# Patient Record
Sex: FEMALE | NOT HISPANIC OR LATINO | Employment: FULL TIME | URBAN - METROPOLITAN AREA
[De-identification: names, ages, dates, MRNs, and addresses within clinical notes are randomized per-mention and may not be internally consistent; named-entity substitution may affect disease eponyms.]

---

## 2021-08-06 ENCOUNTER — THERAPY VISIT (OUTPATIENT)
Dept: PHYSICAL THERAPY | Facility: CLINIC | Age: 46
End: 2021-08-06
Payer: COMMERCIAL

## 2021-08-06 DIAGNOSIS — S39.012A STRAIN OF LUMBAR REGION, INITIAL ENCOUNTER: ICD-10-CM

## 2021-08-06 DIAGNOSIS — M25.561 CHRONIC PAIN OF RIGHT KNEE: ICD-10-CM

## 2021-08-06 DIAGNOSIS — G89.29 CHRONIC PAIN OF RIGHT KNEE: ICD-10-CM

## 2021-08-06 PROCEDURE — 97161 PT EVAL LOW COMPLEX 20 MIN: CPT | Mod: GP | Performed by: PHYSICAL THERAPIST

## 2021-08-06 PROCEDURE — 97110 THERAPEUTIC EXERCISES: CPT | Mod: GP | Performed by: PHYSICAL THERAPIST

## 2021-08-06 ASSESSMENT — ACTIVITIES OF DAILY LIVING (ADL)
GO UP STAIRS: ACTIVITY IS FAIRLY DIFFICULT
STAND: ACTIVITY IS FAIRLY DIFFICULT
GIVING WAY, BUCKLING OR SHIFTING OF KNEE: THE SYMPTOM PREVENTS ME FROM ALL DAILY ACTIVITIES
SWELLING: THE SYMPTOM PREVENTS ME FROM ALL DAILY ACTIVITIES
HOW_WOULD_YOU_RATE_THE_OVERALL_FUNCTION_OF_YOUR_KNEE_DURING_YOUR_USUAL_DAILY_ACTIVITIES?: ABNORMAL
KNEEL ON THE FRONT OF YOUR KNEE: NOT ANSWERED
GO DOWN STAIRS: NOT ANSWERED
SQUAT: ACTIVITY IS FAIRLY DIFFICULT
PAIN: THE SYMPTOM PREVENTS ME FROM ALL DAILY ACTIVITIES
SIT WITH YOUR KNEE BENT: ACTIVITY IS VERY DIFFICULT
AS_A_RESULT_OF_YOUR_KNEE_INJURY,_HOW_WOULD_YOU_RATE_YOUR_CURRENT_LEVEL_OF_DAILY_ACTIVITY?: ABNORMAL
WALK: ACTIVITY IS FAIRLY DIFFICULT
LIMPING: I DO NOT HAVE THE SYMPTOM
KNEE_ACTIVITY_OF_DAILY_LIVING_SUM: 19
RISE FROM A CHAIR: NOT ANSWERED
HOW_WOULD_YOU_RATE_THE_CURRENT_FUNCTION_OF_YOUR_KNEE_DURING_YOUR_USUAL_DAILY_ACTIVITIES_ON_A_SCALE_FROM_0_TO_100_WITH_100_BEING_YOUR_LEVEL_OF_KNEE_FUNCTION_PRIOR_TO_YOUR_INJURY_AND_0_BEING_THE_INABILITY_TO_PERFORM_ANY_OF_YOUR_USUAL_DAILY_ACTIVITIES?: 0
STIFFNESS: NOT ANSWERED
WEAKNESS: I DO NOT HAVE THE SYMPTOM

## 2021-08-06 NOTE — PROGRESS NOTES
Physical Therapy Initial Evaluation  Subjective:  The history is provided by the patient. No  was used.   Therapist Generated HPI Evaluation  Problem details: Pt reports several years of knee pain without known cause. Does yoga regularly and a few weeks ago (July 2021) was bending forward and noticed soreness and stiffness in her back. Her  had to help her stand back up. The onset of the back pain has aggravated her R knee pain. MD referred to PT. .         Type of problem:  Right knee.    This is a chronic condition.  Condition occurred with:  Insidious onset.  Where condition occurred: during recreation/sport.  Patient reports pain:  Anterior.  Pain is described as aching and is intermittent.  Pain is the same all the time.  Since onset symptoms are unchanged.  Associated symptoms:  Edema and tingling (R foot tingling since back pain onset a couple weeks ago). Symptoms are exacerbated by walking, descending stairs and ascending stairs  and relieved by rest.  Special tests included:  X-ray (neg).    Restrictions due to condition include:  Working in normal job without restrictions.  Barriers include:  None as reported by patient.    Patient Health History           General health as reported by patient is good.  Pertinent medical history includes: none.   Red flags:  None as reported by patient.  Medical allergies: none.   Surgeries include:  None.    Current medications:  Pain medication.    Current occupation is IT.   Primary job tasks include:  Computer work and prolonged sitting.                                    Objective:  Standing Alignment:        Lumbar:  Lordosis decr                                                             Hip Evaluation    Hip Strength:        Abduction:  Left: 4+/5     Pain:Right: 3+/5    Pain:                           Knee Evaluation:  ROM:  Strength:  Normal  AROM    Hyperextension:  Left:  12    Right: 4    Flexion: Left: 135    Right:  134              Palpation:  Not Assessed      Edema:  Normal              Sami Lumbar Evaluation    Posture:  Sitting: poor            Movement Loss:  Flexion (Flex): nil  Extension (EXT): nil  Side Glide R (SG R): nil  Side Glide L (SG L): nil and pain                                               ROS    Assessment/Plan:    Patient is a 45 year old female with right side knee complaints.    Patient has the following significant findings with corresponding treatment plan.                Diagnosis 1:  Chronic R patellofemoral pain, recent exacerbation following low back strain  Pain -  manual therapy, self management, education, directional preference exercise and home program  Decreased ROM/flexibility - manual therapy, therapeutic exercise, therapeutic activity and home program  Decreased joint mobility - manual therapy, therapeutic exercise, therapeutic activity and home program  Decreased strength - therapeutic exercise, therapeutic activities and home program  Inflammation - self management/home program  Decreased function - therapeutic activities and home program  Impaired posture - neuro re-education, therapeutic activities and home program      Cumulative Therapy Evaluation is: Low complexity.    Previous and current functional limitations:  (See Goal Flow Sheet for this information)    Short term and Long term goals: (See Goal Flow Sheet for this information)     Communication ability:  Patient appears to be able to clearly communicate and understand verbal and written communication and follow directions correctly.  Treatment Explanation - The following has been discussed with the patient:   RX ordered/plan of care  Anticipated outcomes  Possible risks and side effects  This patient would benefit from PT intervention to resume normal activities.   Rehab potential is excellent.    Frequency:  1 X week, once daily  Duration:  for 6 weeks  Discharge Plan:  Achieve all LTG.  Independent in home treatment  program.  Reach maximal therapeutic benefit.    Please refer to the daily flowsheet for treatment today, total treatment time and time spent performing 1:1 timed codes.

## 2021-08-06 NOTE — LETTER
[facility]  [address]  [place] [address]  [phone]    2021    Re: Sharon Fong   : 1975  MRN: [id number]   REFERRING PHYSICIAN:   Lashon AIKEN [facility]    Date of Initial Evaluation:  2021  Visits:  Rxs Used: 1  Reason for Referral:     Chronic pain of right knee  Strain of lumbar region, initial encounter    EVALUATION SUMMARY    Physical Therapy Initial Evaluation  Subjective:  The history is provided by the patient. No  was used.   Therapist Generated HPI Evaluation  Problem details: Pt reports several years of knee pain without known cause. Does yoga regularly and a few weeks ago (2021) was bending forward and noticed soreness and stiffness in her back. Her  had to help her stand back up. The onset of the back pain has aggravated her R knee pain. MD referred to PT. .         Type of problem:  Right knee.  This is a chronic condition.  Condition occurred with:  Insidious onset.  Where condition occurred: during recreation/sport.  Patient reports pain:  Anterior.  Pain is described as aching and is intermittent.  Pain is the same all the time.  Since onset symptoms are unchanged.  Associated symptoms:  Edema and tingling (R foot tingling since back pain onset a couple weeks ago). Symptoms are exacerbated by walking, descending stairs and ascending stairs  and relieved by rest.  Special tests included:  X-ray (neg).  Restrictions due to condition include:  Working in normal job without restrictions.  Barriers include:  None as reported by patient.    Patient Health History  General health as reported by patient is good.  Pertinent medical history includes: none.   Red flags:  None as reported by patient.  Medical allergies: none.   Surgeries include:  None.    Current medications:  Pain medication.    Current occupation is IT.    Primary job tasks include:  Computer work and prolonged sitting.                Sharon Fong   : 1975- page 2      Objective:  Standing Alignment:    Lumbar:  Lordosis decr       Hip Evaluation  Hip Strength:    Abduction:  Left: 4+/5     Pain:Right: 3+/5    Pain:       Knee Evaluation:  ROM:  Strength:  Normal  AROM  Hyperextension:  Left:  12    Right: 4  Flexion: Left: 135    Right: 134  Palpation:  Not Assessed  Edema:  Normal      Sami Lumbar Evaluation  Posture:  Sitting: poor  Movement Loss:  Flexion (Flex): nil  Extension (EXT): nil  Side Glide R (SG R): nil  Side Glide L (SG L): nil and pain    Assessment/Plan:    Patient is a 45 year old female with right side knee complaints.    Patient has the following significant findings with corresponding treatment plan.                Diagnosis 1:  Chronic R patellofemoral pain, recent exacerbation following low back strain  Pain -  manual therapy, self management, education, directional preference exercise and home program  Decreased ROM/flexibility - manual therapy, therapeutic exercise, therapeutic activity and home program  Decreased joint mobility - manual therapy, therapeutic exercise, therapeutic activity and home program  Decreased strength - therapeutic exercise, therapeutic activities and home program  Inflammation - self management/home program  Decreased function - therapeutic activities and home program  Impaired posture - neuro re-education, therapeutic activities and home program  Cumulative Therapy Evaluation is: Low complexity.  Previous and current functional limitations:  (See Goal Flow Sheet for this information)    Short term and Long term goals: (See Goal Flow Sheet for this information)   Communication ability:  Patient appears to be able to clearly communicate and understand verbal and written communication and follow directions correctly.  Treatment Explanation - The following has been discussed with the patient:   RX  ordered/plan of care  Anticipated outcomes  Possible risks and side effects  This patient would benefit from PT intervention to resume normal activities.   Rehab potential is excellent.    Sharon Fong   : 1975- page 3    Frequency:  1 X week, once daily  Duration:  for 6 weeks  Discharge Plan:  Achieve all LTG.  Independent in home treatment program.  Reach maximal therapeutic benefit.    Thank you for your referral.    INQUIRIES  Therapist: Desean Espinal, PT, DPT, Cert. MDT   [address]  [facility] [address]  Phone: [phone]  Fax: [phone]

## 2021-08-12 NOTE — PROGRESS NOTES
Subjective:  HPI  Physical Exam                    Objective:  System    Physical Exam      Sami Lumbar Evaluation      Movement Loss:  Flexion (Flex): nil  Extension (EXT): nil and min  Side Glide R (SG R): nil  Side Glide L (SG L): nil and pain                                               ROS    Assessment/Plan:    SUBJECTIVE  Subjective changes as noted by pt:  No change in back symptoms, still hurts when sitting for long periods. Knee doesn't feel too back. Cannot reach 30 reps on strengthening yet.        Current pain level: 3/10     Changes in function:  Yes (See Goal flowsheet attached for changes in current functional level)     Adverse reaction to treatment or activity:  None    OBJECTIVE  Changes in objective findings:  Yes, See physical exam section and/or daily flowsheet for response to repeated movements.           ASSESSMENT  Suparna continues to require intervention to meet STG and LTG's: PT  No change of symptoms has been noted.  Response to therapy has shown lack of progress in  pain level  Progress made towards STG/LTG?  Yes (See Goal flowsheet attached for updates on achievement of STG and LTG)    PLAN  Current treatment program is being advanced to more complex exercises.    PTA/ATC plan:  N/A    Please refer to the daily flowsheet for treatment today, total treatment time and time spent performing 1:1 timed codes.

## 2021-08-13 ENCOUNTER — THERAPY VISIT (OUTPATIENT)
Dept: PHYSICAL THERAPY | Facility: CLINIC | Age: 46
End: 2021-08-13
Payer: COMMERCIAL

## 2021-08-13 DIAGNOSIS — G89.29 CHRONIC PAIN OF RIGHT KNEE: ICD-10-CM

## 2021-08-13 DIAGNOSIS — M25.561 CHRONIC PAIN OF RIGHT KNEE: ICD-10-CM

## 2021-08-13 DIAGNOSIS — S39.012A STRAIN OF LUMBAR REGION, INITIAL ENCOUNTER: ICD-10-CM

## 2021-08-13 PROCEDURE — 97112 NEUROMUSCULAR REEDUCATION: CPT | Mod: GP | Performed by: PHYSICAL THERAPIST

## 2021-08-13 PROCEDURE — 97110 THERAPEUTIC EXERCISES: CPT | Mod: GP | Performed by: PHYSICAL THERAPIST

## 2021-08-20 ENCOUNTER — THERAPY VISIT (OUTPATIENT)
Dept: PHYSICAL THERAPY | Facility: CLINIC | Age: 46
End: 2021-08-20
Payer: COMMERCIAL

## 2021-08-20 DIAGNOSIS — G89.29 CHRONIC PAIN OF RIGHT KNEE: ICD-10-CM

## 2021-08-20 DIAGNOSIS — M25.561 CHRONIC PAIN OF RIGHT KNEE: ICD-10-CM

## 2021-08-20 DIAGNOSIS — S39.012A STRAIN OF LUMBAR REGION, INITIAL ENCOUNTER: ICD-10-CM

## 2021-08-20 PROCEDURE — 97110 THERAPEUTIC EXERCISES: CPT | Mod: GP | Performed by: PHYSICAL THERAPIST

## 2021-08-20 PROCEDURE — 97112 NEUROMUSCULAR REEDUCATION: CPT | Mod: GP | Performed by: PHYSICAL THERAPIST

## 2021-08-27 ENCOUNTER — THERAPY VISIT (OUTPATIENT)
Dept: PHYSICAL THERAPY | Facility: CLINIC | Age: 46
End: 2021-08-27
Payer: COMMERCIAL

## 2021-08-27 DIAGNOSIS — G89.29 CHRONIC PAIN OF RIGHT KNEE: ICD-10-CM

## 2021-08-27 DIAGNOSIS — S39.012A STRAIN OF LUMBAR REGION, INITIAL ENCOUNTER: ICD-10-CM

## 2021-08-27 DIAGNOSIS — M25.561 CHRONIC PAIN OF RIGHT KNEE: ICD-10-CM

## 2021-08-27 PROCEDURE — 97110 THERAPEUTIC EXERCISES: CPT | Mod: GP | Performed by: PHYSICAL THERAPIST

## 2021-08-27 PROCEDURE — 97112 NEUROMUSCULAR REEDUCATION: CPT | Mod: GP | Performed by: PHYSICAL THERAPIST

## 2021-09-03 ENCOUNTER — THERAPY VISIT (OUTPATIENT)
Dept: PHYSICAL THERAPY | Facility: CLINIC | Age: 46
End: 2021-09-03
Payer: COMMERCIAL

## 2021-09-03 DIAGNOSIS — G89.29 CHRONIC PAIN OF RIGHT KNEE: ICD-10-CM

## 2021-09-03 DIAGNOSIS — S39.012A STRAIN OF LUMBAR REGION, INITIAL ENCOUNTER: ICD-10-CM

## 2021-09-03 DIAGNOSIS — M25.561 CHRONIC PAIN OF RIGHT KNEE: ICD-10-CM

## 2021-09-03 PROCEDURE — 97112 NEUROMUSCULAR REEDUCATION: CPT | Mod: GP | Performed by: PHYSICAL THERAPIST

## 2021-09-03 PROCEDURE — 97110 THERAPEUTIC EXERCISES: CPT | Mod: GP | Performed by: PHYSICAL THERAPIST

## 2021-09-03 NOTE — PROGRESS NOTES
Subjective:  HPI  Physical Exam                    Objective:  System    Physical Exam    General     ROS    Assessment/Plan:    SUBJECTIVE  Subjective changes as noted by pt:  The back bothers more than the knee at this point. The knee feels about 50% improved overall. Can still see swelling in her knee. Maybe performing the pressups 2x/day, feels better in the morning. Standing for long periods and walking bothers the knee. Sitting worsens the back.        Current pain level: 2/10     Changes in function:  Yes (See Goal flowsheet attached for changes in current functional level)     Adverse reaction to treatment or activity:  None    OBJECTIVE  Changes in objective findings:  Yes, See physical exam section and/or daily flowsheet for response to repeated movements.           ASSESSMENT  Suparna continues to require intervention to meet STG and LTG's: PT  No change of symptoms has been noted.  Response to therapy has shown lack of progress in  pain level  Progress made towards STG/LTG?  Yes (See Goal flowsheet attached for updates on achievement of STG and LTG)    PLAN  Continue current treatment plan until patient demonstrates readiness to progress to higher level exercises.    PTA/ATC plan:  N/A    Please refer to the daily flowsheet for treatment today, total treatment time and time spent performing 1:1 timed codes.

## 2021-10-17 ENCOUNTER — HEALTH MAINTENANCE LETTER (OUTPATIENT)
Age: 46
End: 2021-10-17

## 2021-10-20 PROBLEM — M25.561 RIGHT KNEE PAIN: Status: RESOLVED | Noted: 2021-08-06 | Resolved: 2021-10-20

## 2021-10-20 PROBLEM — S39.012A LOW BACK STRAIN: Status: RESOLVED | Noted: 2021-08-06 | Resolved: 2021-10-20

## 2021-10-20 NOTE — PROGRESS NOTES
Discharge Note    Progress reporting period is from initial evaluation date (please see noted date below) to Sep 3, 2021.  No linked episodes      Sharon failed to follow up and current status is unknown.  Please see information below for last relevant information on current status.  Patient seen for 5 visits.    SUBJECTIVE  Subjective changes noted by patient:     .  Current pain level is  .     Previous pain level was  4/10.   Changes in function:  Yes (See Goal flowsheet attached for changes in current functional level)  Adverse reaction to treatment or activity: None    OBJECTIVE  Changes noted in objective findings:       ASSESSMENT/PLAN  Diagnosis: chronic R patellofemoral pain, exacerbated after recent low back strain   Updated problem list and treatment plan:   Pain - HEP  Decreased ROM/flexibility - HEP  Decreased function - HEP  Decreased strength - HEP  STG/LTGs have been met or progress has been made towards goals:  Yes, please see goal flowsheet for most current information  Assessment of Progress: current status is unknown.    Last current status:     Self Management Plans:  HEP  I have re-evaluated this patient and find that the nature, scope, duration and intensity of the therapy is appropriate for the medical condition of the patient.  Sharon continues to require the following intervention to meet STG and LTG's:  HEP.    Recommendations:  Discharge with current home program.  Patient to follow up with MD as needed.    Please refer to the daily flowsheet for treatment today, total treatment time and time spent performing 1:1 timed codes.

## 2022-08-09 ENCOUNTER — LAB REQUISITION (OUTPATIENT)
Dept: LAB | Facility: CLINIC | Age: 47
End: 2022-08-09
Payer: COMMERCIAL

## 2022-08-09 DIAGNOSIS — Z01.411 ENCOUNTER FOR GYNECOLOGICAL EXAMINATION (GENERAL) (ROUTINE) WITH ABNORMAL FINDINGS: ICD-10-CM

## 2022-08-09 LAB
CHOLEST SERPL-MCNC: 194 MG/DL
ERYTHROCYTE [DISTWIDTH] IN BLOOD BY AUTOMATED COUNT: 14.2 % (ref 10–15)
HBA1C MFR BLD: 5.8 %
HCT VFR BLD AUTO: 37.3 % (ref 35–47)
HDLC SERPL-MCNC: 37 MG/DL
HGB BLD-MCNC: 11.6 G/DL (ref 11.7–15.7)
LDLC SERPL CALC-MCNC: 122 MG/DL
MCH RBC QN AUTO: 27 PG (ref 26.5–33)
MCHC RBC AUTO-ENTMCNC: 31.1 G/DL (ref 31.5–36.5)
MCV RBC AUTO: 87 FL (ref 78–100)
NONHDLC SERPL-MCNC: 157 MG/DL
PLATELET # BLD AUTO: 230 10E3/UL (ref 150–450)
RBC # BLD AUTO: 4.3 10E6/UL (ref 3.8–5.2)
TRIGL SERPL-MCNC: 176 MG/DL
TSH SERPL DL<=0.005 MIU/L-ACNC: 3.09 UIU/ML (ref 0.3–4.2)
WBC # BLD AUTO: 8.8 10E3/UL (ref 4–11)

## 2022-08-09 PROCEDURE — 83036 HEMOGLOBIN GLYCOSYLATED A1C: CPT | Mod: ORL | Performed by: OBSTETRICS & GYNECOLOGY

## 2022-08-09 PROCEDURE — 80061 LIPID PANEL: CPT | Mod: ORL | Performed by: OBSTETRICS & GYNECOLOGY

## 2022-08-09 PROCEDURE — G0145 SCR C/V CYTO,THINLAYER,RESCR: HCPCS | Mod: ORL | Performed by: OBSTETRICS & GYNECOLOGY

## 2022-08-09 PROCEDURE — 85027 COMPLETE CBC AUTOMATED: CPT | Mod: ORL | Performed by: OBSTETRICS & GYNECOLOGY

## 2022-08-09 PROCEDURE — 84443 ASSAY THYROID STIM HORMONE: CPT | Mod: ORL | Performed by: OBSTETRICS & GYNECOLOGY

## 2022-08-09 PROCEDURE — 87624 HPV HI-RISK TYP POOLED RSLT: CPT | Mod: ORL | Performed by: OBSTETRICS & GYNECOLOGY

## 2022-08-13 LAB
BKR LAB AP GYN ADEQUACY: NORMAL
BKR LAB AP GYN INTERPRETATION: NORMAL
BKR LAB AP HPV REFLEX: NORMAL
BKR LAB AP LMP: NORMAL
BKR LAB AP PREVIOUS ABNL DX: NORMAL
BKR LAB AP PREVIOUS ABNORMAL: NORMAL
PATH REPORT.COMMENTS IMP SPEC: NORMAL
PATH REPORT.COMMENTS IMP SPEC: NORMAL
PATH REPORT.RELEVANT HX SPEC: NORMAL

## 2022-08-16 LAB
HUMAN PAPILLOMA VIRUS 16 DNA: NEGATIVE
HUMAN PAPILLOMA VIRUS 18 DNA: NEGATIVE
HUMAN PAPILLOMA VIRUS FINAL DIAGNOSIS: NORMAL
HUMAN PAPILLOMA VIRUS OTHER HR: NEGATIVE

## 2022-10-03 ENCOUNTER — HEALTH MAINTENANCE LETTER (OUTPATIENT)
Age: 47
End: 2022-10-03

## 2022-12-22 ENCOUNTER — LAB REQUISITION (OUTPATIENT)
Dept: LAB | Facility: CLINIC | Age: 47
End: 2022-12-22
Payer: COMMERCIAL

## 2022-12-22 DIAGNOSIS — R30.0 DYSURIA: ICD-10-CM

## 2022-12-22 PROCEDURE — 82565 ASSAY OF CREATININE: CPT | Mod: ORL | Performed by: OBSTETRICS & GYNECOLOGY

## 2022-12-22 PROCEDURE — 87086 URINE CULTURE/COLONY COUNT: CPT | Mod: ORL | Performed by: OBSTETRICS & GYNECOLOGY

## 2022-12-23 LAB
CREAT SERPL-MCNC: 0.63 MG/DL (ref 0.51–0.95)
GFR SERPL CREATININE-BSD FRML MDRD: >90 ML/MIN/1.73M2

## 2022-12-24 LAB — BACTERIA UR CULT: NO GROWTH

## 2023-02-11 ENCOUNTER — HEALTH MAINTENANCE LETTER (OUTPATIENT)
Age: 48
End: 2023-02-11

## 2024-03-09 ENCOUNTER — HEALTH MAINTENANCE LETTER (OUTPATIENT)
Age: 49
End: 2024-03-09

## 2024-03-28 ENCOUNTER — TRANSFERRED RECORDS (OUTPATIENT)
Dept: HEALTH INFORMATION MANAGEMENT | Facility: CLINIC | Age: 49
End: 2024-03-28
Payer: COMMERCIAL

## 2024-04-08 ENCOUNTER — TRANSCRIBE ORDERS (OUTPATIENT)
Dept: OTHER | Age: 49
End: 2024-04-08

## 2024-04-08 ENCOUNTER — PATIENT OUTREACH (OUTPATIENT)
Dept: ONCOLOGY | Facility: CLINIC | Age: 49
End: 2024-04-08
Payer: COMMERCIAL

## 2024-04-08 DIAGNOSIS — C50.919 BREAST CANCER (H): Primary | ICD-10-CM

## 2024-04-08 NOTE — TELEPHONE ENCOUNTER
"Action 2024 3:47 PM ABT   Action Taken Records from RN/Provider received and sent to HIM for upload. Pathology report from [facility] also received and sent to HIM for upload     RECORDS STATUS - BREAST    RECORDS REQUESTED FROM: [facility]   DATE REQUESTED:    NOTES DETAILS STATUS   OFFICE NOTE from referring provider SELF    OFFICE NOTE from medical oncologist [facility] 24: Dr. Marivel Lockett   OPERATIVE REPORT [facility] & [facility] 11/15/13: EXCISION AXILLARY BREAST TISSUE RIGHT     11: RIGHT PARTIAL MASTECTOMY    MEDICATION LIST -NM    LABS     PATHOLOGY REPORTS  (Tissue diagnosis, Stage, ER/AL percentage positive and intensity of staining, HER2 IHC, FISH, and all biopsies from breast and any distant metastasis)                 Req -[facility]  [employer] [facility]:  24: [id number]  11: [id number]  10/08/10: [id number]    [facility]:  11/15/19: [id number]  \"Negative for atypia and malignancy \"  11/15/13: [id number]  \"No atypia or malignancy\"   IMAGING (NEED IMAGES & REPORT)     MRI PACS [facility]:  11, 08/09/10: MR Breast   MAMMO PACS [facility]:  24-07/17/15   ULTRASOUND PACS [facility]:  24-05/06/10: US Breast  24: US Lymph Node     "

## 2024-04-08 NOTE — PROGRESS NOTES
"New Patient Oncology Nurse Navigator Note     Referring provider: Self     Referred to (specialty:) Medical Oncology     Requested provider (if applicable): Dr. Dame Bernal     Date Referral Received: April 8, 2024     Evaluation for:  C50.919 (ICD-10-CM) - Breast cancer (H)     Clinical History (per Nurse review of records provided):      2009  HX BREAST LUMPECTOMY Right 2009  benign   IS THIS AN ACCURATE DATE OR SHOULD IT BE 2010??    2010  10/8/2010  A.     Right breast, 4:00, 3 cm from nipple, cyst aspiration:  Two syringes totalling 7 cc of thick, cloudy brown and bloody fluid.    B.     Right breast, 7:00, 3 cm from nipple, cyst aspiration:  7 cc of cloudy thick brown fluid.   C.     Right breast, 4:00 at nipple, cyst aspiration:  3 cc of thick cloudy tan blood-streaked fluid.([id number])     10/8/2010  Right breast ultrasound-guided biopsy:  Five needle cores up to 1.5 cm. Inked black. IT-1.    MICROSCOPIC  Multiple biopsies reveal foamy histiocytes with an inflammatory reaction composed of neutrophils, lymphocytes, plasma cells and foreign body giant cells.  There is fatty tissue and fibrous tissue. The findings suggest an absess, however, clinical correlation is recommended.  There is a minute fragment of residual duct and lobular units within fibrous stroma without abnormality.    Conclusion   Breast, right 4:00 1 cm from nipple, biopsy  Features consistent with abscess.      2011  HX MASTECTOMY Right 02/2011   partial rt for infection. Is \"MASTECTOMY\" correct?    2/4/2011 - Specimen No: [id number]   A 145 gram lumpectomy specimen measuring 8.0 cm from superior to inferior, 11.5 cm from medial to lateral, and 4.0 cm from anterior to posterior.     Breast, right, excision  Multifocal abscess formation with noncaseating granulomatous inflammation negative for acid fast and fungal organisms. Proliferative fibrocytic change with focal atypical ductal hyperplasia and fibroadenoma.     2019   11/15/2019 - Case: " [id number]   A) SKIN AND SOFT TISSUE, LEFT AXILLA, EXCISION:   1. Benign breast tissue, consistent with ectopic axillary breast tisue   2. Negative for atypia and malignancy     2024  Patient presented with a palpable lump in the 1 o'clock position of the right breast.   Diagnostic mammogram on 3/21/24 showed clustered fine indeterminate microcalcifications in the 12 o'clock position of the right breast, posterior depth. A right breast stereotactic biopsy is recommended for further evaluation. No definitive mammographic abnormality identified to correspond with the palpable area of fullness in the 1 o'clock position   US:   -There is a solid hypoechoic irregularly marginated mass in the 1 o'clock position, 10 cm from the nipple, measuring up to 3.7 x 2.3 x 1.5 cm.   -Additional solid hypoechoic lesion located at the 2 o'clock position of the right breast, 3 cm from the nipple, measuring 1.0 x 0.5 cm.   -Additional hypoechoic solid irregularly marginated lesion at the 2 o'clock position of the right breast, approximately 12 cm from the nipple, located 2.2 cm from the dominant lesion at the 1 o'clock position of the right breast. 0.7 cm  -There are 2 adjacent enlarged right axillary lymph nodes with the largest node measuring 1.4 cm, with a cortical thickness of up to 6 mm.   Imaging needed    3/28/24:          Pathology review needed    Met with Dr. Marivel Lockett on 4/4/24.     4/8 8:45 - Telephoned and spoke with patient. Explained my role and purpose for the call. Writer received referral, reviewed for appropriate plan, and call warm transferred to New Patient Scheduling for completion.    Records Location: Care Everywhere, Media, and See Bookmarked material     Records Needed:   All breast imaging for past 5 years (no imaging on PACS as of 4/8 0854)  Pathology review (as applicable)

## 2024-04-09 ENCOUNTER — ONCOLOGY VISIT (OUTPATIENT)
Dept: ONCOLOGY | Facility: CLINIC | Age: 49
End: 2024-04-09
Attending: INTERNAL MEDICINE
Payer: COMMERCIAL

## 2024-04-09 ENCOUNTER — ALLIED HEALTH/NURSE VISIT (OUTPATIENT)
Dept: ONCOLOGY | Facility: CLINIC | Age: 49
End: 2024-04-09
Payer: COMMERCIAL

## 2024-04-09 ENCOUNTER — PRE VISIT (OUTPATIENT)
Dept: ONCOLOGY | Facility: CLINIC | Age: 49
End: 2024-04-09
Payer: COMMERCIAL

## 2024-04-09 VITALS
SYSTOLIC BLOOD PRESSURE: 134 MMHG | TEMPERATURE: 97.9 F | BODY MASS INDEX: 25.83 KG/M2 | DIASTOLIC BLOOD PRESSURE: 93 MMHG | HEIGHT: 65 IN | HEART RATE: 76 BPM | RESPIRATION RATE: 16 BRPM | WEIGHT: 155 LBS | OXYGEN SATURATION: 99 %

## 2024-04-09 DIAGNOSIS — R56.9 SEIZURE (H): ICD-10-CM

## 2024-04-09 DIAGNOSIS — Z17.0 MALIGNANT NEOPLASM OF UPPER-INNER QUADRANT OF RIGHT BREAST IN FEMALE, ESTROGEN RECEPTOR POSITIVE (H): ICD-10-CM

## 2024-04-09 DIAGNOSIS — C50.211 MALIGNANT NEOPLASM OF UPPER-INNER QUADRANT OF RIGHT BREAST IN FEMALE, ESTROGEN RECEPTOR POSITIVE (H): Primary | Chronic | ICD-10-CM

## 2024-04-09 DIAGNOSIS — E55.9 VITAMIN D DEFICIENCY: ICD-10-CM

## 2024-04-09 DIAGNOSIS — M54.2 NECK PAIN: ICD-10-CM

## 2024-04-09 DIAGNOSIS — Z00.6 EXAMINATION OF PARTICIPANT OR CONTROL IN CLINICAL RESEARCH: ICD-10-CM

## 2024-04-09 DIAGNOSIS — C50.211 MALIGNANT NEOPLASM OF UPPER-INNER QUADRANT OF RIGHT BREAST IN FEMALE, ESTROGEN RECEPTOR POSITIVE (H): ICD-10-CM

## 2024-04-09 DIAGNOSIS — Z17.0 MALIGNANT NEOPLASM OF UPPER-INNER QUADRANT OF RIGHT BREAST IN FEMALE, ESTROGEN RECEPTOR POSITIVE (H): Primary | Chronic | ICD-10-CM

## 2024-04-09 PROCEDURE — 99205 OFFICE O/P NEW HI 60 MIN: CPT | Performed by: INTERNAL MEDICINE

## 2024-04-09 PROCEDURE — 99213 OFFICE O/P EST LOW 20 MIN: CPT | Performed by: INTERNAL MEDICINE

## 2024-04-09 PROCEDURE — 99417 PROLNG OP E/M EACH 15 MIN: CPT | Performed by: INTERNAL MEDICINE

## 2024-04-09 RX ORDER — AMITRIPTYLINE HYDROCHLORIDE 10 MG/1
20 TABLET ORAL AT BEDTIME
COMMUNITY
Start: 2023-07-24 | End: 2024-07-30

## 2024-04-09 RX ORDER — OMEGA-3 FATTY ACIDS/FISH OIL 300-1000MG
CAPSULE ORAL
COMMUNITY

## 2024-04-09 ASSESSMENT — PAIN SCALES - GENERAL: PAINLEVEL: MODERATE PAIN (4)

## 2024-04-09 NOTE — PROGRESS NOTES
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 She presents today for initial medical oncology consultation.      Her full oncologic history is as follows:   Oncologic History  Patient Active Problem List    Diagnosis Date Noted    Malignant neoplasm of upper-inner quadrant of right female breast (H) 2024     Priority: High     Right breast cancer  Has had longstanding history of multiple biopsies, infection, and partial mastectomy for a right breast multifocal abscess dating back to at least .     patient presented with palpable lump at 1:00 in the right breast    3/21/2024 diagnostic mammo showed clustered fine indeterminate calcs at 12:00.  No mammographic abnormality identified to correspond to the palpable area of fullness at 1:00.  On US, at 1:00, 10 cm FN there was a 3.7 x 2.3 x 1.5 cm solid hypoechoic irregularly marginated mass.  Additional hypoechoic lesion located at 2:00, 3 cm FN measuring 1.0 x 0.5 cm.  Another hypoechoic lesion at 2:00, 12 cm FN measuring 0.7 cm.  At least 2 enlarged right axillary lymph nodes -largest measuring up to 0.6 cm.    3/28/2024 ultrasound-guided biopsy of the followin:00 lesion at least DCIS  1:00 lesion grade 2 IDC.  No LVI.  ER (3+, 80 to 90%), [place] (3+, 90 to 100%), HER2 2+ and FISH negative   Right axillary LN: metastatic carcinoma      Inflammatory arthritis 2015     Priority: Medium    Seizure (H) 11/15/2013     Priority: Medium    Vitamin D deficiency 2013     Priority: Medium          Interval History  Sharon is overall doing well.  She presents today accompanied by her .  She is understandably in the midst of still processing her recent diagnosis. She endorses persistent cervical neck pain that is worse on the left side over the  "last several weeks.  At baseline, she has migraines, but no change in quality of headaches recently.      No new visual changes, cough, SOB, chest pain, n/v, constipation/diarrhea, abdominal pain, focal weakness or numbness.       Past Medical History  Past Medical History:   Diagnosis Date    Breast cancer (H)     Hypermobility arthralgia     Migraine     Seizure disorder (H) 2014    Once post surgery         Past Surgical History  Past Surgical History:   Procedure Laterality Date    AS KNEE SCOPE, DIAGNOSTIC      COSMETIC SURGERY Bilateral     removal of axillary excessive tissue    HYSTERECTOMY  2022    fibroids    IR LYMPH NODE BIOPSY  2024    LUMPECTOMY BREAST Right 10/08/2010    for abscess         Family History  History reviewed. No pertinent family history.       Social History  Social History     Tobacco Use    Smoking status: Never     Passive exposure: Never    Smokeless tobacco: Never   Substance Use Topics    Alcohol use: Never    Drug use: Never      Social History     Social History Narrative    Menarche 14. No periods since hysterectomy. No postmenopausal symptoms      first at age 26    Youngest child at diagnosis - 16    Working full time as           Current Medications:  Current Outpatient Medications   Medication Sig Dispense Refill    amitriptyline (ELAVIL) 10 MG tablet Take 20 mg by mouth at bedtime      aspirin-acetaminophen-caffeine (EXCEDRIN MIGRAINE) 250-250-65 MG tablet       ibuprofen (ADVIL/MOTRIN) 200 MG capsule            ECOG Performance Status: 0    Physical Examination:  BP (!) 134/93   Pulse 76   Temp 97.9  F (36.6  C) (Oral)   Resp 16   Ht 1.64 m (5' 4.57\")   Wt 70.3 kg (155 lb)   SpO2 99%   BMI 26.14 kg/m    General:  Well appearing, well-nourished adult female in NAD.  HEENT:  Normocephalic.  Sclera anicteric.  MMM.  No lesions of the oropharynx.  Breast: 2.5 x 3.0 mass at 1:00 7cm FN. Other small palpable densities, difficult to " "appreciate the borders   Lymph:  No cervical, supraclavicular LAD. Small palpable axillary LN bilaterally   Chest:  CTA bilaterally.  No wheezes or crackles.  CV:  RRR.  No murmurs or gallops  Abd:  Soft/NT/ND.  BS normoactive.  No hepatosplenomegaly.  Ext:  No pitting edema of the bilateral lower extremities.  Pulses 2+ and symmetric.  Musculo:  Strength 5/5 throughout.  Neuro:  Cranial nerves grossly intact.  Psych:  Mood and affect appear normal.    Laboratory Data:  Lab Results   Component Value Date    WBC 8.8 08/09/2022    HGB 11.6 (L) 08/09/2022    HCT 37.3 08/09/2022    MCV 87 08/09/2022     08/09/2022     No results found for: \"NA\", \"HCO3\", \"CREATININE\", \"BUN\", \"ANIONGAP\", \"GLUCOSE\"  No results found for: \"ALT\", \"AST\", \"GGT\", \"ALKPHOS\", \"BILITOT\"  No results found for: \"INR\", \"PT\"      Radiology data:  I have personally reviewed the images of / or the following radiology data: As detailed in the oncology timeline      Pathology and other data:  I have personally reviewed the following data: As detailed in the oncology timeline      Assessment and Recommendations  Sharon Fong  is a 48 year old premenopausal woman with a new diagnosis of palp detected [id number] HR+/HER2 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     I had a lengthy discussion with the patient in which we reviewed her breast cancer diagnosis and workup history to date. I reviewed all of the relevant and available clinic notes,imaging, and pathology reports. We discussed the use of locoregional therapies to reduce risk of locoregional disease and the use of systemic therapies to primarily reduce risk of systemic disease. Informed her that locoregional breast cancer is curable and that the goal would be to reduced her risk of developing systemic disease, which can potentially be incurable.  In light of her persistent neck/back pain, will obtain systemic imaging to confirm there is no evidence of distant metastatic disease.    Since she is " premenopausal, given the multifocal nature of her disease and lymph node involvement, I would favor treating her with systemic chemotherapy.I discussed the advantages of offering chemotherapy in the neoadjuvant setting in order to assess response to treatment, and to increase the chances of breast conserving surgery if that is desired. We discussed the chemotherapy regimens that would be recommended as standard of care (T/ddAC), and discussed how these regimens would be different in the context of the ISPY 2.2  clinical trial.  In order to be eligible for the standard arm of the I spy trial, she will need to have a MammaPrint high risk score.  If she does not, briefly discussed that endocrine optimization arm is a potential option.    If her disease is MammaPrint high risk, discussed that in block A (which is currently not open -but hopefully will be at the time of her treatment initiation), the options are Rilvegostomig (a bispecific, humanised IgG1 targeting PD-1 and TIGIT, receptors) and Traztuzumab deruxtecan.  In general, discussed that block B and Block C options are standard chemotherapy.  We discussed the use of tumor molecular subtypes to determine treatment assignments in the I spy trial.  Discussed all of the additional MRIs, biopsy, laboratory testing, PRO surveys, etc. that will be required on the trial but not on standard therapy.  She understands treatment on the trial will likely require a lot more time investment.  Despite all this, she is interested in screening for the trial.  Therefore, initial laboratory evaluations, imaging, and appropriate screening consultations have been ordered.  I will see her back in 2 to 3 weeks to initiate systemic therapy.    In light of her breast cancer diagnosis, I recommended germline genetic testing.  This will be done as part of the I spy trial, but if she decides to be treated with standard therapy, can consider ordering this as it this may at times guide our  treatment decisions and be helpful information for her family members to have.     She was also interested in seeing Dr. Collier for surgical consultation.  Since she will likely be getting neoadjuvant therapy, we will place a routine consult for her to have an initial consultation with them.      120 minutes spent on the date of the encounter doing chart review, review of outside records, review of test results, interpretation of tests, patient visit, documentation, and discussion with other provider(s)       Dame Dolores MD   of Medicine  Division of Hematology, Oncology and Transplantation  [facility]

## 2024-04-09 NOTE — LETTER
2024         RE: Sharon Fong  [address] [address]        Dear Colleague,    Thank you for referring your patient, Sharon Fong, to the [facility]. Please see a copy of my visit note below.    Medical Oncology Note      Sharon Fong    Age: 48 year old        CC: Breast Cancer      HPI: Sharon Fong is a 48 year old premenopausal woman with recent diagnosis of palp detected [id number] HR+/HER2 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 She presents today for initial medical oncology consultation.      Her full oncologic history is as follows:   Oncologic History  Patient Active Problem List    Diagnosis Date Noted    Malignant neoplasm of upper-inner quadrant of right female breast (H) 2024     Priority: High     Right breast cancer  Has had longstanding history of multiple biopsies, infection, and partial mastectomy for a right breast multifocal abscess dating back to at least .     patient presented with palpable lump at 1:00 in the right breast    3/21/2024 diagnostic mammo showed clustered fine indeterminate calcs at 12:00.  No mammographic abnormality identified to correspond to the palpable area of fullness at 1:00.  On US, at 1:00, 10 cm FN there was a 3.7 x 2.3 x 1.5 cm solid hypoechoic irregularly marginated mass.  Additional hypoechoic lesion located at 2:00, 3 cm FN measuring 1.0 x 0.5 cm.  Another hypoechoic lesion at 2:00, 12 cm FN measuring 0.7 cm.  At least 2 enlarged right axillary lymph nodes -largest measuring up to 0.6 cm.    3/28/2024 ultrasound-guided biopsy of the followin:00 lesion at least DCIS  1:00 lesion grade 2 IDC.  No LVI.  ER (3+, 80 to 90%), [place] (3+, 90 to 100%), HER2 2+ and FISH negative   Right axillary LN: metastatic carcinoma      Inflammatory arthritis 2015     Priority: Medium    Seizure (H) 11/15/2013     Priority: Medium    Vitamin D deficiency 2013     Priority: Medium     "      Interval History  Sharon is overall doing well.  She presents today accompanied by her .  She is understandably in the midst of still processing her recent diagnosis. She endorses persistent cervical neck pain that is worse on the left side over the last several weeks.  At baseline, she has migraines, but no change in quality of headaches recently.      No new visual changes, cough, SOB, chest pain, n/v, constipation/diarrhea, abdominal pain, focal weakness or numbness.       Past Medical History  Past Medical History:   Diagnosis Date    Breast cancer (H)     Hypermobility arthralgia     Migraine     Seizure disorder (H) 2014    Once post surgery         Past Surgical History  Past Surgical History:   Procedure Laterality Date    AS KNEE SCOPE, DIAGNOSTIC      COSMETIC SURGERY Bilateral     removal of axillary excessive tissue    HYSTERECTOMY  2022    fibroids    IR LYMPH NODE BIOPSY  2024    LUMPECTOMY BREAST Right 10/08/2010    for abscess         Family History  History reviewed. No pertinent family history.       Social History  Social History     Tobacco Use    Smoking status: Never     Passive exposure: Never    Smokeless tobacco: Never   Substance Use Topics    Alcohol use: Never    Drug use: Never      Social History     Social History Narrative    Menarche 14. No periods since hysterectomy. No postmenopausal symptoms      first at age 26    Youngest child at diagnosis - 16    Working full time as           Current Medications:  Current Outpatient Medications   Medication Sig Dispense Refill    amitriptyline (ELAVIL) 10 MG tablet Take 20 mg by mouth at bedtime      aspirin-acetaminophen-caffeine (EXCEDRIN MIGRAINE) 250-250-65 MG tablet       ibuprofen (ADVIL/MOTRIN) 200 MG capsule            ECOG Performance Status: 0    Physical Examination:  BP (!) 134/93   Pulse 76   Temp 97.9  F (36.6  C) (Oral)   Resp 16   Ht 1.64 m (5' 4.57\")   Wt 70.3 kg (155 lb)   " "SpO2 99%   BMI 26.14 kg/m    General:  Well appearing, well-nourished adult female in NAD.  HEENT:  Normocephalic.  Sclera anicteric.  MMM.  No lesions of the oropharynx.  Breast: 2.5 x 3.0 mass at 1:00 7cm FN. Other small palpable densities, difficult to appreciate the borders   Lymph:  No cervical, supraclavicular LAD. Small palpable axillary LN bilaterally   Chest:  CTA bilaterally.  No wheezes or crackles.  CV:  RRR.  No murmurs or gallops  Abd:  Soft/NT/ND.  BS normoactive.  No hepatosplenomegaly.  Ext:  No pitting edema of the bilateral lower extremities.  Pulses 2+ and symmetric.  Musculo:  Strength 5/5 throughout.  Neuro:  Cranial nerves grossly intact.  Psych:  Mood and affect appear normal.    Laboratory Data:  Lab Results   Component Value Date    WBC 8.8 08/09/2022    HGB 11.6 (L) 08/09/2022    HCT 37.3 08/09/2022    MCV 87 08/09/2022     08/09/2022     No results found for: \"NA\", \"HCO3\", \"CREATININE\", \"BUN\", \"ANIONGAP\", \"GLUCOSE\"  No results found for: \"ALT\", \"AST\", \"GGT\", \"ALKPHOS\", \"BILITOT\"  No results found for: \"INR\", \"PT\"      Radiology data:  I have personally reviewed the images of / or the following radiology data: As detailed in the oncology timeline      Pathology and other data:  I have personally reviewed the following data: As detailed in the oncology timeline      Assessment and Recommendations  Sharon Fong  is a 48 year old premenopausal woman with a new diagnosis of palp detected [id number] HR+/HER2 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     I had a lengthy discussion with the patient in which we reviewed her breast cancer diagnosis and workup history to date. I reviewed all of the relevant and available clinic notes,imaging, and pathology reports. We discussed the use of locoregional therapies to reduce risk of locoregional disease and the use of systemic therapies to primarily reduce risk of systemic disease. Informed her that locoregional breast cancer is curable and that the " goal would be to reduced her risk of developing systemic disease, which can potentially be incurable.  In light of her persistent neck/back pain, will obtain systemic imaging to confirm there is no evidence of distant metastatic disease.    Since she is premenopausal, given the multifocal nature of her disease and lymph node involvement, I would favor treating her with systemic chemotherapy.I discussed the advantages of offering chemotherapy in the neoadjuvant setting in order to assess response to treatment, and to increase the chances of breast conserving surgery if that is desired. We discussed the chemotherapy regimens that would be recommended as standard of care (T/ddAC), and discussed how these regimens would be different in the context of the ISPY 2.2  clinical trial.  In order to be eligible for the standard arm of the I spy trial, she will need to have a MammaPrint high risk score.  If she does not, briefly discussed that endocrine optimization arm is a potential option.    If her disease is MammaPrint high risk, discussed that in block A (which is currently not open -but hopefully will be at the time of her treatment initiation), the options are Rilvegostomig (a bispecific, humanised IgG1 targeting PD-1 and TIGIT, receptors) and Traztuzumab deruxtecan.  In general, discussed that block B and Block C options are standard chemotherapy.  We discussed the use of tumor molecular subtypes to determine treatment assignments in the I spy trial.  Discussed all of the additional MRIs, biopsy, laboratory testing, PRO surveys, etc. that will be required on the trial but not on standard therapy.  She understands treatment on the trial will likely require a lot more time investment.  Despite all this, she is interested in screening for the trial.  Therefore, initial laboratory evaluations, imaging, and appropriate screening consultations have been ordered.  I will see her back in 2 to 3 weeks to initiate systemic  therapy.    In light of her breast cancer diagnosis, I recommended germline genetic testing.  This will be done as part of the I spy trial, but if she decides to be treated with standard therapy, can consider ordering this as it this may at times guide our treatment decisions and be helpful information for her family members to have.     She was also interested in seeing Dr. Collier for surgical consultation.  Since she will likely be getting neoadjuvant therapy, we will place a routine consult for her to have an initial consultation with them.      120 minutes spent on the date of the encounter doing chart review, review of outside records, review of test results, interpretation of tests, patient visit, documentation, and discussion with other provider(s)       Dame Dolores MD   of Medicine  Division of Hematology, Oncology and Transplantation  [facility]

## 2024-04-09 NOTE — NURSING NOTE
[id number]: Informed Consent Note     The consent form, including purpose, risks and benefits, was reviewed with Sharon Suarezantonyshelly, and all questions were answered before she signed the consent form. The patient understands that the study involves an active treatment phase as well as a post-treatment follow up phase.     Present during the discussion were Sharon and her , Fransisco. A copy of the signed form was provided to the patient. No procedures specific to this study were performed prior to the patient signing the consent form.    Consent Version Date: [id number]  Consent obtained by: Smiley Frank RN    Date: [id number]  HIPAA authorization signed?: yes  HIPAA authorization version date: [id number]    Smiley Frank RN    Form 503.03.01 (Version 2)     Effective date: 01AUG2018     Next Review Date: 01AUG2020

## 2024-04-11 ENCOUNTER — ANESTHESIA EVENT (OUTPATIENT)
Dept: SURGERY | Facility: AMBULATORY SURGERY CENTER | Age: 49
End: 2024-04-11
Payer: COMMERCIAL

## 2024-04-12 ENCOUNTER — HOSPITAL ENCOUNTER (OUTPATIENT)
Dept: CT IMAGING | Facility: CLINIC | Age: 49
Discharge: HOME | End: 2024-04-12
Attending: INTERNAL MEDICINE
Payer: COMMERCIAL

## 2024-04-12 ENCOUNTER — HOSPITAL ENCOUNTER (OUTPATIENT)
Dept: NUCLEAR MEDICINE | Facility: CLINIC | Age: 49
Setting detail: NUCLEAR MEDICINE
Discharge: HOME | End: 2024-04-12
Attending: INTERNAL MEDICINE
Payer: COMMERCIAL

## 2024-04-12 ENCOUNTER — HOSPITAL ENCOUNTER (OUTPATIENT)
Dept: CARDIOLOGY | Facility: CLINIC | Age: 49
Discharge: HOME | End: 2024-04-12
Attending: INTERNAL MEDICINE
Payer: COMMERCIAL

## 2024-04-12 DIAGNOSIS — M54.2 NECK PAIN: ICD-10-CM

## 2024-04-12 DIAGNOSIS — Z17.0 MALIGNANT NEOPLASM OF UPPER-INNER QUADRANT OF RIGHT BREAST IN FEMALE, ESTROGEN RECEPTOR POSITIVE (H): Chronic | ICD-10-CM

## 2024-04-12 DIAGNOSIS — C50.211 MALIGNANT NEOPLASM OF UPPER-INNER QUADRANT OF RIGHT FEMALE BREAST (H): Primary | Chronic | ICD-10-CM

## 2024-04-12 DIAGNOSIS — C50.211 MALIGNANT NEOPLASM OF UPPER-INNER QUADRANT OF RIGHT BREAST IN FEMALE, ESTROGEN RECEPTOR POSITIVE (H): Chronic | ICD-10-CM

## 2024-04-12 PROCEDURE — 93010 ELECTROCARDIOGRAM REPORT: CPT | Performed by: INTERNAL MEDICINE

## 2024-04-12 PROCEDURE — A9503 TC99M MEDRONATE: HCPCS | Performed by: INTERNAL MEDICINE

## 2024-04-12 PROCEDURE — 93005 ELECTROCARDIOGRAM TRACING: CPT

## 2024-04-12 PROCEDURE — 343N000001 HC RX 343: Performed by: INTERNAL MEDICINE

## 2024-04-12 PROCEDURE — 250N000009 HC RX 250: Performed by: INTERNAL MEDICINE

## 2024-04-12 PROCEDURE — 78306 BONE IMAGING WHOLE BODY: CPT | Mod: 26 | Performed by: RADIOLOGY

## 2024-04-12 PROCEDURE — 74177 CT ABD & PELVIS W/CONTRAST: CPT | Mod: 26 | Performed by: RADIOLOGY

## 2024-04-12 PROCEDURE — 71260 CT THORAX DX C+: CPT

## 2024-04-12 PROCEDURE — 250N000011 HC RX IP 250 OP 636: Performed by: INTERNAL MEDICINE

## 2024-04-12 PROCEDURE — 78306 BONE IMAGING WHOLE BODY: CPT

## 2024-04-12 PROCEDURE — 71260 CT THORAX DX C+: CPT | Mod: 26 | Performed by: RADIOLOGY

## 2024-04-12 RX ORDER — TC 99M MEDRONATE 20 MG/10ML
25 INJECTION, POWDER, LYOPHILIZED, FOR SOLUTION INTRAVENOUS ONCE
Status: COMPLETED | OUTPATIENT
Start: 2024-04-12 | End: 2024-04-12

## 2024-04-12 RX ORDER — IOPAMIDOL 755 MG/ML
95 INJECTION, SOLUTION INTRAVASCULAR ONCE
Status: COMPLETED | OUTPATIENT
Start: 2024-04-12 | End: 2024-04-12

## 2024-04-12 RX ADMIN — TC 99M MEDRONATE 26.3 MILLICURIE: 20 INJECTION, POWDER, LYOPHILIZED, FOR SOLUTION INTRAVENOUS at 10:36

## 2024-04-12 RX ADMIN — IOPAMIDOL 95 ML: 755 INJECTION, SOLUTION INTRAVENOUS at 11:19

## 2024-04-12 RX ADMIN — SODIUM CHLORIDE, PRESERVATIVE FREE 74 ML: 5 INJECTION INTRAVENOUS at 11:19

## 2024-04-15 ENCOUNTER — ANCILLARY PROCEDURE (OUTPATIENT)
Dept: MRI IMAGING | Facility: CLINIC | Age: 49
End: 2024-04-15
Attending: INTERNAL MEDICINE
Payer: COMMERCIAL

## 2024-04-15 DIAGNOSIS — Z17.0 MALIGNANT NEOPLASM OF UPPER-INNER QUADRANT OF RIGHT BREAST IN FEMALE, ESTROGEN RECEPTOR POSITIVE (H): Chronic | ICD-10-CM

## 2024-04-15 DIAGNOSIS — C50.211 MALIGNANT NEOPLASM OF UPPER-INNER QUADRANT OF RIGHT BREAST IN FEMALE, ESTROGEN RECEPTOR POSITIVE (H): Chronic | ICD-10-CM

## 2024-04-15 LAB
ATRIAL RATE - MUSE: 67 BPM
DIASTOLIC BLOOD PRESSURE - MUSE: NORMAL MMHG
INTERPRETATION ECG - MUSE: NORMAL
P AXIS - MUSE: 62 DEGREES
PR INTERVAL - MUSE: 148 MS
QRS DURATION - MUSE: 78 MS
QT - MUSE: 390 MS
QTC - MUSE: 412 MS
R AXIS - MUSE: 53 DEGREES
SYSTOLIC BLOOD PRESSURE - MUSE: NORMAL MMHG
T AXIS - MUSE: 46 DEGREES
VENTRICULAR RATE- MUSE: 67 BPM

## 2024-04-15 RX ORDER — GADOBUTROL 604.72 MG/ML
0.1 INJECTION INTRAVENOUS ONCE
Status: COMPLETED | OUTPATIENT
Start: 2024-04-15 | End: 2024-04-15

## 2024-04-15 RX ADMIN — GADOBUTROL 7.03 ML: 604.72 INJECTION INTRAVENOUS at 07:56

## 2024-04-16 ENCOUNTER — TRANSFERRED RECORDS (OUTPATIENT)
Dept: HEALTH INFORMATION MANAGEMENT | Facility: CLINIC | Age: 49
End: 2024-04-16

## 2024-04-16 ENCOUNTER — ANCILLARY PROCEDURE (OUTPATIENT)
Dept: MAMMOGRAPHY | Facility: CLINIC | Age: 49
End: 2024-04-16
Attending: INTERNAL MEDICINE
Payer: COMMERCIAL

## 2024-04-16 DIAGNOSIS — Z85.3 PERSONAL HISTORY OF MALIGNANT NEOPLASM OF BREAST: ICD-10-CM

## 2024-04-16 DIAGNOSIS — R92.8 ABNORMAL FINDING ON BREAST IMAGING: ICD-10-CM

## 2024-04-16 DIAGNOSIS — Z00.6 EXAMINATION OF PARTICIPANT OR CONTROL IN CLINICAL RESEARCH: ICD-10-CM

## 2024-04-16 PROCEDURE — 19083 BX BREAST 1ST LESION US IMAG: CPT | Mod: RT | Performed by: RADIOLOGY

## 2024-04-16 PROCEDURE — 76882 US LMTD JT/FCL EVL NVASC XTR: CPT | Mod: LT | Performed by: RADIOLOGY

## 2024-04-16 NOTE — ANESTHESIA PREPROCEDURE EVALUATION
"Anesthesia Pre-Procedure Evaluation    Patient: Sharon Fong   MRN: [id number] : 1975        Procedure : Procedure(s):  Insert port vascular access chest          Past Medical History:   Diagnosis Date    Breast cancer (H)     Hypermobility arthralgia     Migraine     Seizure disorder (H) 2014    Once post surgery      Past Surgical History:   Procedure Laterality Date    AS KNEE SCOPE, DIAGNOSTIC      COSMETIC SURGERY Bilateral     removal of axillary excessive tissue    HYSTERECTOMY  2022    fibroids    IR LYMPH NODE BIOPSY  2024    LUMPECTOMY BREAST Right 10/08/2010    for abscess      No Known Allergies   Social History     Tobacco Use    Smoking status: Never     Passive exposure: Never    Smokeless tobacco: Never   Substance Use Topics    Alcohol use: Never      Wt Readings from Last 1 Encounters:   24 70.3 kg (155 lb)           Physical Exam    Airway        Mallampati: II   TM distance: > 3 FB   Neck ROM: full   Mouth opening: > 3 cm    Respiratory Devices and Support         Dental       (+) Minor Abnormalities - some fillings, tiny chips      Cardiovascular   cardiovascular exam normal          Pulmonary   pulmonary exam normal                OUTSIDE LABS:  CBC:   Lab Results   Component Value Date    WBC 8.8 2022    HGB 11.6 (L) 2022    HCT 37.3 2022     2022     BMP:   Lab Results   Component Value Date    CR 0.63 2022     COAGS: No results found for: \"PTT\", \"INR\", \"FIBR\"  POC: No results found for: \"BGM\", \"HCG\", \"HCGS\"  HEPATIC: No results found for: \"ALBUMIN\", \"PROTTOTAL\", \"ALT\", \"AST\", \"GGT\", \"ALKPHOS\", \"BILITOTAL\", \"BILIDIRECT\", \"MEGHANN\"  OTHER:   Lab Results   Component Value Date    A1C 5.8 (H) 2022    TSH 3.09 2022       Anesthesia Plan    ASA Status:  3    NPO Status:  NPO Appropriate    Anesthesia Type: MAC.     - Reason for MAC: straight local not clinically adequate   Induction: Intravenous, Propofol.   Maintenance: " "TIVA.        Consents    Anesthesia Plan(s) and associated risks, benefits, and realistic alternatives discussed. Questions answered and patient/representative(s) expressed understanding.     - Discussed: Risks, Benefits and Alternatives for BOTH SEDATION and the PROCEDURE were discussed     - Discussed with:  Patient, Other (See Comment), Spouse      - Extended Intubation/Ventilatory Support Discussed: No.      - Patient is DNR/DNI Status: No     Use of blood products discussed: No .     Postoperative Care    Pain management: IV analgesics, Oral pain medications, Multi-modal analgesia.   PONV prophylaxis: Dexamethasone or Solumedrol, Ondansetron (or other 5HT-3), Background Propofol Infusion     Comments:               Renaldo Huerta MD    I have reviewed the pertinent notes and labs in the chart from the past 30 days and (re)examined the patient.  Any updates or changes from those notes are reflected in this note.              # Overweight: Estimated body mass index is 26.14 kg/m  as calculated from the following:    Height as of 4/9/24: 1.64 m (5' 4.57\").    Weight as of 4/9/24: 70.3 kg (155 lb).      "

## 2024-04-17 ENCOUNTER — LAB (OUTPATIENT)
Dept: LAB | Facility: CLINIC | Age: 49
End: 2024-04-17
Attending: INTERNAL MEDICINE
Payer: COMMERCIAL

## 2024-04-17 ENCOUNTER — ANESTHESIA (OUTPATIENT)
Dept: SURGERY | Facility: AMBULATORY SURGERY CENTER | Age: 49
End: 2024-04-17
Payer: COMMERCIAL

## 2024-04-17 ENCOUNTER — HOSPITAL ENCOUNTER (OUTPATIENT)
Facility: AMBULATORY SURGERY CENTER | Age: 49
Discharge: HOME | End: 2024-04-17
Attending: RADIOLOGY
Payer: COMMERCIAL

## 2024-04-17 ENCOUNTER — ANCILLARY PROCEDURE (OUTPATIENT)
Dept: RADIOLOGY | Facility: AMBULATORY SURGERY CENTER | Age: 49
End: 2024-04-17
Attending: INTERNAL MEDICINE
Payer: COMMERCIAL

## 2024-04-17 VITALS
HEART RATE: 61 BPM | HEIGHT: 65 IN | TEMPERATURE: 97.4 F | OXYGEN SATURATION: 99 % | RESPIRATION RATE: 16 BRPM | WEIGHT: 155 LBS | SYSTOLIC BLOOD PRESSURE: 128 MMHG | DIASTOLIC BLOOD PRESSURE: 80 MMHG | BODY MASS INDEX: 25.83 KG/M2

## 2024-04-17 DIAGNOSIS — Z00.6 EXAMINATION OF PARTICIPANT OR CONTROL IN CLINICAL RESEARCH: ICD-10-CM

## 2024-04-17 DIAGNOSIS — C50.211 MALIGNANT NEOPLASM OF UPPER-INNER QUADRANT OF RIGHT BREAST IN FEMALE, ESTROGEN RECEPTOR POSITIVE (H): Chronic | ICD-10-CM

## 2024-04-17 DIAGNOSIS — C50.211 MALIGNANT NEOPLASM OF UPPER-INNER QUADRANT OF RIGHT BREAST IN FEMALE, ESTROGEN RECEPTOR POSITIVE (H): ICD-10-CM

## 2024-04-17 DIAGNOSIS — Z17.0 MALIGNANT NEOPLASM OF UPPER-INNER QUADRANT OF RIGHT BREAST IN FEMALE, ESTROGEN RECEPTOR POSITIVE (H): ICD-10-CM

## 2024-04-17 DIAGNOSIS — Z17.0 MALIGNANT NEOPLASM OF UPPER-INNER QUADRANT OF RIGHT BREAST IN FEMALE, ESTROGEN RECEPTOR POSITIVE (H): Chronic | ICD-10-CM

## 2024-04-17 LAB
ALBUMIN SERPL BCG-MCNC: 4.4 G/DL (ref 3.5–5.2)
ALP SERPL-CCNC: 68 U/L (ref 40–150)
ALT SERPL W P-5'-P-CCNC: 11 U/L (ref 0–50)
ANION GAP SERPL CALCULATED.3IONS-SCNC: 12 MMOL/L (ref 7–15)
APTT PPP: 29 SECONDS (ref 22–38)
AST SERPL W P-5'-P-CCNC: 19 U/L (ref 0–45)
BASOPHILS # BLD AUTO: 0.1 10E3/UL (ref 0–0.2)
BASOPHILS NFR BLD AUTO: 1 %
BILIRUB SERPL-MCNC: 0.9 MG/DL
BUN SERPL-MCNC: 9.4 MG/DL (ref 6–20)
CALCIUM SERPL-MCNC: 9.4 MG/DL (ref 8.6–10)
CHLORIDE SERPL-SCNC: 104 MMOL/L (ref 98–107)
CORTIS SERPL-MCNC: 6.6 UG/DL
CREAT SERPL-MCNC: 0.62 MG/DL (ref 0.51–0.95)
DEPRECATED HCO3 PLAS-SCNC: 23 MMOL/L (ref 22–29)
EGFRCR SERPLBLD CKD-EPI 2021: >90 ML/MIN/1.73M2
EOSINOPHIL # BLD AUTO: 0.1 10E3/UL (ref 0–0.7)
EOSINOPHIL NFR BLD AUTO: 2 %
ERYTHROCYTE [DISTWIDTH] IN BLOOD BY AUTOMATED COUNT: 12.4 % (ref 10–15)
GLUCOSE SERPL-MCNC: 105 MG/DL (ref 70–99)
HBA1C MFR BLD: 5.9 %
HBV CORE AB SERPL QL IA: NONREACTIVE
HBV SURFACE AB SERPL IA-ACNC: <3.5 M[IU]/ML
HBV SURFACE AB SERPL IA-ACNC: NONREACTIVE M[IU]/ML
HBV SURFACE AG SERPL QL IA: NONREACTIVE
HCT VFR BLD AUTO: 39.1 % (ref 35–47)
HCV AB SERPL QL IA: NONREACTIVE
HGB BLD-MCNC: 13.2 G/DL (ref 11.7–15.7)
HIV 1+2 AB+HIV1 P24 AG SERPL QL IA: NONREACTIVE
IMM GRANULOCYTES # BLD: 0 10E3/UL
IMM GRANULOCYTES NFR BLD: 0 %
INR PPP: 1.02 (ref 0.85–1.15)
LYMPHOCYTES # BLD AUTO: 2.3 10E3/UL (ref 0.8–5.3)
LYMPHOCYTES NFR BLD AUTO: 32 %
MAGNESIUM SERPL-MCNC: 2.1 MG/DL (ref 1.7–2.3)
MCH RBC QN AUTO: 28.3 PG (ref 26.5–33)
MCHC RBC AUTO-ENTMCNC: 33.8 G/DL (ref 31.5–36.5)
MCV RBC AUTO: 84 FL (ref 78–100)
MONOCYTES # BLD AUTO: 0.4 10E3/UL (ref 0–1.3)
MONOCYTES NFR BLD AUTO: 6 %
NEUTROPHILS # BLD AUTO: 4.2 10E3/UL (ref 1.6–8.3)
NEUTROPHILS NFR BLD AUTO: 59 %
NRBC # BLD AUTO: 0 10E3/UL
NRBC BLD AUTO-RTO: 0 /100
PLATELET # BLD AUTO: 298 10E3/UL (ref 150–450)
POTASSIUM SERPL-SCNC: 4 MMOL/L (ref 3.4–5.3)
PROT SERPL-MCNC: 7.8 G/DL (ref 6.4–8.3)
RBC # BLD AUTO: 4.67 10E6/UL (ref 3.8–5.2)
SODIUM SERPL-SCNC: 139 MMOL/L (ref 135–145)
T4 FREE SERPL-MCNC: 0.97 NG/DL (ref 0.9–1.7)
TSH SERPL DL<=0.005 MIU/L-ACNC: 2.81 UIU/ML (ref 0.3–4.2)
WBC # BLD AUTO: 7.1 10E3/UL (ref 4–11)

## 2024-04-17 PROCEDURE — 87389 HIV-1 AG W/HIV-1&-2 AB AG IA: CPT

## 2024-04-17 PROCEDURE — 84443 ASSAY THYROID STIM HORMONE: CPT

## 2024-04-17 PROCEDURE — 86803 HEPATITIS C AB TEST: CPT

## 2024-04-17 PROCEDURE — 84439 ASSAY OF FREE THYROXINE: CPT

## 2024-04-17 PROCEDURE — 85730 THROMBOPLASTIN TIME PARTIAL: CPT

## 2024-04-17 PROCEDURE — 87340 HEPATITIS B SURFACE AG IA: CPT

## 2024-04-17 PROCEDURE — 85041 AUTOMATED RBC COUNT: CPT

## 2024-04-17 PROCEDURE — 76937 US GUIDE VASCULAR ACCESS: CPT | Mod: 26 | Performed by: RADIOLOGY

## 2024-04-17 PROCEDURE — 36561 INSERT TUNNELED CV CATH: CPT | Performed by: RADIOLOGY

## 2024-04-17 PROCEDURE — 77001 FLUOROGUIDE FOR VEIN DEVICE: CPT | Mod: 26 | Performed by: RADIOLOGY

## 2024-04-17 PROCEDURE — 36560 INSERT TUNNELED CV CATH: CPT | Performed by: NURSE ANESTHETIST, CERTIFIED REGISTERED

## 2024-04-17 PROCEDURE — 36415 COLL VENOUS BLD VENIPUNCTURE: CPT

## 2024-04-17 PROCEDURE — 82533 TOTAL CORTISOL: CPT

## 2024-04-17 PROCEDURE — 85610 PROTHROMBIN TIME: CPT

## 2024-04-17 PROCEDURE — 86706 HEP B SURFACE ANTIBODY: CPT

## 2024-04-17 PROCEDURE — 86704 HEP B CORE ANTIBODY TOTAL: CPT

## 2024-04-17 PROCEDURE — 83735 ASSAY OF MAGNESIUM: CPT

## 2024-04-17 PROCEDURE — 83036 HEMOGLOBIN GLYCOSYLATED A1C: CPT

## 2024-04-17 PROCEDURE — 36560 INSERT TUNNELED CV CATH: CPT | Performed by: STUDENT IN AN ORGANIZED HEALTH CARE EDUCATION/TRAINING PROGRAM

## 2024-04-17 PROCEDURE — 82374 ASSAY BLOOD CARBON DIOXIDE: CPT

## 2024-04-17 DEVICE — IMP SMART PORT LOW PROFILE 300 PSI 63CMX6FR H787CT60LPPDVI0
Type: IMPLANTABLE DEVICE | Site: CHEST | Status: NON-FUNCTIONAL
Removed: 2024-09-30

## 2024-04-17 RX ORDER — OXYCODONE HYDROCHLORIDE 5 MG/1
10 TABLET ORAL
Status: DISCONTINUED | OUTPATIENT
Start: 2024-04-17 | End: 2024-04-18 | Stop reason: HOSPADM

## 2024-04-17 RX ORDER — SODIUM CHLORIDE, SODIUM LACTATE, POTASSIUM CHLORIDE, CALCIUM CHLORIDE 600; 310; 30; 20 MG/100ML; MG/100ML; MG/100ML; MG/100ML
INJECTION, SOLUTION INTRAVENOUS CONTINUOUS
Status: DISCONTINUED | OUTPATIENT
Start: 2024-04-17 | End: 2024-04-18 | Stop reason: HOSPADM

## 2024-04-17 RX ORDER — HYDROMORPHONE HYDROCHLORIDE 1 MG/ML
0.2 INJECTION, SOLUTION INTRAMUSCULAR; INTRAVENOUS; SUBCUTANEOUS EVERY 5 MIN PRN
Status: DISCONTINUED | OUTPATIENT
Start: 2024-04-17 | End: 2024-04-18 | Stop reason: HOSPADM

## 2024-04-17 RX ORDER — NALOXONE HYDROCHLORIDE 0.4 MG/ML
0.1 INJECTION, SOLUTION INTRAMUSCULAR; INTRAVENOUS; SUBCUTANEOUS
Status: DISCONTINUED | OUTPATIENT
Start: 2024-04-17 | End: 2024-04-18 | Stop reason: HOSPADM

## 2024-04-17 RX ORDER — FENTANYL CITRATE 50 UG/ML
25 INJECTION, SOLUTION INTRAMUSCULAR; INTRAVENOUS EVERY 5 MIN PRN
Status: DISCONTINUED | OUTPATIENT
Start: 2024-04-17 | End: 2024-04-18 | Stop reason: HOSPADM

## 2024-04-17 RX ORDER — HEPARIN SODIUM (PORCINE) LOCK FLUSH IV SOLN 100 UNIT/ML 100 UNIT/ML
5-10 SOLUTION INTRAVENOUS
Status: DISCONTINUED | OUTPATIENT
Start: 2024-04-17 | End: 2024-04-18 | Stop reason: HOSPADM

## 2024-04-17 RX ORDER — LIDOCAINE 40 MG/G
CREAM TOPICAL
Status: DISCONTINUED | OUTPATIENT
Start: 2024-04-17 | End: 2024-04-18 | Stop reason: HOSPADM

## 2024-04-17 RX ORDER — ONDANSETRON 4 MG/1
4 TABLET, ORALLY DISINTEGRATING ORAL EVERY 30 MIN PRN
Status: DISCONTINUED | OUTPATIENT
Start: 2024-04-17 | End: 2024-04-18 | Stop reason: HOSPADM

## 2024-04-17 RX ORDER — HYDROMORPHONE HYDROCHLORIDE 1 MG/ML
0.4 INJECTION, SOLUTION INTRAMUSCULAR; INTRAVENOUS; SUBCUTANEOUS EVERY 5 MIN PRN
Status: DISCONTINUED | OUTPATIENT
Start: 2024-04-17 | End: 2024-04-18 | Stop reason: HOSPADM

## 2024-04-17 RX ORDER — ACETAMINOPHEN 325 MG/1
975 TABLET ORAL ONCE
Status: COMPLETED | OUTPATIENT
Start: 2024-04-17 | End: 2024-04-17

## 2024-04-17 RX ORDER — CEFAZOLIN SODIUM 2 G/50ML
2 SOLUTION INTRAVENOUS
Status: COMPLETED | OUTPATIENT
Start: 2024-04-17 | End: 2024-04-17

## 2024-04-17 RX ORDER — HEPARIN SODIUM (PORCINE) LOCK FLUSH IV SOLN 100 UNIT/ML 100 UNIT/ML
SOLUTION INTRAVENOUS PRN
Status: DISCONTINUED | OUTPATIENT
Start: 2024-04-17 | End: 2024-04-17 | Stop reason: HOSPADM

## 2024-04-17 RX ORDER — PROPOFOL 10 MG/ML
INJECTION, EMULSION INTRAVENOUS CONTINUOUS PRN
Status: DISCONTINUED | OUTPATIENT
Start: 2024-04-17 | End: 2024-04-17

## 2024-04-17 RX ORDER — FENTANYL CITRATE 50 UG/ML
50 INJECTION, SOLUTION INTRAMUSCULAR; INTRAVENOUS EVERY 5 MIN PRN
Status: DISCONTINUED | OUTPATIENT
Start: 2024-04-17 | End: 2024-04-18 | Stop reason: HOSPADM

## 2024-04-17 RX ORDER — LIDOCAINE HYDROCHLORIDE 20 MG/ML
INJECTION, SOLUTION INFILTRATION; PERINEURAL PRN
Status: DISCONTINUED | OUTPATIENT
Start: 2024-04-17 | End: 2024-04-17

## 2024-04-17 RX ORDER — OXYCODONE HYDROCHLORIDE 5 MG/1
5 TABLET ORAL
Status: DISCONTINUED | OUTPATIENT
Start: 2024-04-17 | End: 2024-04-18 | Stop reason: HOSPADM

## 2024-04-17 RX ORDER — ONDANSETRON 2 MG/ML
4 INJECTION INTRAMUSCULAR; INTRAVENOUS EVERY 30 MIN PRN
Status: DISCONTINUED | OUTPATIENT
Start: 2024-04-17 | End: 2024-04-18 | Stop reason: HOSPADM

## 2024-04-17 RX ORDER — HEPARIN SODIUM,PORCINE 10 UNIT/ML
5-10 VIAL (ML) INTRAVENOUS EVERY 24 HOURS
Status: DISCONTINUED | OUTPATIENT
Start: 2024-04-17 | End: 2024-04-18 | Stop reason: HOSPADM

## 2024-04-17 RX ORDER — PROPOFOL 10 MG/ML
INJECTION, EMULSION INTRAVENOUS PRN
Status: DISCONTINUED | OUTPATIENT
Start: 2024-04-17 | End: 2024-04-17

## 2024-04-17 RX ORDER — HEPARIN SODIUM,PORCINE 10 UNIT/ML
5-10 VIAL (ML) INTRAVENOUS
Status: DISCONTINUED | OUTPATIENT
Start: 2024-04-17 | End: 2024-04-18 | Stop reason: HOSPADM

## 2024-04-17 RX ADMIN — CEFAZOLIN SODIUM 2 G: 2 SOLUTION INTRAVENOUS at 09:25

## 2024-04-17 RX ADMIN — PROPOFOL 150 MCG/KG/MIN: 10 INJECTION, EMULSION INTRAVENOUS at 09:27

## 2024-04-17 RX ADMIN — PROPOFOL 40 MG: 10 INJECTION, EMULSION INTRAVENOUS at 09:30

## 2024-04-17 RX ADMIN — ACETAMINOPHEN 975 MG: 325 TABLET ORAL at 08:36

## 2024-04-17 RX ADMIN — LIDOCAINE HYDROCHLORIDE 70 MG: 20 INJECTION, SOLUTION INFILTRATION; PERINEURAL at 09:30

## 2024-04-17 RX ADMIN — SODIUM CHLORIDE, SODIUM LACTATE, POTASSIUM CHLORIDE, CALCIUM CHLORIDE: 600; 310; 30; 20 INJECTION, SOLUTION INTRAVENOUS at 08:35

## 2024-04-17 NOTE — DISCHARGE INSTRUCTIONS
A collaboration between [facility] and [facility]  Experts in minimally invasive, targeted treatments performed using imaging guidance    Venous Access Device,  Port Catheter or Tunneled or Non-Tunneled Central Line Placement    Today you had a procedure today to install a venous access device; either a tunneled central vein catheter or a subcutaneous port catheter.    After you go home:  Drink plenty of fluids.  Generally 6-8 (8 ounce) glasses a day is recommended.  Resume your regular diet unless otherwise ordered by a medical provider.  Keep any applied tape/gauze dressings clean and dry.  Change tape/gauze dressings if they get wet or soiled.  You may shower the following day after procedure, however cover and protect from moisture any tape/gauze dressings.  You may let water hit and run over dried skin glue, but do not scrub.  Pat the area dry after showering.  Port placement incisions are closed with absorbable suture, meaning they do not need to be removed at a later date, and a topical skin adhesive (skin glue).  This glue will wear off in 7-14 days.  Do not remove before this time.  If 14 days have passed and residual glue is present, you may gently remove it.  Do not apply gels, lotions, or ointments to the glue site for the first 10 days as this may cause the glue to prematurely soften and fail.  Do not perform strenuous activities or lift greater than 10 pounds for the next three days.  If there is bleeding or oozing from the procedure site, apply firm pressure to the area for 5-10 minutes.  If the bleeding continues seek medical advice at the numbers below.  Mild procedure site discomfort can be treated with an ice pack and over-the-counter pain relievers.        For 24 hours after any sedation used:  Relax and take it easy.  No strenuous activities.  Do not drive or operate machines at home or at work.  No alcohol consumption.  Do not make any  important or legal decisions.    Call our Interventional Radiology (IR) service if:  If you start bleeding from the procedure site.  If you do start to bleed from the site, lie down and hold some pressure on the site.  Our radiology provider can help you decide if you need to return to the hospital.  If you have new or worsening pain related to the procedure.  If you have concerning swelling at the procedure site.  If you develop persistent nausea or vomiting.  If you develop hives or a rash or any unexplained itching.  If you have a fever of greater than 100.5  F and chills in the first 5 days after procedure.  Any other concerns related to your procedure.      [facility]  Interventional Radiology (IR)  [address]  [address] [facility] [place] [address]    Contact Number:  [phone]  (IR Nurse Triage)  Monday - Friday 7am - 4pm    After hours for urgent concerns:  [phone]  After 4pm Monday - Friday, Weekends and Holidays.   Ask for Interventional Radiology on-call.  Someone is available 24 hours a day.  [facility] toll free number:  [phone]

## 2024-04-17 NOTE — ANESTHESIA CARE TRANSFER NOTE
Patient: Sharon Fong    Procedure: Procedure(s):  Insert port vascular access chest       Diagnosis: Malignant neoplasm of upper-inner quadrant of right female breast (H) [C50.211]  Diagnosis Additional Information: No value filed.    Anesthesia Type:   MAC     Note:    Oropharynx: oropharynx clear of all foreign objects and spontaneously breathing  Level of Consciousness: awake and drowsy  Oxygen Supplementation: room air    Independent Airway: airway patency satisfactory and stable  Dentition: dentition unchanged  Vital Signs Stable: post-procedure vital signs reviewed and stable  Report to RN Given: handoff report given  Patient transferred to: Phase II    Handoff Report: Identifed the Patient, Identified the Reponsible Provider, Reviewed the pertinent medical history, Discussed the surgical course, Reviewed Intra-OP anesthesia mangement and issues during anesthesia, Set expectations for post-procedure period and Allowed opportunity for questions and acknowledgement of understanding      Vitals:  Vitals Value Taken Time   /82 04/17/24 1100   Temp 36.3  C (97.4  F) 04/17/24 1024   Pulse 61 04/17/24 1100   Resp 16 04/17/24 1100   SpO2 99 % 04/17/24 1100       Electronically Signed By: TEETEE Saleh CRNA  April 17, 2024  11:09 AM

## 2024-04-17 NOTE — NURSING NOTE
Chief Complaint   Patient presents with    Labs Only     Labs collected via venipuncture by RN in lab     Labs and research kit collected from venipuncture by RN.     Marleny Talavera RN

## 2024-04-17 NOTE — BRIEF OP NOTE
[facility] [place]    Brief Operative Note    Pre-operative diagnosis: Malignant neoplasm of upper-inner quadrant of right female breast (H) [C50.211]  Post-operative diagnosis Same as pre-operative diagnosis    Procedure: Insert port vascular access chest, Left - Chest    Surgeon: Surgeons and Role:     * Chapin Loza MD - Primary  Anesthesia: MAC   Estimated Blood Loss: Minimal    Drains: None  Specimens: * No specimens in log *  Findings:   None.  Complications: None.  Implants:   Implant Name Type Inv. Item Serial No.  Lot No. LRB No. Used Action   IMP SMART PORT LOW PROFILE 300 PSI [id number] - [id number] Port IMP SMART PORT LOW PROFILE 300 PSI [id number]  [employer] [id number] Left 1 Implanted           6 [place] 22 cm single lumen AngioDynamics Plastic CT SmartPort placed via left internal jugular vein. Tip in the high right atrium. Heparin locked and ready for use.

## 2024-04-17 NOTE — ANESTHESIA POSTPROCEDURE EVALUATION
Patient: Sharon Fong    Procedure: Procedure(s):  Insert port vascular access chest       Anesthesia Type:  MAC    Note:  Disposition: Outpatient   Postop Pain Control: Uneventful            Sign Out: Well controlled pain   PONV: No   Neuro/Psych: Uneventful            Sign Out: Acceptable/Baseline neuro status   Airway/Respiratory: Uneventful            Sign Out: Acceptable/Baseline resp. status   CV/Hemodynamics: Uneventful            Sign Out: Acceptable CV status; No obvious hypovolemia; No obvious fluid overload   Other NRE:    DID A NON-ROUTINE EVENT OCCUR?            Last vitals:  Vitals Value Taken Time   /80 04/17/24 1120   Temp 36.3  C (97.4  F) 04/17/24 1024   Pulse 61 04/17/24 1100   Resp 16 04/17/24 1100   SpO2 99 % 04/17/24 1100       Electronically Signed By: Renaldo Huerta MD  April 17, 2024  2:17 PM

## 2024-04-18 ENCOUNTER — ANCILLARY PROCEDURE (OUTPATIENT)
Dept: MAMMOGRAPHY | Facility: CLINIC | Age: 49
End: 2024-04-18
Attending: INTERNAL MEDICINE
Payer: COMMERCIAL

## 2024-04-18 ENCOUNTER — ANCILLARY PROCEDURE (OUTPATIENT)
Dept: MRI IMAGING | Facility: CLINIC | Age: 49
End: 2024-04-18
Attending: INTERNAL MEDICINE
Payer: COMMERCIAL

## 2024-04-18 DIAGNOSIS — R92.8 ABNORMAL FINDING ON BREAST IMAGING: ICD-10-CM

## 2024-04-18 PROCEDURE — A9585 GADOBUTROL INJECTION: HCPCS | Performed by: STUDENT IN AN ORGANIZED HEALTH CARE EDUCATION/TRAINING PROGRAM

## 2024-04-18 PROCEDURE — 19085 BX BREAST 1ST LESION MR IMAG: CPT | Mod: LT | Performed by: STUDENT IN AN ORGANIZED HEALTH CARE EDUCATION/TRAINING PROGRAM

## 2024-04-18 PROCEDURE — 88305 TISSUE EXAM BY PATHOLOGIST: CPT | Mod: TC | Performed by: INTERNAL MEDICINE

## 2024-04-18 PROCEDURE — 88305 TISSUE EXAM BY PATHOLOGIST: CPT | Mod: 26 | Performed by: PATHOLOGY

## 2024-04-18 RX ORDER — GADOBUTROL 604.72 MG/ML
7.5 INJECTION INTRAVENOUS ONCE
Status: COMPLETED | OUTPATIENT
Start: 2024-04-18 | End: 2024-04-18

## 2024-04-18 RX ORDER — LIDOCAINE HYDROCHLORIDE AND EPINEPHRINE 10; 10 MG/ML; UG/ML
10 INJECTION, SOLUTION INFILTRATION; PERINEURAL ONCE
Status: COMPLETED | OUTPATIENT
Start: 2024-04-18 | End: 2024-04-18

## 2024-04-18 RX ADMIN — LIDOCAINE HYDROCHLORIDE AND EPINEPHRINE 10 ML: 10; 10 INJECTION, SOLUTION INFILTRATION; PERINEURAL at 08:22

## 2024-04-18 RX ADMIN — GADOBUTROL 7 ML: 604.72 INJECTION INTRAVENOUS at 08:21

## 2024-04-22 ENCOUNTER — HOSPITAL ENCOUNTER (OUTPATIENT)
Dept: CARDIOLOGY | Facility: CLINIC | Age: 49
Discharge: HOME | End: 2024-04-22
Attending: INTERNAL MEDICINE
Payer: COMMERCIAL

## 2024-04-22 ENCOUNTER — OFFICE VISIT (OUTPATIENT)
Dept: OPHTHALMOLOGY | Facility: CLINIC | Age: 49
End: 2024-04-22
Attending: OPHTHALMOLOGY
Payer: COMMERCIAL

## 2024-04-22 ENCOUNTER — TELEPHONE (OUTPATIENT)
Dept: MAMMOGRAPHY | Facility: CLINIC | Age: 49
End: 2024-04-22

## 2024-04-22 DIAGNOSIS — Z00.6 EXAMINATION OF PARTICIPANT OR CONTROL IN CLINICAL RESEARCH: ICD-10-CM

## 2024-04-22 DIAGNOSIS — Z17.0 MALIGNANT NEOPLASM OF UPPER-INNER QUADRANT OF RIGHT BREAST IN FEMALE, ESTROGEN RECEPTOR POSITIVE (H): Chronic | ICD-10-CM

## 2024-04-22 DIAGNOSIS — Z79.899 HIGH RISK MEDICATION USE: Primary | ICD-10-CM

## 2024-04-22 DIAGNOSIS — C50.211 MALIGNANT NEOPLASM OF UPPER-INNER QUADRANT OF RIGHT BREAST IN FEMALE, ESTROGEN RECEPTOR POSITIVE (H): Chronic | ICD-10-CM

## 2024-04-22 LAB
LVEF ECHO: NORMAL
PATH REPORT.COMMENTS IMP SPEC: NORMAL
PATH REPORT.FINAL DX SPEC: NORMAL
PATH REPORT.GROSS SPEC: NORMAL
PATH REPORT.MICROSCOPIC SPEC OTHER STN: NORMAL
PATH REPORT.RELEVANT HX SPEC: NORMAL
PHOTO IMAGE: NORMAL

## 2024-04-22 PROCEDURE — 99213 OFFICE O/P EST LOW 20 MIN: CPT | Mod: 25 | Performed by: OPHTHALMOLOGY

## 2024-04-22 PROCEDURE — 93306 TTE W/DOPPLER COMPLETE: CPT | Mod: 26 | Performed by: INTERNAL MEDICINE

## 2024-04-22 PROCEDURE — 93356 MYOCRD STRAIN IMG SPCKL TRCK: CPT | Performed by: INTERNAL MEDICINE

## 2024-04-22 PROCEDURE — 99203 OFFICE O/P NEW LOW 30 MIN: CPT | Performed by: OPHTHALMOLOGY

## 2024-04-22 PROCEDURE — 93306 TTE W/DOPPLER COMPLETE: CPT

## 2024-04-22 ASSESSMENT — VISUAL ACUITY
CORRECTION_TYPE: GLASSES
OD_CC: 20/20
METHOD_MR: DIAGNOSTIC MRX ONLY.
OS_CC: 20/20
METHOD: SNELLEN - LINEAR
OD_CC+: -3

## 2024-04-22 ASSESSMENT — REFRACTION_WEARINGRX
OD_AXIS: 169
OD_ADD: +1.50
SPECS_TYPE: PAL
OD_SPHERE: -1.00
OD_CYLINDER: +0.50
OS_AXIS: 011
OS_CYLINDER: +0.75
OS_ADD: +1.50
OS_SPHERE: -1.00

## 2024-04-22 ASSESSMENT — CUP TO DISC RATIO
OD_RATIO: 0.2
OS_RATIO: 0.2

## 2024-04-22 ASSESSMENT — CONF VISUAL FIELD
OD_SUPERIOR_TEMPORAL_RESTRICTION: 0
OD_INFERIOR_NASAL_RESTRICTION: 0
OD_INFERIOR_TEMPORAL_RESTRICTION: 0
OS_INFERIOR_TEMPORAL_RESTRICTION: 0
OS_INFERIOR_NASAL_RESTRICTION: 0
OD_SUPERIOR_NASAL_RESTRICTION: 0
METHOD: COUNTING FINGERS
OS_SUPERIOR_NASAL_RESTRICTION: 0
OS_SUPERIOR_TEMPORAL_RESTRICTION: 0
OS_NORMAL: 1
OD_NORMAL: 1

## 2024-04-22 ASSESSMENT — REFRACTION_MANIFEST
OD_SPHERE: -1.00
OS_SPHERE: -1.00
OD_AXIS: 170
OD_ADD: +1.75
OD_CYLINDER: +0.25
OS_CYLINDER: +0.75
OS_ADD: +1.75
OS_AXIS: 180

## 2024-04-22 ASSESSMENT — SLIT LAMP EXAM - LIDS
COMMENTS: NORMAL
COMMENTS: NORMAL

## 2024-04-22 ASSESSMENT — TONOMETRY
IOP_METHOD: ICARE
OS_IOP_MMHG: 11
OD_IOP_MMHG: 12

## 2024-04-22 ASSESSMENT — EXTERNAL EXAM - LEFT EYE: OS_EXAM: NORMAL

## 2024-04-22 ASSESSMENT — EXTERNAL EXAM - RIGHT EYE: OD_EXAM: NORMAL

## 2024-04-22 NOTE — PROGRESS NOTES
Chief complaint     Chief Complaints and History of Present Illnesses   Patient presents with    Research Study     ISpy       HPI    Sharon Fong 48 year old female     Chief Complaint(s) and History of Present Illness(es)       Research Study     Additional comments: ISpy             Comments    Pt states vision has been good. No eye pain today. No flashes or floaters. No redness or dryness.    AJAY Tavarez April 22, 2024 9:22 AM                           Past ocular history   Prior eye surgery/laser/Trauma: No  CTL wearer:No  Glasses : -  Family Hx of eye disease: No    PMH     Past Medical History:   Diagnosis Date    Breast cancer (H)     Hypermobility arthralgia     Migraine     Seizure disorder (H) 2014    Once post surgery       PSH     Past Surgical History:   Procedure Laterality Date    AS KNEE SCOPE, DIAGNOSTIC      COSMETIC SURGERY Bilateral     removal of axillary excessive tissue    HYSTERECTOMY  12/2022    fibroids    INSERT PORT VASCULAR ACCESS Left 4/17/2024    Procedure: Insert port vascular access chest;  Surgeon: Chapin Loza MD;  Location: [facility] OR    IR CHEST PORT PLACEMENT > 5 YRS OF AGE  4/17/2024    IR LYMPH NODE BIOPSY  03/28/2024    LUMPECTOMY BREAST Right 10/08/2010    for abscess       Meds     Current Outpatient Medications   Medication Sig Dispense Refill    amitriptyline (ELAVIL) 10 MG tablet Take 20 mg by mouth at bedtime      aspirin-acetaminophen-caffeine (EXCEDRIN MIGRAINE) 250-250-65 MG tablet       ibuprofen (ADVIL/MOTRIN) 200 MG capsule        No current facility-administered medications for this visit.       Labs   -    Imaging   -    Drops Currently Taking   -    Assessment/Plan   # high risk medication  ISPY trial    Normal ocular exam today  Instructions for dry eye given in case she developed new symptoms      Follow up:  Oph: yearly exam    Attending Physician Attestation:  Complete documentation of historical and exam elements from today's  encounter can be found in the full encounter summary report (not reduplicated in this progress note).  I personally obtained the chief complaint(s) and history of present illness.  I confirmed and edited as necessary the review of systems, past medical/surgical history, family history, social history, and examination findings as documented by others; and I examined the patient myself.  I personally reviewed the relevant tests, images, and reports as documented above.  I formulated and edited as necessary the assessment and plan and discussed the findings and management plan with the patient and family. - Ashlyn Quezada MD      Wound Care: Petrolatum

## 2024-04-22 NOTE — TELEPHONE ENCOUNTER
Spoke to Sharon about the benign finding from her left breast biopsy done last week.  We discussed the Radiologist's recommendation of continuing on with her surgical and oncology plan for her known right breast cancer.  Sharon verbalized understanding and all questions and concerns were answered at this time.

## 2024-04-22 NOTE — NURSING NOTE
Chief Complaints and History of Present Illnesses   Patient presents with    Research Study     Deonte     Chief Complaint(s) and History of Present Illness(es)       Research Study              Comments: ISwilfrido              Comments    Pt states vision has been good. No eye pain today. No flashes or floaters. No redness or dryness.    AJAY Tavarez April 22, 2024 9:22 AM

## 2024-04-25 NOTE — TELEPHONE ENCOUNTER
Action April 25, 2024 1:33 PM    Action Taken Slides from NM received and taken to 5th floor path lab for review.  3/28/24: [id number]

## 2024-04-26 ENCOUNTER — LAB REQUISITION (OUTPATIENT)
Dept: LAB | Facility: CLINIC | Age: 49
End: 2024-04-26
Payer: COMMERCIAL

## 2024-04-26 PROCEDURE — 88321 CONSLTJ&REPRT SLD PREP ELSWR: CPT | Performed by: STUDENT IN AN ORGANIZED HEALTH CARE EDUCATION/TRAINING PROGRAM

## 2024-04-29 DIAGNOSIS — C50.211 MALIGNANT NEOPLASM OF UPPER-INNER QUADRANT OF RIGHT BREAST IN FEMALE, ESTROGEN RECEPTOR POSITIVE (H): Primary | ICD-10-CM

## 2024-04-29 DIAGNOSIS — Z17.0 MALIGNANT NEOPLASM OF UPPER-INNER QUADRANT OF RIGHT BREAST IN FEMALE, ESTROGEN RECEPTOR POSITIVE (H): Primary | ICD-10-CM

## 2024-04-29 RX ORDER — LIDOCAINE/PRILOCAINE 2.5 %-2.5%
CREAM (GRAM) TOPICAL PRN
Qty: 30 G | Refills: 1 | Status: SHIPPED | OUTPATIENT
Start: 2024-04-29

## 2024-04-30 ENCOUNTER — ONCOLOGY VISIT (OUTPATIENT)
Dept: ONCOLOGY | Facility: CLINIC | Age: 49
End: 2024-04-30
Attending: INTERNAL MEDICINE
Payer: COMMERCIAL

## 2024-04-30 ENCOUNTER — PATIENT OUTREACH (OUTPATIENT)
Dept: ONCOLOGY | Facility: CLINIC | Age: 49
End: 2024-04-30

## 2024-04-30 ENCOUNTER — LAB (OUTPATIENT)
Dept: LAB | Facility: CLINIC | Age: 49
End: 2024-04-30
Attending: INTERNAL MEDICINE
Payer: COMMERCIAL

## 2024-04-30 ENCOUNTER — ALLIED HEALTH/NURSE VISIT (OUTPATIENT)
Dept: ONCOLOGY | Facility: CLINIC | Age: 49
End: 2024-04-30

## 2024-04-30 VITALS — OXYGEN SATURATION: 96 % | DIASTOLIC BLOOD PRESSURE: 77 MMHG | SYSTOLIC BLOOD PRESSURE: 115 MMHG | HEART RATE: 76 BPM

## 2024-04-30 VITALS
SYSTOLIC BLOOD PRESSURE: 126 MMHG | WEIGHT: 157 LBS | BODY MASS INDEX: 26.48 KG/M2 | OXYGEN SATURATION: 98 % | HEART RATE: 79 BPM | TEMPERATURE: 98.3 F | DIASTOLIC BLOOD PRESSURE: 84 MMHG | RESPIRATION RATE: 16 BRPM

## 2024-04-30 DIAGNOSIS — C50.211 MALIGNANT NEOPLASM OF UPPER-INNER QUADRANT OF RIGHT BREAST IN FEMALE, ESTROGEN RECEPTOR POSITIVE (H): Primary | ICD-10-CM

## 2024-04-30 DIAGNOSIS — Z17.0 MALIGNANT NEOPLASM OF UPPER-INNER QUADRANT OF RIGHT BREAST IN FEMALE, ESTROGEN RECEPTOR POSITIVE (H): Primary | ICD-10-CM

## 2024-04-30 DIAGNOSIS — Z00.6 EXAMINATION OF PARTICIPANT OR CONTROL IN CLINICAL RESEARCH: Primary | ICD-10-CM

## 2024-04-30 LAB
ALBUMIN SERPL BCG-MCNC: 4.3 G/DL (ref 3.5–5.2)
ALBUMIN SERPL BCG-MCNC: 4.3 G/DL (ref 3.5–5.2)
ALP SERPL-CCNC: 65 U/L (ref 40–150)
ALP SERPL-CCNC: 65 U/L (ref 40–150)
ALT SERPL W P-5'-P-CCNC: 7 U/L (ref 0–50)
ALT SERPL W P-5'-P-CCNC: 7 U/L (ref 0–50)
ANION GAP SERPL CALCULATED.3IONS-SCNC: 12 MMOL/L (ref 7–15)
AST SERPL W P-5'-P-CCNC: 17 U/L (ref 0–45)
AST SERPL W P-5'-P-CCNC: 17 U/L (ref 0–45)
BASOPHILS # BLD AUTO: 0.1 10E3/UL (ref 0–0.2)
BASOPHILS NFR BLD AUTO: 1 %
BILIRUB DIRECT SERPL-MCNC: <0.2 MG/DL (ref 0–0.3)
BILIRUB SERPL-MCNC: 0.9 MG/DL
BILIRUB SERPL-MCNC: 0.9 MG/DL
BUN SERPL-MCNC: 13.9 MG/DL (ref 6–20)
CALCIUM SERPL-MCNC: 9.5 MG/DL (ref 8.6–10)
CHLORIDE SERPL-SCNC: 106 MMOL/L (ref 98–107)
CREAT SERPL-MCNC: 0.58 MG/DL (ref 0.51–0.95)
DEPRECATED HCO3 PLAS-SCNC: 21 MMOL/L (ref 22–29)
EGFRCR SERPLBLD CKD-EPI 2021: >90 ML/MIN/1.73M2
EOSINOPHIL # BLD AUTO: 0.2 10E3/UL (ref 0–0.7)
EOSINOPHIL NFR BLD AUTO: 3 %
ERYTHROCYTE [DISTWIDTH] IN BLOOD BY AUTOMATED COUNT: 12.6 % (ref 10–15)
FSH SERPL IRP2-ACNC: 6.6 MIU/ML
GLUCOSE SERPL-MCNC: 130 MG/DL (ref 70–99)
HCT VFR BLD AUTO: 37 % (ref 35–47)
HGB BLD-MCNC: 12.3 G/DL (ref 11.7–15.7)
IMM GRANULOCYTES # BLD: 0 10E3/UL
IMM GRANULOCYTES NFR BLD: 0 %
LYMPHOCYTES # BLD AUTO: 2.5 10E3/UL (ref 0.8–5.3)
LYMPHOCYTES NFR BLD AUTO: 31 %
MCH RBC QN AUTO: 28.3 PG (ref 26.5–33)
MCHC RBC AUTO-ENTMCNC: 33.2 G/DL (ref 31.5–36.5)
MCV RBC AUTO: 85 FL (ref 78–100)
MONOCYTES # BLD AUTO: 0.6 10E3/UL (ref 0–1.3)
MONOCYTES NFR BLD AUTO: 7 %
NEUTROPHILS # BLD AUTO: 4.7 10E3/UL (ref 1.6–8.3)
NEUTROPHILS NFR BLD AUTO: 58 %
NRBC # BLD AUTO: 0 10E3/UL
NRBC BLD AUTO-RTO: 0 /100
PATH REPORT.COMMENTS IMP SPEC: ABNORMAL
PATH REPORT.COMMENTS IMP SPEC: YES
PATH REPORT.FINAL DX SPEC: ABNORMAL
PATH REPORT.GROSS SPEC: ABNORMAL
PATH REPORT.MICROSCOPIC SPEC OTHER STN: ABNORMAL
PATH REPORT.RELEVANT HX SPEC: ABNORMAL
PATH REPORT.RELEVANT HX SPEC: ABNORMAL
PATH REPORT.SITE OF ORIGIN SPEC: ABNORMAL
PLATELET # BLD AUTO: 316 10E3/UL (ref 150–450)
POTASSIUM SERPL-SCNC: 4.1 MMOL/L (ref 3.4–5.3)
PROT SERPL-MCNC: 7.6 G/DL (ref 6.4–8.3)
PROT SERPL-MCNC: 7.6 G/DL (ref 6.4–8.3)
RBC # BLD AUTO: 4.35 10E6/UL (ref 3.8–5.2)
SODIUM SERPL-SCNC: 139 MMOL/L (ref 135–145)
WBC # BLD AUTO: 8.2 10E3/UL (ref 4–11)

## 2024-04-30 PROCEDURE — 96375 TX/PRO/DX INJ NEW DRUG ADDON: CPT

## 2024-04-30 PROCEDURE — 99215 OFFICE O/P EST HI 40 MIN: CPT | Performed by: INTERNAL MEDICINE

## 2024-04-30 PROCEDURE — 250N000011 HC RX IP 250 OP 636: Performed by: INTERNAL MEDICINE

## 2024-04-30 PROCEDURE — 96367 TX/PROPH/DG ADDL SEQ IV INF: CPT

## 2024-04-30 PROCEDURE — 99213 OFFICE O/P EST LOW 20 MIN: CPT | Mod: 25,27 | Performed by: INTERNAL MEDICINE

## 2024-04-30 PROCEDURE — 83001 ASSAY OF GONADOTROPIN (FSH): CPT | Performed by: INTERNAL MEDICINE

## 2024-04-30 PROCEDURE — 85025 COMPLETE CBC W/AUTO DIFF WBC: CPT | Performed by: INTERNAL MEDICINE

## 2024-04-30 PROCEDURE — 258N000003 HC RX IP 258 OP 636: Performed by: INTERNAL MEDICINE

## 2024-04-30 PROCEDURE — 99213 OFFICE O/P EST LOW 20 MIN: CPT | Mod: 25,27

## 2024-04-30 PROCEDURE — 96413 CHEMO IV INFUSION 1 HR: CPT

## 2024-04-30 PROCEDURE — 250N000013 HC RX MED GY IP 250 OP 250 PS 637: Performed by: INTERNAL MEDICINE

## 2024-04-30 PROCEDURE — 80076 HEPATIC FUNCTION PANEL: CPT | Performed by: INTERNAL MEDICINE

## 2024-04-30 PROCEDURE — 96376 TX/PRO/DX INJ SAME DRUG ADON: CPT

## 2024-04-30 PROCEDURE — 36591 DRAW BLOOD OFF VENOUS DEVICE: CPT | Performed by: INTERNAL MEDICINE

## 2024-04-30 RX ORDER — HEPARIN SODIUM (PORCINE) LOCK FLUSH IV SOLN 100 UNIT/ML 100 UNIT/ML
5 SOLUTION INTRAVENOUS
Status: DISCONTINUED | OUTPATIENT
Start: 2024-04-30 | End: 2024-04-30 | Stop reason: HOSPADM

## 2024-04-30 RX ORDER — ONDANSETRON 8 MG/1
8 TABLET, FILM COATED ORAL EVERY 8 HOURS PRN
Qty: 30 TABLET | Refills: 3 | Status: SHIPPED | OUTPATIENT
Start: 2024-04-30

## 2024-04-30 RX ORDER — DIPHENHYDRAMINE HCL 25 MG
50 CAPSULE ORAL ONCE
Status: COMPLETED | OUTPATIENT
Start: 2024-04-30 | End: 2024-04-30

## 2024-04-30 RX ORDER — DIPHENHYDRAMINE HCL 25 MG
50 CAPSULE ORAL ONCE
Status: CANCELLED
Start: 2024-04-30

## 2024-04-30 RX ORDER — ALBUTEROL SULFATE 90 UG/1
1-2 AEROSOL, METERED RESPIRATORY (INHALATION)
Status: CANCELLED
Start: 2024-04-30

## 2024-04-30 RX ORDER — DIPHENHYDRAMINE HYDROCHLORIDE 50 MG/ML
50 INJECTION INTRAMUSCULAR; INTRAVENOUS
Status: COMPLETED | OUTPATIENT
Start: 2024-04-30 | End: 2024-04-30

## 2024-04-30 RX ORDER — ALBUTEROL SULFATE 0.83 MG/ML
2.5 SOLUTION RESPIRATORY (INHALATION)
Status: CANCELLED | OUTPATIENT
Start: 2024-04-30

## 2024-04-30 RX ORDER — EPINEPHRINE 1 MG/ML
0.3 INJECTION, SOLUTION, CONCENTRATE INTRAVENOUS EVERY 5 MIN PRN
Status: DISCONTINUED | OUTPATIENT
Start: 2024-04-30 | End: 2024-04-30 | Stop reason: HOSPADM

## 2024-04-30 RX ORDER — DIPHENHYDRAMINE HYDROCHLORIDE 50 MG/ML
50 INJECTION INTRAMUSCULAR; INTRAVENOUS
Status: CANCELLED
Start: 2024-04-30

## 2024-04-30 RX ORDER — HEPARIN SODIUM (PORCINE) LOCK FLUSH IV SOLN 100 UNIT/ML 100 UNIT/ML
5 SOLUTION INTRAVENOUS
Status: CANCELLED | OUTPATIENT
Start: 2024-04-30

## 2024-04-30 RX ORDER — METHYLPREDNISOLONE SODIUM SUCCINATE 125 MG/2ML
125 INJECTION, POWDER, LYOPHILIZED, FOR SOLUTION INTRAMUSCULAR; INTRAVENOUS
Status: CANCELLED
Start: 2024-04-30

## 2024-04-30 RX ORDER — METHYLPREDNISOLONE SODIUM SUCCINATE 125 MG/2ML
125 INJECTION, POWDER, LYOPHILIZED, FOR SOLUTION INTRAMUSCULAR; INTRAVENOUS
Status: DISCONTINUED | OUTPATIENT
Start: 2024-04-30 | End: 2024-04-30 | Stop reason: HOSPADM

## 2024-04-30 RX ORDER — LORAZEPAM 2 MG/ML
0.5 INJECTION INTRAMUSCULAR EVERY 4 HOURS PRN
Status: CANCELLED | OUTPATIENT
Start: 2024-04-30

## 2024-04-30 RX ORDER — MEPERIDINE HYDROCHLORIDE 25 MG/ML
25 INJECTION INTRAMUSCULAR; INTRAVENOUS; SUBCUTANEOUS EVERY 30 MIN PRN
Status: CANCELLED | OUTPATIENT
Start: 2024-04-30

## 2024-04-30 RX ORDER — EPINEPHRINE 1 MG/ML
0.3 INJECTION, SOLUTION INTRAMUSCULAR; SUBCUTANEOUS EVERY 5 MIN PRN
Status: CANCELLED | OUTPATIENT
Start: 2024-04-30

## 2024-04-30 RX ORDER — HEPARIN SODIUM,PORCINE 10 UNIT/ML
5-20 VIAL (ML) INTRAVENOUS DAILY PRN
Status: CANCELLED | OUTPATIENT
Start: 2024-04-30

## 2024-04-30 RX ADMIN — FAMOTIDINE 20 MG: 10 INJECTION INTRAVENOUS at 12:46

## 2024-04-30 RX ADMIN — FAMOTIDINE 20 MG: 10 INJECTION INTRAVENOUS at 14:03

## 2024-04-30 RX ADMIN — SODIUM CHLORIDE 143 MG: 9 INJECTION, SOLUTION INTRAVENOUS at 13:40

## 2024-04-30 RX ADMIN — SODIUM CHLORIDE 250 ML: 9 INJECTION, SOLUTION INTRAVENOUS at 12:46

## 2024-04-30 RX ADMIN — DIPHENHYDRAMINE HYDROCHLORIDE 50 MG: 25 CAPSULE ORAL at 12:43

## 2024-04-30 RX ADMIN — DEXAMETHASONE SODIUM PHOSPHATE: 10 INJECTION, SOLUTION INTRAMUSCULAR; INTRAVENOUS at 12:49

## 2024-04-30 RX ADMIN — Medication 5 ML: at 15:49

## 2024-04-30 RX ADMIN — METHYLPREDNISOLONE SODIUM SUCCINATE 125 MG: 125 INJECTION, POWDER, FOR SOLUTION INTRAMUSCULAR; INTRAVENOUS at 14:05

## 2024-04-30 RX ADMIN — DIPHENHYDRAMINE HYDROCHLORIDE 50 MG: 50 INJECTION INTRAMUSCULAR; INTRAVENOUS at 14:01

## 2024-04-30 RX ADMIN — Medication 5 ML: at 10:57

## 2024-04-30 ASSESSMENT — PAIN SCALES - GENERAL: PAINLEVEL: NO PAIN (0)

## 2024-04-30 NOTE — NURSING NOTE
Chief Complaint   Patient presents with    Port Draw     Vitals taken, port accessed, labs drawn, heparin locked, checked into next appt     /84 (BP Location: Left arm, Patient Position: Sitting, Cuff Size: Adult Regular)   Pulse 79   Temp 98.3  F (36.8  C) (Oral)   Resp 16   Wt 71.2 kg (157 lb)   SpO2 98%   BMI 26.48 kg/m    Jayy Higgins RN on 4/30/2024 at 10:43 AM

## 2024-04-30 NOTE — NURSING NOTE
"Oncology Rooming Note    April 30, 2024 11:01 AM   Sharon Fong is a 48 year old female who presents for:    Chief Complaint   Patient presents with    Port Draw     Vitals taken, port accessed, labs drawn, heparin locked, checked into next appt    Oncology Clinic Visit     Malignant neoplasm of upper-inner quadrant     Initial Vitals: /84 (BP Location: Left arm, Patient Position: Sitting, Cuff Size: Adult Regular)   Pulse 79   Temp 98.3  F (36.8  C) (Oral)   Resp 16   Wt 71.2 kg (157 lb)   SpO2 98%   BMI 26.48 kg/m   Estimated body mass index is 26.48 kg/m  as calculated from the following:    Height as of 4/17/24: 1.64 m (5' 4.57\").    Weight as of this encounter: 71.2 kg (157 lb). Body surface area is 1.8 meters squared.  No Pain (0) Comment: Data Unavailable   No LMP recorded. Patient has had a hysterectomy.  Allergies reviewed: Yes  Medications reviewed: Yes    Medications: Medication refills not needed today.  Pharmacy name entered into [facility]: [facility] [address] [place] [address] - [address] [place] [address]    Frailty Screening:   Is the patient here for a new oncology consult visit in cancer care? 2. No      Clinical concerns: forms to complete      Sanam Scott"

## 2024-04-30 NOTE — PATIENT INSTRUCTIONS
[facility] Triage and after hours / weekends / holidays:  [phone]    Please call the triage or after hours line if you experience a temperature greater than or equal to 100.4, shaking chills, have uncontrolled nausea, vomiting and/or diarrhea, dizziness, shortness of breath, chest pain, bleeding, unexplained bruising, or if you have any other new/concerning symptoms, questions or concerns.      If you are having any concerning symptoms or wish to speak to a provider before your next infusion visit, please call triage to notify them so we can adequately serve you.     If you need a refill on a narcotic prescription or other medication, please call before your infusion appointment.

## 2024-04-30 NOTE — PROGRESS NOTES
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 She started screening for the ISPY 2.2 trial and was found to have a MammaPrint high risk/blueprint luminal B subtype.  Unfortunately, the block A options (Rilvegostomig and Traztuzumab deruxtecan) have not been received yet and therefore she has been directly randomized to start treatment block B.  She presents today for follow-up.      Interval History  4/12/2024 CT CAP demonstrated multifocal enhancement in the right breast with mildly abnormal right axillary lymph nodes.  Otherwise no evidence of distant metastatic disease.    4/12/2024 NM bone scan negative for osteoblastic metastatic disease.    4/15/2024 MRI breast demonstrated the following  Right breast at 1:00 mass measuring 2.6 x 2.0 by 3.0 cm with extensive surrounding clumped NME occupying most of the right breast and measuring 8.9 x 4.7 x 8.7   Left breast showed a fibroadenoma at 8-9:00 position.  In addition, 7 mm of linear branching NME at 3-4:00 position.  This was biopsied and found to be benign breast tissue.  Few asymmetric right internal mammary chain LN. multiple enlarged right level 1 axillary nodes.  Additionally multiple LEFT level 1 axillary nodes that are indeterminant -which were normal on left axillary US.    4/26/2024 MammaPrint -0.192 (high risk) and blueprint luminal B      Sharon is overall doing well.  She presents today accompanied by her . She denies any new symptoms since her last visit with me. Port access went well.     No new visual changes, cough, SOB, chest pain, n/v, constipation/diarrhea, abdominal pain, focal weakness or numbness.         Her full oncologic history is as follows:   Oncologic History  Patient Active Problem List    Diagnosis Date Noted    Malignant  neoplasm of upper-inner quadrant of right female breast (H) 2024     Priority: High     Right breast cancer  Has had longstanding history of multiple biopsies, infection, and partial mastectomy for a right breast multifocal abscess dating back to at least .     patient presented with palpable lump at 1:00 in the right breast    3/21/2024 diagnostic mammo showed clustered fine indeterminate calcs at 12:00.  No mammographic abnormality identified to correspond to the palpable area of fullness at 1:00.  On US, at 1:00, 10 cm FN there was a 3.7 x 2.3 x 1.5 cm solid hypoechoic irregularly marginated mass.  Additional hypoechoic lesion located at 2:00, 3 cm FN measuring 1.0 x 0.5 cm.  Another hypoechoic lesion at 2:00, 12 cm FN measuring 0.7 cm.  At least 2 enlarged right axillary lymph nodes -largest measuring up to 0.6 cm.    3/28/2024 ultrasound-guided biopsy of the followin:00 lesion at least DCIS  1:00 lesion grade 2 IDC.  No LVI.  ER (3+, 80 to 90%), AR (3+, 90 to 100%), HER2 2+ and FISH negative   Right axillary LN: metastatic carcinoma  2024 CT CAP demonstrated multifocal enhancement in the right breast with mildly abnormal right axillary lymph nodes.  Otherwise no evidence of distant metastatic disease.    2024 NM bone scan negative for osteoblastic metastatic disease.    4/15/2024 MRI breast demonstrated the following  Right breast at 1:00 mass measuring 2.6 x 2.0 by 3.0 cm with extensive surrounding clumped NME occupying most of the right breast and measuring 8.9 x 4.7 x 8.7   Left breast showed a fibroadenoma at 8-9:00 position.  In addition, 7 mm of linear branching NME at 3-4:00 position.  This was biopsied and found to be benign breast tissue.  Few asymmetric right internal mammary chain LN. multiple enlarged right level 1 axillary nodes.  Additionally multiple LEFT level 1 axillary nodes that are indeterminant -which were normal on left axillary US.    2024 MammaPrint  -0.192 (high risk) and blueprint luminal B      Inflammatory arthritis 06/01/2015     Priority: Medium    Seizure (H) 11/15/2013     Priority: Medium    Vitamin D deficiency 04/23/2013     Priority: Medium            Current Medications:  Current Outpatient Medications   Medication Sig Dispense Refill    amitriptyline (ELAVIL) 10 MG tablet Take 20 mg by mouth at bedtime      aspirin-acetaminophen-caffeine (EXCEDRIN MIGRAINE) 250-250-65 MG tablet       ibuprofen (ADVIL/MOTRIN) 200 MG capsule       lidocaine-prilocaine (EMLA) 2.5-2.5 % external cream Apply topically as needed for moderate pain 30 g 1         ECOG Performance Status: 0    Physical Examination:  /84 (BP Location: Left arm, Patient Position: Sitting, Cuff Size: Adult Regular)   Pulse 79   Temp 98.3  F (36.8  C) (Oral)   Resp 16   Wt 71.2 kg (157 lb)   SpO2 98%   BMI 26.48 kg/m    General:  Well appearing, well-nourished adult female in NAD.  HEENT:  Normocephalic.  Sclera anicteric.  MMM.  No lesions of the oropharynx.  Breast: not examined   Lymph:  not examined   Chest:  Breathing comfortably on room air  Abd:  Soft/NT/ND.  No hepatosplenomegaly.  Ext:  No pitting edema of the bilateral lower extremities.    Musculo:  Strength 5/5 throughout.  Neuro:  Cranial nerves grossly intact.  Psych:  Mood and affect appear normal.      Laboratory Data:  Lab Results   Component Value Date    WBC 8.2 04/30/2024    HGB 12.3 04/30/2024    HCT 37.0 04/30/2024    MCV 85 04/30/2024     04/30/2024     Lab Results   Component Value Date     04/30/2024    BUN 13.9 04/30/2024    ANIONGAP 12 04/30/2024     Lab Results   Component Value Date    ALT 7 04/30/2024    ALT 7 04/30/2024    AST 17 04/30/2024    AST 17 04/30/2024    ALKPHOS 65 04/30/2024    ALKPHOS 65 04/30/2024     Lab Results   Component Value Date    INR 1.02 04/17/2024         Radiology data:  I have personally reviewed the images of / or the following radiology data: As detailed in the  oncology timeline      Pathology and other data:  I have personally reviewed the following data: As detailed in the oncology timeline      Assessment and Recommendations  Sharon Fong  is a 48 year old premenopausal woman with a new diagnosis of 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  She is on the I SPY 2.2 clinical trial and presents today for initiation of neoadjuvant chemotherapy.      # Right breast cancer  - Clinically and molecularly high risk  - Plan to start with C1D1 neoadjuvant paclitaxel  - MRI in 3 weeks  - Referral to surgery -she would like to see Dr. Collier   - Will need adjuvant radiation therapy -will place referral after her surgery    #Hx of migraines  - May need to change zofran as premed if this triggers headaches      45 minutes spent on the date of the encounter doing chart review, review of outside records, review of test results, interpretation of tests, patient visit, documentation, and discussion with other provider(s)       Dame Dolores MD   of Medicine  Division of Hematology, Oncology and Transplantation  [facility]

## 2024-04-30 NOTE — PROGRESS NOTES
[facility]: Cancer Care                                                                                          Called pt re: Advanced care planning information including CPR, DNR, and a [place] [facility] Care Directive. Wanted to make sure that the information that was being sent to her was what she was looking for. Went over what each document included and if they sounded like they fit for her. Pt acknowledged they do, will send them to her via [employer] along with the [employer] website and Prepare for your care website. Pt verbalized understanding of the information with no questions at this time.    Signature:  Bette Watters RN

## 2024-04-30 NOTE — LETTER
4/30/2024         RE: Sharon Fong  [address] [facility] [address]        Dear Colleague,    Thank you for referring your patient, Sharon Fong, to the [facility]. Please see a copy of my visit note below.    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 She started screening for the ISPY 2.2 trial and was found to have a MammaPrint high risk/blueprint luminal B subtype.  Unfortunately, the block A options (Rilvegostomig and Traztuzumab deruxtecan) have not been received yet and therefore she has been directly randomized to start treatment block B.  She presents today for follow-up.      Interval History  4/12/2024 CT CAP demonstrated multifocal enhancement in the right breast with mildly abnormal right axillary lymph nodes.  Otherwise no evidence of distant metastatic disease.    4/12/2024 NM bone scan negative for osteoblastic metastatic disease.    4/15/2024 MRI breast demonstrated the following  Right breast at 1:00 mass measuring 2.6 x 2.0 by 3.0 cm with extensive surrounding clumped NME occupying most of the right breast and measuring 8.9 x 4.7 x 8.7   Left breast showed a fibroadenoma at 8-9:00 position.  In addition, 7 mm of linear branching NME at 3-4:00 position.  This was biopsied and found to be benign breast tissue.  Few asymmetric right internal mammary chain LN. multiple enlarged right level 1 axillary nodes.  Additionally multiple LEFT level 1 axillary nodes that are indeterminant -which were normal on left axillary US.    4/26/2024 MammaPrint -0.192 (high risk) and blueprint luminal B      Sharon is overall doing well.  She presents today accompanied by her . She denies any new symptoms since her last visit with me. Port access went  well.     No new visual changes, cough, SOB, chest pain, n/v, constipation/diarrhea, abdominal pain, focal weakness or numbness.         Her full oncologic history is as follows:   Oncologic History  Patient Active Problem List    Diagnosis Date Noted    Malignant neoplasm of upper-inner quadrant of right female breast (H) 2024     Priority: High     Right breast cancer  Has had longstanding history of multiple biopsies, infection, and partial mastectomy for a right breast multifocal abscess dating back to at least .     patient presented with palpable lump at 1:00 in the right breast    3/21/2024 diagnostic mammo showed clustered fine indeterminate calcs at 12:00.  No mammographic abnormality identified to correspond to the palpable area of fullness at 1:00.  On US, at 1:00, 10 cm FN there was a 3.7 x 2.3 x 1.5 cm solid hypoechoic irregularly marginated mass.  Additional hypoechoic lesion located at 2:00, 3 cm FN measuring 1.0 x 0.5 cm.  Another hypoechoic lesion at 2:00, 12 cm FN measuring 0.7 cm.  At least 2 enlarged right axillary lymph nodes -largest measuring up to 0.6 cm.    3/28/2024 ultrasound-guided biopsy of the followin:00 lesion at least DCIS  1:00 lesion grade 2 IDC.  No LVI.  ER (3+, 80 to 90%), [place] (3+, 90 to 100%), HER2 2+ and FISH negative   Right axillary LN: metastatic carcinoma  2024 CT CAP demonstrated multifocal enhancement in the right breast with mildly abnormal right axillary lymph nodes.  Otherwise no evidence of distant metastatic disease.    2024 NM bone scan negative for osteoblastic metastatic disease.    4/15/2024 MRI breast demonstrated the following  Right breast at 1:00 mass measuring 2.6 x 2.0 by 3.0 cm with extensive surrounding clumped NME occupying most of the right breast and measuring 8.9 x 4.7 x 8.7   Left breast showed a fibroadenoma at 8-9:00 position.  In addition, 7 mm of linear branching NME at 3-4:00 position.  This was biopsied and found to  be benign breast tissue.  Few asymmetric right internal mammary chain LN. multiple enlarged right level 1 axillary nodes.  Additionally multiple LEFT level 1 axillary nodes that are indeterminant -which were normal on left axillary US.    4/26/2024 MammaPrint -0.192 (high risk) and blueprint luminal B      Inflammatory arthritis 06/01/2015     Priority: Medium    Seizure (H) 11/15/2013     Priority: Medium    Vitamin D deficiency 04/23/2013     Priority: Medium            Current Medications:  Current Outpatient Medications   Medication Sig Dispense Refill    amitriptyline (ELAVIL) 10 MG tablet Take 20 mg by mouth at bedtime      aspirin-acetaminophen-caffeine (EXCEDRIN MIGRAINE) 250-250-65 MG tablet       ibuprofen (ADVIL/MOTRIN) 200 MG capsule       lidocaine-prilocaine (EMLA) 2.5-2.5 % external cream Apply topically as needed for moderate pain 30 g 1         ECOG Performance Status: 0    Physical Examination:  /84 (BP Location: Left arm, Patient Position: Sitting, Cuff Size: Adult Regular)   Pulse 79   Temp 98.3  F (36.8  C) (Oral)   Resp 16   Wt 71.2 kg (157 lb)   SpO2 98%   BMI 26.48 kg/m    General:  Well appearing, well-nourished adult female in NAD.  HEENT:  Normocephalic.  Sclera anicteric.  MMM.  No lesions of the oropharynx.  Breast: not examined   Lymph:  not examined   Chest:  Breathing comfortably on room air  Abd:  Soft/NT/ND.  No hepatosplenomegaly.  Ext:  No pitting edema of the bilateral lower extremities.    Musculo:  Strength 5/5 throughout.  Neuro:  Cranial nerves grossly intact.  Psych:  Mood and affect appear normal.      Laboratory Data:  Lab Results   Component Value Date    WBC 8.2 04/30/2024    HGB 12.3 04/30/2024    HCT 37.0 04/30/2024    MCV 85 04/30/2024     04/30/2024     Lab Results   Component Value Date     04/30/2024    BUN 13.9 04/30/2024    ANIONGAP 12 04/30/2024     Lab Results   Component Value Date    ALT 7 04/30/2024    ALT 7 04/30/2024    AST 17  04/30/2024    AST 17 04/30/2024    ALKPHOS 65 04/30/2024    ALKPHOS 65 04/30/2024     Lab Results   Component Value Date    INR 1.02 04/17/2024         Radiology data:  I have personally reviewed the images of / or the following radiology data: As detailed in the oncology timeline      Pathology and other data:  I have personally reviewed the following data: As detailed in the oncology timeline      Assessment and Recommendations  Sharon Fong  is a 48 year old premenopausal woman with a new diagnosis of 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  She is on the I SPY 2.2 clinical trial and presents today for initiation of neoadjuvant chemotherapy.      # Right breast cancer  - Clinically and molecularly high risk  - Plan to start with C1D1 neoadjuvant paclitaxel  - MRI in 3 weeks  - Referral to surgery -she would like to see Dr. Collier   - Will need adjuvant radiation therapy -will place referral after her surgery    #Hx of migraines  - May need to change zofran as premed if this triggers headaches      45 minutes spent on the date of the encounter doing chart review, review of outside records, review of test results, interpretation of tests, patient visit, documentation, and discussion with other provider(s)       Dame Dolores MD   of Medicine  Division of Hematology, Oncology and Transplantation  [facility]

## 2024-04-30 NOTE — NURSING NOTE
[id number]: Informed Consent Note     The consent form, including purpose, risks and benefits, was reviewed with Sharon Suarezantonyshelly, and all questions were answered before she signed the consent form. The patient understands that the study involves an active treatment phase as well as a post-treatment follow up phase.     Present during the discussion were Dr. Bernal, Sharon, her  and myslef. A copy of the signed form was provided to the patient. No procedures specific to this study were performed prior to the patient signing the consent form.    Consent Version Date: 3/4/24  Consent obtained by: Philomena Hayes    Date: 4/30/24  Reproductive Evaluation     Subject name: Sharon Fong   I discussed with the patient that in order to participate in this study she must agree to use effective contraception during therapy and continuing 6 months after surgery. Examples of effective contraception were provided, including hormonal contraception, intrauterine devices, and double barrier contraception. She agreed to use effective contraception per the study's requirements.  [id number]: Study Visit Note   Subject name: Sharon Fong     Visit: Block B, Cycle 1 Day 1    Did the study visit occur within the appropriate window allowed by the protocol? yes    Since the last study visit, She has been doing well. She met with Dr. Bernal today and is ready to start treatment.     I have personally interviewed Sharon Fong and reviewed her medical record for adverse events and concomitant medications and these have been recorded on the corresponding logs in Sharon Fong's research file.     Sharon Fong was given the opportunity to ask any trial related questions.  Please see provider progress note for physical exam and other clinical information. Labs were reviewed - any significant lab values were addressed and reviewed.    .tommie

## 2024-05-01 ENCOUNTER — TELEPHONE (OUTPATIENT)
Dept: ONCOLOGY | Facility: CLINIC | Age: 49
End: 2024-05-01
Payer: COMMERCIAL

## 2024-05-02 ENCOUNTER — PATIENT OUTREACH (OUTPATIENT)
Dept: ONCOLOGY | Facility: CLINIC | Age: 49
End: 2024-05-02
Payer: COMMERCIAL

## 2024-05-03 ENCOUNTER — PATIENT OUTREACH (OUTPATIENT)
Dept: ONCOLOGY | Facility: CLINIC | Age: 49
End: 2024-05-03
Payer: COMMERCIAL

## 2024-05-03 NOTE — PROGRESS NOTES
[facility]: Cancer Care                                                                                          Called pt to check in about her body aches and makes sure she is hydrating ok after her chemo.   Pt reporting she is feeling much better. The body aches are almost all gone and she is able to drink and feels she is getting enough fluids. Asked if she had anything else she was feeling after chemo and she reported she is doing well. No other questions or concerns today.    Signature:  Bette Watters RNCC

## 2024-05-03 NOTE — PROGRESS NOTES
New Patient Oncology Nurse Navigator Note     Referring provider: Dame Dolores JOE     Referring Clinic/Organization: [facility]     Referred to (specialty:) Cancer Surgery     Requested provider (if applicable): Dr. Boom Collier     Date Referral Received: 2024     Evaluation for:  C50.211, Z17.0 (ICD-10-CM) - Malignant neoplasm of upper-inner quadrant of right breast in female, estrogen receptor positive (H)     Clinical History (per Nurse review of records provided):      Navigator learned of this referral from Dr. Collier's RNCC on . The referral was linked to an infusion appointment and did not fall to navigation WQ.     Right breast cancer  Has had longstanding history of multiple biopsies, infection, and partial mastectomy for a right breast multifocal abscess dating back to at least 2024 patient presented with palpable lump at 1:00 in the right breast     3/21/2024 diagnostic mammo showed clustered fine indeterminate calcs at 12:00.  No mammographic abnormality identified to correspond to the palpable area of fullness at 1:00.  On US, at 1:00, 10 cm FN there was a 3.7 x 2.3 x 1.5 cm solid hypoechoic irregularly marginated mass.  Additional hypoechoic lesion located at 2:00, 3 cm FN measuring 1.0 x 0.5 cm.  Another hypoechoic lesion at 2:00, 12 cm FN measuring 0.7 cm.  At least 2 enlarged right axillary lymph nodes -largest measuring up to 0.6 cm.     3/28/2024 ultrasound-guided biopsy of the followin:00 lesion at least DCIS    1:00 lesion grade 2 IDC.  No LVI.  ER (3+, 80 to 90%), [place] (3+, 90 to 100%), HER2 2+ and FISH negative     Right axillary LN: metastatic carcinoma  2024 CT CAP demonstrated multifocal enhancement in the right breast with mildly abnormal right axillary lymph nodes.  Otherwise no evidence of distant metastatic disease.    2024 CT CAP demonstrated multifocal enhancement in the right breast with mildly abnormal right axillary lymph nodes.   Otherwise no evidence of distant metastatic disease.     4/12/2024 NM bone scan negative for osteoblastic metastatic disease.     4/15/2024 MRI breast demonstrated the following  Right breast at 1:00 mass measuring 2.6 x 2.0 by 3.0 cm with extensive surrounding clumped NME occupying most of the right breast and measuring 8.9 x 4.7 x 8.7   Left breast showed a fibroadenoma at 8-9:00 position.  In addition, 7 mm of linear branching NME at 3-4:00 position.  This was biopsied and found to be benign breast tissue.  Few asymmetric right internal mammary chain LN. multiple enlarged right level 1 axillary nodes.  Additionally multiple LEFT level 1 axillary nodes that are indeterminant -which were normal on left axillary US.     4/26/2024 MammaPrint -0.192 (high risk) and blueprint luminal B     Patient is current undergoing neoadjuvant chemotherapy.  Weekly PACLitaxel Followed by Dose Dense DOXOrubicin / Cyclophosphamide (AC)   4/30/2024 to 9/4/2024    Writer received referral, reviewed for appropriate plan, and referral transferred to New Patient Scheduling for completion.

## 2024-05-05 RX ORDER — METHYLPREDNISOLONE SODIUM SUCCINATE 125 MG/2ML
125 INJECTION, POWDER, LYOPHILIZED, FOR SOLUTION INTRAMUSCULAR; INTRAVENOUS
Status: CANCELLED
Start: 2024-05-14

## 2024-05-05 RX ORDER — ALBUTEROL SULFATE 0.83 MG/ML
2.5 SOLUTION RESPIRATORY (INHALATION)
Status: CANCELLED | OUTPATIENT
Start: 2024-05-14

## 2024-05-05 RX ORDER — DIPHENHYDRAMINE HYDROCHLORIDE 50 MG/ML
50 INJECTION INTRAMUSCULAR; INTRAVENOUS
Status: CANCELLED
Start: 2024-05-14

## 2024-05-05 RX ORDER — ONDANSETRON 2 MG/ML
8 INJECTION INTRAMUSCULAR; INTRAVENOUS ONCE
Status: CANCELLED | OUTPATIENT
Start: 2024-05-14

## 2024-05-05 RX ORDER — LORAZEPAM 2 MG/ML
0.5 INJECTION INTRAMUSCULAR EVERY 4 HOURS PRN
Status: CANCELLED | OUTPATIENT
Start: 2024-05-07

## 2024-05-05 RX ORDER — DIPHENHYDRAMINE HCL 25 MG
50 CAPSULE ORAL
Status: CANCELLED
Start: 2024-05-14

## 2024-05-05 RX ORDER — LORAZEPAM 2 MG/ML
0.5 INJECTION INTRAMUSCULAR EVERY 4 HOURS PRN
Status: CANCELLED | OUTPATIENT
Start: 2024-05-14

## 2024-05-05 RX ORDER — DIPHENHYDRAMINE HYDROCHLORIDE 50 MG/ML
50 INJECTION INTRAMUSCULAR; INTRAVENOUS
Status: CANCELLED
Start: 2024-05-07

## 2024-05-05 RX ORDER — EPINEPHRINE 1 MG/ML
0.3 INJECTION, SOLUTION INTRAMUSCULAR; SUBCUTANEOUS EVERY 5 MIN PRN
Status: CANCELLED | OUTPATIENT
Start: 2024-05-07

## 2024-05-05 RX ORDER — HEPARIN SODIUM (PORCINE) LOCK FLUSH IV SOLN 100 UNIT/ML 100 UNIT/ML
5 SOLUTION INTRAVENOUS
Status: CANCELLED | OUTPATIENT
Start: 2024-05-07

## 2024-05-05 RX ORDER — HEPARIN SODIUM (PORCINE) LOCK FLUSH IV SOLN 100 UNIT/ML 100 UNIT/ML
5 SOLUTION INTRAVENOUS
Status: CANCELLED | OUTPATIENT
Start: 2024-05-14

## 2024-05-05 RX ORDER — ALBUTEROL SULFATE 0.83 MG/ML
2.5 SOLUTION RESPIRATORY (INHALATION)
Status: CANCELLED | OUTPATIENT
Start: 2024-05-07

## 2024-05-05 RX ORDER — MEPERIDINE HYDROCHLORIDE 25 MG/ML
25 INJECTION INTRAMUSCULAR; INTRAVENOUS; SUBCUTANEOUS EVERY 30 MIN PRN
Status: CANCELLED | OUTPATIENT
Start: 2024-05-14

## 2024-05-05 RX ORDER — METHYLPREDNISOLONE SODIUM SUCCINATE 125 MG/2ML
125 INJECTION, POWDER, LYOPHILIZED, FOR SOLUTION INTRAMUSCULAR; INTRAVENOUS
Status: CANCELLED
Start: 2024-05-07

## 2024-05-05 RX ORDER — HEPARIN SODIUM,PORCINE 10 UNIT/ML
5-20 VIAL (ML) INTRAVENOUS DAILY PRN
Status: CANCELLED | OUTPATIENT
Start: 2024-05-14

## 2024-05-05 RX ORDER — ALBUTEROL SULFATE 90 UG/1
1-2 AEROSOL, METERED RESPIRATORY (INHALATION)
Status: CANCELLED
Start: 2024-05-07

## 2024-05-05 RX ORDER — EPINEPHRINE 1 MG/ML
0.3 INJECTION, SOLUTION INTRAMUSCULAR; SUBCUTANEOUS EVERY 5 MIN PRN
Status: CANCELLED | OUTPATIENT
Start: 2024-05-14

## 2024-05-05 RX ORDER — HEPARIN SODIUM,PORCINE 10 UNIT/ML
5-20 VIAL (ML) INTRAVENOUS DAILY PRN
Status: CANCELLED | OUTPATIENT
Start: 2024-05-07

## 2024-05-05 RX ORDER — ALBUTEROL SULFATE 90 UG/1
1-2 AEROSOL, METERED RESPIRATORY (INHALATION)
Status: CANCELLED
Start: 2024-05-14

## 2024-05-05 RX ORDER — MEPERIDINE HYDROCHLORIDE 25 MG/ML
25 INJECTION INTRAMUSCULAR; INTRAVENOUS; SUBCUTANEOUS EVERY 30 MIN PRN
Status: CANCELLED | OUTPATIENT
Start: 2024-05-07

## 2024-05-05 RX ORDER — DIPHENHYDRAMINE HCL 25 MG
50 CAPSULE ORAL ONCE
Status: CANCELLED
Start: 2024-05-07

## 2024-05-06 NOTE — TELEPHONE ENCOUNTER
Physician Result Form  forms filled out and put in providers folder for review and signature.      Avelina Salinas

## 2024-05-06 NOTE — TELEPHONE ENCOUNTER
Physician Result Form paperwork completed, checked for accuracy, signed and faxed to [employer] @ [phone]. A copy was made, sent to scanning and original mailed to patient at home address.    Successful transmission verified in Right Fax.    Avelina Salinas

## 2024-05-07 ENCOUNTER — INFUSION THERAPY VISIT (OUTPATIENT)
Dept: ONCOLOGY | Facility: CLINIC | Age: 49
End: 2024-05-07
Attending: INTERNAL MEDICINE
Payer: COMMERCIAL

## 2024-05-07 ENCOUNTER — ALLIED HEALTH/NURSE VISIT (OUTPATIENT)
Dept: ONCOLOGY | Facility: CLINIC | Age: 49
End: 2024-05-07

## 2024-05-07 ENCOUNTER — LAB (OUTPATIENT)
Dept: LAB | Facility: CLINIC | Age: 49
End: 2024-05-07
Attending: INTERNAL MEDICINE
Payer: COMMERCIAL

## 2024-05-07 VITALS
HEART RATE: 74 BPM | WEIGHT: 158.4 LBS | TEMPERATURE: 98.5 F | RESPIRATION RATE: 16 BRPM | OXYGEN SATURATION: 100 % | SYSTOLIC BLOOD PRESSURE: 135 MMHG | DIASTOLIC BLOOD PRESSURE: 86 MMHG | BODY MASS INDEX: 26.71 KG/M2

## 2024-05-07 DIAGNOSIS — Z00.6 EXAMINATION OF PARTICIPANT OR CONTROL IN CLINICAL RESEARCH: Primary | ICD-10-CM

## 2024-05-07 DIAGNOSIS — C50.211 MALIGNANT NEOPLASM OF UPPER-INNER QUADRANT OF RIGHT BREAST IN FEMALE, ESTROGEN RECEPTOR POSITIVE (H): Primary | ICD-10-CM

## 2024-05-07 DIAGNOSIS — Z17.0 MALIGNANT NEOPLASM OF UPPER-INNER QUADRANT OF RIGHT BREAST IN FEMALE, ESTROGEN RECEPTOR POSITIVE (H): Primary | ICD-10-CM

## 2024-05-07 DIAGNOSIS — Z00.6 EXAMINATION OF PARTICIPANT OR CONTROL IN CLINICAL RESEARCH: ICD-10-CM

## 2024-05-07 LAB
ALBUMIN SERPL BCG-MCNC: 4.2 G/DL (ref 3.5–5.2)
ALP SERPL-CCNC: 62 U/L (ref 40–150)
ALT SERPL W P-5'-P-CCNC: 10 U/L (ref 0–50)
ANION GAP SERPL CALCULATED.3IONS-SCNC: 10 MMOL/L (ref 7–15)
AST SERPL W P-5'-P-CCNC: 15 U/L (ref 0–45)
BASOPHILS # BLD AUTO: 0.1 10E3/UL (ref 0–0.2)
BASOPHILS NFR BLD AUTO: 1 %
BILIRUB SERPL-MCNC: 0.6 MG/DL
BUN SERPL-MCNC: 14.6 MG/DL (ref 6–20)
CALCIUM SERPL-MCNC: 9.6 MG/DL (ref 8.6–10)
CHLORIDE SERPL-SCNC: 105 MMOL/L (ref 98–107)
CREAT SERPL-MCNC: 0.73 MG/DL (ref 0.51–0.95)
DEPRECATED HCO3 PLAS-SCNC: 24 MMOL/L (ref 22–29)
EGFRCR SERPLBLD CKD-EPI 2021: >90 ML/MIN/1.73M2
EOSINOPHIL # BLD AUTO: 0.2 10E3/UL (ref 0–0.7)
EOSINOPHIL NFR BLD AUTO: 3 %
ERYTHROCYTE [DISTWIDTH] IN BLOOD BY AUTOMATED COUNT: 12.5 % (ref 10–15)
GLUCOSE SERPL-MCNC: 126 MG/DL (ref 70–99)
HCT VFR BLD AUTO: 34.8 % (ref 35–47)
HGB BLD-MCNC: 11.9 G/DL (ref 11.7–15.7)
IMM GRANULOCYTES # BLD: 0 10E3/UL
IMM GRANULOCYTES NFR BLD: 0 %
LYMPHOCYTES # BLD AUTO: 2.8 10E3/UL (ref 0.8–5.3)
LYMPHOCYTES NFR BLD AUTO: 39 %
MCH RBC QN AUTO: 28.7 PG (ref 26.5–33)
MCHC RBC AUTO-ENTMCNC: 34.2 G/DL (ref 31.5–36.5)
MCV RBC AUTO: 84 FL (ref 78–100)
MONOCYTES # BLD AUTO: 0.4 10E3/UL (ref 0–1.3)
MONOCYTES NFR BLD AUTO: 5 %
NEUTROPHILS # BLD AUTO: 3.7 10E3/UL (ref 1.6–8.3)
NEUTROPHILS NFR BLD AUTO: 52 %
NRBC # BLD AUTO: 0 10E3/UL
NRBC BLD AUTO-RTO: 0 /100
PLATELET # BLD AUTO: 313 10E3/UL (ref 150–450)
POTASSIUM SERPL-SCNC: 4 MMOL/L (ref 3.4–5.3)
PROT SERPL-MCNC: 7.2 G/DL (ref 6.4–8.3)
RBC # BLD AUTO: 4.15 10E6/UL (ref 3.8–5.2)
SODIUM SERPL-SCNC: 139 MMOL/L (ref 135–145)
WBC # BLD AUTO: 7.2 10E3/UL (ref 4–11)

## 2024-05-07 PROCEDURE — 99215 OFFICE O/P EST HI 40 MIN: CPT | Mod: 25

## 2024-05-07 PROCEDURE — 82040 ASSAY OF SERUM ALBUMIN: CPT

## 2024-05-07 PROCEDURE — 96376 TX/PRO/DX INJ SAME DRUG ADON: CPT

## 2024-05-07 PROCEDURE — G0463 HOSPITAL OUTPT CLINIC VISIT: HCPCS | Mod: 25

## 2024-05-07 PROCEDURE — 250N000013 HC RX MED GY IP 250 OP 250 PS 637: Performed by: INTERNAL MEDICINE

## 2024-05-07 PROCEDURE — 84155 ASSAY OF PROTEIN SERUM: CPT

## 2024-05-07 PROCEDURE — 258N000003 HC RX IP 258 OP 636: Performed by: INTERNAL MEDICINE

## 2024-05-07 PROCEDURE — 96361 HYDRATE IV INFUSION ADD-ON: CPT

## 2024-05-07 PROCEDURE — 85049 AUTOMATED PLATELET COUNT: CPT | Performed by: INTERNAL MEDICINE

## 2024-05-07 PROCEDURE — 96375 TX/PRO/DX INJ NEW DRUG ADDON: CPT

## 2024-05-07 PROCEDURE — 96409 CHEMO IV PUSH SNGL DRUG: CPT

## 2024-05-07 PROCEDURE — 250N000011 HC RX IP 250 OP 636: Performed by: INTERNAL MEDICINE

## 2024-05-07 PROCEDURE — 36591 DRAW BLOOD OFF VENOUS DEVICE: CPT

## 2024-05-07 RX ORDER — ALBUTEROL SULFATE 90 UG/1
1-2 AEROSOL, METERED RESPIRATORY (INHALATION)
Status: CANCELLED
Start: 2024-05-09

## 2024-05-07 RX ORDER — METHYLPREDNISOLONE SODIUM SUCCINATE 125 MG/2ML
125 INJECTION, POWDER, LYOPHILIZED, FOR SOLUTION INTRAMUSCULAR; INTRAVENOUS
Status: DISCONTINUED | OUTPATIENT
Start: 2024-05-07 | End: 2024-05-07 | Stop reason: HOSPADM

## 2024-05-07 RX ORDER — HEPARIN SODIUM (PORCINE) LOCK FLUSH IV SOLN 100 UNIT/ML 100 UNIT/ML
500 SOLUTION INTRAVENOUS ONCE
Status: COMPLETED | OUTPATIENT
Start: 2024-05-07 | End: 2024-05-07

## 2024-05-07 RX ORDER — MEPERIDINE HYDROCHLORIDE 25 MG/ML
25 INJECTION INTRAMUSCULAR; INTRAVENOUS; SUBCUTANEOUS EVERY 30 MIN PRN
Status: CANCELLED | OUTPATIENT
Start: 2024-05-09

## 2024-05-07 RX ORDER — EPINEPHRINE 1 MG/ML
0.3 INJECTION, SOLUTION INTRAMUSCULAR; SUBCUTANEOUS EVERY 5 MIN PRN
Status: CANCELLED | OUTPATIENT
Start: 2024-05-09

## 2024-05-07 RX ORDER — HEPARIN SODIUM,PORCINE 10 UNIT/ML
5-20 VIAL (ML) INTRAVENOUS DAILY PRN
Status: CANCELLED | OUTPATIENT
Start: 2024-05-09

## 2024-05-07 RX ORDER — METHYLPREDNISOLONE SODIUM SUCCINATE 125 MG/2ML
125 INJECTION, POWDER, LYOPHILIZED, FOR SOLUTION INTRAMUSCULAR; INTRAVENOUS
Status: CANCELLED | OUTPATIENT
Start: 2024-05-09

## 2024-05-07 RX ORDER — LORAZEPAM 2 MG/ML
0.5 INJECTION INTRAMUSCULAR EVERY 4 HOURS PRN
Status: CANCELLED | OUTPATIENT
Start: 2024-05-09

## 2024-05-07 RX ORDER — ALBUTEROL SULFATE 0.83 MG/ML
2.5 SOLUTION RESPIRATORY (INHALATION)
Status: CANCELLED | OUTPATIENT
Start: 2024-05-09

## 2024-05-07 RX ORDER — DIPHENHYDRAMINE HYDROCHLORIDE 50 MG/ML
50 INJECTION INTRAMUSCULAR; INTRAVENOUS
Status: CANCELLED | OUTPATIENT
Start: 2024-05-09 | End: 2024-05-09

## 2024-05-07 RX ORDER — DIPHENHYDRAMINE HCL 25 MG
50 CAPSULE ORAL ONCE
Status: COMPLETED | OUTPATIENT
Start: 2024-05-07 | End: 2024-05-07

## 2024-05-07 RX ORDER — DIPHENHYDRAMINE HYDROCHLORIDE 50 MG/ML
50 INJECTION INTRAMUSCULAR; INTRAVENOUS
Status: COMPLETED | OUTPATIENT
Start: 2024-05-07 | End: 2024-05-07

## 2024-05-07 RX ORDER — HEPARIN SODIUM (PORCINE) LOCK FLUSH IV SOLN 100 UNIT/ML 100 UNIT/ML
5 SOLUTION INTRAVENOUS
Status: DISCONTINUED | OUTPATIENT
Start: 2024-05-07 | End: 2024-05-07 | Stop reason: HOSPADM

## 2024-05-07 RX ORDER — PACLITAXEL 100 MG/20ML
100 INJECTION, POWDER, LYOPHILIZED, FOR SUSPENSION INTRAVENOUS ONCE
Status: CANCELLED
Start: 2024-05-09 | End: 2024-05-09

## 2024-05-07 RX ORDER — HEPARIN SODIUM (PORCINE) LOCK FLUSH IV SOLN 100 UNIT/ML 100 UNIT/ML
5 SOLUTION INTRAVENOUS
Status: CANCELLED | OUTPATIENT
Start: 2024-05-09

## 2024-05-07 RX ORDER — DIPHENHYDRAMINE HCL 25 MG
50 CAPSULE ORAL ONCE
Status: CANCELLED | OUTPATIENT
Start: 2024-05-09

## 2024-05-07 RX ORDER — EPINEPHRINE 1 MG/ML
0.3 INJECTION, SOLUTION, CONCENTRATE INTRAVENOUS EVERY 5 MIN PRN
Status: DISCONTINUED | OUTPATIENT
Start: 2024-05-07 | End: 2024-05-07 | Stop reason: HOSPADM

## 2024-05-07 RX ADMIN — SODIUM CHLORIDE 250 ML: 9 INJECTION, SOLUTION INTRAVENOUS at 14:40

## 2024-05-07 RX ADMIN — FAMOTIDINE 20 MG: 10 INJECTION INTRAVENOUS at 14:43

## 2024-05-07 RX ADMIN — Medication 500 UNITS: at 13:55

## 2024-05-07 RX ADMIN — DEXAMETHASONE SODIUM PHOSPHATE: 10 INJECTION, SOLUTION INTRAMUSCULAR; INTRAVENOUS at 14:46

## 2024-05-07 RX ADMIN — METHYLPREDNISOLONE SODIUM SUCCINATE 125 MG: 125 INJECTION, POWDER, FOR SOLUTION INTRAMUSCULAR; INTRAVENOUS at 15:14

## 2024-05-07 RX ADMIN — PACLITAXEL 143 MG: 6 INJECTION, SOLUTION INTRAVENOUS at 15:03

## 2024-05-07 RX ADMIN — DIPHENHYDRAMINE HYDROCHLORIDE 50 MG: 25 CAPSULE ORAL at 14:34

## 2024-05-07 RX ADMIN — SODIUM CHLORIDE 500 ML: 9 INJECTION, SOLUTION INTRAVENOUS at 15:15

## 2024-05-07 RX ADMIN — DIPHENHYDRAMINE HYDROCHLORIDE 25 MG: 50 INJECTION INTRAMUSCULAR; INTRAVENOUS at 15:17

## 2024-05-07 RX ADMIN — FAMOTIDINE 20 MG: 10 INJECTION INTRAVENOUS at 15:12

## 2024-05-07 RX ADMIN — Medication 5 ML: at 16:01

## 2024-05-07 ASSESSMENT — PAIN SCALES - GENERAL: PAINLEVEL: NO PAIN (0)

## 2024-05-07 NOTE — NURSING NOTE
"Chief Complaint   Patient presents with    Port Draw     Labs drawn via port by RN in lab.  VS taken       Port accessed with 20 gauge 3/4\" Power needle by RN, labs collected, line flushed with saline and heparin.  Vitals taken. Pt checked in for appointment(s).    Caroline Bright RN    "

## 2024-05-07 NOTE — PROGRESS NOTES
Infusion Nursing Note:  Sharon Fong presents today for C2D1 Taxol.    Patient seen by provider today: No   present during visit today: Not Applicable.    Note: Sharon presents to infusion today feeling well. She denies any pain, infectious symptoms, or other concerns.    Per research staff Smiley Frank RN, OK to proceed with study treatment today.     Patient felt shoulder pain quickly after Taxol infusion started. Medication stopped and hypersensitivity medications administered (see below).    MAXIMILIANO Aceves/Smiley Frank RN/Concepcion Wiggins RN:  -Will switch patient to Abraxane. Try for sometime this week    Intravenous Access:  Implanted Port.    Treatment Conditions:  Lab Results   Component Value Date    HGB 11.9 05/07/2024    WBC 7.2 05/07/2024    ANEUTAUTO 3.7 05/07/2024     05/07/2024        Lab Results   Component Value Date     05/07/2024    POTASSIUM 4.0 05/07/2024    MAG 2.1 04/17/2024    CR 0.73 05/07/2024    JJ 9.6 05/07/2024    BILITOTAL 0.6 05/07/2024    ALBUMIN 4.2 05/07/2024    ALT 10 05/07/2024    AST 15 05/07/2024       Results reviewed, labs MET treatment parameters, ok to proceed with treatment.      Post Infusion Assessment:  Patient tolerated infusion poorly due to : Hypersensitivity: Did patient have a hypersensitivity reaction? : Yes  Drug or Product name: Taxol  Were pre-meds administered?: Yes  What pre-meds were administered?: Diphenhydramine (Benadryl);Dexamethasone (decadron;Famotidine (Pepcid);Ondansetron (Zofran)  First or Subsequent treatment: Subsequent infusion  Rate of infusion when patient had hypersensitivity reaction: 299  Time the hypersensitivity reaction was first recognized: 1510  Symptoms observed or reported (select all that apply): Nausea;Other: (Comment);Chest tightness (shoulder pain)  Interventions/treatment following reaction: Infusion stopped;Hypersensitivity medications administered;Therapy discontinued  What  hypersensitivity medications were administered?: DiphendydrAMINE (benadryl);Methylprednisolone;NaCl 0.9% Bolus;Famotidine(Pepcid)  Name of provider notified: Dr. Bernal  Time provider notified: 0600  Type of notification (select all that apply): Other: (Comment) (secure chat)  Was the patient re-challenged today?: No - no re-challenge today  Blood return noted pre and post infusion.  Site patent and intact, free from redness, edema or discomfort.  No evidence of extravasations.  Access discontinued per protocol.     Discharge Plan:   Patient declined prescription refills.  Discharge instructions reviewed with: Patient and Family.  Patient and/or family verbalized understanding of discharge instructions and all questions answered.  AVS to patient via [employer].  Patient will return in a few days for next appointment. She is aware to watch mychart and scheduling will call her. Future infusion appointments will need to be adjusted.   Patient discharged in stable condition accompanied by: .  Departure Mode: Ambulatory.      Concepcion Wiggins RN

## 2024-05-07 NOTE — NURSING NOTE
[id number]: Study Visit Note   Subject name: Sharon Fong     Visit: Block B C2D1    Did the study visit occur within the appropriate window allowed by the protocol? yes    Since the last study visit, She has been doing fairly well. She reports the following:  Mild intermittent nausea  Intermittent abdominal cramps  Joint aches - recommended tylenol and ibuprofen PRN    I have personally interviewed Sharon Fong and reviewed her medical record for adverse events and concomitant medications and these have been recorded on the corresponding logs in Sharon Fong's research file.     Special considerations for today's visit were reviewed with staff administering the study intervention including: Icing hands and feet during taxol infusion    Sharon Fong was given the opportunity to ask any trial related questions.  Labs were reviewed - any significant lab values were addressed and reviewed.    MARITZA RgN, RN  CRC-RN,   Office [phone]

## 2024-05-07 NOTE — PROGRESS NOTES
RECORDS STATUS - BREAST    RECORDS REQUESTED FROM: [facility]   NOTES DETAILS STATUS   OFFICE NOTE from referring provider Epic 04/30/24: Dr. Dame Bernal   OFFICE NOTE from medical oncologist Epic 04/30/24: Dr. Dame Bernal   OPERATIVE REPORT [facility] & [facility]  11/15/13: EXCISION AXILLARY BREAST TISSUE RIGHT      02/04/11: RIGHT PARTIAL MASTECTOMY    MEDICATION LIST [facility]    LABS     PATHOLOGY REPORTS  (Tissue diagnosis, Stage, ER/NC percentage positive and intensity of staining, HER2 IHC, FISH, and all biopsies from breast and any distant metastasis)                 Report in [facility] Path Consult:  04/26/24:[id number]    Surg Path:  04/18/24: [id number]   IMAGING (NEED IMAGES & REPORT)     CT SCANS PACS 04/12/24: CT CAP   MRI PACS 04/18/24, 05/18/11, 08/09/10: MR Breast    MAMMO PACS 04/18/24-07/17/15    ULTRASOUND PACS 04/16/24: US Axillary  04/16/24-05/06/10: US Breast  03/28/24: US Lymph Nod   BONE SCAN PACS 04/12/24

## 2024-05-08 ENCOUNTER — PATIENT OUTREACH (OUTPATIENT)
Dept: ONCOLOGY | Facility: CLINIC | Age: 49
End: 2024-05-08
Payer: COMMERCIAL

## 2024-05-08 NOTE — PROGRESS NOTES
[facility]: Cancer Care Plan of Care Education Note                                    Discussion with Patient:                                                      Re: Abraxane     Medication understanding/side effect: Abraxane      Goals          General     Other (pt-stated)      Notes - Note created  5/8/2024 12:01 PM by Bette Watters, RN     Goal Statement: I will use my clinic and care team resources as directed.  Date Goal set: 5/8/2024  Barriers: disease burden  Strengths: support and health awareness  Date to Achieve By: ongoing  Patient expressed understanding of goal: Yes  Action steps to achieve this goal:  I will contact triage with new, worsening or uncontrolled symptoms.   I will contact triage with temperature over 100.4  I will call with difficulties of scheduling and/or transportation.   I will request needed refills when there are 7 days of medication remaining.   I will not send urgent or symptomatic messages through [employer].   I will contact scheduling to arrange or make changes in my appointments.                Assessment:                                                      Assessment completed with:: Patient    Plan of Care Education   Diagnosis:: breast ca  Does patient understand diagnosis?: Yes  Tx plan/regimen:: abraxane  Does patient understand treatment plan/regimen?: Yes  Vascular access education provided for:: Port  Side effect education:: Diarrhea/Constipation;Lab value monitoring (anemia, neutropenia, thrombocytopenia);Sexual health;Skin changes;Urinary;Fatigue;Hair loss;Mouth sores;Immune-mediated effects;Mylosuppression;Infection;Neuropathy;Nausea/Vomiting  Safety/self care at home reviewed with patient:: Yes  Coping - concerns/fears reviewed with patient:: Yes  Plan of Care:: SHABNAM follow-up appointment;Lab appointment;Imaging;MD follow-up appointment;Treatment schedule  When to call provider:: Bleeding;Increased shortness of breath;Uncontrolled  nausea/vomiting;New/worsening pain;Shaking chills;Temperature >100.4F;Uncontrolled diarrhea/constipation  Reasons for deferring treatment reviewed with patient:: Yes  Additional education provided for: : Bleeding precautions    Evaluation of Learning  Patient Education Provided: Yes  Readiness:: Acceptance  Method:: Literature;Explanation  Response:: Verbalizes understanding    Intervention/Education provided during outreach:                                                       Went over Side effects, management of Side effects and when to call the clinic.   Pt has questions about dental flossing, paperwork that was turned in being faxed, wondering when next chemo will be and how many she has left. Let her know if she is having symptoms to call and ask for triage to get quicker help in managing them.   Discussed flossing and being careful if she is noticing bleeding when she does so that is more than usual. Recommended not doing so while being treated and to use a soft tooth brush.   Paperwork was faxed.  The next treatment is being worked on but we don't have an update yet, it is not clear whether she will be having treatment tomorrow. She is currently scheduled next week. She has had a full treatment, but missed the treatment yesterday d/t a reaction. She is feeling fine today.     Follow up call in 1-2 weeks  Patient to follow up as scheduled at next appt  Patient to call/[employer] message with updates  Medication Change: Discussed medication change with patient  Confirmed patient has clinic and triage numbers    Signature:  Bette Watters RN

## 2024-05-09 ENCOUNTER — INFUSION THERAPY VISIT (OUTPATIENT)
Dept: ONCOLOGY | Facility: CLINIC | Age: 49
End: 2024-05-09
Attending: INTERNAL MEDICINE
Payer: COMMERCIAL

## 2024-05-09 VITALS
SYSTOLIC BLOOD PRESSURE: 111 MMHG | DIASTOLIC BLOOD PRESSURE: 75 MMHG | HEART RATE: 71 BPM | OXYGEN SATURATION: 97 % | RESPIRATION RATE: 16 BRPM | TEMPERATURE: 97.7 F

## 2024-05-09 DIAGNOSIS — Z17.0 MALIGNANT NEOPLASM OF UPPER-INNER QUADRANT OF RIGHT BREAST IN FEMALE, ESTROGEN RECEPTOR POSITIVE (H): Primary | ICD-10-CM

## 2024-05-09 DIAGNOSIS — C50.211 MALIGNANT NEOPLASM OF UPPER-INNER QUADRANT OF RIGHT BREAST IN FEMALE, ESTROGEN RECEPTOR POSITIVE (H): Primary | ICD-10-CM

## 2024-05-09 PROCEDURE — 250N000011 HC RX IP 250 OP 636: Performed by: INTERNAL MEDICINE

## 2024-05-09 PROCEDURE — 250N000013 HC RX MED GY IP 250 OP 250 PS 637: Performed by: INTERNAL MEDICINE

## 2024-05-09 PROCEDURE — 258N000003 HC RX IP 258 OP 636: Performed by: INTERNAL MEDICINE

## 2024-05-09 PROCEDURE — 96375 TX/PRO/DX INJ NEW DRUG ADDON: CPT

## 2024-05-09 PROCEDURE — 96413 CHEMO IV INFUSION 1 HR: CPT

## 2024-05-09 RX ORDER — PACLITAXEL 100 MG/20ML
100 INJECTION, POWDER, LYOPHILIZED, FOR SUSPENSION INTRAVENOUS ONCE
Status: COMPLETED | OUTPATIENT
Start: 2024-05-09 | End: 2024-05-09

## 2024-05-09 RX ORDER — HEPARIN SODIUM (PORCINE) LOCK FLUSH IV SOLN 100 UNIT/ML 100 UNIT/ML
5 SOLUTION INTRAVENOUS
Status: DISCONTINUED | OUTPATIENT
Start: 2024-05-09 | End: 2024-05-09 | Stop reason: HOSPADM

## 2024-05-09 RX ORDER — DIPHENHYDRAMINE HCL 25 MG
50 CAPSULE ORAL ONCE
Status: COMPLETED | OUTPATIENT
Start: 2024-05-09 | End: 2024-05-09

## 2024-05-09 RX ADMIN — SODIUM CHLORIDE 250 ML: 9 INJECTION, SOLUTION INTRAVENOUS at 08:17

## 2024-05-09 RX ADMIN — FAMOTIDINE 20 MG: 10 INJECTION INTRAVENOUS at 08:20

## 2024-05-09 RX ADMIN — DEXAMETHASONE SODIUM PHOSPHATE: 10 INJECTION, SOLUTION INTRAMUSCULAR; INTRAVENOUS at 08:22

## 2024-05-09 RX ADMIN — Medication 5 ML: at 09:35

## 2024-05-09 RX ADMIN — PACLITAXEL 180 MG: 100 INJECTION, POWDER, LYOPHILIZED, FOR SUSPENSION INTRAVENOUS at 08:53

## 2024-05-09 RX ADMIN — DIPHENHYDRAMINE HYDROCHLORIDE 50 MG: 25 CAPSULE ORAL at 08:19

## 2024-05-09 ASSESSMENT — PAIN SCALES - GENERAL: PAINLEVEL: NO PAIN (0)

## 2024-05-09 NOTE — PROGRESS NOTES
Infusion Nursing Note:  Sharon Fong presents today for Cycle 2 Day 1 (Additional day) Abraxane.    Patient seen by provider today: No   present during visit today: Not Applicable.    Note: Patient  receiving Abraxane for the first time due to previous Taxol reactions. Chemotherapy teaching done previously by [facility].  Reinforced chemotherapy teaching/side effects and schedule during infusion visit. Confirmed with Dr. Bernal that patient will continue Abraxane weekly.    Patient arrives feeling okay. Endorses fatigue and mild weekness.    Copy of AVS reviewed with patient. Pt instructed to call care coordinator, triage (or MD on call if after hours/weekends) with chills/temp >=100.4, questions/concerns. Pt stated understanding of plan.      Intravenous Access:  Implanted Port.    Treatment Conditions:  Lab Results   Component Value Date    HGB 11.9 05/07/2024    WBC 7.2 05/07/2024    ANEUTAUTO 3.7 05/07/2024     05/07/2024        Lab Results   Component Value Date     05/07/2024    POTASSIUM 4.0 05/07/2024    MAG 2.1 04/17/2024    CR 0.73 05/07/2024    JJ 9.6 05/07/2024    BILITOTAL 0.6 05/07/2024    ALBUMIN 4.2 05/07/2024    ALT 10 05/07/2024    AST 15 05/07/2024       Results reviewed, labs MET treatment parameters, ok to proceed with treatment.      Post Infusion Assessment:  Patient tolerated infusion without incident.  Blood return noted pre and post infusion.  Site patent and intact, free from redness, edema or discomfort.  No evidence of extravasations.  Access discontinued per protocol.      Discharge Plan:   Patient declined prescription refills.  Discharge instructions reviewed with: Patient and .  Patient and/or family verbalized understanding of discharge instructions and all questions answered.  AVS to patient via [employer].  Patient will return 5/16 for next appointment (yet to be scheduled - pt aware).  Patient discharged in stable condition accompanied by:  .  Departure Mode: Ambulatory.      Luisa Santana RN

## 2024-05-13 ENCOUNTER — PRE VISIT (OUTPATIENT)
Dept: ONCOLOGY | Facility: CLINIC | Age: 49
End: 2024-05-13
Payer: COMMERCIAL

## 2024-05-13 ENCOUNTER — ONCOLOGY VISIT (OUTPATIENT)
Dept: ONCOLOGY | Facility: CLINIC | Age: 49
End: 2024-05-13
Attending: SURGERY
Payer: COMMERCIAL

## 2024-05-13 DIAGNOSIS — C50.211 MALIGNANT NEOPLASM OF UPPER-INNER QUADRANT OF RIGHT BREAST IN FEMALE, ESTROGEN RECEPTOR POSITIVE (H): Primary | ICD-10-CM

## 2024-05-13 DIAGNOSIS — Z17.0 MALIGNANT NEOPLASM OF UPPER-INNER QUADRANT OF RIGHT BREAST IN FEMALE, ESTROGEN RECEPTOR POSITIVE (H): Primary | ICD-10-CM

## 2024-05-13 PROCEDURE — 99205 OFFICE O/P NEW HI 60 MIN: CPT | Performed by: SURGERY

## 2024-05-13 PROCEDURE — 99213 OFFICE O/P EST LOW 20 MIN: CPT | Performed by: SURGERY

## 2024-05-13 RX ORDER — ACETAMINOPHEN 500 MG
500 TABLET ORAL PRN
COMMUNITY
End: 2024-07-30

## 2024-05-13 NOTE — NURSING NOTE
"Oncology Rooming Note    May 13, 2024 5:42 PM   Sharon Fong is a 48 year old female who presents for:    Chief Complaint   Patient presents with    Oncology Clinic Visit     Breast Cancer.      Initial Vitals: There were no vitals taken for this visit. Estimated body mass index is 26.71 kg/m  as calculated from the following:    Height as of 4/17/24: 1.64 m (5' 4.57\").    Weight as of 5/7/24: 71.8 kg (158 lb 6.4 oz). There is no height or weight on file to calculate BSA.  Data Unavailable Comment: Data Unavailable   No LMP recorded. Patient has had a hysterectomy.  Allergies reviewed: No  Medications reviewed: No    Medications: Medication refills not needed today.  Pharmacy name entered into [employer]: [facility] - [place], [place] - [address] [place] [address]    Frailty Screening:   Is the patient here for a new oncology consult visit in cancer care? 2. No      Clinical concerns: Unable to obtain vitals and complete rooming process, provider came in the room and d/c'd pt before they could be obtained.        Melonie Goodman CMA              "

## 2024-05-13 NOTE — PROGRESS NOTES
Patient is a 48-year-old woman with a new diagnosis of a right breast cancer.  She felt her right breast mass and underwent a diagnostic mammogram which was normal.  She also had an ultrasound which showed 3 separate masses in her right breast as well as multiple axillary lymph nodes.  She underwent biopsy of the lymph nodes and 2 biopsies of the breast.  Biopsies of the breast demonstrated grade 2 invasive ductal cancer ER positive HER2/tommy negative.  The second biopsy of the breast showed DCIS.  Biopsy of her lymph node was positive.  She subsidy had a CT scan of the chest which demonstrated a 3 cm mass in her right breast with ipsilateral lymphadenopathy.    She also had a few right-sided internal mammary lymph nodes of the left axillary lymph nodes.  She underwent an MRI which demonstrated the right breast mass to be 3 cm in size with non-mass enhancement extending 8.9 cm.  She had a left breast biopsy which was negative.  And left axillary ultrasound which demonstrated normal axillary lymph nodes.  She underwent screening for ice by and had a MammaPrint high.  She was subsequently started on standard chemotherapy with Taxol.  Her past medical history significant for previous right breast excision that was benign.  Her family history is negative for breast or ovarian cancer.    Impression: T3 N2 M0 right breast cancer    Plan: I talked her about treatment options.  I do not think she is a candidate for breast conserving surgery.  I did recommend a mastectomy.  Did not recommend immediate reconstruction because of the extent of disease of the necessity for radiation therapy.  I have recommended a sentinel lymph node biopsy and a removal of her biopsied lymph node at the time of her mastectomy.  I hope she had a very good radiographic response to her neoadjuvant chemotherapy and can avoid axillary node dissection.  Genetic testing has already been performed but results not available.  I will see her towards the  end of chemotherapy with surgery more detail.  End of dictation total time was 60 minutes which included reviewing her imaging in person visit including her care

## 2024-05-13 NOTE — LETTER
5/13/2024       RE: Sharon Fong  [address] [facility] [address]    Dear Colleague,    Thank you for referring your patient, Sharon Fong, to the [facility]. Please see a copy of my visit note below.    Patient is a 48-year-old woman with a new diagnosis of a right breast cancer.  She felt her right breast mass and underwent a diagnostic mammogram which was normal.  She also had an ultrasound which showed 3 separate masses in her right breast as well as multiple axillary lymph nodes.  She underwent biopsy of the lymph nodes and 2 biopsies of the breast.  Biopsies of the breast demonstrated grade 2 invasive ductal cancer ER positive HER2/tommy negative.  The second biopsy of the breast showed DCIS.  Biopsy of her lymph node was positive.  She subsidy had a CT scan of the chest which demonstrated a 3 cm mass in her right breast with ipsilateral lymphadenopathy.    She also had a few right-sided internal mammary lymph nodes of the left axillary lymph nodes.  She underwent an MRI which demonstrated the right breast mass to be 3 cm in size with non-mass enhancement extending 8.9 cm.  She had a left breast biopsy which was negative.  And left axillary ultrasound which demonstrated normal axillary lymph nodes.  She underwent screening for ice by and had a MammaPrint high.  She was subsequently started on standard chemotherapy with Taxol.  Her past medical history significant for previous right breast excision that was benign.  Her family history is negative for breast or ovarian cancer.    Impression: T3 N2 M0 right breast cancer    Plan: I talked her about treatment options.  I do not think she is a candidate for breast conserving surgery.  I did recommend a mastectomy.  Did not recommend immediate reconstruction because of the extent of disease of the necessity for radiation therapy.  I have recommended a sentinel lymph node biopsy and a removal of her biopsied  lymph node at the time of her mastectomy.  I hope she had a very good radiographic response to her neoadjuvant chemotherapy and can avoid axillary node dissection.  Genetic testing has already been performed but results not available.  I will see her towards the end of chemotherapy with surgery more detail.  End of dictation total time was 60 minutes which included reviewing her imaging in person visit including her care      Boom Collier MD

## 2024-05-14 DIAGNOSIS — Z17.0 MALIGNANT NEOPLASM OF UPPER-INNER QUADRANT OF RIGHT BREAST IN FEMALE, ESTROGEN RECEPTOR POSITIVE (H): Primary | ICD-10-CM

## 2024-05-14 DIAGNOSIS — C50.211 MALIGNANT NEOPLASM OF UPPER-INNER QUADRANT OF RIGHT BREAST IN FEMALE, ESTROGEN RECEPTOR POSITIVE (H): Primary | ICD-10-CM

## 2024-05-14 RX ORDER — PACLITAXEL 100 MG/20ML
100 INJECTION, POWDER, LYOPHILIZED, FOR SUSPENSION INTRAVENOUS ONCE
Status: CANCELLED
Start: 2024-05-14 | End: 2024-05-14

## 2024-05-15 ENCOUNTER — INFUSION THERAPY VISIT (OUTPATIENT)
Dept: ONCOLOGY | Facility: CLINIC | Age: 49
End: 2024-05-15
Attending: INTERNAL MEDICINE
Payer: COMMERCIAL

## 2024-05-15 ENCOUNTER — ALLIED HEALTH/NURSE VISIT (OUTPATIENT)
Dept: ONCOLOGY | Facility: CLINIC | Age: 49
End: 2024-05-15

## 2024-05-15 ENCOUNTER — APPOINTMENT (OUTPATIENT)
Dept: LAB | Facility: CLINIC | Age: 49
End: 2024-05-15
Attending: INTERNAL MEDICINE
Payer: COMMERCIAL

## 2024-05-15 VITALS
OXYGEN SATURATION: 98 % | HEART RATE: 80 BPM | RESPIRATION RATE: 16 BRPM | WEIGHT: 157.6 LBS | SYSTOLIC BLOOD PRESSURE: 109 MMHG | DIASTOLIC BLOOD PRESSURE: 76 MMHG | TEMPERATURE: 97.9 F | BODY MASS INDEX: 26.58 KG/M2

## 2024-05-15 DIAGNOSIS — T45.1X5A ALOPECIA DUE TO CYTOTOXIC DRUG: ICD-10-CM

## 2024-05-15 DIAGNOSIS — C50.211 MALIGNANT NEOPLASM OF UPPER-INNER QUADRANT OF RIGHT BREAST IN FEMALE, ESTROGEN RECEPTOR POSITIVE (H): Primary | ICD-10-CM

## 2024-05-15 DIAGNOSIS — L65.8 ALOPECIA DUE TO CYTOTOXIC DRUG: ICD-10-CM

## 2024-05-15 DIAGNOSIS — Z00.6 EXAMINATION OF PARTICIPANT OR CONTROL IN CLINICAL RESEARCH: ICD-10-CM

## 2024-05-15 DIAGNOSIS — C50.211 MALIGNANT NEOPLASM OF UPPER-INNER QUADRANT OF RIGHT BREAST IN FEMALE, ESTROGEN RECEPTOR POSITIVE (H): ICD-10-CM

## 2024-05-15 DIAGNOSIS — Z17.0 MALIGNANT NEOPLASM OF UPPER-INNER QUADRANT OF RIGHT BREAST IN FEMALE, ESTROGEN RECEPTOR POSITIVE (H): Primary | ICD-10-CM

## 2024-05-15 DIAGNOSIS — Z17.0 MALIGNANT NEOPLASM OF UPPER-INNER QUADRANT OF RIGHT BREAST IN FEMALE, ESTROGEN RECEPTOR POSITIVE (H): ICD-10-CM

## 2024-05-15 DIAGNOSIS — C50.211 MALIGNANT NEOPLASM OF UPPER-INNER QUADRANT OF RIGHT FEMALE BREAST (H): Primary | Chronic | ICD-10-CM

## 2024-05-15 LAB
ALBUMIN SERPL BCG-MCNC: 4.3 G/DL (ref 3.5–5.2)
ALP SERPL-CCNC: 59 U/L (ref 40–150)
ALT SERPL W P-5'-P-CCNC: 26 U/L (ref 0–50)
ANION GAP SERPL CALCULATED.3IONS-SCNC: 10 MMOL/L (ref 7–15)
AST SERPL W P-5'-P-CCNC: 24 U/L (ref 0–45)
BASOPHILS # BLD AUTO: 0.1 10E3/UL (ref 0–0.2)
BASOPHILS NFR BLD AUTO: 2 %
BILIRUB SERPL-MCNC: 0.6 MG/DL
BUN SERPL-MCNC: 19.6 MG/DL (ref 6–20)
CALCIUM SERPL-MCNC: 9.3 MG/DL (ref 8.6–10)
CHLORIDE SERPL-SCNC: 106 MMOL/L (ref 98–107)
CREAT SERPL-MCNC: 0.59 MG/DL (ref 0.51–0.95)
DEPRECATED HCO3 PLAS-SCNC: 23 MMOL/L (ref 22–29)
EGFRCR SERPLBLD CKD-EPI 2021: >90 ML/MIN/1.73M2
EOSINOPHIL # BLD AUTO: 0.1 10E3/UL (ref 0–0.7)
EOSINOPHIL NFR BLD AUTO: 3 %
ERYTHROCYTE [DISTWIDTH] IN BLOOD BY AUTOMATED COUNT: 12.3 % (ref 10–15)
GLUCOSE SERPL-MCNC: 119 MG/DL (ref 70–99)
HCT VFR BLD AUTO: 34.8 % (ref 35–47)
HGB BLD-MCNC: 11.7 G/DL (ref 11.7–15.7)
IMM GRANULOCYTES # BLD: 0 10E3/UL
IMM GRANULOCYTES NFR BLD: 0 %
LYMPHOCYTES # BLD AUTO: 2.5 10E3/UL (ref 0.8–5.3)
LYMPHOCYTES NFR BLD AUTO: 49 %
MCH RBC QN AUTO: 28.5 PG (ref 26.5–33)
MCHC RBC AUTO-ENTMCNC: 33.6 G/DL (ref 31.5–36.5)
MCV RBC AUTO: 85 FL (ref 78–100)
MONOCYTES # BLD AUTO: 0.3 10E3/UL (ref 0–1.3)
MONOCYTES NFR BLD AUTO: 6 %
NEUTROPHILS # BLD AUTO: 2 10E3/UL (ref 1.6–8.3)
NEUTROPHILS NFR BLD AUTO: 40 %
NRBC # BLD AUTO: 0 10E3/UL
NRBC BLD AUTO-RTO: 0 /100
PLATELET # BLD AUTO: 291 10E3/UL (ref 150–450)
POTASSIUM SERPL-SCNC: 4.1 MMOL/L (ref 3.4–5.3)
PROT SERPL-MCNC: 7 G/DL (ref 6.4–8.3)
RBC # BLD AUTO: 4.1 10E6/UL (ref 3.8–5.2)
SODIUM SERPL-SCNC: 139 MMOL/L (ref 135–145)
WBC # BLD AUTO: 5 10E3/UL (ref 4–11)

## 2024-05-15 PROCEDURE — 96375 TX/PRO/DX INJ NEW DRUG ADDON: CPT

## 2024-05-15 PROCEDURE — 36591 DRAW BLOOD OFF VENOUS DEVICE: CPT | Performed by: INTERNAL MEDICINE

## 2024-05-15 PROCEDURE — 80053 COMPREHEN METABOLIC PANEL: CPT

## 2024-05-15 PROCEDURE — 96413 CHEMO IV INFUSION 1 HR: CPT

## 2024-05-15 PROCEDURE — 85025 COMPLETE CBC W/AUTO DIFF WBC: CPT | Performed by: INTERNAL MEDICINE

## 2024-05-15 PROCEDURE — 258N000003 HC RX IP 258 OP 636: Performed by: INTERNAL MEDICINE

## 2024-05-15 PROCEDURE — 250N000011 HC RX IP 250 OP 636: Performed by: INTERNAL MEDICINE

## 2024-05-15 RX ORDER — HEPARIN SODIUM (PORCINE) LOCK FLUSH IV SOLN 100 UNIT/ML 100 UNIT/ML
5 SOLUTION INTRAVENOUS
Status: DISCONTINUED | OUTPATIENT
Start: 2024-05-15 | End: 2024-05-15 | Stop reason: HOSPADM

## 2024-05-15 RX ORDER — ONDANSETRON 2 MG/ML
8 INJECTION INTRAMUSCULAR; INTRAVENOUS ONCE
Status: COMPLETED | OUTPATIENT
Start: 2024-05-15 | End: 2024-05-15

## 2024-05-15 RX ORDER — PACLITAXEL 100 MG/20ML
100 INJECTION, POWDER, LYOPHILIZED, FOR SUSPENSION INTRAVENOUS ONCE
Status: COMPLETED | OUTPATIENT
Start: 2024-05-15 | End: 2024-05-15

## 2024-05-15 RX ADMIN — PACLITAXEL 180 MG: 100 INJECTION, POWDER, LYOPHILIZED, FOR SUSPENSION INTRAVENOUS at 09:38

## 2024-05-15 RX ADMIN — SODIUM CHLORIDE 250 ML: 9 INJECTION, SOLUTION INTRAVENOUS at 09:03

## 2024-05-15 RX ADMIN — Medication 5 ML: at 10:28

## 2024-05-15 RX ADMIN — Medication 5 ML: at 08:05

## 2024-05-15 RX ADMIN — ONDANSETRON 8 MG: 2 INJECTION INTRAMUSCULAR; INTRAVENOUS at 09:03

## 2024-05-15 ASSESSMENT — PAIN SCALES - GENERAL: PAINLEVEL: NO PAIN (0)

## 2024-05-15 NOTE — NURSING NOTE
Chief Complaint   Patient presents with    Port Draw     Vitals taken, port accessed, labs drawn, heparin locked, checked into next appt     /76 (BP Location: Left arm, Patient Position: Sitting, Cuff Size: Adult Large)   Pulse 80   Temp 97.9  F (36.6  C) (Oral)   Resp 16   Wt 71.5 kg (157 lb 9.6 oz)   SpO2 98%   BMI 26.58 kg/m    Jayy Higgins RN on 5/15/2024 at 8:08 AM

## 2024-05-15 NOTE — NURSING NOTE
[id number]: Study Visit Note   Subject name: Sharon Fong     Visit: Block B C3D1    Did the study visit occur within the appropriate window allowed by the protocol? yes    Since the last study visit, She has been doing fair. She reports the following:   Diarrhea during the first few days after infusion. Imodium recommended.   Body aches- Tylenol/ibuprofen recommended  Headaches- Tylenol/ibuprofen recommended.       I have personally interviewed Sharon Fong and reviewed her medical record for adverse events and concomitant medications and these have been recorded on the corresponding logs in Sharon Fong's research file.     Sharon Fong was given the opportunity to ask any trial related questions.  Labs were reviewed - any significant lab values were addressed and reviewed.    MARITZA RgN, RN  CRC-RN,   Office [phone]

## 2024-05-15 NOTE — PROGRESS NOTES
Infusion Nursing Note:  Sharon Fong presents today for Cycle 3 Day 1 Abraxane.    Patient seen by provider today: No   present during visit today: Not Applicable.    Note: Pt presents to infusion feeling well. She reports fatigue and body aches for ~2 days following chemo. She tried Tylenol without relief. She has been able to manage mild nausea and soft stools with toast/small frequent meals - hasn't felt the need to take prns. She denies fevers/chills, cough/congestion, SOB, chest pain, vomiting, neuropathy, pain or further concerns today.    Inquired about using NSAIDs for body aches/migraines as Tylenol has not been helpful. Pt was previously recommended to avoid during chemotherapy.  Per secure chat with Dr. Bernal @ 0332:  - ok for pt to take NSAIDs as needed, especially if only needing a few doses. She should reach out if needing to use consistently  - ok to give Zofran alone as pre-medication today (Dex, Benadryl and Pepcid dropped)    Per Edgar Reis RN @ 5465:  - ok to proceed with study treatment today    Intravenous Access:  Implanted Port.    Treatment Conditions:  Lab Results   Component Value Date    HGB 11.7 05/15/2024    WBC 5.0 05/15/2024    ANEUTAUTO 2.0 05/15/2024     05/15/2024        Lab Results   Component Value Date     05/15/2024    POTASSIUM 4.1 05/15/2024    MAG 2.1 04/17/2024    CR 0.59 05/15/2024    JJ 9.3 05/15/2024    BILITOTAL 0.6 05/15/2024    ALBUMIN 4.3 05/15/2024    ALT 26 05/15/2024    AST 24 05/15/2024     Results reviewed, labs MET treatment parameters, ok to proceed with treatment.      Post Infusion Assessment:  Patient tolerated infusion without incident.  Blood return noted pre and post infusion.  Site patent and intact, free from redness, edema or discomfort.  No evidence of extravasations.  Access discontinued per protocol.       Discharge Plan:   Patient declined prescription refills.  Discharge instructions reviewed with: Patient  and Family.  Patient and/or family verbalized understanding of discharge instructions and all questions answered.  AVS to patient via [employer].  Patient will return 5/23/24 for next appointment. Sent home with Rx for cranial prothesis so pt can pursue wig resources.  Patient discharged in stable condition accompanied by: .  Departure Mode: Ambulatory.      Stephy Gonzalez RN

## 2024-05-15 NOTE — PATIENT INSTRUCTIONS
[facility] Triage and after hours / weekends / holidays:  [phone]    Please call the triage or after hours line if you experience a temperature greater than or equal to 100.4, shaking chills, have uncontrolled nausea, vomiting and/or diarrhea, dizziness, shortness of breath, chest pain, bleeding, unexplained bruising, or if you have any other new/concerning symptoms, questions or concerns.      If you are having any concerning symptoms or wish to speak to a provider before your next infusion visit, please call triage to notify them so we can adequately serve you.     If you need a refill on a narcotic prescription or other medication, please call before your infusion appointment.                May 2024      [place] Monday Tuesday Wednesday Thursday Friday Saturday                  1     2     3     4       5     6     7    LAB PERIPHERAL   1:30 PM   (15 min.)   [facility] DRAW   [facility]    ONC INFUSION 3 HR (180 MIN)   2:00 PM   (180 min.)    ONC INFUSION NURSE   [facility] 8     9    ONC INFUSION 1.5 HR (90 MIN)   7:30 AM   (90 min.)    ONC INFUSION NURSE   [facility] 10     11       12     13    NEW ONCOLOGY   4:55 PM   (40 min.)   Boom Collier MD   [facility] 14     15    LAB PERIPHERAL   7:30 AM   (15 min.)   [facility]   [facility]    ONC INFUSION 1.5 HR (90 MIN)   8:00 AM   (90 min.)    ONC INFUSION NURSE   [facility] 16     17     18       19     20    MR BREAST BILATERAL WWO   7:45 AM   (75 min.)   [id number]   [facility] 21    RETURN CCSL   3:15 PM   (30 min.)   Dame Bernal MD   [facility] 22     23    LAB PERIPHERAL   2:45 PM   (15 min.)   [facility]   [facility]    ONC INFUSION 1.5 HR (90 MIN)    3:00 PM   (90 min.)   [facility] ON[facility] INFUSION NURSE   [facility] 24     25       26     27     28     29     30    LAB PERIPHERAL   7:45 AM   (15 min.)    [facility]   [facility]    ONC INFUSION 1.5 HR (90 MIN)  11:00 AM   (90 min.)   [facility] ONC INFUSION NURSE   [facility] 31 June 2024 Sunday Monday Tuesday Wednesday Thursday Friday Saturday                                 1       2     3     4     5     6     7     8       9     10     11    RETURN CCSL   3:15 PM   (30 min.)   Dame Bernal MD   [facility] 12     13     14     15       16     17     18     19     20    LAB PERIPHERAL  12:30 PM   (15 min.)    [facility]   [facility]    ONC INFUSION 1.5 HR (90 MIN)   1:00 PM   (90 min.)   [facility] ONC INFUSION NURSE   [facility] 21     22       23     24     25     26     27    LAB PERIPHERAL   2:45 PM   (15 min.)    [facility]   [facility]    ONC INFUSION 1.5 HR (90 MIN)   3:30 PM   (90 min.)   [facility] ONC INFUSION NURSE   [facility] 28     29       30                                                   Lab Results:  Recent Results (from the past 12 hour(s))   Comprehensive metabolic panel    Collection Time: 05/15/24  8:04 AM   Result Value Ref Range    Sodium 139 135 - 145 mmol/L    Potassium 4.1 3.4 - 5.3 mmol/L    Carbon Dioxide (CO2) 23 22 - 29 mmol/L    Anion Gap 10 7 - 15 mmol/L    Urea Nitrogen 19.6 6.0 - 20.0 mg/dL    Creatinine 0.59 0.51 - 0.95 mg/dL    GFR Estimate >90 >60 mL/min/1.73m2    Calcium 9.3 8.6 - 10.0 mg/dL    Chloride 106 98 - 107 mmol/L    Glucose 119 (H) 70 - 99 mg/dL    Alkaline Phosphatase 59 40 - 150 U/L    AST 24 0 - 45 U/L    ALT 26 0 - 50 U/L    Protein Total 7.0 6.4 - 8.3 g/dL    Albumin 4.3 3.5 - 5.2 g/dL    Bilirubin Total 0.6  <=1.2 mg/dL   CBC with platelets and differential    Collection Time: 05/15/24  8:04 AM   Result Value Ref Range    WBC Count 5.0 4.0 - 11.0 10e3/uL    RBC Count 4.10 3.80 - 5.20 10e6/uL    Hemoglobin 11.7 11.7 - 15.7 g/dL    Hematocrit 34.8 (L) 35.0 - 47.0 %    MCV 85 78 - 100 fL    MCH 28.5 26.5 - 33.0 pg    MCHC 33.6 31.5 - 36.5 g/dL    RDW 12.3 10.0 - 15.0 %    Platelet Count 291 150 - 450 10e3/uL    % Neutrophils 40 %    % Lymphocytes 49 %    % Monocytes 6 %    % Eosinophils 3 %    % Basophils 2 %    % Immature Granulocytes 0 %    NRBCs per 100 WBC 0 <1 /100    Absolute Neutrophils 2.0 1.6 - 8.3 10e3/uL    Absolute Lymphocytes 2.5 0.8 - 5.3 10e3/uL    Absolute Monocytes 0.3 0.0 - 1.3 10e3/uL    Absolute Eosinophils 0.1 0.0 - 0.7 10e3/uL    Absolute Basophils 0.1 0.0 - 0.2 10e3/uL    Absolute Immature Granulocytes 0.0 <=0.4 10e3/uL    Absolute NRBCs 0.0 10e3/uL

## 2024-05-18 ENCOUNTER — HEALTH MAINTENANCE LETTER (OUTPATIENT)
Age: 49
End: 2024-05-18

## 2024-05-20 ENCOUNTER — ANCILLARY PROCEDURE (OUTPATIENT)
Dept: MRI IMAGING | Facility: CLINIC | Age: 49
End: 2024-05-20
Attending: INTERNAL MEDICINE

## 2024-05-20 DIAGNOSIS — Z17.0 MALIGNANT NEOPLASM OF UPPER-INNER QUADRANT OF RIGHT BREAST IN FEMALE, ESTROGEN RECEPTOR POSITIVE (H): ICD-10-CM

## 2024-05-20 DIAGNOSIS — C50.211 MALIGNANT NEOPLASM OF UPPER-INNER QUADRANT OF RIGHT BREAST IN FEMALE, ESTROGEN RECEPTOR POSITIVE (H): ICD-10-CM

## 2024-05-20 RX ORDER — GADOBUTROL 604.72 MG/ML
0.1 INJECTION INTRAVENOUS ONCE
Status: COMPLETED | OUTPATIENT
Start: 2024-05-20 | End: 2024-05-20

## 2024-05-20 RX ADMIN — GADOBUTROL 7.15 ML: 604.72 INJECTION INTRAVENOUS at 08:19

## 2024-05-21 ENCOUNTER — ONCOLOGY VISIT (OUTPATIENT)
Dept: ONCOLOGY | Facility: CLINIC | Age: 49
End: 2024-05-21
Attending: INTERNAL MEDICINE
Payer: COMMERCIAL

## 2024-05-21 VITALS
HEART RATE: 81 BPM | OXYGEN SATURATION: 97 % | DIASTOLIC BLOOD PRESSURE: 76 MMHG | TEMPERATURE: 98.3 F | RESPIRATION RATE: 18 BRPM | SYSTOLIC BLOOD PRESSURE: 109 MMHG | BODY MASS INDEX: 26.98 KG/M2 | WEIGHT: 160 LBS

## 2024-05-21 DIAGNOSIS — Z17.0 MALIGNANT NEOPLASM OF UPPER-INNER QUADRANT OF RIGHT BREAST IN FEMALE, ESTROGEN RECEPTOR POSITIVE (H): Primary | Chronic | ICD-10-CM

## 2024-05-21 DIAGNOSIS — G43.719 INTRACTABLE CHRONIC MIGRAINE WITHOUT AURA AND WITHOUT STATUS MIGRAINOSUS: ICD-10-CM

## 2024-05-21 DIAGNOSIS — C50.211 MALIGNANT NEOPLASM OF UPPER-INNER QUADRANT OF RIGHT BREAST IN FEMALE, ESTROGEN RECEPTOR POSITIVE (H): Primary | Chronic | ICD-10-CM

## 2024-05-21 PROCEDURE — 99215 OFFICE O/P EST HI 40 MIN: CPT | Performed by: INTERNAL MEDICINE

## 2024-05-21 PROCEDURE — 99213 OFFICE O/P EST LOW 20 MIN: CPT | Performed by: INTERNAL MEDICINE

## 2024-05-21 RX ORDER — DIPHENHYDRAMINE HCL 25 MG
50 CAPSULE ORAL
Status: CANCELLED
Start: 2024-06-04

## 2024-05-21 RX ORDER — EPINEPHRINE 1 MG/ML
0.3 INJECTION, SOLUTION INTRAMUSCULAR; SUBCUTANEOUS EVERY 5 MIN PRN
Status: CANCELLED | OUTPATIENT
Start: 2024-06-04

## 2024-05-21 RX ORDER — HEPARIN SODIUM (PORCINE) LOCK FLUSH IV SOLN 100 UNIT/ML 100 UNIT/ML
5 SOLUTION INTRAVENOUS
Status: CANCELLED | OUTPATIENT
Start: 2024-05-28

## 2024-05-21 RX ORDER — DIPHENHYDRAMINE HYDROCHLORIDE 50 MG/ML
50 INJECTION INTRAMUSCULAR; INTRAVENOUS
Status: CANCELLED
Start: 2024-05-21

## 2024-05-21 RX ORDER — METHYLPREDNISOLONE SODIUM SUCCINATE 125 MG/2ML
125 INJECTION, POWDER, LYOPHILIZED, FOR SOLUTION INTRAMUSCULAR; INTRAVENOUS
Status: CANCELLED
Start: 2024-06-04

## 2024-05-21 RX ORDER — HEPARIN SODIUM,PORCINE 10 UNIT/ML
5-20 VIAL (ML) INTRAVENOUS DAILY PRN
Status: CANCELLED | OUTPATIENT
Start: 2024-05-28

## 2024-05-21 RX ORDER — PACLITAXEL 100 MG/20ML
100 INJECTION, POWDER, LYOPHILIZED, FOR SUSPENSION INTRAVENOUS ONCE
Status: CANCELLED
Start: 2024-05-21 | End: 2024-05-21

## 2024-05-21 RX ORDER — HEPARIN SODIUM,PORCINE 10 UNIT/ML
5-20 VIAL (ML) INTRAVENOUS DAILY PRN
Status: CANCELLED | OUTPATIENT
Start: 2024-06-04

## 2024-05-21 RX ORDER — MEPERIDINE HYDROCHLORIDE 25 MG/ML
25 INJECTION INTRAMUSCULAR; INTRAVENOUS; SUBCUTANEOUS EVERY 30 MIN PRN
Status: CANCELLED | OUTPATIENT
Start: 2024-05-28

## 2024-05-21 RX ORDER — ALBUTEROL SULFATE 0.83 MG/ML
2.5 SOLUTION RESPIRATORY (INHALATION)
Status: CANCELLED | OUTPATIENT
Start: 2024-06-04

## 2024-05-21 RX ORDER — ALBUTEROL SULFATE 90 UG/1
1-2 AEROSOL, METERED RESPIRATORY (INHALATION)
Status: CANCELLED
Start: 2024-05-21

## 2024-05-21 RX ORDER — MEPERIDINE HYDROCHLORIDE 25 MG/ML
25 INJECTION INTRAMUSCULAR; INTRAVENOUS; SUBCUTANEOUS EVERY 30 MIN PRN
Status: CANCELLED | OUTPATIENT
Start: 2024-06-04

## 2024-05-21 RX ORDER — LORAZEPAM 2 MG/ML
0.5 INJECTION INTRAMUSCULAR EVERY 4 HOURS PRN
Status: CANCELLED | OUTPATIENT
Start: 2024-05-28

## 2024-05-21 RX ORDER — ALBUTEROL SULFATE 90 UG/1
1-2 AEROSOL, METERED RESPIRATORY (INHALATION)
Status: CANCELLED
Start: 2024-05-28

## 2024-05-21 RX ORDER — LORAZEPAM 2 MG/ML
0.5 INJECTION INTRAMUSCULAR EVERY 4 HOURS PRN
Status: CANCELLED | OUTPATIENT
Start: 2024-06-04

## 2024-05-21 RX ORDER — HEPARIN SODIUM (PORCINE) LOCK FLUSH IV SOLN 100 UNIT/ML 100 UNIT/ML
5 SOLUTION INTRAVENOUS
Status: CANCELLED | OUTPATIENT
Start: 2024-05-21

## 2024-05-21 RX ORDER — DIPHENHYDRAMINE HCL 25 MG
50 CAPSULE ORAL
Status: CANCELLED
Start: 2024-05-21

## 2024-05-21 RX ORDER — DIPHENHYDRAMINE HYDROCHLORIDE 50 MG/ML
50 INJECTION INTRAMUSCULAR; INTRAVENOUS
Status: CANCELLED
Start: 2024-06-04

## 2024-05-21 RX ORDER — METHYLPREDNISOLONE SODIUM SUCCINATE 125 MG/2ML
125 INJECTION, POWDER, LYOPHILIZED, FOR SOLUTION INTRAMUSCULAR; INTRAVENOUS
Status: CANCELLED
Start: 2024-05-28

## 2024-05-21 RX ORDER — EPINEPHRINE 1 MG/ML
0.3 INJECTION, SOLUTION INTRAMUSCULAR; SUBCUTANEOUS EVERY 5 MIN PRN
Status: CANCELLED | OUTPATIENT
Start: 2024-05-28

## 2024-05-21 RX ORDER — ALBUTEROL SULFATE 0.83 MG/ML
2.5 SOLUTION RESPIRATORY (INHALATION)
Status: CANCELLED | OUTPATIENT
Start: 2024-05-21

## 2024-05-21 RX ORDER — EPINEPHRINE 1 MG/ML
0.3 INJECTION, SOLUTION INTRAMUSCULAR; SUBCUTANEOUS EVERY 5 MIN PRN
Status: CANCELLED | OUTPATIENT
Start: 2024-05-21

## 2024-05-21 RX ORDER — METHYLPREDNISOLONE SODIUM SUCCINATE 125 MG/2ML
125 INJECTION, POWDER, LYOPHILIZED, FOR SOLUTION INTRAMUSCULAR; INTRAVENOUS
Status: CANCELLED
Start: 2024-05-21

## 2024-05-21 RX ORDER — HEPARIN SODIUM,PORCINE 10 UNIT/ML
5-20 VIAL (ML) INTRAVENOUS DAILY PRN
Status: CANCELLED | OUTPATIENT
Start: 2024-05-21

## 2024-05-21 RX ORDER — PACLITAXEL 100 MG/20ML
100 INJECTION, POWDER, LYOPHILIZED, FOR SUSPENSION INTRAVENOUS ONCE
Status: CANCELLED
Start: 2024-06-04 | End: 2024-06-04

## 2024-05-21 RX ORDER — ALBUTEROL SULFATE 90 UG/1
1-2 AEROSOL, METERED RESPIRATORY (INHALATION)
Status: CANCELLED
Start: 2024-06-04

## 2024-05-21 RX ORDER — ALBUTEROL SULFATE 0.83 MG/ML
2.5 SOLUTION RESPIRATORY (INHALATION)
Status: CANCELLED | OUTPATIENT
Start: 2024-05-28

## 2024-05-21 RX ORDER — PACLITAXEL 100 MG/20ML
100 INJECTION, POWDER, LYOPHILIZED, FOR SUSPENSION INTRAVENOUS ONCE
Status: CANCELLED
Start: 2024-05-28 | End: 2024-05-28

## 2024-05-21 RX ORDER — DIPHENHYDRAMINE HCL 25 MG
50 CAPSULE ORAL
Status: CANCELLED
Start: 2024-05-28

## 2024-05-21 RX ORDER — LORAZEPAM 2 MG/ML
0.5 INJECTION INTRAMUSCULAR EVERY 4 HOURS PRN
Status: CANCELLED | OUTPATIENT
Start: 2024-05-21

## 2024-05-21 RX ORDER — DIPHENHYDRAMINE HYDROCHLORIDE 50 MG/ML
50 INJECTION INTRAMUSCULAR; INTRAVENOUS
Status: CANCELLED
Start: 2024-05-28

## 2024-05-21 RX ORDER — MEPERIDINE HYDROCHLORIDE 25 MG/ML
25 INJECTION INTRAMUSCULAR; INTRAVENOUS; SUBCUTANEOUS EVERY 30 MIN PRN
Status: CANCELLED | OUTPATIENT
Start: 2024-05-21

## 2024-05-21 RX ORDER — HEPARIN SODIUM (PORCINE) LOCK FLUSH IV SOLN 100 UNIT/ML 100 UNIT/ML
5 SOLUTION INTRAVENOUS
Status: CANCELLED | OUTPATIENT
Start: 2024-06-04

## 2024-05-21 ASSESSMENT — PAIN SCALES - GENERAL: PAINLEVEL: NO PAIN (0)

## 2024-05-21 NOTE — NURSING NOTE
"Oncology Rooming Note    May 21, 2024 3:30 PM   Sharon Fong is a 48 year old female who presents for:    Chief Complaint   Patient presents with    Breast Cancer     Initial Vitals: /76 (BP Location: Right arm, Patient Position: Sitting, Cuff Size: Adult Regular)   Pulse 81   Temp 98.3  F (36.8  C) (Oral)   Resp 18   Wt 72.6 kg (160 lb)   SpO2 97%   BMI 26.98 kg/m   Estimated body mass index is 26.98 kg/m  as calculated from the following:    Height as of 4/17/24: 1.64 m (5' 4.57\").    Weight as of this encounter: 72.6 kg (160 lb). Body surface area is 1.82 meters squared.  No Pain (0) Comment: Data Unavailable   No LMP recorded. Patient has had a hysterectomy.  Allergies reviewed: Yes  Medications reviewed: Yes    Medications: Medication refills not needed today.  Pharmacy name entered into [employer]: [facility] [address] [place] [address] - [address] [place] [address]    Frailty Screening:   Is the patient here for a new oncology consult visit in cancer care? 2. No      Clinical concerns: Pt has a list of concerns.   Dr Bernal  was notified.      Melonie Goodman CMA              "

## 2024-05-21 NOTE — PROGRESS NOTES
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 She started screening for the ISPY 2.2 trial and was found to have a MammaPrint high risk/blueprint luminal B subtype.  She was started on treatment in block B due to lack of availability of block A agents.  Her course has been complicated by allergic reaction to Taxol requiring change to Abraxane.  Her 3-week MRI shows minimally decreased mass, although we are still awaiting formal ROR from ispy team.  She presents today for follow-up.      Interval History  Sharon is overall doing well since her last visit with me.  She is able to tolerate Abraxane much better than Taxol.  Unfortunately, she continues to experience headaches which have been bothersome.  She takes as needed Excedrin although this results in insomnia due to the caffeine in it.    In addition, she has been experiencing arthralgias after each infusion.  This typically improves with NSAIDs.    She also notes intermittent pain in her sternum since the start of chemotherapy.  This is not persistent.  Not associated with any other symptoms.  She also notes her biopsy site is still intermittently painful.    Clinically, she does not feel like her masses changed significantly.     No new visual changes, cough, SOB, chest pain, n/v, constipation/diarrhea, abdominal pain, focal weakness or numbness.       Her full oncologic history is as follows:   Oncologic History  Patient Active Problem List    Diagnosis Date Noted    Malignant neoplasm of upper-inner quadrant of right female breast (H) 04/02/2024     Priority: High     Right breast cancer  Has had longstanding history of multiple biopsies, infection, and partial mastectomy for a right breast multifocal abscess dating back to at least 2010.    2024 patient  presented with palpable lump at 1:00 in the right breast    3/21/2024 diagnostic mammo showed clustered fine indeterminate calcs at 12:00.  No mammographic abnormality identified to correspond to the palpable area of fullness at 1:00.  On US, at 1:00, 10 cm FN there was a 3.7 x 2.3 x 1.5 cm solid hypoechoic irregularly marginated mass.  Additional hypoechoic lesion located at 2:00, 3 cm FN measuring 1.0 x 0.5 cm.  Another hypoechoic lesion at 2:00, 12 cm FN measuring 0.7 cm.  At least 2 enlarged right axillary lymph nodes -largest measuring up to 0.6 cm.    3/28/2024 ultrasound-guided biopsy of the followin:00 lesion at least DCIS  1:00 lesion grade 2 IDC.  No LVI.  ER (3+, 80 to 90%), NC (3+, 90 to 100%), HER2 2+ and FISH negative   Right axillary LN: metastatic carcinoma    2024 CT CAP demonstrated multifocal enhancement in the right breast with mildly abnormal right axillary lymph nodes.  Otherwise no evidence of distant metastatic disease.    2024 NM bone scan negative for osteoblastic metastatic disease.    4/15/2024 MRI breast demonstrated the following  Right breast at 1:00 mass measuring 2.6 x 2.0 by 3.0 cm with extensive surrounding clumped NME occupying most of the right breast and measuring 8.9 x 4.7 x 8.7   Left breast showed a fibroadenoma at 8-9:00 position.  In addition, 7 mm of linear branching NME at 3-4:00 position.  This was biopsied and found to be benign breast tissue.  Few asymmetric right internal mammary chain LN. multiple enlarged right level 1 axillary nodes.  Additionally multiple LEFT level 1 axillary nodes that are indeterminant -which were normal on left axillary US.    2024 MammaPrint -0.192 (high risk) and blueprint luminal B    2024 started weekly Taxol -changed to Abraxane starting week 2 due to allergic reaction    2024 W3 MRI  Minimally decreased size of the postoperative right breast cancer at the approximate 1:00 position measuring approximately 2.4  x 1.9 x 2.9 cm (previously 2.6 x 2.0 x 3.0 cm).  There is overall similar extent although mildly decreased volume of abnormal surrounding NME  Minimal interval enlargement of multiple bilateral level I lymph nodes, including the biopsy-proven right axillary node with indwelling biopsy marker. No significant change in the multiple small asymmetric right internal mammary chain lymph nodes.           Inflammatory arthritis 06/01/2015     Priority: Medium    Seizure (H) 11/15/2013     Priority: Medium    Vitamin D deficiency 04/23/2013     Priority: Medium            Current Medications:  Current Outpatient Medications   Medication Sig Dispense Refill    acetaminophen (TYLENOL) 500 MG tablet Take 500 mg by mouth as needed for mild pain      amitriptyline (ELAVIL) 10 MG tablet Take 20 mg by mouth at bedtime (Patient not taking: Reported on 5/13/2024)      aspirin-acetaminophen-caffeine (EXCEDRIN MIGRAINE) 250-250-65 MG tablet  (Patient not taking: Reported on 5/15/2024)      ibuprofen (ADVIL/MOTRIN) 200 MG capsule  (Patient not taking: Reported on 5/13/2024)      lidocaine-prilocaine (EMLA) 2.5-2.5 % external cream Apply topically as needed for moderate pain 30 g 1    ondansetron (ZOFRAN) 8 MG tablet Take 1 tablet (8 mg) by mouth every 8 hours as needed for nausea (Patient not taking: Reported on 5/15/2024) 30 tablet 3         ECOG Performance Status: 0    Physical Examination:  /76 (BP Location: Right arm, Patient Position: Sitting, Cuff Size: Adult Regular)   Pulse 81   Temp 98.3  F (36.8  C) (Oral)   Resp 18   Wt 72.6 kg (160 lb)   SpO2 97%   BMI 26.98 kg/m    General:  Well appearing, well-nourished adult female in NAD.  HEENT:  Normocephalic.  Sclera anicteric.  MMM.  No lesions of the oropharynx.  Breast: 2.5 x 3.0 mass at 1:00 7cm FN - largely stable  Lymph:  palpable axillary LN bilaterally   Chest:  Breathing comfortably on room air  Abd:  Soft/NT/ND.  No hepatosplenomegaly.  Ext:  No pitting edema  of the bilateral lower extremities.    Musculo:  Strength 5/5 throughout.  Neuro:  Cranial nerves grossly intact.  Psych:  Mood and affect appear normal.      Laboratory Data:  Lab Results   Component Value Date    WBC 5.0 05/15/2024    HGB 11.7 05/15/2024    HCT 34.8 (L) 05/15/2024    MCV 85 05/15/2024     05/15/2024     Lab Results   Component Value Date     05/15/2024    BUN 19.6 05/15/2024    ANIONGAP 10 05/15/2024     Lab Results   Component Value Date    ALT 26 05/15/2024    AST 24 05/15/2024    ALKPHOS 59 05/15/2024     Lab Results   Component Value Date    INR 1.02 04/17/2024         Radiology data:  I have personally reviewed the images of / or the following radiology data: As detailed in the oncology timeline      Pathology and other data:  I have personally reviewed the following data: As detailed in the oncology timeline      Assessment and Recommendations  Sharon Fong  is a 48 year old premenopausal woman with a new diagnosis of 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  She is on the I SPY 2.2 clinical trial and is receiving weekly Abraxane.  She presents today for a 2-week follow-up.      # Right breast cancer  -Continue neoadjuvant Abraxane  -Will order repeat MRI for 6 weeks -awaiting return of results from I spy team to determine if change in volume from baseline to 3 weeks with sufficient  -She has already established care with Dr. Collier and is planned to have a mastectomy after neoadjuvant chemotherapy  -Will need adjuvant radiation therapy -will place referral after her surgery    # Contralateral (left) axillary lymph node enlargement  Indeterminant  -Will continue to follow on subsequent scans.  These appeared abnormal on baseline ultrasound  -If continuing to enlarge we will consider a biopsy    #Hx of migraines  -We changed Zofran premed to Compazine  -Asked her to reach out to me if this is not sufficient to control her nausea or if it causes  any other symptoms      40 minutes spent on the date of the encounter doing chart review, review of outside records, review of test results, interpretation of tests, patient visit, documentation, and discussion with other provider(s)       Dame Dolores MD   of Medicine  Division of Hematology, Oncology and Transplantation  [facility]

## 2024-05-21 NOTE — LETTER
5/21/2024         RE: Sharon Fong  [address] [facility] [facility] [address]        Dear Colleague,    Thank you for referring your patient, Sharon Fong, to the [facility]. Please see a copy of my visit note below.    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 She started screening for the ISPY 2.2 trial and was found to have a MammaPrint high risk/blueprint luminal B subtype.  She was started on treatment in block B due to lack of availability of block A agents.  Her course has been complicated by allergic reaction to Taxol requiring change to Abraxane.  Her 3-week MRI shows minimally decreased mass, although we are still awaiting formal ROR from ispy team.  She presents today for follow-up.      Interval History  Sharon is overall doing well since her last visit with me.  She is able to tolerate Abraxane much better than Taxol.  Unfortunately, she continues to experience headaches which have been bothersome.  She takes as needed Excedrin although this results in insomnia due to the caffeine in it.    In addition, she has been experiencing arthralgias after each infusion.  This typically improves with NSAIDs.    She also notes intermittent pain in her sternum since the start of chemotherapy.  This is not persistent.  Not associated with any other symptoms.  She also notes her biopsy site is still intermittently painful.    Clinically, she does not feel like her masses changed significantly.     No new visual changes, cough, SOB, chest pain, n/v, constipation/diarrhea, abdominal pain, focal weakness or numbness.       Her full oncologic history is as follows:   Oncologic History  Patient Active Problem List    Diagnosis Date Noted    Malignant neoplasm of  upper-inner quadrant of right female breast (H) 2024     Priority: High     Right breast cancer  Has had longstanding history of multiple biopsies, infection, and partial mastectomy for a right breast multifocal abscess dating back to at least .     patient presented with palpable lump at 1:00 in the right breast    3/21/2024 diagnostic mammo showed clustered fine indeterminate calcs at 12:00.  No mammographic abnormality identified to correspond to the palpable area of fullness at 1:00.  On US, at 1:00, 10 cm FN there was a 3.7 x 2.3 x 1.5 cm solid hypoechoic irregularly marginated mass.  Additional hypoechoic lesion located at 2:00, 3 cm FN measuring 1.0 x 0.5 cm.  Another hypoechoic lesion at 2:00, 12 cm FN measuring 0.7 cm.  At least 2 enlarged right axillary lymph nodes -largest measuring up to 0.6 cm.    3/28/2024 ultrasound-guided biopsy of the followin:00 lesion at least DCIS  1:00 lesion grade 2 IDC.  No LVI.  ER (3+, 80 to 90%), WV (3+, 90 to 100%), HER2 2+ and FISH negative   Right axillary LN: metastatic carcinoma    2024 CT CAP demonstrated multifocal enhancement in the right breast with mildly abnormal right axillary lymph nodes.  Otherwise no evidence of distant metastatic disease.    2024 NM bone scan negative for osteoblastic metastatic disease.    4/15/2024 MRI breast demonstrated the following  Right breast at 1:00 mass measuring 2.6 x 2.0 by 3.0 cm with extensive surrounding clumped NME occupying most of the right breast and measuring 8.9 x 4.7 x 8.7   Left breast showed a fibroadenoma at 8-9:00 position.  In addition, 7 mm of linear branching NME at 3-4:00 position.  This was biopsied and found to be benign breast tissue.  Few asymmetric right internal mammary chain LN. multiple enlarged right level 1 axillary nodes.  Additionally multiple LEFT level 1 axillary nodes that are indeterminant -which were normal on left axillary US.    2024 MammaPrint -0.192 (high  risk) and blueprint luminal B    4/30/2024 started weekly Taxol -changed to Abraxane starting week 2 due to allergic reaction    5/17/2024 W3 MRI  Minimally decreased size of the postoperative right breast cancer at the approximate 1:00 position measuring approximately 2.4 x 1.9 x 2.9 cm (previously 2.6 x 2.0 x 3.0 cm).  There is overall similar extent although mildly decreased volume of abnormal surrounding NME  Minimal interval enlargement of multiple bilateral level I lymph nodes, including the biopsy-proven right axillary node with indwelling biopsy marker. No significant change in the multiple small asymmetric right internal mammary chain lymph nodes.           Inflammatory arthritis 06/01/2015     Priority: Medium    Seizure (H) 11/15/2013     Priority: Medium    Vitamin D deficiency 04/23/2013     Priority: Medium            Current Medications:  Current Outpatient Medications   Medication Sig Dispense Refill    acetaminophen (TYLENOL) 500 MG tablet Take 500 mg by mouth as needed for mild pain      amitriptyline (ELAVIL) 10 MG tablet Take 20 mg by mouth at bedtime (Patient not taking: Reported on 5/13/2024)      aspirin-acetaminophen-caffeine (EXCEDRIN MIGRAINE) 250-250-65 MG tablet  (Patient not taking: Reported on 5/15/2024)      ibuprofen (ADVIL/MOTRIN) 200 MG capsule  (Patient not taking: Reported on 5/13/2024)      lidocaine-prilocaine (EMLA) 2.5-2.5 % external cream Apply topically as needed for moderate pain 30 g 1    ondansetron (ZOFRAN) 8 MG tablet Take 1 tablet (8 mg) by mouth every 8 hours as needed for nausea (Patient not taking: Reported on 5/15/2024) 30 tablet 3         ECOG Performance Status: 0    Physical Examination:  /76 (BP Location: Right arm, Patient Position: Sitting, Cuff Size: Adult Regular)   Pulse 81   Temp 98.3  F (36.8  C) (Oral)   Resp 18   Wt 72.6 kg (160 lb)   SpO2 97%   BMI 26.98 kg/m    General:  Well appearing, well-nourished adult female in NAD.  HEENT:   Normocephalic.  Sclera anicteric.  MMM.  No lesions of the oropharynx.  Breast: 2.5 x 3.0 mass at 1:00 7cm FN - largely stable  Lymph:  palpable axillary LN bilaterally   Chest:  Breathing comfortably on room air  Abd:  Soft/NT/ND.  No hepatosplenomegaly.  Ext:  No pitting edema of the bilateral lower extremities.    Musculo:  Strength 5/5 throughout.  Neuro:  Cranial nerves grossly intact.  Psych:  Mood and affect appear normal.      Laboratory Data:  Lab Results   Component Value Date    WBC 5.0 05/15/2024    HGB 11.7 05/15/2024    HCT 34.8 (L) 05/15/2024    MCV 85 05/15/2024     05/15/2024     Lab Results   Component Value Date     05/15/2024    BUN 19.6 05/15/2024    ANIONGAP 10 05/15/2024     Lab Results   Component Value Date    ALT 26 05/15/2024    AST 24 05/15/2024    ALKPHOS 59 05/15/2024     Lab Results   Component Value Date    INR 1.02 04/17/2024         Radiology data:  I have personally reviewed the images of / or the following radiology data: As detailed in the oncology timeline      Pathology and other data:  I have personally reviewed the following data: As detailed in the oncology timeline      Assessment and Recommendations  Sharon Fong  is a 48 year old premenopausal woman with a new diagnosis of 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  She is on the I SPY 2.2 clinical trial and is receiving weekly Abraxane.  She presents today for a 2-week follow-up.      # Right breast cancer  -Continue neoadjuvant Abraxane  -Will order repeat MRI for 6 weeks -awaiting return of results from I spy team to determine if change in volume from baseline to 3 weeks with sufficient  -She has already established care with Dr. Collier and is planned to have a mastectomy after neoadjuvant chemotherapy  -Will need adjuvant radiation therapy -will place referral after her surgery    # Contralateral (left) axillary lymph node enlargement  Indeterminant  -Will continue to  follow on subsequent scans.  These appeared abnormal on baseline ultrasound  -If continuing to enlarge we will consider a biopsy    #Hx of migraines  -We changed Zofran premed to Compazine  -Asked her to reach out to me if this is not sufficient to control her nausea or if it causes any other symptoms      40 minutes spent on the date of the encounter doing chart review, review of outside records, review of test results, interpretation of tests, patient visit, documentation, and discussion with other provider(s)       Dame Dolores MD   of Medicine  Division of Hematology, Oncology and Transplantation  [facility]

## 2024-05-23 ENCOUNTER — ALLIED HEALTH/NURSE VISIT (OUTPATIENT)
Dept: ONCOLOGY | Facility: CLINIC | Age: 49
End: 2024-05-23

## 2024-05-23 ENCOUNTER — INFUSION THERAPY VISIT (OUTPATIENT)
Dept: ONCOLOGY | Facility: CLINIC | Age: 49
End: 2024-05-23
Attending: INTERNAL MEDICINE
Payer: COMMERCIAL

## 2024-05-23 ENCOUNTER — APPOINTMENT (OUTPATIENT)
Dept: LAB | Facility: CLINIC | Age: 49
End: 2024-05-23
Payer: COMMERCIAL

## 2024-05-23 VITALS
RESPIRATION RATE: 16 BRPM | WEIGHT: 161.1 LBS | BODY MASS INDEX: 27.17 KG/M2 | OXYGEN SATURATION: 99 % | HEART RATE: 74 BPM | TEMPERATURE: 98.2 F | SYSTOLIC BLOOD PRESSURE: 122 MMHG | DIASTOLIC BLOOD PRESSURE: 79 MMHG

## 2024-05-23 DIAGNOSIS — Z17.0 MALIGNANT NEOPLASM OF UPPER-INNER QUADRANT OF RIGHT BREAST IN FEMALE, ESTROGEN RECEPTOR POSITIVE (H): Primary | ICD-10-CM

## 2024-05-23 DIAGNOSIS — Z00.6 EXAMINATION OF PARTICIPANT OR CONTROL IN CLINICAL RESEARCH: ICD-10-CM

## 2024-05-23 DIAGNOSIS — Z17.0 MALIGNANT NEOPLASM OF UPPER-INNER QUADRANT OF RIGHT BREAST IN FEMALE, ESTROGEN RECEPTOR POSITIVE (H): ICD-10-CM

## 2024-05-23 DIAGNOSIS — C50.211 MALIGNANT NEOPLASM OF UPPER-INNER QUADRANT OF RIGHT BREAST IN FEMALE, ESTROGEN RECEPTOR POSITIVE (H): Primary | ICD-10-CM

## 2024-05-23 DIAGNOSIS — C50.211 MALIGNANT NEOPLASM OF UPPER-INNER QUADRANT OF RIGHT BREAST IN FEMALE, ESTROGEN RECEPTOR POSITIVE (H): ICD-10-CM

## 2024-05-23 LAB
ALBUMIN SERPL BCG-MCNC: 4.3 G/DL (ref 3.5–5.2)
ALP SERPL-CCNC: 58 U/L (ref 40–150)
ALT SERPL W P-5'-P-CCNC: 54 U/L (ref 0–50)
ANION GAP SERPL CALCULATED.3IONS-SCNC: 10 MMOL/L (ref 7–15)
AST SERPL W P-5'-P-CCNC: 32 U/L (ref 0–45)
BASOPHILS # BLD AUTO: 0.1 10E3/UL (ref 0–0.2)
BASOPHILS NFR BLD AUTO: 1 %
BILIRUB SERPL-MCNC: 0.4 MG/DL
BUN SERPL-MCNC: 13.4 MG/DL (ref 6–20)
CALCIUM SERPL-MCNC: 9.4 MG/DL (ref 8.6–10)
CHLORIDE SERPL-SCNC: 107 MMOL/L (ref 98–107)
CREAT SERPL-MCNC: 0.52 MG/DL (ref 0.51–0.95)
DEPRECATED HCO3 PLAS-SCNC: 23 MMOL/L (ref 22–29)
EGFRCR SERPLBLD CKD-EPI 2021: >90 ML/MIN/1.73M2
EOSINOPHIL # BLD AUTO: 0.1 10E3/UL (ref 0–0.7)
EOSINOPHIL NFR BLD AUTO: 2 %
ERYTHROCYTE [DISTWIDTH] IN BLOOD BY AUTOMATED COUNT: 13 % (ref 10–15)
GLUCOSE SERPL-MCNC: 123 MG/DL (ref 70–99)
HCT VFR BLD AUTO: 32.2 % (ref 35–47)
HGB BLD-MCNC: 10.9 G/DL (ref 11.7–15.7)
IMM GRANULOCYTES # BLD: 0 10E3/UL
IMM GRANULOCYTES NFR BLD: 1 %
LYMPHOCYTES # BLD AUTO: 2.7 10E3/UL (ref 0.8–5.3)
LYMPHOCYTES NFR BLD AUTO: 51 %
MCH RBC QN AUTO: 29.2 PG (ref 26.5–33)
MCHC RBC AUTO-ENTMCNC: 33.9 G/DL (ref 31.5–36.5)
MCV RBC AUTO: 86 FL (ref 78–100)
MONOCYTES # BLD AUTO: 0.4 10E3/UL (ref 0–1.3)
MONOCYTES NFR BLD AUTO: 8 %
NEUTROPHILS # BLD AUTO: 2 10E3/UL (ref 1.6–8.3)
NEUTROPHILS NFR BLD AUTO: 37 %
NRBC # BLD AUTO: 0 10E3/UL
NRBC BLD AUTO-RTO: 0 /100
PLATELET # BLD AUTO: 363 10E3/UL (ref 150–450)
POTASSIUM SERPL-SCNC: 4 MMOL/L (ref 3.4–5.3)
PROT SERPL-MCNC: 7.1 G/DL (ref 6.4–8.3)
RBC # BLD AUTO: 3.73 10E6/UL (ref 3.8–5.2)
SODIUM SERPL-SCNC: 140 MMOL/L (ref 135–145)
WBC # BLD AUTO: 5.3 10E3/UL (ref 4–11)

## 2024-05-23 PROCEDURE — 85025 COMPLETE CBC W/AUTO DIFF WBC: CPT | Performed by: INTERNAL MEDICINE

## 2024-05-23 PROCEDURE — 82040 ASSAY OF SERUM ALBUMIN: CPT

## 2024-05-23 PROCEDURE — 300N000004 RESEARCH KIT COLLECTION

## 2024-05-23 PROCEDURE — 258N000003 HC RX IP 258 OP 636: Performed by: INTERNAL MEDICINE

## 2024-05-23 PROCEDURE — 96413 CHEMO IV INFUSION 1 HR: CPT

## 2024-05-23 PROCEDURE — 250N000011 HC RX IP 250 OP 636: Performed by: INTERNAL MEDICINE

## 2024-05-23 PROCEDURE — 96375 TX/PRO/DX INJ NEW DRUG ADDON: CPT

## 2024-05-23 PROCEDURE — 36591 DRAW BLOOD OFF VENOUS DEVICE: CPT

## 2024-05-23 RX ORDER — HEPARIN SODIUM (PORCINE) LOCK FLUSH IV SOLN 100 UNIT/ML 100 UNIT/ML
5 SOLUTION INTRAVENOUS ONCE
Status: COMPLETED | OUTPATIENT
Start: 2024-05-23 | End: 2024-05-23

## 2024-05-23 RX ORDER — HEPARIN SODIUM (PORCINE) LOCK FLUSH IV SOLN 100 UNIT/ML 100 UNIT/ML
5 SOLUTION INTRAVENOUS
Status: DISCONTINUED | OUTPATIENT
Start: 2024-05-23 | End: 2024-05-23 | Stop reason: HOSPADM

## 2024-05-23 RX ORDER — PACLITAXEL 100 MG/20ML
100 INJECTION, POWDER, LYOPHILIZED, FOR SUSPENSION INTRAVENOUS ONCE
Status: COMPLETED | OUTPATIENT
Start: 2024-05-23 | End: 2024-05-23

## 2024-05-23 RX ADMIN — Medication 5 ML: at 15:06

## 2024-05-23 RX ADMIN — PACLITAXEL 180 MG: 100 INJECTION, POWDER, LYOPHILIZED, FOR SUSPENSION INTRAVENOUS at 16:20

## 2024-05-23 RX ADMIN — Medication 5 ML: at 16:57

## 2024-05-23 RX ADMIN — SODIUM CHLORIDE 250 ML: 9 INJECTION, SOLUTION INTRAVENOUS at 16:04

## 2024-05-23 RX ADMIN — PROCHLORPERAZINE EDISYLATE 10 MG: 5 INJECTION INTRAMUSCULAR; INTRAVENOUS at 16:05

## 2024-05-23 ASSESSMENT — PAIN SCALES - GENERAL: PAINLEVEL: NO PAIN (0)

## 2024-05-23 NOTE — NURSING NOTE
Chief Complaint   Patient presents with    Port Draw     Labs drawn via port by RN in lab. VS taken.      Labs drawn via port by RN. Port accessed with 20g flat needle. Flushed with saline and heparin. Pt tolerated well. Vitals taken. Pt checked into next appt.     Opal Schmidt RN

## 2024-05-23 NOTE — PROGRESS NOTES
Infusion Nursing Note:  Sharon Fong presents today for Cycle 4 Day 1 abraxane.    Patient seen by provider today: No, visit with Dr. Bernal on 05/21   present during visit today: Not Applicable.    Note: Sharon presents today feeling well. Denies pain or nausea/vomiting. Denies fevers/chills. Offers no changes or concerns since visit with Dr. Bernal on 05/21.      Intravenous Access:  Implanted Port.    Treatment Conditions:     Latest Reference Range & Units 05/23/24 15:16   Sodium 135 - 145 mmol/L 140   Potassium 3.4 - 5.3 mmol/L 4.0   Chloride 98 - 107 mmol/L 107   Carbon Dioxide (CO2) 22 - 29 mmol/L 23   Urea Nitrogen 6.0 - 20.0 mg/dL 13.4   Creatinine 0.51 - 0.95 mg/dL 0.52   GFR Estimate >60 mL/min/1.73m2 >90   Calcium 8.6 - 10.0 mg/dL 9.4   Anion Gap 7 - 15 mmol/L 10   Albumin 3.5 - 5.2 g/dL 4.3   Protein Total 6.4 - 8.3 g/dL 7.1   Alkaline Phosphatase 40 - 150 U/L 58   ALT 0 - 50 U/L 54 (H)   AST 0 - 45 U/L 32   Bilirubin Total <=1.2 mg/dL 0.4   Glucose 70 - 99 mg/dL 123 (H)   WBC 4.0 - 11.0 10e3/uL 5.3   Hemoglobin 11.7 - 15.7 g/dL 10.9 (L)   Hematocrit 35.0 - 47.0 % 32.2 (L)   Platelet Count 150 - 450 10e3/uL 363   RBC Count 3.80 - 5.20 10e6/uL 3.73 (L)   MCV 78 - 100 fL 86   MCH 26.5 - 33.0 pg 29.2   MCHC 31.5 - 36.5 g/dL 33.9   RDW 10.0 - 15.0 % 13.0   % Neutrophils % 37   % Lymphocytes % 51   % Monocytes % 8   % Eosinophils % 2   % Basophils % 1   Absolute Basophils 0.0 - 0.2 10e3/uL 0.1   Absolute Eosinophils 0.0 - 0.7 10e3/uL 0.1   Absolute Immature Granulocytes <=0.4 10e3/uL 0.0   Absolute Lymphocytes 0.8 - 5.3 10e3/uL 2.7   Absolute Monocytes 0.0 - 1.3 10e3/uL 0.4   % Immature Granulocytes % 1   Absolute Neutrophils 1.6 - 8.3 10e3/uL 2.0   Absolute NRBCs 10e3/uL 0.0   NRBCs per 100 WBC <1 /100 0     Results reviewed, labs MET treatment parameters, ok to proceed with treatment.      Post Infusion Assessment:  Patient tolerated infusion without incident.  Blood return noted pre and  post infusion.  Site patent and intact, free from redness, edema or discomfort.  No evidence of extravasations.  Access discontinued per protocol.       Discharge Plan:   Patient declined prescription refills.  Discharge instructions reviewed with: Patient.  Patient and/or family verbalized understanding of discharge instructions and all questions answered.  AVS to patient via [employer].  Patient will return 05/30 for next infusion appointment.   Patient discharged in stable condition accompanied by: daughter.  Departure Mode: Ambulatory.      Elsie Pederson RN

## 2024-05-27 DIAGNOSIS — Z17.0 MALIGNANT NEOPLASM OF UPPER-INNER QUADRANT OF RIGHT BREAST IN FEMALE, ESTROGEN RECEPTOR POSITIVE (H): Primary | ICD-10-CM

## 2024-05-27 DIAGNOSIS — C50.211 MALIGNANT NEOPLASM OF UPPER-INNER QUADRANT OF RIGHT BREAST IN FEMALE, ESTROGEN RECEPTOR POSITIVE (H): Primary | ICD-10-CM

## 2024-05-27 RX ORDER — ONDANSETRON 2 MG/ML
4 INJECTION INTRAMUSCULAR; INTRAVENOUS EVERY 6 HOURS PRN
Status: CANCELLED
Start: 2024-06-04

## 2024-05-27 RX ORDER — ONDANSETRON 2 MG/ML
4 INJECTION INTRAMUSCULAR; INTRAVENOUS EVERY 6 HOURS PRN
Status: CANCELLED
Start: 2024-05-28

## 2024-05-28 ENCOUNTER — PATIENT OUTREACH (OUTPATIENT)
Dept: ONCOLOGY | Facility: CLINIC | Age: 49
End: 2024-05-28
Payer: COMMERCIAL

## 2024-05-28 NOTE — PROGRESS NOTES
New Patient Oncology Nurse Navigator Note     Referring provider: Dame Dolores JOE      Referring Clinic/Organization: [facility]   - Oncology Adult      Referred to (specialty:) Genetic Counseling     Requested provider (if applicable): NA     Date Referral Received: May 27, 2024     Evaluation for:  C50.211, Z17.0 (ICD-10-CM) - Malignant neoplasm of upper-inner quadrant of right breast in female, estrogen receptor positive (H)     Genetic counseling for VUS in FLCN and TSC2 genes in a patient with HR+/HER2 negative high risk breast cancer       Payor: [employer] / Plan: [employer] / Product Type: HMO /     May 28, 2024  Referral received and reviewed.   Sent to NPS to process.     Lisa SALASN, RN   Oncology Nurse Navigator   [facility]   [phone] / [phone]

## 2024-05-30 ENCOUNTER — ALLIED HEALTH/NURSE VISIT (OUTPATIENT)
Dept: ONCOLOGY | Facility: CLINIC | Age: 49
End: 2024-05-30

## 2024-05-30 ENCOUNTER — INFUSION THERAPY VISIT (OUTPATIENT)
Dept: ONCOLOGY | Facility: CLINIC | Age: 49
End: 2024-05-30
Payer: COMMERCIAL

## 2024-05-30 ENCOUNTER — APPOINTMENT (OUTPATIENT)
Dept: LAB | Facility: CLINIC | Age: 49
End: 2024-05-30
Payer: COMMERCIAL

## 2024-05-30 VITALS
SYSTOLIC BLOOD PRESSURE: 106 MMHG | OXYGEN SATURATION: 98 % | HEART RATE: 103 BPM | RESPIRATION RATE: 16 BRPM | TEMPERATURE: 98.6 F | BODY MASS INDEX: 26.87 KG/M2 | DIASTOLIC BLOOD PRESSURE: 74 MMHG | WEIGHT: 159.3 LBS

## 2024-05-30 DIAGNOSIS — Z17.0 MALIGNANT NEOPLASM OF UPPER-INNER QUADRANT OF RIGHT BREAST IN FEMALE, ESTROGEN RECEPTOR POSITIVE (H): Primary | ICD-10-CM

## 2024-05-30 DIAGNOSIS — Z00.6 EXAMINATION OF PARTICIPANT OR CONTROL IN CLINICAL RESEARCH: ICD-10-CM

## 2024-05-30 DIAGNOSIS — Z17.0 MALIGNANT NEOPLASM OF UPPER-INNER QUADRANT OF RIGHT BREAST IN FEMALE, ESTROGEN RECEPTOR POSITIVE (H): ICD-10-CM

## 2024-05-30 DIAGNOSIS — C50.211 MALIGNANT NEOPLASM OF UPPER-INNER QUADRANT OF RIGHT BREAST IN FEMALE, ESTROGEN RECEPTOR POSITIVE (H): Primary | ICD-10-CM

## 2024-05-30 DIAGNOSIS — C50.211 MALIGNANT NEOPLASM OF UPPER-INNER QUADRANT OF RIGHT BREAST IN FEMALE, ESTROGEN RECEPTOR POSITIVE (H): ICD-10-CM

## 2024-05-30 LAB
ALBUMIN SERPL BCG-MCNC: 4.4 G/DL (ref 3.5–5.2)
ALP SERPL-CCNC: 60 U/L (ref 40–150)
ALT SERPL W P-5'-P-CCNC: 37 U/L (ref 0–50)
ANION GAP SERPL CALCULATED.3IONS-SCNC: 10 MMOL/L (ref 7–15)
AST SERPL W P-5'-P-CCNC: 28 U/L (ref 0–45)
BASOPHILS # BLD AUTO: 0.1 10E3/UL (ref 0–0.2)
BASOPHILS NFR BLD AUTO: 2 %
BILIRUB SERPL-MCNC: 0.5 MG/DL
BUN SERPL-MCNC: 15.5 MG/DL (ref 6–20)
CALCIUM SERPL-MCNC: 9.4 MG/DL (ref 8.6–10)
CHLORIDE SERPL-SCNC: 104 MMOL/L (ref 98–107)
CREAT SERPL-MCNC: 0.55 MG/DL (ref 0.51–0.95)
DEPRECATED HCO3 PLAS-SCNC: 25 MMOL/L (ref 22–29)
EGFRCR SERPLBLD CKD-EPI 2021: >90 ML/MIN/1.73M2
EOSINOPHIL # BLD AUTO: 0.1 10E3/UL (ref 0–0.7)
EOSINOPHIL NFR BLD AUTO: 2 %
ERYTHROCYTE [DISTWIDTH] IN BLOOD BY AUTOMATED COUNT: 12.6 % (ref 10–15)
GLUCOSE SERPL-MCNC: 130 MG/DL (ref 70–99)
HCT VFR BLD AUTO: 33.8 % (ref 35–47)
HGB BLD-MCNC: 11.5 G/DL (ref 11.7–15.7)
IMM GRANULOCYTES # BLD: 0 10E3/UL
IMM GRANULOCYTES NFR BLD: 1 %
LYMPHOCYTES # BLD AUTO: 2.5 10E3/UL (ref 0.8–5.3)
LYMPHOCYTES NFR BLD AUTO: 48 %
MCH RBC QN AUTO: 28.6 PG (ref 26.5–33)
MCHC RBC AUTO-ENTMCNC: 34 G/DL (ref 31.5–36.5)
MCV RBC AUTO: 84 FL (ref 78–100)
MONOCYTES # BLD AUTO: 0.3 10E3/UL (ref 0–1.3)
MONOCYTES NFR BLD AUTO: 6 %
NEUTROPHILS # BLD AUTO: 2.1 10E3/UL (ref 1.6–8.3)
NEUTROPHILS NFR BLD AUTO: 41 %
NRBC # BLD AUTO: 0 10E3/UL
NRBC BLD AUTO-RTO: 0 /100
PLATELET # BLD AUTO: 342 10E3/UL (ref 150–450)
POTASSIUM SERPL-SCNC: 3.8 MMOL/L (ref 3.4–5.3)
PROT SERPL-MCNC: 7.3 G/DL (ref 6.4–8.3)
RBC # BLD AUTO: 4.02 10E6/UL (ref 3.8–5.2)
SODIUM SERPL-SCNC: 139 MMOL/L (ref 135–145)
WBC # BLD AUTO: 5 10E3/UL (ref 4–11)

## 2024-05-30 PROCEDURE — 85025 COMPLETE CBC W/AUTO DIFF WBC: CPT | Performed by: INTERNAL MEDICINE

## 2024-05-30 PROCEDURE — 80053 COMPREHEN METABOLIC PANEL: CPT

## 2024-05-30 PROCEDURE — 96375 TX/PRO/DX INJ NEW DRUG ADDON: CPT

## 2024-05-30 PROCEDURE — 36591 DRAW BLOOD OFF VENOUS DEVICE: CPT | Performed by: INTERNAL MEDICINE

## 2024-05-30 PROCEDURE — 250N000011 HC RX IP 250 OP 636

## 2024-05-30 PROCEDURE — 96413 CHEMO IV INFUSION 1 HR: CPT

## 2024-05-30 PROCEDURE — 250N000011 HC RX IP 250 OP 636: Performed by: INTERNAL MEDICINE

## 2024-05-30 PROCEDURE — 258N000003 HC RX IP 258 OP 636: Performed by: INTERNAL MEDICINE

## 2024-05-30 RX ORDER — HEPARIN SODIUM (PORCINE) LOCK FLUSH IV SOLN 100 UNIT/ML 100 UNIT/ML
5 SOLUTION INTRAVENOUS ONCE
Status: COMPLETED | OUTPATIENT
Start: 2024-05-30 | End: 2024-05-30

## 2024-05-30 RX ORDER — PACLITAXEL 100 MG/20ML
100 INJECTION, POWDER, LYOPHILIZED, FOR SUSPENSION INTRAVENOUS ONCE
Status: COMPLETED | OUTPATIENT
Start: 2024-05-30 | End: 2024-05-30

## 2024-05-30 RX ORDER — HEPARIN SODIUM (PORCINE) LOCK FLUSH IV SOLN 100 UNIT/ML 100 UNIT/ML
5 SOLUTION INTRAVENOUS
Status: DISCONTINUED | OUTPATIENT
Start: 2024-05-30 | End: 2024-05-30 | Stop reason: HOSPADM

## 2024-05-30 RX ORDER — ONDANSETRON 2 MG/ML
4 INJECTION INTRAMUSCULAR; INTRAVENOUS EVERY 6 HOURS PRN
Status: DISCONTINUED | OUTPATIENT
Start: 2024-05-30 | End: 2024-05-30 | Stop reason: HOSPADM

## 2024-05-30 RX ADMIN — PACLITAXEL 180 MG: 100 INJECTION, POWDER, LYOPHILIZED, FOR SUSPENSION INTRAVENOUS at 10:03

## 2024-05-30 RX ADMIN — Medication 5 ML: at 08:12

## 2024-05-30 RX ADMIN — Medication 5 ML: at 10:50

## 2024-05-30 RX ADMIN — SODIUM CHLORIDE 250 ML: 9 INJECTION, SOLUTION INTRAVENOUS at 09:00

## 2024-05-30 RX ADMIN — ONDANSETRON 4 MG: 2 INJECTION INTRAMUSCULAR; INTRAVENOUS at 09:00

## 2024-05-30 ASSESSMENT — PAIN SCALES - GENERAL: PAINLEVEL: NO PAIN (0)

## 2024-05-30 NOTE — NURSING NOTE
[id number]: Study Visit Note   Subject name: Sharon Fong     Visit: Block B C5D1    Did the study visit occur within the appropriate window allowed by the protocol? yes    Since the last study visit, She has been doing fair. She reports that her headaches have been persistent and she has restarted taking Amitriptyline for this.  Her hair has been falling out and she has shaved her head at this time.     I have personally interviewed Sharon Fong and reviewed her medical record for adverse events and concomitant medications and these have been recorded on the corresponding logs in Sharon Fong's research file.     Sharon Fong was given the opportunity to ask any trial related questions.  Labs were reviewed - any significant lab values were addressed and reviewed.    MARITZA RgN, RN  CRC-RN,   Office [phone]

## 2024-05-30 NOTE — NURSING NOTE
"Chief Complaint   Patient presents with    Port Draw     Labs drawn via port by RN. VS taken.     Port accessed with 20 gauge, 3/4\" power needle by RN, labs collected, line flushed with saline and heparin.  Vitals taken. Pt checked in for appointment(s).     Cari Hester RN    "

## 2024-05-30 NOTE — PROGRESS NOTES
Infusion Nursing Note:  Sharon Fong presents today for Cycle 5 Day 1 Paclitaxel-protein bound (ABRAXANE).    Patient seen by provider today: No   present during visit today: Not Applicable.    Note: Patient arrives today feeling fatigued.  She also reports ongoing intermittent diarrhea and headaches.  She would like to move forward today with the IV zofran as a premedication.  She reports they have changed her anti-nausea medication a couple times to see if it would help with her headaches and she didn't notice a difference.  She offers no other new concerns at this time.    OK to proceed per Smiley Hernández RN as long as labs meet parameters.    Patient iced her hands and feet and ate ice chips while her Abraxane was infusing.    Intravenous Access:  Implanted Port.    Treatment Conditions:  Lab Results   Component Value Date    HGB 11.5 (L) 05/30/2024    WBC 5.0 05/30/2024    ANEUTAUTO 2.1 05/30/2024     05/30/2024        Lab Results   Component Value Date     05/30/2024    POTASSIUM 3.8 05/30/2024    MAG 2.1 04/17/2024    CR 0.55 05/30/2024    JJ 9.4 05/30/2024    BILITOTAL 0.5 05/30/2024    ALBUMIN 4.4 05/30/2024    ALT 37 05/30/2024    AST 28 05/30/2024       Results reviewed, labs MET treatment parameters, ok to proceed with treatment.      Post Infusion Assessment:  Patient tolerated infusion without incident.  Blood return noted pre and post infusion.  Site patent and intact, free from redness, edema or discomfort.  No evidence of extravasations.  Access discontinued per protocol.     Discharge Plan:   Patient declined prescription refills.  Discharge instructions reviewed with: Patient and Family.  Patient and/or family verbalized understanding of discharge instructions and all questions answered.  AVS to patient via [employer].  Patient will return 6/6/24 for next appointment.   Patient discharged in stable condition accompanied by: daughter.  Departure Mode:  Ambulatory.      BERNY HENDRICKS RN

## 2024-06-06 ENCOUNTER — INFUSION THERAPY VISIT (OUTPATIENT)
Dept: ONCOLOGY | Facility: CLINIC | Age: 49
End: 2024-06-06
Attending: INTERNAL MEDICINE
Payer: COMMERCIAL

## 2024-06-06 ENCOUNTER — APPOINTMENT (OUTPATIENT)
Dept: LAB | Facility: CLINIC | Age: 49
End: 2024-06-06
Attending: INTERNAL MEDICINE
Payer: COMMERCIAL

## 2024-06-06 VITALS
HEART RATE: 101 BPM | BODY MASS INDEX: 27.14 KG/M2 | DIASTOLIC BLOOD PRESSURE: 76 MMHG | WEIGHT: 160.9 LBS | RESPIRATION RATE: 16 BRPM | SYSTOLIC BLOOD PRESSURE: 111 MMHG | TEMPERATURE: 97.9 F | OXYGEN SATURATION: 99 %

## 2024-06-06 DIAGNOSIS — Z95.828 PORT-A-CATH IN PLACE: Primary | ICD-10-CM

## 2024-06-06 DIAGNOSIS — C50.211 MALIGNANT NEOPLASM OF UPPER-INNER QUADRANT OF RIGHT BREAST IN FEMALE, ESTROGEN RECEPTOR POSITIVE (H): Primary | ICD-10-CM

## 2024-06-06 DIAGNOSIS — Z17.0 MALIGNANT NEOPLASM OF UPPER-INNER QUADRANT OF RIGHT BREAST IN FEMALE, ESTROGEN RECEPTOR POSITIVE (H): Primary | ICD-10-CM

## 2024-06-06 LAB
ALBUMIN SERPL BCG-MCNC: 4.6 G/DL (ref 3.5–5.2)
ALP SERPL-CCNC: 64 U/L (ref 40–150)
ALT SERPL W P-5'-P-CCNC: 34 U/L (ref 0–50)
ANION GAP SERPL CALCULATED.3IONS-SCNC: 13 MMOL/L (ref 7–15)
AST SERPL W P-5'-P-CCNC: 26 U/L (ref 0–45)
BASOPHILS # BLD AUTO: 0.1 10E3/UL (ref 0–0.2)
BASOPHILS NFR BLD AUTO: 1 %
BILIRUB SERPL-MCNC: 0.5 MG/DL
BUN SERPL-MCNC: 17.3 MG/DL (ref 6–20)
CALCIUM SERPL-MCNC: 9.9 MG/DL (ref 8.6–10)
CHLORIDE SERPL-SCNC: 103 MMOL/L (ref 98–107)
CREAT SERPL-MCNC: 0.68 MG/DL (ref 0.51–0.95)
DEPRECATED HCO3 PLAS-SCNC: 22 MMOL/L (ref 22–29)
EGFRCR SERPLBLD CKD-EPI 2021: >90 ML/MIN/1.73M2
EOSINOPHIL # BLD AUTO: 0.1 10E3/UL (ref 0–0.7)
EOSINOPHIL NFR BLD AUTO: 2 %
ERYTHROCYTE [DISTWIDTH] IN BLOOD BY AUTOMATED COUNT: 13.4 % (ref 10–15)
GLUCOSE SERPL-MCNC: 140 MG/DL (ref 70–99)
HCT VFR BLD AUTO: 34.7 % (ref 35–47)
HGB BLD-MCNC: 11.8 G/DL (ref 11.7–15.7)
IMM GRANULOCYTES # BLD: 0 10E3/UL
IMM GRANULOCYTES NFR BLD: 1 %
LYMPHOCYTES # BLD AUTO: 2.6 10E3/UL (ref 0.8–5.3)
LYMPHOCYTES NFR BLD AUTO: 38 %
MCH RBC QN AUTO: 28.9 PG (ref 26.5–33)
MCHC RBC AUTO-ENTMCNC: 34 G/DL (ref 31.5–36.5)
MCV RBC AUTO: 85 FL (ref 78–100)
MONOCYTES # BLD AUTO: 0.5 10E3/UL (ref 0–1.3)
MONOCYTES NFR BLD AUTO: 7 %
NEUTROPHILS # BLD AUTO: 3.4 10E3/UL (ref 1.6–8.3)
NEUTROPHILS NFR BLD AUTO: 51 %
NRBC # BLD AUTO: 0 10E3/UL
NRBC BLD AUTO-RTO: 0 /100
PLATELET # BLD AUTO: 383 10E3/UL (ref 150–450)
POTASSIUM SERPL-SCNC: 4 MMOL/L (ref 3.4–5.3)
PROT SERPL-MCNC: 7.6 G/DL (ref 6.4–8.3)
RBC # BLD AUTO: 4.09 10E6/UL (ref 3.8–5.2)
SODIUM SERPL-SCNC: 138 MMOL/L (ref 135–145)
WBC # BLD AUTO: 6.7 10E3/UL (ref 4–11)

## 2024-06-06 PROCEDURE — 85004 AUTOMATED DIFF WBC COUNT: CPT | Performed by: INTERNAL MEDICINE

## 2024-06-06 PROCEDURE — 80053 COMPREHEN METABOLIC PANEL: CPT

## 2024-06-06 PROCEDURE — 250N000011 HC RX IP 250 OP 636: Performed by: INTERNAL MEDICINE

## 2024-06-06 PROCEDURE — 258N000003 HC RX IP 258 OP 636: Mod: JZ | Performed by: INTERNAL MEDICINE

## 2024-06-06 PROCEDURE — 96375 TX/PRO/DX INJ NEW DRUG ADDON: CPT

## 2024-06-06 PROCEDURE — 96413 CHEMO IV INFUSION 1 HR: CPT

## 2024-06-06 PROCEDURE — 36591 DRAW BLOOD OFF VENOUS DEVICE: CPT | Performed by: INTERNAL MEDICINE

## 2024-06-06 RX ORDER — HEPARIN SODIUM (PORCINE) LOCK FLUSH IV SOLN 100 UNIT/ML 100 UNIT/ML
5 SOLUTION INTRAVENOUS
Status: DISCONTINUED | OUTPATIENT
Start: 2024-06-06 | End: 2024-06-06 | Stop reason: HOSPADM

## 2024-06-06 RX ORDER — PACLITAXEL 100 MG/20ML
100 INJECTION, POWDER, LYOPHILIZED, FOR SUSPENSION INTRAVENOUS ONCE
Status: COMPLETED | OUTPATIENT
Start: 2024-06-06 | End: 2024-06-06

## 2024-06-06 RX ORDER — ONDANSETRON 2 MG/ML
4 INJECTION INTRAMUSCULAR; INTRAVENOUS EVERY 6 HOURS PRN
Status: DISCONTINUED | OUTPATIENT
Start: 2024-06-06 | End: 2024-06-06 | Stop reason: HOSPADM

## 2024-06-06 RX ADMIN — SODIUM CHLORIDE 250 ML: 9 INJECTION, SOLUTION INTRAVENOUS at 14:49

## 2024-06-06 RX ADMIN — ONDANSETRON 4 MG: 2 INJECTION INTRAMUSCULAR; INTRAVENOUS at 14:49

## 2024-06-06 RX ADMIN — PACLITAXEL 180 MG: 100 INJECTION, POWDER, LYOPHILIZED, FOR SUSPENSION INTRAVENOUS at 15:25

## 2024-06-06 ASSESSMENT — PAIN SCALES - GENERAL: PAINLEVEL: NO PAIN (0)

## 2024-06-06 NOTE — PATIENT INSTRUCTIONS
[facility] Triage and after hours / weekends / holidays:  [phone]    Please call the triage or after hours line if you experience a temperature greater than or equal to 100.4, shaking chills, have uncontrolled nausea, vomiting and/or diarrhea, dizziness, shortness of breath, chest pain, bleeding, unexplained bruising, or if you have any other new/concerning symptoms, questions or concerns.      If you are having any concerning symptoms or wish to speak to a provider before your next infusion visit, please call triage to notify them so we can adequately serve you.     If you need a refill on a narcotic prescription or other medication, please call before your infusion appointment.                June 2024 Sunday Monday Tuesday Wednesday Thursday Friday Saturday                                 1       2     3     4     5     6    LAB PERIPHERAL   1:30 PM   (15 min.)   [facility] LAB DRAW   [facility]    ONC INFUSION 1.5 HR (90 MIN)   2:00 PM   (90 min.)    ONC INFUSION NURSE   [facility] 7     8       9     10    MR BREAST BILATERAL W   8:30 AM   (75 min.)   [id number]   [facility] 11    RETURN CCSL   3:15 PM   (30 min.)   Dame Bernal MD   [facility] 12     13     14    LAB PERIPHERAL   2:30 PM   (15 min.)   [facility] LAB DRAW   [facility]    ONC INFUSION 1.5 HR (90 MIN)   3:00 PM   (90 min.)    ONC INFUSION NURSE   [facility] 15       16     17     18     19     20    LAB PERIPHERAL  12:30 PM   (15 min.)   [facility] LAB DRAW   [facility]    ONC INFUSION 1.5 HR (90 MIN)   1:00 PM   (90 min.)    ONC INFUSION NURSE   [facility] 21     22       23     24     25     26     27    LAB PERIPHERAL   2:45 PM   (15 min.)   [facility] LAB DRAW   [facility]    ONC INFUSION 1.5 HR (90 MIN)   3:30 PM   (90 min.)   [facility] ONC INFUSION NURSE   [facility] 28     29       30 July 2024 Sunday Monday Tuesday Wednesday Thursday Friday Saturday        1     2    RETURN CCSL   3:15 PM   (30 min.)   Dame Bernal MD   [facility] 3    LAB PERIPHERAL   2:45 PM   (15 min.)   [facility] LAB DRAW   [facility]    ONC INFUSION 1.5 HR (90 MIN)   3:30 PM   (90 min.)   [facility] ONC INFUSION NURSE   [facility] 4     5     6       7     8     9     10     11    LAB PERIPHERAL  12:30 PM   (15 min.)   [facility] LAB DRAW   [facility]    ONC INFUSION 1.5 HR (90 MIN)   1:00 PM   (90 min.)   [facility] ONC INFUSION NURSE   [facility] 12     13       14     15     16     17     18    LAB PERIPHERAL   3:00 PM   (15 min.)   [facility] LAB DRAW   [facility]    ONC INFUSION 1.5 HR (90 MIN)   3:30 PM   (90 min.)   [facility] ONC INFUSION NURSE   [facility] 19     20       21     22     23     24     25    LAB PERIPHERAL   9:45 AM   (15 min.)   [facility] LAB DRAW   [facility]    RETURN CCSL  10:00 AM   (45 min.)   Angie Herrera PA-C   [facility]    ONC INFUSION 2 HR (120 MIN)  12:30 PM   (120 min.)   [facility] ONC INFUSION NURSE   [facility]    LAB PERIPHERAL   2:45 PM   (15 min.)   [facility] LAB DRAW   [facility] 26     27       28     29     30     31                                    Lab Results:  Recent Results (from the past 12 hour(s))   Comprehensive metabolic panel    Collection Time: 06/06/24  2:12 PM   Result Value Ref Range    Sodium 138 135 - 145 mmol/L    Potassium 4.0 3.4 - 5.3 mmol/L    Carbon Dioxide (CO2) 22  22 - 29 mmol/L    Anion Gap 13 7 - 15 mmol/L    Urea Nitrogen 17.3 6.0 - 20.0 mg/dL    Creatinine 0.68 0.51 - 0.95 mg/dL    GFR Estimate >90 >60 mL/min/1.73m2    Calcium 9.9 8.6 - 10.0 mg/dL    Chloride 103 98 - 107 mmol/L    Glucose 140 (H) 70 - 99 mg/dL    Alkaline Phosphatase 64 40 - 150 U/L    AST 26 0 - 45 U/L    ALT 34 0 - 50 U/L    Protein Total 7.6 6.4 - 8.3 g/dL    Albumin 4.6 3.5 - 5.2 g/dL    Bilirubin Total 0.5 <=1.2 mg/dL   CBC with platelets and differential    Collection Time: 06/06/24  2:12 PM   Result Value Ref Range    WBC Count 6.7 4.0 - 11.0 10e3/uL    RBC Count 4.09 3.80 - 5.20 10e6/uL    Hemoglobin 11.8 11.7 - 15.7 g/dL    Hematocrit 34.7 (L) 35.0 - 47.0 %    MCV 85 78 - 100 fL    MCH 28.9 26.5 - 33.0 pg    MCHC 34.0 31.5 - 36.5 g/dL    RDW 13.4 10.0 - 15.0 %    Platelet Count 383 150 - 450 10e3/uL    % Neutrophils 51 %    % Lymphocytes 38 %    % Monocytes 7 %    % Eosinophils 2 %    % Basophils 1 %    % Immature Granulocytes 1 %    NRBCs per 100 WBC 0 <1 /100    Absolute Neutrophils 3.4 1.6 - 8.3 10e3/uL    Absolute Lymphocytes 2.6 0.8 - 5.3 10e3/uL    Absolute Monocytes 0.5 0.0 - 1.3 10e3/uL    Absolute Eosinophils 0.1 0.0 - 0.7 10e3/uL    Absolute Basophils 0.1 0.0 - 0.2 10e3/uL    Absolute Immature Granulocytes 0.0 <=0.4 10e3/uL    Absolute NRBCs 0.0 10e3/uL

## 2024-06-06 NOTE — PROGRESS NOTES
Infusion Nursing Note:  Sharon Fong presents today for Cycle 6 Day 1 Abraxane.    Patient seen by provider today: No   present during visit today: Not Applicable.    Note: Pt presents to infusion feeling well. She reports baseline fatigue for 3 days following treatment. She has occasional, mild nosebleeds that stop after a few seconds. This has improved with eating papaya per pt. She denies fevers/chills, cough/congestion, SOB, chest pain, nausea, diarrhea/constipation, neuropathy or further concerns.    Per lab RN - Port appears to be flipped. Unsuccessful attempt by 2 RNs.  Per secure chat with Dr. Bernal @ 1500:  - chest xray ordered, can be scheduled before next infusion    IB sent to scheduling to request this. Sharon ices her hands, feet and mouth independently for duration of infusion.    Intravenous Access:  Peripheral IV placed. Port not used today, see above.    Treatment Conditions:  Lab Results   Component Value Date    HGB 11.8 06/06/2024    WBC 6.7 06/06/2024    ANEUTAUTO 3.4 06/06/2024     06/06/2024        Lab Results   Component Value Date     06/06/2024    POTASSIUM 4.0 06/06/2024    MAG 2.1 04/17/2024    CR 0.68 06/06/2024    JJ 9.9 06/06/2024    BILITOTAL 0.5 06/06/2024    ALBUMIN 4.6 06/06/2024    ALT 34 06/06/2024    AST 26 06/06/2024     Results reviewed, labs MET treatment parameters, ok to proceed with treatment.      Post Infusion Assessment:  Patient tolerated infusion without incident.  Blood return noted pre and post infusion.  Site patent and intact, free from redness, edema or discomfort.  No evidence of extravasations.  Access discontinued per protocol.       Discharge Plan:   Patient declined prescription refills.  Discharge instructions reviewed with: Patient and Family.  Patient and/or family verbalized understanding of discharge instructions and all questions answered.  AVS to patient via [employer].  Patient will return 6/14/24 for next appointment.    Patient discharged in stable condition accompanied by: daughter.  Departure Mode: Ambulatory.      Stephy Gonzalez RN

## 2024-06-06 NOTE — NURSING NOTE
Chief Complaint   Patient presents with    Port Draw     Vitals taken, attempt port access, unable to access port, checked into next appt    Blood Draw     VAT started piv, labs drawn, saline locked     /76 (BP Location: Left arm, Patient Position: Sitting, Cuff Size: Adult Regular)   Pulse 101   Temp 97.9  F (36.6  C) (Oral)   Resp 16   Wt 73 kg (160 lb 14.4 oz)   SpO2 99%   BMI 27.14 kg/m      Unable to access port by 2 nurses, message sent to provider to update, port feels flipped. Provider will determine next steps for patient. Infusion nurse updated.    Jayy Higgins, RN on 6/6/2024 at 2:25 PM

## 2024-06-07 ENCOUNTER — ANCILLARY PROCEDURE (OUTPATIENT)
Dept: GENERAL RADIOLOGY | Facility: CLINIC | Age: 49
End: 2024-06-07
Attending: INTERNAL MEDICINE
Payer: COMMERCIAL

## 2024-06-07 DIAGNOSIS — Z95.828 PORT-A-CATH IN PLACE: ICD-10-CM

## 2024-06-07 PROCEDURE — 71046 X-RAY EXAM CHEST 2 VIEWS: CPT | Performed by: STUDENT IN AN ORGANIZED HEALTH CARE EDUCATION/TRAINING PROGRAM

## 2024-06-10 ENCOUNTER — ANCILLARY PROCEDURE (OUTPATIENT)
Dept: MRI IMAGING | Facility: CLINIC | Age: 49
End: 2024-06-10
Attending: INTERNAL MEDICINE

## 2024-06-10 DIAGNOSIS — C50.211 MALIGNANT NEOPLASM OF UPPER-INNER QUADRANT OF RIGHT BREAST IN FEMALE, ESTROGEN RECEPTOR POSITIVE (H): Chronic | ICD-10-CM

## 2024-06-10 DIAGNOSIS — Z17.0 MALIGNANT NEOPLASM OF UPPER-INNER QUADRANT OF RIGHT BREAST IN FEMALE, ESTROGEN RECEPTOR POSITIVE (H): Chronic | ICD-10-CM

## 2024-06-10 RX ORDER — GADOBUTROL 604.72 MG/ML
0.1 INJECTION INTRAVENOUS ONCE
Status: COMPLETED | OUTPATIENT
Start: 2024-06-10 | End: 2024-06-10

## 2024-06-10 RX ADMIN — GADOBUTROL 7.3 ML: 604.72 INJECTION INTRAVENOUS at 09:05

## 2024-06-11 ENCOUNTER — ALLIED HEALTH/NURSE VISIT (OUTPATIENT)
Dept: ONCOLOGY | Facility: CLINIC | Age: 49
End: 2024-06-11
Payer: COMMERCIAL

## 2024-06-11 ENCOUNTER — ONCOLOGY VISIT (OUTPATIENT)
Dept: ONCOLOGY | Facility: CLINIC | Age: 49
End: 2024-06-11
Attending: INTERNAL MEDICINE
Payer: COMMERCIAL

## 2024-06-11 VITALS
SYSTOLIC BLOOD PRESSURE: 122 MMHG | OXYGEN SATURATION: 99 % | DIASTOLIC BLOOD PRESSURE: 79 MMHG | BODY MASS INDEX: 27.51 KG/M2 | TEMPERATURE: 98.6 F | RESPIRATION RATE: 16 BRPM | WEIGHT: 163.1 LBS | HEART RATE: 93 BPM

## 2024-06-11 DIAGNOSIS — Z17.0 MALIGNANT NEOPLASM OF UPPER-INNER QUADRANT OF RIGHT BREAST IN FEMALE, ESTROGEN RECEPTOR POSITIVE (H): ICD-10-CM

## 2024-06-11 DIAGNOSIS — Z00.6 EXAMINATION OF PARTICIPANT OR CONTROL IN CLINICAL RESEARCH: ICD-10-CM

## 2024-06-11 DIAGNOSIS — C50.211 MALIGNANT NEOPLASM OF UPPER-INNER QUADRANT OF RIGHT BREAST IN FEMALE, ESTROGEN RECEPTOR POSITIVE (H): Primary | ICD-10-CM

## 2024-06-11 DIAGNOSIS — Z17.0 MALIGNANT NEOPLASM OF UPPER-INNER QUADRANT OF RIGHT BREAST IN FEMALE, ESTROGEN RECEPTOR POSITIVE (H): Primary | ICD-10-CM

## 2024-06-11 DIAGNOSIS — C50.211 MALIGNANT NEOPLASM OF UPPER-INNER QUADRANT OF RIGHT BREAST IN FEMALE, ESTROGEN RECEPTOR POSITIVE (H): ICD-10-CM

## 2024-06-11 PROCEDURE — 99214 OFFICE O/P EST MOD 30 MIN: CPT | Performed by: INTERNAL MEDICINE

## 2024-06-11 PROCEDURE — 99212 OFFICE O/P EST SF 10 MIN: CPT | Performed by: INTERNAL MEDICINE

## 2024-06-11 RX ORDER — LORAZEPAM 2 MG/ML
0.5 INJECTION INTRAMUSCULAR EVERY 4 HOURS PRN
Status: CANCELLED | OUTPATIENT
Start: 2024-06-21

## 2024-06-11 RX ORDER — MEPERIDINE HYDROCHLORIDE 25 MG/ML
25 INJECTION INTRAMUSCULAR; INTRAVENOUS; SUBCUTANEOUS EVERY 30 MIN PRN
Status: CANCELLED | OUTPATIENT
Start: 2024-06-14

## 2024-06-11 RX ORDER — HEPARIN SODIUM,PORCINE 10 UNIT/ML
5-20 VIAL (ML) INTRAVENOUS DAILY PRN
Status: CANCELLED | OUTPATIENT
Start: 2024-06-21

## 2024-06-11 RX ORDER — HEPARIN SODIUM (PORCINE) LOCK FLUSH IV SOLN 100 UNIT/ML 100 UNIT/ML
5 SOLUTION INTRAVENOUS
Status: CANCELLED | OUTPATIENT
Start: 2024-06-21

## 2024-06-11 RX ORDER — ONDANSETRON 2 MG/ML
4 INJECTION INTRAMUSCULAR; INTRAVENOUS EVERY 6 HOURS PRN
Status: CANCELLED
Start: 2024-06-28

## 2024-06-11 RX ORDER — MEPERIDINE HYDROCHLORIDE 25 MG/ML
25 INJECTION INTRAMUSCULAR; INTRAVENOUS; SUBCUTANEOUS EVERY 30 MIN PRN
Status: CANCELLED | OUTPATIENT
Start: 2024-06-21

## 2024-06-11 RX ORDER — ALBUTEROL SULFATE 0.83 MG/ML
2.5 SOLUTION RESPIRATORY (INHALATION)
Status: CANCELLED | OUTPATIENT
Start: 2024-06-14

## 2024-06-11 RX ORDER — ALBUTEROL SULFATE 90 UG/1
1-2 AEROSOL, METERED RESPIRATORY (INHALATION)
Status: CANCELLED
Start: 2024-06-14

## 2024-06-11 RX ORDER — EPINEPHRINE 1 MG/ML
0.3 INJECTION, SOLUTION INTRAMUSCULAR; SUBCUTANEOUS EVERY 5 MIN PRN
Status: CANCELLED | OUTPATIENT
Start: 2024-06-21

## 2024-06-11 RX ORDER — LORAZEPAM 2 MG/ML
0.5 INJECTION INTRAMUSCULAR EVERY 4 HOURS PRN
Status: CANCELLED | OUTPATIENT
Start: 2024-06-28

## 2024-06-11 RX ORDER — ONDANSETRON 2 MG/ML
4 INJECTION INTRAMUSCULAR; INTRAVENOUS EVERY 6 HOURS PRN
Status: CANCELLED
Start: 2024-06-14

## 2024-06-11 RX ORDER — MEPERIDINE HYDROCHLORIDE 25 MG/ML
25 INJECTION INTRAMUSCULAR; INTRAVENOUS; SUBCUTANEOUS EVERY 30 MIN PRN
Status: CANCELLED | OUTPATIENT
Start: 2024-06-28

## 2024-06-11 RX ORDER — EPINEPHRINE 1 MG/ML
0.3 INJECTION, SOLUTION INTRAMUSCULAR; SUBCUTANEOUS EVERY 5 MIN PRN
Status: CANCELLED | OUTPATIENT
Start: 2024-06-14

## 2024-06-11 RX ORDER — DIPHENHYDRAMINE HYDROCHLORIDE 50 MG/ML
50 INJECTION INTRAMUSCULAR; INTRAVENOUS
Status: CANCELLED
Start: 2024-06-28

## 2024-06-11 RX ORDER — HEPARIN SODIUM,PORCINE 10 UNIT/ML
5-20 VIAL (ML) INTRAVENOUS DAILY PRN
Status: CANCELLED | OUTPATIENT
Start: 2024-06-14

## 2024-06-11 RX ORDER — PACLITAXEL 100 MG/20ML
100 INJECTION, POWDER, LYOPHILIZED, FOR SUSPENSION INTRAVENOUS ONCE
Status: CANCELLED
Start: 2024-06-28 | End: 2024-06-25

## 2024-06-11 RX ORDER — DIPHENHYDRAMINE HCL 25 MG
50 CAPSULE ORAL
Status: CANCELLED
Start: 2024-06-21

## 2024-06-11 RX ORDER — LORAZEPAM 2 MG/ML
0.5 INJECTION INTRAMUSCULAR EVERY 4 HOURS PRN
Status: CANCELLED | OUTPATIENT
Start: 2024-06-14

## 2024-06-11 RX ORDER — ALBUTEROL SULFATE 90 UG/1
1-2 AEROSOL, METERED RESPIRATORY (INHALATION)
Status: CANCELLED
Start: 2024-06-28

## 2024-06-11 RX ORDER — ALBUTEROL SULFATE 90 UG/1
1-2 AEROSOL, METERED RESPIRATORY (INHALATION)
Status: CANCELLED
Start: 2024-06-21

## 2024-06-11 RX ORDER — EPINEPHRINE 1 MG/ML
0.3 INJECTION, SOLUTION INTRAMUSCULAR; SUBCUTANEOUS EVERY 5 MIN PRN
Status: CANCELLED | OUTPATIENT
Start: 2024-06-28

## 2024-06-11 RX ORDER — METHYLPREDNISOLONE SODIUM SUCCINATE 125 MG/2ML
125 INJECTION, POWDER, LYOPHILIZED, FOR SOLUTION INTRAMUSCULAR; INTRAVENOUS
Status: CANCELLED
Start: 2024-06-28

## 2024-06-11 RX ORDER — DIPHENHYDRAMINE HCL 25 MG
50 CAPSULE ORAL
Status: CANCELLED
Start: 2024-06-14

## 2024-06-11 RX ORDER — DIPHENHYDRAMINE HCL 25 MG
50 CAPSULE ORAL
Status: CANCELLED
Start: 2024-06-28

## 2024-06-11 RX ORDER — METHYLPREDNISOLONE SODIUM SUCCINATE 125 MG/2ML
125 INJECTION, POWDER, LYOPHILIZED, FOR SOLUTION INTRAMUSCULAR; INTRAVENOUS
Status: CANCELLED
Start: 2024-06-21

## 2024-06-11 RX ORDER — DIPHENHYDRAMINE HYDROCHLORIDE 50 MG/ML
50 INJECTION INTRAMUSCULAR; INTRAVENOUS
Status: CANCELLED
Start: 2024-06-14

## 2024-06-11 RX ORDER — ALBUTEROL SULFATE 0.83 MG/ML
2.5 SOLUTION RESPIRATORY (INHALATION)
Status: CANCELLED | OUTPATIENT
Start: 2024-06-21

## 2024-06-11 RX ORDER — ALBUTEROL SULFATE 0.83 MG/ML
2.5 SOLUTION RESPIRATORY (INHALATION)
Status: CANCELLED | OUTPATIENT
Start: 2024-06-28

## 2024-06-11 RX ORDER — ONDANSETRON 2 MG/ML
4 INJECTION INTRAMUSCULAR; INTRAVENOUS EVERY 6 HOURS PRN
Status: CANCELLED
Start: 2024-06-21

## 2024-06-11 RX ORDER — PACLITAXEL 100 MG/20ML
100 INJECTION, POWDER, LYOPHILIZED, FOR SUSPENSION INTRAVENOUS ONCE
Status: CANCELLED
Start: 2024-06-14 | End: 2024-06-11

## 2024-06-11 RX ORDER — PACLITAXEL 100 MG/20ML
100 INJECTION, POWDER, LYOPHILIZED, FOR SUSPENSION INTRAVENOUS ONCE
Status: CANCELLED
Start: 2024-06-21 | End: 2024-06-18

## 2024-06-11 RX ORDER — HEPARIN SODIUM,PORCINE 10 UNIT/ML
5-20 VIAL (ML) INTRAVENOUS DAILY PRN
Status: CANCELLED | OUTPATIENT
Start: 2024-06-28

## 2024-06-11 RX ORDER — HEPARIN SODIUM (PORCINE) LOCK FLUSH IV SOLN 100 UNIT/ML 100 UNIT/ML
5 SOLUTION INTRAVENOUS
Status: CANCELLED | OUTPATIENT
Start: 2024-06-28

## 2024-06-11 RX ORDER — METHYLPREDNISOLONE SODIUM SUCCINATE 125 MG/2ML
125 INJECTION, POWDER, LYOPHILIZED, FOR SOLUTION INTRAMUSCULAR; INTRAVENOUS
Status: CANCELLED
Start: 2024-06-14

## 2024-06-11 RX ORDER — HEPARIN SODIUM (PORCINE) LOCK FLUSH IV SOLN 100 UNIT/ML 100 UNIT/ML
5 SOLUTION INTRAVENOUS
Status: CANCELLED | OUTPATIENT
Start: 2024-06-14

## 2024-06-11 RX ORDER — DIPHENHYDRAMINE HYDROCHLORIDE 50 MG/ML
50 INJECTION INTRAMUSCULAR; INTRAVENOUS
Status: CANCELLED
Start: 2024-06-21

## 2024-06-11 ASSESSMENT — PAIN SCALES - GENERAL: PAINLEVEL: NO PAIN (0)

## 2024-06-11 NOTE — NURSING NOTE
"Oncology Rooming Note    June 11, 2024 3:26 PM   Sharon Fong is a 48 year old female who presents for:    Chief Complaint   Patient presents with    Oncology Clinic Visit     RTN for Breast Cancer     Initial Vitals: /79 (BP Location: Right arm, Patient Position: Right side, Cuff Size: Adult Regular)   Pulse 93   Temp 98.6  F (37  C) (Oral)   Resp 16   Wt 74 kg (163 lb 1.6 oz)   SpO2 99%   BMI 27.51 kg/m   Estimated body mass index is 27.51 kg/m  as calculated from the following:    Height as of 4/17/24: 1.64 m (5' 4.57\").    Weight as of this encounter: 74 kg (163 lb 1.6 oz). Body surface area is 1.84 meters squared.  No Pain (0) Comment: Data Unavailable   No LMP recorded. Patient has had a hysterectomy.  Allergies reviewed: Yes  Medications reviewed: Yes    Medications: Medication refills not needed today.  Pharmacy name entered into [employer]: [facility] [address] [place] [address] - [address] [place] [address]    Frailty Screening:   Is the patient here for a new oncology consult visit in cancer care? 2. No      Clinical concerns: none       Julia Santos MA            "

## 2024-06-11 NOTE — LETTER
6/11/2024      Sharon Fong  [address] [address]      Dear Colleague,    Thank you for referring your patient, Sharon Fong, to the [facility]. Please see a copy of my visit note below.    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 She started screening for the ISPY 2.2 trial and was found to have a MammaPrint high risk/blueprint luminal B subtype.  She was started on treatment in block B due to lack of availability of block A agents.  Her course has been complicated by allergic reaction to Taxol requiring change to Abraxane.  Her 3-week MRI showed a tumor volume reduction of 28.9% (did not meet prespecified threshold) and therefore she received a 6-week MRI which continues to show treatment response.  We are currently awaiting ROR from [facility] team. She presents today for follow-up.      Interval History  Sharon presents today accompanied by her  for follow-up.  Since her last visit, she continues to experience fatigue, and more recently has had mouth sores.  She is still managing well and remains independent with all of her ADLs.    She feels her headaches are slightly better during this past week.    She also feels the breast mass is slightly smaller compared to prior.    There were issues with her port last week in terms of access.  Chest x-ray was obtained and demonstrates no abnormalities.    No new visual changes, cough, SOB, chest pain, n/v, constipation/diarrhea, abdominal pain, focal weakness or numbness.       Her full oncologic history is as follows:   Oncologic History  Patient Active Problem List    Diagnosis Date Noted    Malignant neoplasm of upper-inner quadrant of right female breast (H) 04/02/2024     Priority: High      Right breast cancer  Has had longstanding history of multiple biopsies, infection, and partial mastectomy for a right breast multifocal abscess dating back to at least .     patient presented with palpable lump at 1:00 in the right breast    3/21/2024 diagnostic mammo showed clustered fine indeterminate calcs at 12:00.  No mammographic abnormality identified to correspond to the palpable area of fullness at 1:00.  On US, at 1:00, 10 cm FN there was a 3.7 x 2.3 x 1.5 cm solid hypoechoic irregularly marginated mass.  Additional hypoechoic lesion located at 2:00, 3 cm FN measuring 1.0 x 0.5 cm.  Another hypoechoic lesion at 2:00, 12 cm FN measuring 0.7 cm.  At least 2 enlarged right axillary lymph nodes -largest measuring up to 0.6 cm.    3/28/2024 ultrasound-guided biopsy of the followin:00 lesion at least DCIS  1:00 lesion grade 2 IDC.  No LVI.  ER (3+, 80 to 90%), WY (3+, 90 to 100%), HER2 2+ and FISH negative   Right axillary LN: metastatic carcinoma    2024 CT CAP demonstrated multifocal enhancement in the right breast with mildly abnormal right axillary lymph nodes.  Otherwise no evidence of distant metastatic disease.    2024 NM bone scan negative for osteoblastic metastatic disease.    4/15/2024 MRI breast demonstrated the following  Right breast at 1:00 mass measuring 2.6 x 2.0 by 3.0 cm with extensive surrounding clumped NME occupying most of the right breast and measuring 8.9 x 4.7 x 8.7   Left breast showed a fibroadenoma at 8-9:00 position.  In addition, 7 mm of linear branching NME at 3-4:00 position.  This was biopsied and found to be benign breast tissue.  Few asymmetric right internal mammary chain LN. multiple enlarged right level 1 axillary nodes.  Additionally multiple LEFT level 1 axillary nodes that are indeterminant -which were normal on left axillary US.    2024 MammaPrint -0.192 (high risk) and blueprint luminal B    2024 started weekly Taxol -changed to  Abraxane starting week 2 due to allergic reaction    5/17/2024 W3 MRI  Minimally decreased size of the postoperative right breast cancer at the approximate 1:00 position measuring approximately 2.4 x 1.9 x 2.9 cm (previously 2.6 x 2.0 x 3.0 cm).  There is overall similar extent although mildly decreased volume of abnormal surrounding NME  Minimal interval enlargement of multiple bilateral level I lymph nodes, including the biopsy-proven right axillary node with indwelling biopsy marker. No significant change in the multiple small asymmetric right internal mammary chain lymph nodes.  - Tumor volume reduction of -28.9%    6/10/2024 (6-week MRI breast)8.8 cm anteroposterior on axial MIPS, unchanged.  Mildly decreased size of the biopsy-proven cancer in the right breast at the approximate 1:00 position middle depth measuring approximately 2.0 x 1.4 x 2.4 cm (previously 2.4 x 1.9 x 2.9 cm).   Extensive surrounding NME is largely unchanged changed, but the volume has minimally decreased.  Stable bilateral axillary lymph nodes, including the biopsy-proven right level I axillary node. Stable asymmetric small right internal mammary chain lymph nodes.      Inflammatory arthritis 06/01/2015     Priority: Medium    Seizure (H) 11/15/2013     Priority: Medium    Vitamin D deficiency 04/23/2013     Priority: Medium            Current Medications:  Current Outpatient Medications   Medication Sig Dispense Refill    magic mouthwash suspension (diphenhydrAMINE, lidocaine, aluminum-magnesium & simethicone) Swish and swallow 10 mLs in mouth every 6 hours as needed for mouth sores 100 mL 1    acetaminophen (TYLENOL) 500 MG tablet Take 500 mg by mouth as needed for mild pain (Patient not taking: Reported on 6/6/2024)      amitriptyline (ELAVIL) 10 MG tablet Take 20 mg by mouth at bedtime      aspirin-acetaminophen-caffeine (EXCEDRIN MIGRAINE) 250-250-65 MG tablet       ibuprofen (ADVIL/MOTRIN) 200 MG capsule       lidocaine-prilocaine  (EMLA) 2.5-2.5 % external cream Apply topically as needed for moderate pain 30 g 1    ondansetron (ZOFRAN) 8 MG tablet Take 1 tablet (8 mg) by mouth every 8 hours as needed for nausea (Patient not taking: Reported on 6/6/2024) 30 tablet 3         ECOG Performance Status: 0    Physical Examination:  /79 (BP Location: Right arm, Patient Position: Right side, Cuff Size: Adult Regular)   Pulse 93   Temp 98.6  F (37  C) (Oral)   Resp 16   Wt 74 kg (163 lb 1.6 oz)   SpO2 99%   BMI 27.51 kg/m    General:  Well appearing, well-nourished adult female in NAD.  HEENT:  Normocephalic.  Sclera anicteric.  MMM.  No lesions of the oropharynx.  Breast: 2.5 x 2.5cm mass at 1:00 7cm FN - largely stable (previously measured 2.5 x 3 cm)  Lymph:  palpable axillary LN bilaterally -stable compared to prior  Chest:  Breathing comfortably on room air  Abd:  Soft/NT/ND.  No hepatosplenomegaly.  Ext:  No pitting edema of the bilateral lower extremities.    Musculo:  Strength 5/5 throughout.  Neuro:  Cranial nerves grossly intact.  Psych:  Mood and affect appear normal.      Laboratory Data:  Lab Results   Component Value Date    WBC 6.7 06/06/2024    HGB 11.8 06/06/2024    HCT 34.7 (L) 06/06/2024    MCV 85 06/06/2024     06/06/2024     Lab Results   Component Value Date     06/06/2024    BUN 17.3 06/06/2024    ANIONGAP 13 06/06/2024     Lab Results   Component Value Date    ALT 34 06/06/2024    AST 26 06/06/2024    ALKPHOS 64 06/06/2024     Lab Results   Component Value Date    INR 1.02 04/17/2024         Radiology data:  I have personally reviewed the images of / or the following radiology data: As detailed in the oncology timeline      Pathology and other data:  I have personally reviewed the following data: As detailed in the oncology timeline      Assessment and Recommendations  Sharon Fong  is a 48 year old premenopausal woman with a new diagnosis of palp detected cT2-3 N3 M0 HR+/HER2 negative IDC of the  right breast.  She is on the I SPY 2.2 clinical trial and is receiving weekly Abraxane.  She presents today for follow-up.      # Right breast cancer  -Continue neoadjuvant Abraxane  -Will await tumor volume measurements from [facility] radiology prior to deciding next steps regarding continuation on Abraxane versus escalating early to block C  -She has already established care with Dr. Collier and is planned to have a mastectomy after neoadjuvant chemotherapy  -Will need adjuvant radiation therapy -will place referral after her surgery    # Contralateral (left) axillary lymph node enlargement  Indeterminant  -Will continue to follow on subsequent scans.  These appeared normal on baseline ultrasound    #Hx of migraines  -Will add back to Zofran for premeds as she did not feel well on Compazine  -She will reach out to us if nausea becomes an issue so we can consider additional agents      35 minutes spent on the date of the encounter doing chart review, review of test results, interpretation of tests, patient visit, documentation, and discussion with other provider(s)       Dame Dolores MD   of Medicine  Division of Hematology, Oncology and Transplantation  [facility]

## 2024-06-11 NOTE — PROGRESS NOTES
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 She started screening for the ISPY 2.2 trial and was found to have a MammaPrint high risk/blueprint luminal B subtype.  She was started on treatment in block B due to lack of availability of block A agents.  Her course has been complicated by allergic reaction to Taxol requiring change to Abraxane.  Her 3-week MRI showed a tumor volume reduction of 28.9% (did not meet prespecified threshold) and therefore she received a 6-week MRI which continues to show treatment response.  We are currently awaiting ROR from I [facility] team. She presents today for follow-up.      Interval History  Sharon presents today accompanied by her  for follow-up.  Since her last visit, she continues to experience fatigue, and more recently has had mouth sores.  She is still managing well and remains independent with all of her ADLs.    She feels her headaches are slightly better during this past week.    She also feels the breast mass is slightly smaller compared to prior.    There were issues with her port last week in terms of access.  Chest x-ray was obtained and demonstrates no abnormalities.    No new visual changes, cough, SOB, chest pain, n/v, constipation/diarrhea, abdominal pain, focal weakness or numbness.       Her full oncologic history is as follows:   Oncologic History  Patient Active Problem List    Diagnosis Date Noted    Malignant neoplasm of upper-inner quadrant of right female breast (H) 04/02/2024     Priority: High     Right breast cancer  Has had longstanding history of multiple biopsies, infection, and partial mastectomy for a right breast multifocal abscess dating back to at least 2010.    2024 patient presented with palpable lump at 1:00 in the right  breast    3/21/2024 diagnostic mammo showed clustered fine indeterminate calcs at 12:00.  No mammographic abnormality identified to correspond to the palpable area of fullness at 1:00.  On US, at 1:00, 10 cm FN there was a 3.7 x 2.3 x 1.5 cm solid hypoechoic irregularly marginated mass.  Additional hypoechoic lesion located at 2:00, 3 cm FN measuring 1.0 x 0.5 cm.  Another hypoechoic lesion at 2:00, 12 cm FN measuring 0.7 cm.  At least 2 enlarged right axillary lymph nodes -largest measuring up to 0.6 cm.    3/28/2024 ultrasound-guided biopsy of the followin:00 lesion at least DCIS  1:00 lesion grade 2 IDC.  No LVI.  ER (3+, 80 to 90%), AZ (3+, 90 to 100%), HER2 2+ and FISH negative   Right axillary LN: metastatic carcinoma    2024 CT CAP demonstrated multifocal enhancement in the right breast with mildly abnormal right axillary lymph nodes.  Otherwise no evidence of distant metastatic disease.    2024 NM bone scan negative for osteoblastic metastatic disease.    4/15/2024 MRI breast demonstrated the following  Right breast at 1:00 mass measuring 2.6 x 2.0 by 3.0 cm with extensive surrounding clumped NME occupying most of the right breast and measuring 8.9 x 4.7 x 8.7   Left breast showed a fibroadenoma at 8-9:00 position.  In addition, 7 mm of linear branching NME at 3-4:00 position.  This was biopsied and found to be benign breast tissue.  Few asymmetric right internal mammary chain LN. multiple enlarged right level 1 axillary nodes.  Additionally multiple LEFT level 1 axillary nodes that are indeterminant -which were normal on left axillary US.    2024 MammaPrint -0.192 (high risk) and blueprint luminal B    2024 started weekly Taxol -changed to Abraxane starting week 2 due to allergic reaction    2024 W3 MRI  Minimally decreased size of the postoperative right breast cancer at the approximate 1:00 position measuring approximately 2.4 x 1.9 x 2.9 cm (previously 2.6 x 2.0 x 3.0  cm).  There is overall similar extent although mildly decreased volume of abnormal surrounding NME  Minimal interval enlargement of multiple bilateral level I lymph nodes, including the biopsy-proven right axillary node with indwelling biopsy marker. No significant change in the multiple small asymmetric right internal mammary chain lymph nodes.  - Tumor volume reduction of -28.9%    6/10/2024 (6-week MRI breast)8.8 cm anteroposterior on axial MIPS, unchanged.  Mildly decreased size of the biopsy-proven cancer in the right breast at the approximate 1:00 position middle depth measuring approximately 2.0 x 1.4 x 2.4 cm (previously 2.4 x 1.9 x 2.9 cm).   Extensive surrounding NME is largely unchanged changed, but the volume has minimally decreased.  Stable bilateral axillary lymph nodes, including the biopsy-proven right level I axillary node. Stable asymmetric small right internal mammary chain lymph nodes.      Inflammatory arthritis 06/01/2015     Priority: Medium    Seizure (H) 11/15/2013     Priority: Medium    Vitamin D deficiency 04/23/2013     Priority: Medium            Current Medications:  Current Outpatient Medications   Medication Sig Dispense Refill    magic mouthwash suspension (diphenhydrAMINE, lidocaine, aluminum-magnesium & simethicone) Swish and swallow 10 mLs in mouth every 6 hours as needed for mouth sores 100 mL 1    acetaminophen (TYLENOL) 500 MG tablet Take 500 mg by mouth as needed for mild pain (Patient not taking: Reported on 6/6/2024)      amitriptyline (ELAVIL) 10 MG tablet Take 20 mg by mouth at bedtime      aspirin-acetaminophen-caffeine (EXCEDRIN MIGRAINE) 250-250-65 MG tablet       ibuprofen (ADVIL/MOTRIN) 200 MG capsule       lidocaine-prilocaine (EMLA) 2.5-2.5 % external cream Apply topically as needed for moderate pain 30 g 1    ondansetron (ZOFRAN) 8 MG tablet Take 1 tablet (8 mg) by mouth every 8 hours as needed for nausea (Patient not taking: Reported on 6/6/2024) 30 tablet 3          ECOG Performance Status: 0    Physical Examination:  /79 (BP Location: Right arm, Patient Position: Right side, Cuff Size: Adult Regular)   Pulse 93   Temp 98.6  F (37  C) (Oral)   Resp 16   Wt 74 kg (163 lb 1.6 oz)   SpO2 99%   BMI 27.51 kg/m    General:  Well appearing, well-nourished adult female in NAD.  HEENT:  Normocephalic.  Sclera anicteric.  MMM.  No lesions of the oropharynx.  Breast: 2.5 x 2.5cm mass at 1:00 7cm FN - largely stable (previously measured 2.5 x 3 cm)  Lymph:  palpable axillary LN bilaterally -stable compared to prior  Chest:  Breathing comfortably on room air  Abd:  Soft/NT/ND.  No hepatosplenomegaly.  Ext:  No pitting edema of the bilateral lower extremities.    Musculo:  Strength 5/5 throughout.  Neuro:  Cranial nerves grossly intact.  Psych:  Mood and affect appear normal.      Laboratory Data:  Lab Results   Component Value Date    WBC 6.7 06/06/2024    HGB 11.8 06/06/2024    HCT 34.7 (L) 06/06/2024    MCV 85 06/06/2024     06/06/2024     Lab Results   Component Value Date     06/06/2024    BUN 17.3 06/06/2024    ANIONGAP 13 06/06/2024     Lab Results   Component Value Date    ALT 34 06/06/2024    AST 26 06/06/2024    ALKPHOS 64 06/06/2024     Lab Results   Component Value Date    INR 1.02 04/17/2024         Radiology data:  I have personally reviewed the images of / or the following radiology data: As detailed in the oncology timeline      Pathology and other data:  I have personally reviewed the following data: As detailed in the oncology timeline      Assessment and Recommendations  Sharon Fong  is a 48 year old premenopausal woman with a new diagnosis of 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  She is on the I SPY 2.2 clinical trial and is receiving weekly Abraxane.  She presents today for follow-up.      # Right breast cancer  -Continue neoadjuvant Abraxane  -Will await tumor volume measurements from I spy  radiology prior to deciding next steps regarding continuation on Abraxane versus escalating early to block C  -She has already established care with Dr. Collier and is planned to have a mastectomy after neoadjuvant chemotherapy  -Will need adjuvant radiation therapy -will place referral after her surgery    # Contralateral (left) axillary lymph node enlargement  Indeterminant  -Will continue to follow on subsequent scans.  These appeared normal on baseline ultrasound    #Hx of migraines  -Will add back to Zofran for premeds as she did not feel well on Compazine  -She will reach out to us if nausea becomes an issue so we can consider additional agents      35 minutes spent on the date of the encounter doing chart review, review of test results, interpretation of tests, patient visit, documentation, and discussion with other provider(s)       Dame Dolores MD   of Medicine  Division of Hematology, Oncology and Transplantation  [facility]

## 2024-06-14 ENCOUNTER — LAB (OUTPATIENT)
Dept: LAB | Facility: CLINIC | Age: 49
End: 2024-06-14
Attending: INTERNAL MEDICINE
Payer: COMMERCIAL

## 2024-06-14 ENCOUNTER — INFUSION THERAPY VISIT (OUTPATIENT)
Dept: ONCOLOGY | Facility: CLINIC | Age: 49
End: 2024-06-14
Attending: INTERNAL MEDICINE
Payer: COMMERCIAL

## 2024-06-14 VITALS
RESPIRATION RATE: 18 BRPM | WEIGHT: 162 LBS | HEART RATE: 96 BPM | OXYGEN SATURATION: 99 % | SYSTOLIC BLOOD PRESSURE: 116 MMHG | TEMPERATURE: 98 F | BODY MASS INDEX: 27.32 KG/M2 | DIASTOLIC BLOOD PRESSURE: 78 MMHG

## 2024-06-14 DIAGNOSIS — C50.211 MALIGNANT NEOPLASM OF UPPER-INNER QUADRANT OF RIGHT BREAST IN FEMALE, ESTROGEN RECEPTOR POSITIVE (H): Primary | ICD-10-CM

## 2024-06-14 DIAGNOSIS — Z17.0 MALIGNANT NEOPLASM OF UPPER-INNER QUADRANT OF RIGHT BREAST IN FEMALE, ESTROGEN RECEPTOR POSITIVE (H): Primary | ICD-10-CM

## 2024-06-14 LAB
ALBUMIN SERPL BCG-MCNC: 4.4 G/DL (ref 3.5–5.2)
ALP SERPL-CCNC: 60 U/L (ref 40–150)
ALT SERPL W P-5'-P-CCNC: 23 U/L (ref 0–50)
ANION GAP SERPL CALCULATED.3IONS-SCNC: 12 MMOL/L (ref 7–15)
AST SERPL W P-5'-P-CCNC: 25 U/L (ref 0–45)
BASOPHILS # BLD AUTO: 0.1 10E3/UL (ref 0–0.2)
BASOPHILS NFR BLD AUTO: 2 %
BILIRUB SERPL-MCNC: 0.6 MG/DL
BUN SERPL-MCNC: 10.3 MG/DL (ref 6–20)
CALCIUM SERPL-MCNC: 9.9 MG/DL (ref 8.6–10)
CHLORIDE SERPL-SCNC: 104 MMOL/L (ref 98–107)
CREAT SERPL-MCNC: 0.69 MG/DL (ref 0.51–0.95)
DEPRECATED HCO3 PLAS-SCNC: 24 MMOL/L (ref 22–29)
EGFRCR SERPLBLD CKD-EPI 2021: >90 ML/MIN/1.73M2
EOSINOPHIL # BLD AUTO: 0.1 10E3/UL (ref 0–0.7)
EOSINOPHIL NFR BLD AUTO: 1 %
ERYTHROCYTE [DISTWIDTH] IN BLOOD BY AUTOMATED COUNT: 13.7 % (ref 10–15)
GLUCOSE SERPL-MCNC: 144 MG/DL (ref 70–99)
HCT VFR BLD AUTO: 31.9 % (ref 35–47)
HGB BLD-MCNC: 10.8 G/DL (ref 11.7–15.7)
IMM GRANULOCYTES # BLD: 0 10E3/UL
IMM GRANULOCYTES NFR BLD: 1 %
LYMPHOCYTES # BLD AUTO: 2.3 10E3/UL (ref 0.8–5.3)
LYMPHOCYTES NFR BLD AUTO: 36 %
MCH RBC QN AUTO: 29 PG (ref 26.5–33)
MCHC RBC AUTO-ENTMCNC: 33.9 G/DL (ref 31.5–36.5)
MCV RBC AUTO: 86 FL (ref 78–100)
MONOCYTES # BLD AUTO: 0.4 10E3/UL (ref 0–1.3)
MONOCYTES NFR BLD AUTO: 7 %
NEUTROPHILS # BLD AUTO: 3.4 10E3/UL (ref 1.6–8.3)
NEUTROPHILS NFR BLD AUTO: 53 %
NRBC # BLD AUTO: 0 10E3/UL
NRBC BLD AUTO-RTO: 0 /100
PLATELET # BLD AUTO: 353 10E3/UL (ref 150–450)
POTASSIUM SERPL-SCNC: 3.9 MMOL/L (ref 3.4–5.3)
PROT SERPL-MCNC: 7.3 G/DL (ref 6.4–8.3)
RBC # BLD AUTO: 3.73 10E6/UL (ref 3.8–5.2)
SODIUM SERPL-SCNC: 140 MMOL/L (ref 135–145)
WBC # BLD AUTO: 6.4 10E3/UL (ref 4–11)

## 2024-06-14 PROCEDURE — 96375 TX/PRO/DX INJ NEW DRUG ADDON: CPT

## 2024-06-14 PROCEDURE — 300N000004 RESEARCH KIT COLLECTION

## 2024-06-14 PROCEDURE — 36591 DRAW BLOOD OFF VENOUS DEVICE: CPT | Performed by: INTERNAL MEDICINE

## 2024-06-14 PROCEDURE — 250N000011 HC RX IP 250 OP 636: Mod: JZ | Performed by: INTERNAL MEDICINE

## 2024-06-14 PROCEDURE — 258N000003 HC RX IP 258 OP 636: Mod: JZ | Performed by: INTERNAL MEDICINE

## 2024-06-14 PROCEDURE — 96413 CHEMO IV INFUSION 1 HR: CPT

## 2024-06-14 PROCEDURE — 85004 AUTOMATED DIFF WBC COUNT: CPT | Performed by: INTERNAL MEDICINE

## 2024-06-14 PROCEDURE — 82247 BILIRUBIN TOTAL: CPT

## 2024-06-14 RX ORDER — HEPARIN SODIUM (PORCINE) LOCK FLUSH IV SOLN 100 UNIT/ML 100 UNIT/ML
5 SOLUTION INTRAVENOUS ONCE
Status: COMPLETED | OUTPATIENT
Start: 2024-06-14 | End: 2024-06-14

## 2024-06-14 RX ORDER — PACLITAXEL 100 MG/20ML
100 INJECTION, POWDER, LYOPHILIZED, FOR SUSPENSION INTRAVENOUS ONCE
Status: COMPLETED | OUTPATIENT
Start: 2024-06-14 | End: 2024-06-14

## 2024-06-14 RX ORDER — ONDANSETRON 2 MG/ML
4 INJECTION INTRAMUSCULAR; INTRAVENOUS EVERY 6 HOURS PRN
Status: DISCONTINUED | OUTPATIENT
Start: 2024-06-14 | End: 2024-06-14 | Stop reason: HOSPADM

## 2024-06-14 RX ORDER — HEPARIN SODIUM (PORCINE) LOCK FLUSH IV SOLN 100 UNIT/ML 100 UNIT/ML
5 SOLUTION INTRAVENOUS
Status: DISCONTINUED | OUTPATIENT
Start: 2024-06-14 | End: 2024-06-14 | Stop reason: HOSPADM

## 2024-06-14 RX ADMIN — Medication 5 ML: at 14:53

## 2024-06-14 RX ADMIN — ONDANSETRON 4 MG: 2 INJECTION INTRAMUSCULAR; INTRAVENOUS at 15:21

## 2024-06-14 RX ADMIN — SODIUM CHLORIDE 250 ML: 9 INJECTION, SOLUTION INTRAVENOUS at 15:20

## 2024-06-14 RX ADMIN — PACLITAXEL 180 MG: 100 INJECTION, POWDER, LYOPHILIZED, FOR SUSPENSION INTRAVENOUS at 15:52

## 2024-06-14 RX ADMIN — Medication 5 ML: at 16:38

## 2024-06-14 ASSESSMENT — PAIN SCALES - GENERAL: PAINLEVEL: NO PAIN (0)

## 2024-06-14 NOTE — PROGRESS NOTES
Infusion Nursing Note:  Sharon Fong presents today for Cycle 7 Day 1 Abraxane.    Patient seen by provider today: No   present during visit today: Not Applicable.    Note: Pt presents to infusion today feeling well. Pt denies having any fevers/chills, chest pain, sob, nausea/vomiting, rash, bladder or bowel concerns.    Pt ices hands, feet and chews ice chips during infusion.     Intravenous Access:  Implanted Port.    Treatment Conditions:  Lab Results   Component Value Date    HGB 10.8 (L) 06/14/2024    WBC 6.4 06/14/2024    ANEUTAUTO 3.4 06/14/2024     06/14/2024        Results reviewed, labs MET treatment parameters, ok to proceed with treatment.      Post Infusion Assessment:  Patient tolerated infusion without incident.  Blood return noted pre and post infusion.  Site patent and intact, free from redness, edema or discomfort.  No evidence of extravasations.  Access discontinued per protocol.       Discharge Plan:   Patient declined prescription refills.  Discharge instructions reviewed with: Patient and Family.  Patient and/or family verbalized understanding of discharge instructions and all questions answered.  AVS to patient via [employer].  Patient will return 6/20 for next appointment.   Patient discharged in stable condition accompanied by: self and son.  Departure Mode: Ambulatory.      Nery oCronado RN

## 2024-06-14 NOTE — NURSING NOTE
"Chief Complaint   Patient presents with    Port Draw     Labs drawn via port by RN. Port accessed with 20g 3/4\" power needle and vitals WNL. Flushed with saline and heparin. Pt tolerated well. Patient checked into next appointment.       Smiley Angeles RN    "

## 2024-06-14 NOTE — PATIENT INSTRUCTIONS
[facility] Triage and after hours / weekends / holidays:  [phone]    Please call the triage or after hours line if you experience a temperature greater than or equal to 100.4, shaking chills, have uncontrolled nausea, vomiting and/or diarrhea, dizziness, shortness of breath, chest pain, bleeding, unexplained bruising, or if you have any other new/concerning symptoms, questions or concerns.      If you are having any concerning symptoms or wish to speak to a provider before your next infusion visit, please call triage to notify them so we can adequately serve you.     If you need a refill on a narcotic prescription or other medication, please call before your infusion appointment.             June 2024 Sunday Monday Tuesday Wednesday Thursday Friday Saturday                                 1       2     3     4     5     6    LAB PERIPHERAL   1:30 PM   (15 min.)   [facility] LAB DRAW   [facility]    ONC INFUSION 1.5 HR (90 MIN)   2:00 PM   (90 min.)    ONC INFUSION NURSE   [facility] 7    XR CHEST 2 VIEWS   2:30 PM   (20 min.)   MGXR2   [facility] 8       9     10    MR BREAST BILATERAL W   8:30 AM   (75 min.)   [id number]   [facility] 11    RETURN CCSL   3:15 PM   (30 min.)   Dame Bernal MD   [facility] 12     13     14    LAB PERIPHERAL   2:30 PM   (15 min.)   [facility] LAB DRAW   [facility]    ONC INFUSION 1.5 HR (90 MIN)   3:00 PM   (90 min.)    ONC INFUSION NURSE   [facility] 15       16     17     18     19     20    LAB PERIPHERAL  12:30 PM   (15 min.)   [facility] LAB DRAW   [facility]    ONC INFUSION 1.5 HR (90 MIN)   1:00 PM   (90 min.)    ONC INFUSION NURSE   [facility] 21     22       23     24     25     26     27    LAB  PERIPHERAL   2:45 PM   (15 min.)   [facility] LAB DRAW   [facility]    ONC INFUSION 1.5 HR (90 MIN)   3:30 PM   (90 min.)   [facility] ONC INFUSION NURSE   [facility] 28     29 30 July 2024 Sunday Monday Tuesday Wednesday Thursday Friday Saturday        1     2    RETURN CCSL   3:15 PM   (30 min.)   Dame Bernal MD   [facility] 3    LAB PERIPHERAL   2:45 PM   (15 min.)   [facility] LAB DRAW   [facility]    ONC INFUSION 1.5 HR (90 MIN)   3:30 PM   (90 min.)   [facility] ONC INFUSION NURSE   [facility] 4     5     6       7     8     9     10     11    LAB PERIPHERAL  12:30 PM   (15 min.)   [facility] LAB DRAW   [facility]    ONC INFUSION 1.5 HR (90 MIN)   1:00 PM   (90 min.)   [facility] ONC INFUSION NURSE   [facility] 12     13       14     15     16     17     18    LAB PERIPHERAL   3:00 PM   (15 min.)   [facility] LAB DRAW   [facility]    ONC INFUSION 1.5 HR (90 MIN)   3:30 PM   (90 min.)   [facility] ONC INFUSION NURSE   [facility] 19     20       21     22     23     24     25    LAB PERIPHERAL   9:45 AM   (15 min.)   [facility] LAB DRAW   [facility]    RETURN CCSL  10:00 AM   (45 min.)   Angie Herrera PA-C   [facility]    ONC INFUSION 2 HR (120 MIN)  12:30 PM   (120 min.)   [facility] ONC INFUSION NURSE   [facility] 26     27       28     29     30     31                                   Recent Results (from the past 24 hour(s))   Comprehensive metabolic panel    Collection Time: 06/14/24  2:52 PM   Result Value Ref Range    Sodium 140 135 - 145 mmol/L    Potassium 3.9 3.4 - 5.3 mmol/L    Carbon Dioxide (CO2) 24 22 - 29 mmol/L    Anion Gap 12 7 - 15  mmol/L    Urea Nitrogen 10.3 6.0 - 20.0 mg/dL    Creatinine 0.69 0.51 - 0.95 mg/dL    GFR Estimate >90 >60 mL/min/1.73m2    Calcium 9.9 8.6 - 10.0 mg/dL    Chloride 104 98 - 107 mmol/L    Glucose 144 (H) 70 - 99 mg/dL    Alkaline Phosphatase 60 40 - 150 U/L    AST 25 0 - 45 U/L    ALT 23 0 - 50 U/L    Protein Total 7.3 6.4 - 8.3 g/dL    Albumin 4.4 3.5 - 5.2 g/dL    Bilirubin Total 0.6 <=1.2 mg/dL   CBC with platelets and differential    Collection Time: 06/14/24  2:52 PM   Result Value Ref Range    WBC Count 6.4 4.0 - 11.0 10e3/uL    RBC Count 3.73 (L) 3.80 - 5.20 10e6/uL    Hemoglobin 10.8 (L) 11.7 - 15.7 g/dL    Hematocrit 31.9 (L) 35.0 - 47.0 %    MCV 86 78 - 100 fL    MCH 29.0 26.5 - 33.0 pg    MCHC 33.9 31.5 - 36.5 g/dL    RDW 13.7 10.0 - 15.0 %    Platelet Count 353 150 - 450 10e3/uL    % Neutrophils 53 %    % Lymphocytes 36 %    % Monocytes 7 %    % Eosinophils 1 %    % Basophils 2 %    % Immature Granulocytes 1 %    NRBCs per 100 WBC 0 <1 /100    Absolute Neutrophils 3.4 1.6 - 8.3 10e3/uL    Absolute Lymphocytes 2.3 0.8 - 5.3 10e3/uL    Absolute Monocytes 0.4 0.0 - 1.3 10e3/uL    Absolute Eosinophils 0.1 0.0 - 0.7 10e3/uL    Absolute Basophils 0.1 0.0 - 0.2 10e3/uL    Absolute Immature Granulocytes 0.0 <=0.4 10e3/uL    Absolute NRBCs 0.0 10e3/uL

## 2024-06-20 ENCOUNTER — ALLIED HEALTH/NURSE VISIT (OUTPATIENT)
Dept: ONCOLOGY | Facility: CLINIC | Age: 49
End: 2024-06-20

## 2024-06-20 ENCOUNTER — INFUSION THERAPY VISIT (OUTPATIENT)
Dept: ONCOLOGY | Facility: CLINIC | Age: 49
End: 2024-06-20
Attending: INTERNAL MEDICINE
Payer: COMMERCIAL

## 2024-06-20 ENCOUNTER — APPOINTMENT (OUTPATIENT)
Dept: LAB | Facility: CLINIC | Age: 49
End: 2024-06-20
Attending: INTERNAL MEDICINE
Payer: COMMERCIAL

## 2024-06-20 VITALS
TEMPERATURE: 98.3 F | OXYGEN SATURATION: 97 % | DIASTOLIC BLOOD PRESSURE: 71 MMHG | SYSTOLIC BLOOD PRESSURE: 103 MMHG | BODY MASS INDEX: 27.49 KG/M2 | RESPIRATION RATE: 16 BRPM | WEIGHT: 163 LBS | HEART RATE: 88 BPM

## 2024-06-20 DIAGNOSIS — Z00.6 EXAMINATION OF PARTICIPANT OR CONTROL IN CLINICAL RESEARCH: Primary | ICD-10-CM

## 2024-06-20 DIAGNOSIS — C50.211 MALIGNANT NEOPLASM OF UPPER-INNER QUADRANT OF RIGHT BREAST IN FEMALE, ESTROGEN RECEPTOR POSITIVE (H): ICD-10-CM

## 2024-06-20 DIAGNOSIS — Z17.0 MALIGNANT NEOPLASM OF UPPER-INNER QUADRANT OF RIGHT BREAST IN FEMALE, ESTROGEN RECEPTOR POSITIVE (H): ICD-10-CM

## 2024-06-20 DIAGNOSIS — C50.211 MALIGNANT NEOPLASM OF UPPER-INNER QUADRANT OF RIGHT BREAST IN FEMALE, ESTROGEN RECEPTOR POSITIVE (H): Primary | ICD-10-CM

## 2024-06-20 DIAGNOSIS — Z17.0 MALIGNANT NEOPLASM OF UPPER-INNER QUADRANT OF RIGHT BREAST IN FEMALE, ESTROGEN RECEPTOR POSITIVE (H): Primary | ICD-10-CM

## 2024-06-20 LAB
ALBUMIN SERPL BCG-MCNC: 4.2 G/DL (ref 3.5–5.2)
ALP SERPL-CCNC: 56 U/L (ref 40–150)
ALT SERPL W P-5'-P-CCNC: 40 U/L (ref 0–50)
ANION GAP SERPL CALCULATED.3IONS-SCNC: 10 MMOL/L (ref 7–15)
AST SERPL W P-5'-P-CCNC: 37 U/L (ref 0–45)
BASOPHILS # BLD AUTO: 0.1 10E3/UL (ref 0–0.2)
BASOPHILS NFR BLD AUTO: 2 %
BILIRUB SERPL-MCNC: 0.6 MG/DL
BUN SERPL-MCNC: 12.3 MG/DL (ref 6–20)
CALCIUM SERPL-MCNC: 9.3 MG/DL (ref 8.6–10)
CHLORIDE SERPL-SCNC: 106 MMOL/L (ref 98–107)
CREAT SERPL-MCNC: 0.53 MG/DL (ref 0.51–0.95)
DEPRECATED HCO3 PLAS-SCNC: 24 MMOL/L (ref 22–29)
EGFRCR SERPLBLD CKD-EPI 2021: >90 ML/MIN/1.73M2
EOSINOPHIL # BLD AUTO: 0.1 10E3/UL (ref 0–0.7)
EOSINOPHIL NFR BLD AUTO: 3 %
ERYTHROCYTE [DISTWIDTH] IN BLOOD BY AUTOMATED COUNT: 13.6 % (ref 10–15)
GLUCOSE SERPL-MCNC: 138 MG/DL (ref 70–99)
HCT VFR BLD AUTO: 30.4 % (ref 35–47)
HGB BLD-MCNC: 10.3 G/DL (ref 11.7–15.7)
IMM GRANULOCYTES # BLD: 0 10E3/UL
IMM GRANULOCYTES NFR BLD: 1 %
LYMPHOCYTES # BLD AUTO: 2.1 10E3/UL (ref 0.8–5.3)
LYMPHOCYTES NFR BLD AUTO: 38 %
MCH RBC QN AUTO: 29.3 PG (ref 26.5–33)
MCHC RBC AUTO-ENTMCNC: 33.9 G/DL (ref 31.5–36.5)
MCV RBC AUTO: 87 FL (ref 78–100)
MONOCYTES # BLD AUTO: 0.3 10E3/UL (ref 0–1.3)
MONOCYTES NFR BLD AUTO: 6 %
NEUTROPHILS # BLD AUTO: 2.7 10E3/UL (ref 1.6–8.3)
NEUTROPHILS NFR BLD AUTO: 50 %
NRBC # BLD AUTO: 0 10E3/UL
NRBC BLD AUTO-RTO: 0 /100
PLATELET # BLD AUTO: 308 10E3/UL (ref 150–450)
POTASSIUM SERPL-SCNC: 4 MMOL/L (ref 3.4–5.3)
PROT SERPL-MCNC: 6.9 G/DL (ref 6.4–8.3)
RBC # BLD AUTO: 3.51 10E6/UL (ref 3.8–5.2)
SODIUM SERPL-SCNC: 140 MMOL/L (ref 135–145)
WBC # BLD AUTO: 5.3 10E3/UL (ref 4–11)

## 2024-06-20 PROCEDURE — 36591 DRAW BLOOD OFF VENOUS DEVICE: CPT

## 2024-06-20 PROCEDURE — 250N000011 HC RX IP 250 OP 636: Performed by: INTERNAL MEDICINE

## 2024-06-20 PROCEDURE — 80053 COMPREHEN METABOLIC PANEL: CPT

## 2024-06-20 PROCEDURE — 96375 TX/PRO/DX INJ NEW DRUG ADDON: CPT

## 2024-06-20 PROCEDURE — 96413 CHEMO IV INFUSION 1 HR: CPT

## 2024-06-20 PROCEDURE — 85025 COMPLETE CBC W/AUTO DIFF WBC: CPT | Performed by: INTERNAL MEDICINE

## 2024-06-20 PROCEDURE — 258N000003 HC RX IP 258 OP 636: Mod: JZ | Performed by: INTERNAL MEDICINE

## 2024-06-20 RX ORDER — HEPARIN SODIUM (PORCINE) LOCK FLUSH IV SOLN 100 UNIT/ML 100 UNIT/ML
5 SOLUTION INTRAVENOUS
Status: DISCONTINUED | OUTPATIENT
Start: 2024-06-20 | End: 2024-06-20 | Stop reason: HOSPADM

## 2024-06-20 RX ORDER — PACLITAXEL 100 MG/20ML
100 INJECTION, POWDER, LYOPHILIZED, FOR SUSPENSION INTRAVENOUS ONCE
Status: COMPLETED | OUTPATIENT
Start: 2024-06-20 | End: 2024-06-20

## 2024-06-20 RX ORDER — ONDANSETRON 2 MG/ML
4 INJECTION INTRAMUSCULAR; INTRAVENOUS EVERY 6 HOURS PRN
Status: DISCONTINUED | OUTPATIENT
Start: 2024-06-20 | End: 2024-06-20 | Stop reason: HOSPADM

## 2024-06-20 RX ADMIN — SODIUM CHLORIDE 250 ML: 9 INJECTION, SOLUTION INTRAVENOUS at 13:48

## 2024-06-20 RX ADMIN — ONDANSETRON 4 MG: 2 INJECTION INTRAMUSCULAR; INTRAVENOUS at 13:48

## 2024-06-20 RX ADMIN — Medication 5 ML: at 13:11

## 2024-06-20 RX ADMIN — Medication 5 ML: at 14:52

## 2024-06-20 RX ADMIN — PACLITAXEL 180 MG: 100 INJECTION, POWDER, LYOPHILIZED, FOR SUSPENSION INTRAVENOUS at 14:12

## 2024-06-20 ASSESSMENT — PAIN SCALES - GENERAL: PAINLEVEL: NO PAIN (0)

## 2024-06-20 NOTE — NURSING NOTE
[id number]: Study Visit Note   Subject name: Sharon Fong     Visit: Block B C8D1    Did the study visit occur within the appropriate window allowed by the protocol? yes    Since the last study visit, She has been doing fair. She reports the following:  Hot flashes  Increasing fatigue  Intermittent headaches  Diarrhea - 1 day following infusion    Mouth sores are improving    I have personally interviewed Sharon Fong and reviewed her medical record for adverse events and concomitant medications and these have been recorded on the corresponding logs in Sharon Fong's research file.     Sharon Fong was given the opportunity to ask any trial related questions.  Labs were reviewed - any significant lab values were addressed and reviewed.    MARITZA RgN, RN  CRC-RN,   Office [phone]

## 2024-06-20 NOTE — PATIENT INSTRUCTIONS
[facility] Triage and after hours / weekends / holidays:  [phone]    Please call the triage or after hours line if you experience a temperature greater than or equal to 100.4, shaking chills, have uncontrolled nausea, vomiting and/or diarrhea, dizziness, shortness of breath, chest pain, bleeding, unexplained bruising, or if you have any other new/concerning symptoms, questions or concerns.      If you are having any concerning symptoms or wish to speak to a provider before your next infusion visit, please call triage to notify them so we can adequately serve you.     If you need a refill on a narcotic prescription or other medication, please call before your infusion appointment.           June 2024 Sunday Monday Tuesday Wednesday Thursday Friday Saturday                                 1       2     3     4     5     6    LAB PERIPHERAL   1:30 PM   (15 min.)   [facility] LAB DRAW   [facility]    ONC INFUSION 1.5 HR (90 MIN)   2:00 PM   (90 min.)    ONC INFUSION NURSE   [facility] 7    XR CHEST 2 VIEWS   2:30 PM   (20 min.)   MGXR2   [facility] 8       9     10    MR BREAST BILATERAL W   8:30 AM   (75 min.)   [id number]   [facility] 11    RETURN CCSL   3:15 PM   (30 min.)   Dame Bernal MD   [facility] 12     13     14    LAB PERIPHERAL   2:30 PM   (15 min.)   [facility] LAB DRAW   [facility]    ONC INFUSION 1.5 HR (90 MIN)   3:00 PM   (90 min.)    ONC INFUSION NURSE   [facility] 15       16     17     18     19     20    LAB PERIPHERAL  12:30 PM   (15 min.)   [facility] LAB DRAW   [facility]    ONC INFUSION 1.5 HR (90 MIN)   1:00 PM   (90 min.)    ONC INFUSION NURSE   [facility] 21     22       23     24     25     26     27    LAB  PERIPHERAL   2:45 PM   (15 min.)   [facility] LAB DRAW   [facility]    ONC INFUSION 1.5 HR (90 MIN)   3:30 PM   (90 min.)   [facility] ONC INFUSION NURSE   [facility] 28     29 30 July 2024 Sunday Monday Tuesday Wednesday Thursday Friday Saturday        1     2    RETURN CCSL   3:15 PM   (30 min.)   Dame Bernal MD   [facility] 3    LAB PERIPHERAL   2:45 PM   (15 min.)   [facility] LAB DRAW   [facility]    ONC INFUSION 1.5 HR (90 MIN)   3:30 PM   (90 min.)   [facility] ONC INFUSION NURSE   [facility] 4     5     6       7     8     9     10     11    LAB PERIPHERAL  12:30 PM   (15 min.)   [facility] LAB DRAW   [facility]    ONC INFUSION 1.5 HR (90 MIN)   1:00 PM   (90 min.)   [facility] ONC INFUSION NURSE   [facility] 12     13       14     15     16     17     18    LAB PERIPHERAL   3:00 PM   (15 min.)   [facility] LAB DRAW   [facility]    ONC INFUSION 1.5 HR (90 MIN)   3:30 PM   (90 min.)   [facility] ONC INFUSION NURSE   [facility] 19     20       21     22     23     24     25    LAB PERIPHERAL   9:45 AM   (15 min.)   [facility] LAB DRAW   [facility]    RETURN CCSL  10:00 AM   (45 min.)   Angie Herrera PA-C   [facility]    ONC INFUSION 2 HR (120 MIN)  12:30 PM   (120 min.)   [facility] ONC INFUSION NURSE   [facility] 26     27       28     29     30     31                                   Recent Results (from the past 24 hour(s))   Comprehensive metabolic panel    Collection Time: 06/20/24  1:09 PM   Result Value Ref Range    Sodium 140 135 - 145 mmol/L    Potassium 4.0 3.4 - 5.3 mmol/L    Carbon Dioxide (CO2) 24 22 - 29 mmol/L    Anion Gap 10 7 - 15  mmol/L    Urea Nitrogen 12.3 6.0 - 20.0 mg/dL    Creatinine 0.53 0.51 - 0.95 mg/dL    GFR Estimate >90 >60 mL/min/1.73m2    Calcium 9.3 8.6 - 10.0 mg/dL    Chloride 106 98 - 107 mmol/L    Glucose 138 (H) 70 - 99 mg/dL    Alkaline Phosphatase 56 40 - 150 U/L    AST 37 0 - 45 U/L    ALT 40 0 - 50 U/L    Protein Total 6.9 6.4 - 8.3 g/dL    Albumin 4.2 3.5 - 5.2 g/dL    Bilirubin Total 0.6 <=1.2 mg/dL   CBC with platelets and differential    Collection Time: 06/20/24  1:09 PM   Result Value Ref Range    WBC Count 5.3 4.0 - 11.0 10e3/uL    RBC Count 3.51 (L) 3.80 - 5.20 10e6/uL    Hemoglobin 10.3 (L) 11.7 - 15.7 g/dL    Hematocrit 30.4 (L) 35.0 - 47.0 %    MCV 87 78 - 100 fL    MCH 29.3 26.5 - 33.0 pg    MCHC 33.9 31.5 - 36.5 g/dL    RDW 13.6 10.0 - 15.0 %    Platelet Count 308 150 - 450 10e3/uL    % Neutrophils 50 %    % Lymphocytes 38 %    % Monocytes 6 %    % Eosinophils 3 %    % Basophils 2 %    % Immature Granulocytes 1 %    NRBCs per 100 WBC 0 <1 /100    Absolute Neutrophils 2.7 1.6 - 8.3 10e3/uL    Absolute Lymphocytes 2.1 0.8 - 5.3 10e3/uL    Absolute Monocytes 0.3 0.0 - 1.3 10e3/uL    Absolute Eosinophils 0.1 0.0 - 0.7 10e3/uL    Absolute Basophils 0.1 0.0 - 0.2 10e3/uL    Absolute Immature Granulocytes 0.0 <=0.4 10e3/uL    Absolute NRBCs 0.0 10e3/uL

## 2024-06-20 NOTE — PROGRESS NOTES
Infusion Nursing Note:  Sharon Fong presents today for cycle 8, day 1 abraxane.    Patient seen by provider today: No   present during visit today: Not Applicable.    Note: Patient denies any fevers or chills at home. States she continues to be fatigued. Having difficulty sleeping at night due to hot flashes. Continues with intermittent headaches. Also reports some diarrhea for 1-2 days after infusion, states it is improving. Has imodium at home but hasn't been taking it.  Had some swelling in her left upper back for 2-3 days last week, resolved now.     Ok to proceed with infusion today per Smiley Frank, RN research coordinator.    Patient chewed on ice chips and iced her hands and feet during abraxane infusion.        Intravenous Access:  Implanted Port.    Treatment Conditions:  Lab Results   Component Value Date    HGB 10.3 (L) 06/20/2024    WBC 5.3 06/20/2024    ANEUTAUTO 2.7 06/20/2024     06/20/2024        Lab Results   Component Value Date     06/20/2024    POTASSIUM 4.0 06/20/2024    MAG 2.1 04/17/2024    CR 0.53 06/20/2024    JJ 9.3 06/20/2024    BILITOTAL 0.6 06/20/2024    ALBUMIN 4.2 06/20/2024    ALT 40 06/20/2024    AST 37 06/20/2024       Results reviewed, labs MET treatment parameters, ok to proceed with treatment.      Post Infusion Assessment:  Patient tolerated infusion without incident.  Blood return noted pre and post infusion.  Site patent and intact, free from redness, edema or discomfort.  No evidence of extravasations.  Access discontinued per protocol.       Discharge Plan:   Patient declined prescription refills.  Discharge instructions reviewed with: Patient.  Patient and/or family verbalized understanding of discharge instructions and all questions answered.  AVS to patient via [employer].  Patient will return 6/27/24 for next appointment.   /Patient discharged in stable condition accompanied by: self and .  Departure Mode: Ambulatory.      Jailyn  JAI Clements, RN

## 2024-06-27 ENCOUNTER — INFUSION THERAPY VISIT (OUTPATIENT)
Dept: ONCOLOGY | Facility: CLINIC | Age: 49
End: 2024-06-27
Attending: INTERNAL MEDICINE
Payer: COMMERCIAL

## 2024-06-27 ENCOUNTER — APPOINTMENT (OUTPATIENT)
Dept: LAB | Facility: CLINIC | Age: 49
End: 2024-06-27
Attending: INTERNAL MEDICINE
Payer: COMMERCIAL

## 2024-06-27 VITALS
TEMPERATURE: 98.4 F | HEART RATE: 84 BPM | BODY MASS INDEX: 27.64 KG/M2 | WEIGHT: 163.9 LBS | DIASTOLIC BLOOD PRESSURE: 81 MMHG | OXYGEN SATURATION: 100 % | RESPIRATION RATE: 16 BRPM | SYSTOLIC BLOOD PRESSURE: 120 MMHG

## 2024-06-27 DIAGNOSIS — C50.211 MALIGNANT NEOPLASM OF UPPER-INNER QUADRANT OF RIGHT BREAST IN FEMALE, ESTROGEN RECEPTOR POSITIVE (H): Primary | ICD-10-CM

## 2024-06-27 DIAGNOSIS — Z17.0 MALIGNANT NEOPLASM OF UPPER-INNER QUADRANT OF RIGHT BREAST IN FEMALE, ESTROGEN RECEPTOR POSITIVE (H): Primary | ICD-10-CM

## 2024-06-27 LAB
ALBUMIN SERPL BCG-MCNC: 4.3 G/DL (ref 3.5–5.2)
ALP SERPL-CCNC: 56 U/L (ref 40–150)
ALT SERPL W P-5'-P-CCNC: 60 U/L (ref 0–50)
ANION GAP SERPL CALCULATED.3IONS-SCNC: 11 MMOL/L (ref 7–15)
AST SERPL W P-5'-P-CCNC: 49 U/L (ref 0–45)
BASOPHILS # BLD AUTO: 0.1 10E3/UL (ref 0–0.2)
BASOPHILS NFR BLD AUTO: 2 %
BILIRUB SERPL-MCNC: 0.4 MG/DL
BUN SERPL-MCNC: 13.2 MG/DL (ref 6–20)
CALCIUM SERPL-MCNC: 9.3 MG/DL (ref 8.6–10)
CHLORIDE SERPL-SCNC: 107 MMOL/L (ref 98–107)
CREAT SERPL-MCNC: 0.55 MG/DL (ref 0.51–0.95)
DEPRECATED HCO3 PLAS-SCNC: 24 MMOL/L (ref 22–29)
EGFRCR SERPLBLD CKD-EPI 2021: >90 ML/MIN/1.73M2
EOSINOPHIL # BLD AUTO: 0.2 10E3/UL (ref 0–0.7)
EOSINOPHIL NFR BLD AUTO: 4 %
ERYTHROCYTE [DISTWIDTH] IN BLOOD BY AUTOMATED COUNT: 14.1 % (ref 10–15)
GLUCOSE SERPL-MCNC: 129 MG/DL (ref 70–99)
HCT VFR BLD AUTO: 30.6 % (ref 35–47)
HGB BLD-MCNC: 10.4 G/DL (ref 11.7–15.7)
IMM GRANULOCYTES # BLD: 0.1 10E3/UL
IMM GRANULOCYTES NFR BLD: 1 %
LYMPHOCYTES # BLD AUTO: 2.2 10E3/UL (ref 0.8–5.3)
LYMPHOCYTES NFR BLD AUTO: 40 %
MCH RBC QN AUTO: 29.3 PG (ref 26.5–33)
MCHC RBC AUTO-ENTMCNC: 34 G/DL (ref 31.5–36.5)
MCV RBC AUTO: 86 FL (ref 78–100)
MONOCYTES # BLD AUTO: 0.5 10E3/UL (ref 0–1.3)
MONOCYTES NFR BLD AUTO: 9 %
NEUTROPHILS # BLD AUTO: 2.4 10E3/UL (ref 1.6–8.3)
NEUTROPHILS NFR BLD AUTO: 44 %
NRBC # BLD AUTO: 0 10E3/UL
NRBC BLD AUTO-RTO: 0 /100
PLATELET # BLD AUTO: 380 10E3/UL (ref 150–450)
POTASSIUM SERPL-SCNC: 4.3 MMOL/L (ref 3.4–5.3)
PROT SERPL-MCNC: 7 G/DL (ref 6.4–8.3)
RBC # BLD AUTO: 3.55 10E6/UL (ref 3.8–5.2)
SODIUM SERPL-SCNC: 142 MMOL/L (ref 135–145)
WBC # BLD AUTO: 5.4 10E3/UL (ref 4–11)

## 2024-06-27 PROCEDURE — 80053 COMPREHEN METABOLIC PANEL: CPT

## 2024-06-27 PROCEDURE — 96413 CHEMO IV INFUSION 1 HR: CPT

## 2024-06-27 PROCEDURE — 85025 COMPLETE CBC W/AUTO DIFF WBC: CPT | Performed by: INTERNAL MEDICINE

## 2024-06-27 PROCEDURE — 36591 DRAW BLOOD OFF VENOUS DEVICE: CPT | Performed by: INTERNAL MEDICINE

## 2024-06-27 PROCEDURE — 250N000011 HC RX IP 250 OP 636: Performed by: INTERNAL MEDICINE

## 2024-06-27 PROCEDURE — 96375 TX/PRO/DX INJ NEW DRUG ADDON: CPT

## 2024-06-27 RX ORDER — ONDANSETRON 2 MG/ML
4 INJECTION INTRAMUSCULAR; INTRAVENOUS EVERY 6 HOURS PRN
Status: DISCONTINUED | OUTPATIENT
Start: 2024-06-27 | End: 2024-06-27 | Stop reason: HOSPADM

## 2024-06-27 RX ORDER — HEPARIN SODIUM (PORCINE) LOCK FLUSH IV SOLN 100 UNIT/ML 100 UNIT/ML
5 SOLUTION INTRAVENOUS
Status: DISCONTINUED | OUTPATIENT
Start: 2024-06-27 | End: 2024-06-27 | Stop reason: HOSPADM

## 2024-06-27 RX ORDER — HEPARIN SODIUM (PORCINE) LOCK FLUSH IV SOLN 100 UNIT/ML 100 UNIT/ML
5 SOLUTION INTRAVENOUS ONCE
Status: COMPLETED | OUTPATIENT
Start: 2024-06-27 | End: 2024-06-27

## 2024-06-27 RX ORDER — PACLITAXEL 100 MG/20ML
100 INJECTION, POWDER, LYOPHILIZED, FOR SUSPENSION INTRAVENOUS ONCE
Status: COMPLETED | OUTPATIENT
Start: 2024-06-27 | End: 2024-06-27

## 2024-06-27 RX ADMIN — PACLITAXEL 180 MG: 100 INJECTION, POWDER, LYOPHILIZED, FOR SUSPENSION INTRAVENOUS at 16:08

## 2024-06-27 RX ADMIN — ONDANSETRON 4 MG: 2 INJECTION INTRAMUSCULAR; INTRAVENOUS at 15:53

## 2024-06-27 RX ADMIN — Medication 5 ML: at 16:49

## 2024-06-27 RX ADMIN — Medication 5 ML: at 15:10

## 2024-06-27 ASSESSMENT — PAIN SCALES - GENERAL: PAINLEVEL: NO PAIN (0)

## 2024-06-27 NOTE — PROGRESS NOTES
Infusion Nursing Note:  Sharon Fong presents today for Cycle 9 Abraxane.        Note: Sharon comes to infusion today with no questions or concerns.  She denies pain.  She  has been afebrile and denies signs and symptoms of infection including: cough, SOB, sore throat,  vomiting, rash, or pain with urination.  She typically has a couple of episodes of diarrhea 1-3 days post abraxane which has now resolved.  She denies numbness and tingling in her hands and feet.  Her fatigue has improved since last week.  She  wishes to proceed with today's planned treatment      She ices her hands/feet and chews ice chips during the abraxane infusion    Intravenous Access:  Implanted Port accessed in lab  Positive blood return pre and post abraxane.    Treatment Conditions:  Component      Latest Ref Rng 6/27/2024  3:17 PM   WBC      4.0 - 11.0 10e3/uL 5.4    RBC Count      3.80 - 5.20 10e6/uL 3.55 (L)    Hemoglobin      11.7 - 15.7 g/dL 10.4 (L)    Hematocrit      35.0 - 47.0 % 30.6 (L)    MCV      78 - 100 fL 86    MCH      26.5 - 33.0 pg 29.3    MCHC      31.5 - 36.5 g/dL 34.0    RDW      10.0 - 15.0 % 14.1    Platelet Count      150 - 450 10e3/uL 380    % Neutrophils      % 44    % Lymphocytes      % 40    % Monocytes      % 9    % Eosinophils      % 4    % Basophils      % 2    % Immature Granulocytes      % 1    NRBCs per 100 WBC      <1 /100 0    Absolute Neutrophils      1.6 - 8.3 10e3/uL 2.4    Absolute Lymphocytes      0.8 - 5.3 10e3/uL 2.2    Absolute Monocytes      0.0 - 1.3 10e3/uL 0.5    Absolute Eosinophils      0.0 - 0.7 10e3/uL 0.2    Absolute Basophils      0.0 - 0.2 10e3/uL 0.1    Absolute Immature Granulocytes      <=0.4 10e3/uL 0.1    Absolute NRBCs      10e3/uL 0.0    Sodium      135 - 145 mmol/L 142    Potassium      3.4 - 5.3 mmol/L 4.3    Carbon Dioxide (CO2)      22 - 29 mmol/L 24    Anion Gap      7 - 15 mmol/L 11    Urea Nitrogen      6.0 - 20.0 mg/dL 13.2    Creatinine      0.51 - 0.95 mg/dL  0.55    GFR Estimate      >60 mL/min/1.73m2 >90    Calcium      8.6 - 10.0 mg/dL 9.3    Chloride      98 - 107 mmol/L 107    Glucose      70 - 99 mg/dL 129 (H)    Alkaline Phosphatase      40 - 150 U/L 56    AST      0 - 45 U/L 49 (H)    ALT      0 - 50 U/L 60 (H)    Protein Total      6.4 - 8.3 g/dL 7.0    Albumin      3.5 - 5.2 g/dL 4.3    Bilirubin Total      <=1.2 mg/dL 0.4         Results reviewed, labs MET treatment parameters, ok to proceed with treatment.      Post Infusion Assessment:  Patient tolerated infusion without incident.       Discharge Plan:     [facility]  Patient will return 7/3/24 for cycle 10.  Patient discharged in stable condition accompanied by: daughter.  Departure Mode: Ambulatory.      Eveline Busch RN

## 2024-07-02 ENCOUNTER — ALLIED HEALTH/NURSE VISIT (OUTPATIENT)
Dept: ONCOLOGY | Facility: CLINIC | Age: 49
End: 2024-07-02

## 2024-07-02 ENCOUNTER — ONCOLOGY VISIT (OUTPATIENT)
Dept: ONCOLOGY | Facility: CLINIC | Age: 49
End: 2024-07-02
Attending: INTERNAL MEDICINE
Payer: COMMERCIAL

## 2024-07-02 VITALS
SYSTOLIC BLOOD PRESSURE: 107 MMHG | HEART RATE: 89 BPM | RESPIRATION RATE: 16 BRPM | BODY MASS INDEX: 27.49 KG/M2 | DIASTOLIC BLOOD PRESSURE: 74 MMHG | OXYGEN SATURATION: 98 % | WEIGHT: 163 LBS | TEMPERATURE: 98.5 F

## 2024-07-02 DIAGNOSIS — Z17.0 MALIGNANT NEOPLASM OF UPPER-INNER QUADRANT OF RIGHT BREAST IN FEMALE, ESTROGEN RECEPTOR POSITIVE (H): Primary | ICD-10-CM

## 2024-07-02 DIAGNOSIS — Z00.6 EXAMINATION OF PARTICIPANT OR CONTROL IN CLINICAL RESEARCH: ICD-10-CM

## 2024-07-02 DIAGNOSIS — C50.211 MALIGNANT NEOPLASM OF UPPER-INNER QUADRANT OF RIGHT BREAST IN FEMALE, ESTROGEN RECEPTOR POSITIVE (H): Primary | Chronic | ICD-10-CM

## 2024-07-02 DIAGNOSIS — C50.211 MALIGNANT NEOPLASM OF UPPER-INNER QUADRANT OF RIGHT BREAST IN FEMALE, ESTROGEN RECEPTOR POSITIVE (H): Primary | ICD-10-CM

## 2024-07-02 DIAGNOSIS — Z17.0 MALIGNANT NEOPLASM OF UPPER-INNER QUADRANT OF RIGHT BREAST IN FEMALE, ESTROGEN RECEPTOR POSITIVE (H): Primary | Chronic | ICD-10-CM

## 2024-07-02 PROCEDURE — 99212 OFFICE O/P EST SF 10 MIN: CPT | Performed by: INTERNAL MEDICINE

## 2024-07-02 PROCEDURE — 99214 OFFICE O/P EST MOD 30 MIN: CPT | Performed by: INTERNAL MEDICINE

## 2024-07-02 RX ORDER — ONDANSETRON 2 MG/ML
4 INJECTION INTRAMUSCULAR; INTRAVENOUS EVERY 6 HOURS PRN
Status: CANCELLED
Start: 2024-07-17

## 2024-07-02 RX ORDER — ALBUTEROL SULFATE 90 UG/1
1-2 AEROSOL, METERED RESPIRATORY (INHALATION)
Status: CANCELLED
Start: 2024-07-17

## 2024-07-02 RX ORDER — EPINEPHRINE 1 MG/ML
0.3 INJECTION, SOLUTION INTRAMUSCULAR; SUBCUTANEOUS EVERY 5 MIN PRN
Status: CANCELLED | OUTPATIENT
Start: 2024-07-03

## 2024-07-02 RX ORDER — HEPARIN SODIUM,PORCINE 10 UNIT/ML
5-20 VIAL (ML) INTRAVENOUS DAILY PRN
Status: CANCELLED | OUTPATIENT
Start: 2024-07-03

## 2024-07-02 RX ORDER — METHYLPREDNISOLONE SODIUM SUCCINATE 125 MG/2ML
125 INJECTION, POWDER, LYOPHILIZED, FOR SOLUTION INTRAMUSCULAR; INTRAVENOUS
Status: CANCELLED
Start: 2024-07-17

## 2024-07-02 RX ORDER — PACLITAXEL 100 MG/20ML
100 INJECTION, POWDER, LYOPHILIZED, FOR SUSPENSION INTRAVENOUS ONCE
Status: CANCELLED
Start: 2024-07-10 | End: 2024-07-10

## 2024-07-02 RX ORDER — ALBUTEROL SULFATE 0.83 MG/ML
2.5 SOLUTION RESPIRATORY (INHALATION)
Status: CANCELLED | OUTPATIENT
Start: 2024-07-17

## 2024-07-02 RX ORDER — EPINEPHRINE 1 MG/ML
0.3 INJECTION, SOLUTION INTRAMUSCULAR; SUBCUTANEOUS EVERY 5 MIN PRN
Status: CANCELLED | OUTPATIENT
Start: 2024-07-10

## 2024-07-02 RX ORDER — LORAZEPAM 2 MG/ML
0.5 INJECTION INTRAMUSCULAR EVERY 4 HOURS PRN
Status: CANCELLED | OUTPATIENT
Start: 2024-07-03

## 2024-07-02 RX ORDER — HEPARIN SODIUM (PORCINE) LOCK FLUSH IV SOLN 100 UNIT/ML 100 UNIT/ML
5 SOLUTION INTRAVENOUS
Status: CANCELLED | OUTPATIENT
Start: 2024-07-10

## 2024-07-02 RX ORDER — ALBUTEROL SULFATE 0.83 MG/ML
2.5 SOLUTION RESPIRATORY (INHALATION)
Status: CANCELLED | OUTPATIENT
Start: 2024-07-03

## 2024-07-02 RX ORDER — HEPARIN SODIUM (PORCINE) LOCK FLUSH IV SOLN 100 UNIT/ML 100 UNIT/ML
5 SOLUTION INTRAVENOUS
Status: CANCELLED | OUTPATIENT
Start: 2024-07-03

## 2024-07-02 RX ORDER — DIPHENHYDRAMINE HYDROCHLORIDE 50 MG/ML
50 INJECTION INTRAMUSCULAR; INTRAVENOUS
Status: CANCELLED
Start: 2024-07-17

## 2024-07-02 RX ORDER — ALUMINUM HYDROXIDE, MAGNESIUM HYDROXIDE, DIMETHICONE 200; 20; 200 MG/5ML; MG/5ML; MG/5ML
SUSPENSION ORAL
COMMUNITY
Start: 2024-06-11

## 2024-07-02 RX ORDER — MEPERIDINE HYDROCHLORIDE 25 MG/ML
25 INJECTION INTRAMUSCULAR; INTRAVENOUS; SUBCUTANEOUS EVERY 30 MIN PRN
Status: CANCELLED | OUTPATIENT
Start: 2024-07-10

## 2024-07-02 RX ORDER — ALBUTEROL SULFATE 90 UG/1
1-2 AEROSOL, METERED RESPIRATORY (INHALATION)
Status: CANCELLED
Start: 2024-07-10

## 2024-07-02 RX ORDER — MEPERIDINE HYDROCHLORIDE 25 MG/ML
25 INJECTION INTRAMUSCULAR; INTRAVENOUS; SUBCUTANEOUS EVERY 30 MIN PRN
Status: CANCELLED | OUTPATIENT
Start: 2024-07-03

## 2024-07-02 RX ORDER — ONDANSETRON 2 MG/ML
4 INJECTION INTRAMUSCULAR; INTRAVENOUS EVERY 6 HOURS PRN
Status: CANCELLED
Start: 2024-07-10

## 2024-07-02 RX ORDER — DIPHENHYDRAMINE HYDROCHLORIDE 50 MG/ML
50 INJECTION INTRAMUSCULAR; INTRAVENOUS
Status: CANCELLED
Start: 2024-07-10

## 2024-07-02 RX ORDER — DIPHENHYDRAMINE HYDROCHLORIDE 50 MG/ML
50 INJECTION INTRAMUSCULAR; INTRAVENOUS
Status: CANCELLED
Start: 2024-07-03

## 2024-07-02 RX ORDER — PACLITAXEL 100 MG/20ML
100 INJECTION, POWDER, LYOPHILIZED, FOR SUSPENSION INTRAVENOUS ONCE
Status: CANCELLED
Start: 2024-07-03 | End: 2024-07-03

## 2024-07-02 RX ORDER — ALBUTEROL SULFATE 90 UG/1
1-2 AEROSOL, METERED RESPIRATORY (INHALATION)
Status: CANCELLED
Start: 2024-07-03

## 2024-07-02 RX ORDER — DIPHENHYDRAMINE HCL 25 MG
50 CAPSULE ORAL
Status: CANCELLED
Start: 2024-07-17

## 2024-07-02 RX ORDER — HEPARIN SODIUM,PORCINE 10 UNIT/ML
5-20 VIAL (ML) INTRAVENOUS DAILY PRN
Status: CANCELLED | OUTPATIENT
Start: 2024-07-10

## 2024-07-02 RX ORDER — HEPARIN SODIUM (PORCINE) LOCK FLUSH IV SOLN 100 UNIT/ML 100 UNIT/ML
5 SOLUTION INTRAVENOUS
Status: CANCELLED | OUTPATIENT
Start: 2024-07-17

## 2024-07-02 RX ORDER — MEPERIDINE HYDROCHLORIDE 25 MG/ML
25 INJECTION INTRAMUSCULAR; INTRAVENOUS; SUBCUTANEOUS EVERY 30 MIN PRN
Status: CANCELLED | OUTPATIENT
Start: 2024-07-17

## 2024-07-02 RX ORDER — LORAZEPAM 2 MG/ML
0.5 INJECTION INTRAMUSCULAR EVERY 4 HOURS PRN
Status: CANCELLED | OUTPATIENT
Start: 2024-07-17

## 2024-07-02 RX ORDER — DIPHENHYDRAMINE HCL 25 MG
50 CAPSULE ORAL
Status: CANCELLED
Start: 2024-07-10

## 2024-07-02 RX ORDER — METHYLPREDNISOLONE SODIUM SUCCINATE 125 MG/2ML
125 INJECTION, POWDER, LYOPHILIZED, FOR SOLUTION INTRAMUSCULAR; INTRAVENOUS
Status: CANCELLED
Start: 2024-07-03

## 2024-07-02 RX ORDER — EPINEPHRINE 1 MG/ML
0.3 INJECTION, SOLUTION INTRAMUSCULAR; SUBCUTANEOUS EVERY 5 MIN PRN
Status: CANCELLED | OUTPATIENT
Start: 2024-07-17

## 2024-07-02 RX ORDER — HEPARIN SODIUM,PORCINE 10 UNIT/ML
5-20 VIAL (ML) INTRAVENOUS DAILY PRN
Status: CANCELLED | OUTPATIENT
Start: 2024-07-17

## 2024-07-02 RX ORDER — METHYLPREDNISOLONE SODIUM SUCCINATE 125 MG/2ML
125 INJECTION, POWDER, LYOPHILIZED, FOR SOLUTION INTRAMUSCULAR; INTRAVENOUS
Status: CANCELLED
Start: 2024-07-10

## 2024-07-02 RX ORDER — ALBUTEROL SULFATE 0.83 MG/ML
2.5 SOLUTION RESPIRATORY (INHALATION)
Status: CANCELLED | OUTPATIENT
Start: 2024-07-10

## 2024-07-02 RX ORDER — DIPHENHYDRAMINE HCL 25 MG
50 CAPSULE ORAL
Status: CANCELLED
Start: 2024-07-03

## 2024-07-02 RX ORDER — PACLITAXEL 100 MG/20ML
100 INJECTION, POWDER, LYOPHILIZED, FOR SUSPENSION INTRAVENOUS ONCE
Status: CANCELLED
Start: 2024-07-17 | End: 2024-07-17

## 2024-07-02 RX ORDER — LORAZEPAM 2 MG/ML
0.5 INJECTION INTRAMUSCULAR EVERY 4 HOURS PRN
Status: CANCELLED | OUTPATIENT
Start: 2024-07-10

## 2024-07-02 RX ORDER — ONDANSETRON 2 MG/ML
4 INJECTION INTRAMUSCULAR; INTRAVENOUS EVERY 6 HOURS PRN
Status: CANCELLED
Start: 2024-07-03

## 2024-07-02 ASSESSMENT — PAIN SCALES - GENERAL: PAINLEVEL: NO PAIN (0)

## 2024-07-02 NOTE — LETTER
7/2/2024      Sharon Fong  [address] [facility] [address]      Dear Colleague,    Thank you for referring your patient, Sharon Fong, to the [facility]. Please see a copy of my visit note below.    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 She started screening for the ISPY 2.2 trial and was found to have a MammaPrint high risk/blueprint luminal B subtype.  She was started on treatment in block B due to lack of availability of block A agents.  Her course has been complicated by allergic reaction to Taxol requiring change to Abraxane.  Her 3-week MRI showed a tumor volume reduction of 28.9% (did not meet prespecified threshold) and therefore she received a 6-week MRI demonstrated a 57% reduction tumor volume from baseline (which again did not meet prespecified threshold) to continue on block B.  The recommendations from my spine were to escalate related to AC.  However, in light of ongoing significant response to Abraxane, we decided to continue to complete the full 12 weeks of treatment.  She presents today prior to C4D1 of Abraxane.    Interval History  Since her last visit here, she continues to experience adverse effects related to treatment. This past week, she endorses loose stools/diarrhea and bleeding from hemorrhoids.  She took Imodium to help with the diarrhea and unfortunately this caused nausea.  She then took Zofran to help with her nausea which unfortunately caused headaches.    She also endorses significant arthralgias in many joints.  She is taking Advil which is helping.    Her fatigue continues to be progressive.    Fortunately, she does not have any neuropathy.  She is icing during her infusion.    She feels her left breast mass is  slightly smaller, though largely stable compared to prior.    No other symptoms.      Her full oncologic history is as follows:   Oncologic History  Patient Active Problem List    Diagnosis Date Noted     Malignant neoplasm of upper-inner quadrant of right female breast (H) 2024     Priority: High     Right breast cancer  Has had longstanding history of multiple biopsies, infection, and partial mastectomy for a right breast multifocal abscess dating back to at least .     patient presented with palpable lump at 1:00 in the right breast    3/21/2024 diagnostic mammo showed clustered fine indeterminate calcs at 12:00.  No mammographic abnormality identified to correspond to the palpable area of fullness at 1:00.  On US, at 1:00, 10 cm FN there was a 3.7 x 2.3 x 1.5 cm solid hypoechoic irregularly marginated mass.  Additional hypoechoic lesion located at 2:00, 3 cm FN measuring 1.0 x 0.5 cm.  Another hypoechoic lesion at 2:00, 12 cm FN measuring 0.7 cm.  At least 2 enlarged right axillary lymph nodes -largest measuring up to 0.6 cm.    3/28/2024 ultrasound-guided biopsy of the followin:00 lesion at least DCIS  1:00 lesion grade 2 IDC.  No LVI.  ER (3+, 80 to 90%), TN (3+, 90 to 100%), HER2 2+ and FISH negative   Right axillary LN: metastatic carcinoma    2024 CT CAP demonstrated multifocal enhancement in the right breast with mildly abnormal right axillary lymph nodes.  Otherwise no evidence of distant metastatic disease.    2024 NM bone scan negative for osteoblastic metastatic disease.    4/15/2024 MRI breast demonstrated the following  Right breast at 1:00 mass measuring 2.6 x 2.0 by 3.0 cm with extensive surrounding clumped NME occupying most of the right breast and measuring 8.9 x 4.7 x 8.7   Left breast showed a fibroadenoma at 8-9:00 position.  In addition, 7 mm of linear branching NME at 3-4:00 position.  This was biopsied and found to be benign breast tissue.  Few asymmetric right  internal mammary chain LN. multiple enlarged right level 1 axillary nodes.  Additionally multiple LEFT level 1 axillary nodes that are indeterminant -which were normal on left axillary US.    4/26/2024 MammaPrint -0.192 (high risk) and blueprint luminal B    4/30/2024 started weekly Taxol -changed to Abraxane starting week 2 due to allergic reaction    5/17/2024 W3 MRI  Minimally decreased size of the postoperative right breast cancer at the approximate 1:00 position measuring approximately 2.4 x 1.9 x 2.9 cm (previously 2.6 x 2.0 x 3.0 cm).  There is overall similar extent although mildly decreased volume of abnormal surrounding NME  Minimal interval enlargement of multiple bilateral level I lymph nodes, including the biopsy-proven right axillary node with indwelling biopsy marker. No significant change in the multiple small asymmetric right internal mammary chain lymph nodes.  Tumor volume reduction of 28.9% from baseline     6/10/2024 (6-week MRI breast) 8.8 cm anteroposterior on axial MIPS, unchanged.  Mildly decreased size of the biopsy-proven cancer in the right breast at the approximate 1:00 position middle depth measuring approximately 2.0 x 1.4 x 2.4 cm (previously 2.4 x 1.9 x 2.9 cm).   Extensive surrounding NME is largely unchanged changed, but the volume has minimally decreased.  Stable bilateral axillary lymph nodes, including the biopsy-proven right level I axillary node. Stable asymmetric small right internal mammary chain lymph nodes.  FTV reduction of 57% from baseline        Inflammatory arthritis 06/01/2015     Priority: Medium     Seizure (H) 11/15/2013     Priority: Medium     Vitamin D deficiency 04/23/2013     Priority: Medium            Current Medications:  Current Outpatient Medications   Medication Sig Dispense Refill     FT ANTACID & ANTIGAS 200-200-20 MG/5ML SUSP suspension        acetaminophen (TYLENOL) 500 MG tablet Take 500 mg by mouth as needed for mild pain (Patient not taking:  Reported on 6/6/2024)       amitriptyline (ELAVIL) 10 MG tablet Take 20 mg by mouth at bedtime       aspirin-acetaminophen-caffeine (EXCEDRIN MIGRAINE) 250-250-65 MG tablet        ibuprofen (ADVIL/MOTRIN) 200 MG capsule        lidocaine-prilocaine (EMLA) 2.5-2.5 % external cream Apply topically as needed for moderate pain 30 g 1     magic mouthwash suspension (diphenhydrAMINE, lidocaine, aluminum-magnesium & simethicone) Swish and swallow 10 mLs in mouth every 6 hours as needed for mouth sores (Patient not taking: Reported on 6/20/2024) 100 mL 1     ondansetron (ZOFRAN) 8 MG tablet Take 1 tablet (8 mg) by mouth every 8 hours as needed for nausea 30 tablet 3         ECOG Performance Status: 0    Physical Examination:  /74 (BP Location: Right arm, Patient Position: Sitting, Cuff Size: Adult Regular)   Pulse 89   Temp 98.5  F (36.9  C) (Oral)   Resp 16   Wt 73.9 kg (163 lb)   SpO2 98%   BMI 27.49 kg/m    General:  Well appearing, well-nourished adult female in NAD.  HEENT:  Normocephalic.  Sclera anicteric.  MMM.  No lesions of the oropharynx.  Breast: 2.5 x 2.5cm mass at 1:00 7cm FN - largely stable   Lymph:  palpable axillary LN bilaterally -stable compared to prior  Chest:  Breathing comfortably on room air  Abd:  Soft/NT/ND.  No hepatosplenomegaly.  Ext:  No pitting edema of the bilateral lower extremities.    Musculo:  Strength 5/5 throughout.  Neuro:  Cranial nerves grossly intact.  Psych:  Mood and affect appear normal.      Laboratory Data:  Lab Results   Component Value Date    WBC 4.1 07/03/2024    HGB 9.8 (L) 07/03/2024    HCT 29.4 (L) 07/03/2024    MCV 88 07/03/2024     07/03/2024     Lab Results   Component Value Date     07/03/2024    BUN 12.4 07/03/2024    ANIONGAP 11 07/03/2024     Lab Results   Component Value Date    ALT 59 (H) 07/03/2024    AST 40 07/03/2024    ALKPHOS 57 07/03/2024     Lab Results   Component Value Date    INR 1.02 04/17/2024         Radiology data:  I have  personally reviewed the images of / or the following radiology data: As detailed in the oncology timeline      Pathology and other data:  I have personally reviewed the following data: As detailed in the oncology timeline      Assessment and Recommendations  Sharon Fong  is a 48 year old premenopausal woman with a new diagnosis of 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  She is on the I SPY 2.2 clinical trial and is receiving weekly Abraxane.  She presents today for follow-up.      # Right breast cancer  - Continue neoadjuvant Abraxane  - She will have another MRI and biopsy at week 12.  Since there will most likely be disease in her breast, she wonders why she needs a lymph node biopsy.  Will reach out to our research team to clarify this for her.  - She has already established care with Dr. Collier and is planned to have a mastectomy after neoadjuvant chemotherapy  - Will need adjuvant radiation therapy -will place referral after her surgery    # Arthralgias  likely associated with Taxol though she also has a history of hypermobility/inflammatory arthritis which could be exacerbated by current systemic therapy.  -Okay to use NSAIDs -though not on a daily basis  -Can consider longer acting ones a such as naproxen    # Contralateral (left) axillary lymph node enlargement  Indeterminant  - Will continue to follow on subsequent scans.  These appeared normal on baseline ultrasound    #Hx of migraines  - Zofran has been added back for premeds as she did not feel well on Compazine  - She will reach out to us if nausea becomes an issue so we can consider additional agents  - Once she starts AC, she will be receiving Aloxi.  Discussed that her headaches may potentially get worse on this.  If that occurs after cycle 1, will need to consider alternative antiemetic premeds for her including olanzapine      35 minutes spent on the date of the encounter doing chart review, review of test  results, interpretation of tests, patient visit, documentation, and discussion with other provider(s)       Dame Dolores MD   of Medicine  Division of Hematology, Oncology and Transplantation  [facility]                Again, thank you for allowing me to participate in the care of your patient.        Sincerely,        Dame Dolores MD

## 2024-07-02 NOTE — NURSING NOTE
"Oncology Rooming Note    July 2, 2024 3:49 PM   Sharon Fong is a 48 year old female who presents for:    Chief Complaint   Patient presents with    Oncology Clinic Visit     Malignant neoplasm of upper-inner quadrant of right breast in female, estrogen receptor positive     Initial Vitals: /74 (BP Location: Right arm, Patient Position: Sitting, Cuff Size: Adult Regular)   Pulse 89   Temp 98.5  F (36.9  C) (Oral)   Resp 16   Wt 73.9 kg (163 lb)   SpO2 98%   BMI 27.49 kg/m   Estimated body mass index is 27.49 kg/m  as calculated from the following:    Height as of 4/17/24: 1.64 m (5' 4.57\").    Weight as of this encounter: 73.9 kg (163 lb). Body surface area is 1.83 meters squared.  No Pain (0) Comment: Data Unavailable   No LMP recorded. Patient has had a hysterectomy.  Allergies reviewed: Yes  Medications reviewed: Yes    Medications: Medication refills not needed today.  Pharmacy name entered into [facility]: [facility] [address] [place] [address] - [address] [place] [address]    Frailty Screening:   Is the patient here for a new oncology consult visit in cancer care? 2. No      Clinical concerns: none.       Torsten Mckeon"

## 2024-07-02 NOTE — PROGRESS NOTES
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 She started screening for the ISPY 2.2 trial and was found to have a MammaPrint high risk/blueprint luminal B subtype.  She was started on treatment in block B due to lack of availability of block A agents.  Her course has been complicated by allergic reaction to Taxol requiring change to Abraxane.  Her 3-week MRI showed a tumor volume reduction of 28.9% (did not meet prespecified threshold) and therefore she received a 6-week MRI demonstrated a 57% reduction tumor volume from baseline (which again did not meet prespecified threshold) to continue on block B.  The recommendations from my spine were to escalate related to AC.  However, in light of ongoing significant response to Abraxane, we decided to continue to complete the full 12 weeks of treatment.  She presents today prior to C4D1 of Abraxane.    Interval History  Since her last visit here, she continues to experience adverse effects related to treatment. This past week, she endorses loose stools/diarrhea and bleeding from hemorrhoids.  She took Imodium to help with the diarrhea and unfortunately this caused nausea.  She then took Zofran to help with her nausea which unfortunately caused headaches.    She also endorses significant arthralgias in many joints.  She is taking Advil which is helping.    Her fatigue continues to be progressive.    Fortunately, she does not have any neuropathy.  She is icing during her infusion.    She feels her left breast mass is slightly smaller, though largely stable compared to prior.    No other symptoms.      Her full oncologic history is as follows:   Oncologic History  Patient Active Problem List    Diagnosis Date Noted    Malignant neoplasm of upper-inner quadrant of right  female breast (H) 2024     Priority: High     Right breast cancer  Has had longstanding history of multiple biopsies, infection, and partial mastectomy for a right breast multifocal abscess dating back to at least 2024 patient presented with palpable lump at 1:00 in the right breast    3/21/2024 diagnostic mammo showed clustered fine indeterminate calcs at 12:00.  No mammographic abnormality identified to correspond to the palpable area of fullness at 1:00.  On US, at 1:00, 10 cm FN there was a 3.7 x 2.3 x 1.5 cm solid hypoechoic irregularly marginated mass.  Additional hypoechoic lesion located at 2:00, 3 cm FN measuring 1.0 x 0.5 cm.  Another hypoechoic lesion at 2:00, 12 cm FN measuring 0.7 cm.  At least 2 enlarged right axillary lymph nodes -largest measuring up to 0.6 cm.    3/28/2024 ultrasound-guided biopsy of the followin:00 lesion at least DCIS  1:00 lesion grade 2 IDC.  No LVI.  ER (3+, 80 to 90%), PA (3+, 90 to 100%), HER2 2+ and FISH negative   Right axillary LN: metastatic carcinoma    2024 CT CAP demonstrated multifocal enhancement in the right breast with mildly abnormal right axillary lymph nodes.  Otherwise no evidence of distant metastatic disease.    2024 NM bone scan negative for osteoblastic metastatic disease.    4/15/2024 MRI breast demonstrated the following  Right breast at 1:00 mass measuring 2.6 x 2.0 by 3.0 cm with extensive surrounding clumped NME occupying most of the right breast and measuring 8.9 x 4.7 x 8.7   Left breast showed a fibroadenoma at 8-9:00 position.  In addition, 7 mm of linear branching NME at 3-4:00 position.  This was biopsied and found to be benign breast tissue.  Few asymmetric right internal mammary chain LN. multiple enlarged right level 1 axillary nodes.  Additionally multiple LEFT level 1 axillary nodes that are indeterminant -which were normal on left axillary US.    2024 MammaPrint -0.192 (high risk) and blueprint luminal  B    4/30/2024 started weekly Taxol -changed to Abraxane starting week 2 due to allergic reaction    5/17/2024 W3 MRI  Minimally decreased size of the postoperative right breast cancer at the approximate 1:00 position measuring approximately 2.4 x 1.9 x 2.9 cm (previously 2.6 x 2.0 x 3.0 cm).  There is overall similar extent although mildly decreased volume of abnormal surrounding NME  Minimal interval enlargement of multiple bilateral level I lymph nodes, including the biopsy-proven right axillary node with indwelling biopsy marker. No significant change in the multiple small asymmetric right internal mammary chain lymph nodes.  Tumor volume reduction of 28.9% from baseline     6/10/2024 (6-week MRI breast) 8.8 cm anteroposterior on axial MIPS, unchanged.  Mildly decreased size of the biopsy-proven cancer in the right breast at the approximate 1:00 position middle depth measuring approximately 2.0 x 1.4 x 2.4 cm (previously 2.4 x 1.9 x 2.9 cm).   Extensive surrounding NME is largely unchanged changed, but the volume has minimally decreased.  Stable bilateral axillary lymph nodes, including the biopsy-proven right level I axillary node. Stable asymmetric small right internal mammary chain lymph nodes.  FTV reduction of 57% from baseline       Inflammatory arthritis 06/01/2015     Priority: Medium    Seizure (H) 11/15/2013     Priority: Medium    Vitamin D deficiency 04/23/2013     Priority: Medium            Current Medications:  Current Outpatient Medications   Medication Sig Dispense Refill    FT ANTACID & ANTIGAS 200-200-20 MG/5ML SUSP suspension       acetaminophen (TYLENOL) 500 MG tablet Take 500 mg by mouth as needed for mild pain (Patient not taking: Reported on 6/6/2024)      amitriptyline (ELAVIL) 10 MG tablet Take 20 mg by mouth at bedtime      aspirin-acetaminophen-caffeine (EXCEDRIN MIGRAINE) 250-250-65 MG tablet       ibuprofen (ADVIL/MOTRIN) 200 MG capsule       lidocaine-prilocaine (EMLA) 2.5-2.5 %  external cream Apply topically as needed for moderate pain 30 g 1    magic mouthwash suspension (diphenhydrAMINE, lidocaine, aluminum-magnesium & simethicone) Swish and swallow 10 mLs in mouth every 6 hours as needed for mouth sores (Patient not taking: Reported on 6/20/2024) 100 mL 1    ondansetron (ZOFRAN) 8 MG tablet Take 1 tablet (8 mg) by mouth every 8 hours as needed for nausea 30 tablet 3         ECOG Performance Status: 0    Physical Examination:  /74 (BP Location: Right arm, Patient Position: Sitting, Cuff Size: Adult Regular)   Pulse 89   Temp 98.5  F (36.9  C) (Oral)   Resp 16   Wt 73.9 kg (163 lb)   SpO2 98%   BMI 27.49 kg/m    General:  Well appearing, well-nourished adult female in NAD.  HEENT:  Normocephalic.  Sclera anicteric.  MMM.  No lesions of the oropharynx.  Breast: 2.5 x 2.5cm mass at 1:00 7cm FN - largely stable   Lymph:  palpable axillary LN bilaterally -stable compared to prior  Chest:  Breathing comfortably on room air  Abd:  Soft/NT/ND.  No hepatosplenomegaly.  Ext:  No pitting edema of the bilateral lower extremities.    Musculo:  Strength 5/5 throughout.  Neuro:  Cranial nerves grossly intact.  Psych:  Mood and affect appear normal.      Laboratory Data:  Lab Results   Component Value Date    WBC 4.1 07/03/2024    HGB 9.8 (L) 07/03/2024    HCT 29.4 (L) 07/03/2024    MCV 88 07/03/2024     07/03/2024     Lab Results   Component Value Date     07/03/2024    BUN 12.4 07/03/2024    ANIONGAP 11 07/03/2024     Lab Results   Component Value Date    ALT 59 (H) 07/03/2024    AST 40 07/03/2024    ALKPHOS 57 07/03/2024     Lab Results   Component Value Date    INR 1.02 04/17/2024         Radiology data:  I have personally reviewed the images of / or the following radiology data: As detailed in the oncology timeline      Pathology and other data:  I have personally reviewed the following data: As detailed in the oncology timeline      Assessment and Recommendations  Sharon  CHASE Fong  is a 48 year old premenopausal woman with a new diagnosis of 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  She is on the I SPY 2.2 clinical trial and is receiving weekly Abraxane.  She presents today for follow-up.      # Right breast cancer  - Continue neoadjuvant Abraxane  - She will have another MRI and biopsy at week 12.  Since there will most likely be disease in her breast, she wonders why she needs a lymph node biopsy.  Will reach out to our research team to clarify this for her.  - She has already established care with Dr. Collier and is planned to have a mastectomy after neoadjuvant chemotherapy  - Will need adjuvant radiation therapy -will place referral after her surgery    # Arthralgias  likely associated with Taxol though she also has a history of hypermobility/inflammatory arthritis which could be exacerbated by current systemic therapy.  -Okay to use NSAIDs -though not on a daily basis  -Can consider longer acting ones a such as naproxen    # Contralateral (left) axillary lymph node enlargement  Indeterminant  - Will continue to follow on subsequent scans.  These appeared normal on baseline ultrasound    #Hx of migraines  - Zofran has been added back for premeds as she did not feel well on Compazine  - She will reach out to us if nausea becomes an issue so we can consider additional agents  - Once she starts AC, she will be receiving Aloxi.  Discussed that her headaches may potentially get worse on this.  If that occurs after cycle 1, will need to consider alternative antiemetic premeds for her including olanzapine      35 minutes spent on the date of the encounter doing chart review, review of test results, interpretation of tests, patient visit, documentation, and discussion with other provider(s)       Dame Dolores MD   of Medicine  Division of Hematology, Oncology and Transplantation  [facility]

## 2024-07-02 NOTE — PROGRESS NOTES
Diarrhea - started bleeding from hemorroids. Took imodium - caused nausea. Took zofran - caused headaches.   Arthralgias - taking advil   Fatigue   No neuropathy     Biopsy of LN?       Wt Readings from Last 10 Encounters:   07/02/24 73.9 kg (163 lb)   06/27/24 74.3 kg (163 lb 14.4 oz)   06/20/24 73.9 kg (163 lb)   06/14/24 73.5 kg (162 lb)   06/11/24 74 kg (163 lb 1.6 oz)   06/06/24 73 kg (160 lb 14.4 oz)   05/30/24 72.3 kg (159 lb 4.8 oz)   05/23/24 73.1 kg (161 lb 1.6 oz)   05/21/24 72.6 kg (160 lb)   05/15/24 71.5 kg (157 lb 9.6 oz)

## 2024-07-03 ENCOUNTER — INFUSION THERAPY VISIT (OUTPATIENT)
Dept: ONCOLOGY | Facility: CLINIC | Age: 49
End: 2024-07-03
Attending: INTERNAL MEDICINE
Payer: COMMERCIAL

## 2024-07-03 ENCOUNTER — APPOINTMENT (OUTPATIENT)
Dept: LAB | Facility: CLINIC | Age: 49
End: 2024-07-03
Attending: INTERNAL MEDICINE
Payer: COMMERCIAL

## 2024-07-03 VITALS
WEIGHT: 166.3 LBS | OXYGEN SATURATION: 100 % | RESPIRATION RATE: 16 BRPM | SYSTOLIC BLOOD PRESSURE: 107 MMHG | DIASTOLIC BLOOD PRESSURE: 73 MMHG | BODY MASS INDEX: 28.05 KG/M2 | HEART RATE: 88 BPM | TEMPERATURE: 98.1 F

## 2024-07-03 DIAGNOSIS — C50.211 MALIGNANT NEOPLASM OF UPPER-INNER QUADRANT OF RIGHT BREAST IN FEMALE, ESTROGEN RECEPTOR POSITIVE (H): Primary | ICD-10-CM

## 2024-07-03 DIAGNOSIS — Z17.0 MALIGNANT NEOPLASM OF UPPER-INNER QUADRANT OF RIGHT BREAST IN FEMALE, ESTROGEN RECEPTOR POSITIVE (H): Primary | ICD-10-CM

## 2024-07-03 LAB
ALBUMIN SERPL BCG-MCNC: 4.2 G/DL (ref 3.5–5.2)
ALP SERPL-CCNC: 57 U/L (ref 40–150)
ALT SERPL W P-5'-P-CCNC: 59 U/L (ref 0–50)
ANION GAP SERPL CALCULATED.3IONS-SCNC: 11 MMOL/L (ref 7–15)
AST SERPL W P-5'-P-CCNC: 40 U/L (ref 0–45)
BASOPHILS # BLD AUTO: 0.1 10E3/UL (ref 0–0.2)
BASOPHILS NFR BLD AUTO: 2 %
BILIRUB SERPL-MCNC: 0.6 MG/DL
BUN SERPL-MCNC: 12.4 MG/DL (ref 6–20)
CALCIUM SERPL-MCNC: 9.4 MG/DL (ref 8.6–10)
CHLORIDE SERPL-SCNC: 104 MMOL/L (ref 98–107)
CREAT SERPL-MCNC: 0.51 MG/DL (ref 0.51–0.95)
DEPRECATED HCO3 PLAS-SCNC: 24 MMOL/L (ref 22–29)
EGFRCR SERPLBLD CKD-EPI 2021: >90 ML/MIN/1.73M2
EOSINOPHIL # BLD AUTO: 0.1 10E3/UL (ref 0–0.7)
EOSINOPHIL NFR BLD AUTO: 3 %
ERYTHROCYTE [DISTWIDTH] IN BLOOD BY AUTOMATED COUNT: 14.4 % (ref 10–15)
GLUCOSE SERPL-MCNC: 115 MG/DL (ref 70–99)
HCT VFR BLD AUTO: 29.4 % (ref 35–47)
HGB BLD-MCNC: 9.8 G/DL (ref 11.7–15.7)
IMM GRANULOCYTES # BLD: 0 10E3/UL
IMM GRANULOCYTES NFR BLD: 1 %
LYMPHOCYTES # BLD AUTO: 1.5 10E3/UL (ref 0.8–5.3)
LYMPHOCYTES NFR BLD AUTO: 36 %
MCH RBC QN AUTO: 29.2 PG (ref 26.5–33)
MCHC RBC AUTO-ENTMCNC: 33.3 G/DL (ref 31.5–36.5)
MCV RBC AUTO: 88 FL (ref 78–100)
MONOCYTES # BLD AUTO: 0.3 10E3/UL (ref 0–1.3)
MONOCYTES NFR BLD AUTO: 7 %
NEUTROPHILS # BLD AUTO: 2.1 10E3/UL (ref 1.6–8.3)
NEUTROPHILS NFR BLD AUTO: 51 %
NRBC # BLD AUTO: 0 10E3/UL
NRBC BLD AUTO-RTO: 0 /100
PLATELET # BLD AUTO: 313 10E3/UL (ref 150–450)
POTASSIUM SERPL-SCNC: 4.2 MMOL/L (ref 3.4–5.3)
PROT SERPL-MCNC: 6.8 G/DL (ref 6.4–8.3)
RBC # BLD AUTO: 3.36 10E6/UL (ref 3.8–5.2)
SODIUM SERPL-SCNC: 139 MMOL/L (ref 135–145)
WBC # BLD AUTO: 4.1 10E3/UL (ref 4–11)

## 2024-07-03 PROCEDURE — 36591 DRAW BLOOD OFF VENOUS DEVICE: CPT

## 2024-07-03 PROCEDURE — 96375 TX/PRO/DX INJ NEW DRUG ADDON: CPT

## 2024-07-03 PROCEDURE — 258N000003 HC RX IP 258 OP 636: Performed by: INTERNAL MEDICINE

## 2024-07-03 PROCEDURE — 250N000011 HC RX IP 250 OP 636: Performed by: INTERNAL MEDICINE

## 2024-07-03 PROCEDURE — 85004 AUTOMATED DIFF WBC COUNT: CPT | Performed by: INTERNAL MEDICINE

## 2024-07-03 PROCEDURE — 96413 CHEMO IV INFUSION 1 HR: CPT

## 2024-07-03 PROCEDURE — 82040 ASSAY OF SERUM ALBUMIN: CPT

## 2024-07-03 RX ORDER — ONDANSETRON 2 MG/ML
4 INJECTION INTRAMUSCULAR; INTRAVENOUS EVERY 6 HOURS PRN
Status: DISCONTINUED | OUTPATIENT
Start: 2024-07-03 | End: 2024-07-03 | Stop reason: HOSPADM

## 2024-07-03 RX ORDER — HEPARIN SODIUM (PORCINE) LOCK FLUSH IV SOLN 100 UNIT/ML 100 UNIT/ML
5 SOLUTION INTRAVENOUS
Status: DISCONTINUED | OUTPATIENT
Start: 2024-07-03 | End: 2024-07-03 | Stop reason: HOSPADM

## 2024-07-03 RX ORDER — HEPARIN SODIUM (PORCINE) LOCK FLUSH IV SOLN 100 UNIT/ML 100 UNIT/ML
500 SOLUTION INTRAVENOUS ONCE
Status: COMPLETED | OUTPATIENT
Start: 2024-07-03 | End: 2024-07-03

## 2024-07-03 RX ORDER — PACLITAXEL 100 MG/20ML
100 INJECTION, POWDER, LYOPHILIZED, FOR SUSPENSION INTRAVENOUS ONCE
Status: COMPLETED | OUTPATIENT
Start: 2024-07-03 | End: 2024-07-03

## 2024-07-03 RX ADMIN — HEPARIN 5 ML: 100 SYRINGE at 17:13

## 2024-07-03 RX ADMIN — ONDANSETRON 4 MG: 2 INJECTION INTRAMUSCULAR; INTRAVENOUS at 15:58

## 2024-07-03 RX ADMIN — PACLITAXEL 180 MG: 100 INJECTION, POWDER, LYOPHILIZED, FOR SUSPENSION INTRAVENOUS at 16:27

## 2024-07-03 RX ADMIN — SODIUM CHLORIDE 250 ML: 9 INJECTION, SOLUTION INTRAVENOUS at 15:54

## 2024-07-03 RX ADMIN — Medication 500 UNITS: at 15:01

## 2024-07-03 ASSESSMENT — PAIN SCALES - GENERAL: PAINLEVEL: NO PAIN (0)

## 2024-07-03 NOTE — NURSING NOTE
Chief Complaint   Patient presents with    Port Draw     Labs drawn via port by RN in lab     Port accessed with 20 gauge, 3/4 inch, flat needle by RN, labs collected, line flushed with saline and heparin.  Vitals taken. Pt checked in for appointment(s).    Marleny Talavera RN

## 2024-07-03 NOTE — PROGRESS NOTES
Infusion Nursing Note:  Sharon Fong presents today for Cycle 10, Day 1- Abraxane Infusion.    Patient seen by provider today: Yes: Dr. Bernal on 7/2/24   present during visit today: Not Applicable.    Note: Patient reports feeling OK on arrival to infusion suite today. Denies the following signs of infection: no fever, cough, chills, rash, chest pain, or shortness of breath. Reports diarrhea, taking Imodium. Has had some bleeding with hemorrhoids. Discussed this with Dr. Bernal yesterday, no changes overnight. No new questions or concerns. Wishes to proceed with treatment today.     Patient ices hands and feet and chews on ice chips during chemo infusion.       Intravenous Access:  Implanted Port.    Treatment Conditions:  Lab Results   Component Value Date    HGB 9.8 (L) 07/03/2024    WBC 4.1 07/03/2024    ANEUTAUTO 2.1 07/03/2024     07/03/2024        Lab Results   Component Value Date     07/03/2024    POTASSIUM 4.2 07/03/2024    MAG 2.1 04/17/2024    CR 0.51 07/03/2024    JJ 9.4 07/03/2024    BILITOTAL 0.6 07/03/2024    ALBUMIN 4.2 07/03/2024    ALT 59 (H) 07/03/2024    AST 40 07/03/2024       Results reviewed, labs MET treatment parameters, ok to proceed with treatment.      Post Infusion Assessment:  Patient tolerated infusion without incident.  Blood return noted pre and post infusion.  Site patent and intact, free from redness, edema or discomfort.  No evidence of extravasations.  Access discontinued per protocol.       Discharge Plan:   Patient declined prescription refills.  Discharge instructions reviewed with: Patient.  Patient and/or family verbalized understanding of discharge instructions and all questions answered.  AVS to patient via [employer].  Patient will return 7/11/24 for next appointment.   Patient discharged in stable condition accompanied by: family.  Departure Mode: Ambulatory.      Selam Barone, RN

## 2024-07-03 NOTE — PATIENT INSTRUCTIONS
[facility] Triage and after hours / weekends / holidays:  [phone]    Please call the triage or after hours line if you experience a temperature greater than or equal to 100.4, shaking chills, have uncontrolled nausea, vomiting and/or diarrhea, dizziness, shortness of breath, chest pain, bleeding, unexplained bruising, or if you have any other new/concerning symptoms, questions or concerns.      If you are having any concerning symptoms or wish to speak to a provider before your next infusion visit, please call triage to notify them so we can adequately serve you.     If you need a refill on a narcotic prescription or other medication, please call before your infusion appointment.

## 2024-07-11 ENCOUNTER — INFUSION THERAPY VISIT (OUTPATIENT)
Dept: ONCOLOGY | Facility: CLINIC | Age: 49
End: 2024-07-11
Attending: INTERNAL MEDICINE
Payer: COMMERCIAL

## 2024-07-11 ENCOUNTER — APPOINTMENT (OUTPATIENT)
Dept: LAB | Facility: CLINIC | Age: 49
End: 2024-07-11
Attending: INTERNAL MEDICINE
Payer: COMMERCIAL

## 2024-07-11 ENCOUNTER — ALLIED HEALTH/NURSE VISIT (OUTPATIENT)
Dept: ONCOLOGY | Facility: CLINIC | Age: 49
End: 2024-07-11

## 2024-07-11 VITALS
WEIGHT: 165.7 LBS | TEMPERATURE: 98.4 F | HEART RATE: 91 BPM | RESPIRATION RATE: 16 BRPM | BODY MASS INDEX: 27.94 KG/M2 | OXYGEN SATURATION: 99 % | DIASTOLIC BLOOD PRESSURE: 75 MMHG | SYSTOLIC BLOOD PRESSURE: 110 MMHG

## 2024-07-11 DIAGNOSIS — Z17.0 MALIGNANT NEOPLASM OF UPPER-INNER QUADRANT OF RIGHT BREAST IN FEMALE, ESTROGEN RECEPTOR POSITIVE (H): Primary | ICD-10-CM

## 2024-07-11 DIAGNOSIS — Z17.0 MALIGNANT NEOPLASM OF UPPER-INNER QUADRANT OF RIGHT BREAST IN FEMALE, ESTROGEN RECEPTOR POSITIVE (H): ICD-10-CM

## 2024-07-11 DIAGNOSIS — C50.211 MALIGNANT NEOPLASM OF UPPER-INNER QUADRANT OF RIGHT BREAST IN FEMALE, ESTROGEN RECEPTOR POSITIVE (H): ICD-10-CM

## 2024-07-11 DIAGNOSIS — C50.211 MALIGNANT NEOPLASM OF UPPER-INNER QUADRANT OF RIGHT BREAST IN FEMALE, ESTROGEN RECEPTOR POSITIVE (H): Primary | ICD-10-CM

## 2024-07-11 DIAGNOSIS — Z00.6 EXAMINATION OF PARTICIPANT OR CONTROL IN CLINICAL RESEARCH: ICD-10-CM

## 2024-07-11 LAB
ALBUMIN SERPL BCG-MCNC: 4.2 G/DL (ref 3.5–5.2)
ALP SERPL-CCNC: 58 U/L (ref 40–150)
ALT SERPL W P-5'-P-CCNC: 49 U/L (ref 0–50)
ANION GAP SERPL CALCULATED.3IONS-SCNC: 10 MMOL/L (ref 7–15)
AST SERPL W P-5'-P-CCNC: 36 U/L (ref 0–45)
BASOPHILS # BLD AUTO: 0.1 10E3/UL (ref 0–0.2)
BASOPHILS NFR BLD AUTO: 2 %
BILIRUB SERPL-MCNC: 0.6 MG/DL
BUN SERPL-MCNC: 12.5 MG/DL (ref 6–20)
CALCIUM SERPL-MCNC: 9.3 MG/DL (ref 8.6–10)
CHLORIDE SERPL-SCNC: 105 MMOL/L (ref 98–107)
CREAT SERPL-MCNC: 0.54 MG/DL (ref 0.51–0.95)
DEPRECATED HCO3 PLAS-SCNC: 23 MMOL/L (ref 22–29)
EGFRCR SERPLBLD CKD-EPI 2021: >90 ML/MIN/1.73M2
EOSINOPHIL # BLD AUTO: 0.2 10E3/UL (ref 0–0.7)
EOSINOPHIL NFR BLD AUTO: 3 %
ERYTHROCYTE [DISTWIDTH] IN BLOOD BY AUTOMATED COUNT: 15 % (ref 10–15)
GLUCOSE SERPL-MCNC: 134 MG/DL (ref 70–99)
HCT VFR BLD AUTO: 29.3 % (ref 35–47)
HGB BLD-MCNC: 10 G/DL (ref 11.7–15.7)
IMM GRANULOCYTES # BLD: 0.1 10E3/UL
IMM GRANULOCYTES NFR BLD: 2 %
LYMPHOCYTES # BLD AUTO: 2 10E3/UL (ref 0.8–5.3)
LYMPHOCYTES NFR BLD AUTO: 33 %
MCH RBC QN AUTO: 30 PG (ref 26.5–33)
MCHC RBC AUTO-ENTMCNC: 34.1 G/DL (ref 31.5–36.5)
MCV RBC AUTO: 88 FL (ref 78–100)
MONOCYTES # BLD AUTO: 0.4 10E3/UL (ref 0–1.3)
MONOCYTES NFR BLD AUTO: 7 %
NEUTROPHILS # BLD AUTO: 3.3 10E3/UL (ref 1.6–8.3)
NEUTROPHILS NFR BLD AUTO: 53 %
NRBC # BLD AUTO: 0 10E3/UL
NRBC BLD AUTO-RTO: 0 /100
PLATELET # BLD AUTO: 394 10E3/UL (ref 150–450)
POTASSIUM SERPL-SCNC: 4.1 MMOL/L (ref 3.4–5.3)
PROT SERPL-MCNC: 6.8 G/DL (ref 6.4–8.3)
RBC # BLD AUTO: 3.33 10E6/UL (ref 3.8–5.2)
SODIUM SERPL-SCNC: 138 MMOL/L (ref 135–145)
WBC # BLD AUTO: 6.1 10E3/UL (ref 4–11)

## 2024-07-11 PROCEDURE — 96375 TX/PRO/DX INJ NEW DRUG ADDON: CPT

## 2024-07-11 PROCEDURE — 96413 CHEMO IV INFUSION 1 HR: CPT

## 2024-07-11 PROCEDURE — 80053 COMPREHEN METABOLIC PANEL: CPT

## 2024-07-11 PROCEDURE — 250N000011 HC RX IP 250 OP 636: Performed by: INTERNAL MEDICINE

## 2024-07-11 PROCEDURE — 85025 COMPLETE CBC W/AUTO DIFF WBC: CPT | Performed by: INTERNAL MEDICINE

## 2024-07-11 PROCEDURE — 258N000003 HC RX IP 258 OP 636: Performed by: INTERNAL MEDICINE

## 2024-07-11 PROCEDURE — 36591 DRAW BLOOD OFF VENOUS DEVICE: CPT | Performed by: INTERNAL MEDICINE

## 2024-07-11 RX ORDER — PACLITAXEL 100 MG/20ML
100 INJECTION, POWDER, LYOPHILIZED, FOR SUSPENSION INTRAVENOUS ONCE
Status: COMPLETED | OUTPATIENT
Start: 2024-07-11 | End: 2024-07-11

## 2024-07-11 RX ORDER — ONDANSETRON 2 MG/ML
4 INJECTION INTRAMUSCULAR; INTRAVENOUS EVERY 6 HOURS PRN
Status: DISCONTINUED | OUTPATIENT
Start: 2024-07-11 | End: 2024-07-11 | Stop reason: HOSPADM

## 2024-07-11 RX ORDER — HEPARIN SODIUM (PORCINE) LOCK FLUSH IV SOLN 100 UNIT/ML 100 UNIT/ML
5 SOLUTION INTRAVENOUS
Status: COMPLETED | OUTPATIENT
Start: 2024-07-11 | End: 2024-07-11

## 2024-07-11 RX ORDER — HEPARIN SODIUM (PORCINE) LOCK FLUSH IV SOLN 100 UNIT/ML 100 UNIT/ML
5 SOLUTION INTRAVENOUS
Status: DISCONTINUED | OUTPATIENT
Start: 2024-07-11 | End: 2024-07-11 | Stop reason: HOSPADM

## 2024-07-11 RX ADMIN — ONDANSETRON 4 MG: 2 INJECTION INTRAMUSCULAR; INTRAVENOUS at 13:33

## 2024-07-11 RX ADMIN — PACLITAXEL 180 MG: 100 INJECTION, POWDER, LYOPHILIZED, FOR SUSPENSION INTRAVENOUS at 13:55

## 2024-07-11 RX ADMIN — Medication 5 ML: at 14:30

## 2024-07-11 RX ADMIN — SODIUM CHLORIDE 250 ML: 9 INJECTION, SOLUTION INTRAVENOUS at 13:33

## 2024-07-11 RX ADMIN — Medication 5 ML: at 12:37

## 2024-07-11 ASSESSMENT — PAIN SCALES - GENERAL: PAINLEVEL: MODERATE PAIN (4)

## 2024-07-11 NOTE — NURSING NOTE
"Chief Complaint   Patient presents with    Port Draw     Labs drawn from port by rn.  VS taken.     Port accessed with 20 gauge 3/4\" Power needle and labs drawn by rn.  Port flushed with NS and heparin.  Pt tolerated well.  VS taken.  Pt checked in for next appt.    Xiao Jeronimo RN      "

## 2024-07-11 NOTE — PATIENT INSTRUCTIONS
[facility] Triage and after hours / weekends / holidays:  [phone]    Please call the triage or after hours line if you experience a temperature greater than or equal to 100.4, shaking chills, have uncontrolled nausea, vomiting and/or diarrhea, dizziness, shortness of breath, chest pain, bleeding, unexplained bruising, or if you have any other new/concerning symptoms, questions, or concerns.      If you are having any concerning symptoms or wish to speak to a provider before your next infusion visit, please call your care coordinator or triage to notify them so we can adequately serve you.     If you need a refill on a narcotic prescription or other medication, please call before your infusion appointment.

## 2024-07-11 NOTE — PROGRESS NOTES
Infusion Nursing Note:  Sharon Fong presents today for Cycle 11 Day 1 abraxane.    Patient seen by provider today: No   present during visit today: Not Applicable.    Note: Sharon presents today feeling tired, but otherwise well. Endorses joint pain, managed with advil; declines intervention at this visit. Denies nausea/vomiting. Denies fevers/chills. Denies SOB, cough, chest pain, or dizziness/lightheadedness. Denies constipation/diarrhea. Denies urinary issues. Denies neuropathy. Continues to ice hands and feet during infusions. Offers no new concerns at this appointment.      Intravenous Access:  Implanted Port.    Treatment Conditions:     Latest Reference Range & Units 07/11/24 12:44   Sodium 135 - 145 mmol/L 138   Potassium 3.4 - 5.3 mmol/L 4.1   Chloride 98 - 107 mmol/L 105   Carbon Dioxide (CO2) 22 - 29 mmol/L 23   Urea Nitrogen 6.0 - 20.0 mg/dL 12.5   Creatinine 0.51 - 0.95 mg/dL 0.54   GFR Estimate >60 mL/min/1.73m2 >90   Calcium 8.6 - 10.0 mg/dL 9.3   Anion Gap 7 - 15 mmol/L 10   Albumin 3.5 - 5.2 g/dL 4.2   Protein Total 6.4 - 8.3 g/dL 6.8   Alkaline Phosphatase 40 - 150 U/L 58   ALT 0 - 50 U/L 49   AST 0 - 45 U/L 36   Bilirubin Total <=1.2 mg/dL 0.6   Glucose 70 - 99 mg/dL 134 (H)   WBC 4.0 - 11.0 10e3/uL 6.1   Hemoglobin 11.7 - 15.7 g/dL 10.0 (L)   Hematocrit 35.0 - 47.0 % 29.3 (L)   Platelet Count 150 - 450 10e3/uL 394   RBC Count 3.80 - 5.20 10e6/uL 3.33 (L)   MCV 78 - 100 fL 88   MCH 26.5 - 33.0 pg 30.0   MCHC 31.5 - 36.5 g/dL 34.1   RDW 10.0 - 15.0 % 15.0   % Neutrophils % 53   % Lymphocytes % 33   % Monocytes % 7   % Eosinophils % 3   % Basophils % 2   Absolute Basophils 0.0 - 0.2 10e3/uL 0.1   Absolute Eosinophils 0.0 - 0.7 10e3/uL 0.2   Absolute Immature Granulocytes <=0.4 10e3/uL 0.1   Absolute Lymphocytes 0.8 - 5.3 10e3/uL 2.0   Absolute Monocytes 0.0 - 1.3 10e3/uL 0.4   % Immature Granulocytes % 2   Absolute Neutrophils 1.6 - 8.3 10e3/uL 3.3   Absolute NRBCs 10e3/uL 0.0    NRBCs per 100 WBC <1 /100 0     Results reviewed, labs MET treatment parameters, ok to proceed with treatment.      Post Infusion Assessment:  Patient tolerated infusion without incident.  Blood return noted pre and post infusion.  Site patent and intact, free from redness, edema or discomfort.  No evidence of extravasations.  Access discontinued per protocol.       Discharge Plan:   Patient declined prescription refills.  Discharge instructions reviewed with: Patient.  Patient and/or family verbalized understanding of discharge instructions and all questions answered.  AVS to patient via [employer].  Patient will return 07/18 for next infusion appointment.   Patient discharged in stable condition accompanied by: daughter.  Departure Mode: Ambulatory.      Elsie Pederson RN

## 2024-07-18 ENCOUNTER — INFUSION THERAPY VISIT (OUTPATIENT)
Dept: ONCOLOGY | Facility: CLINIC | Age: 49
End: 2024-07-18
Attending: INTERNAL MEDICINE
Payer: COMMERCIAL

## 2024-07-18 ENCOUNTER — APPOINTMENT (OUTPATIENT)
Dept: LAB | Facility: CLINIC | Age: 49
End: 2024-07-18
Attending: INTERNAL MEDICINE
Payer: COMMERCIAL

## 2024-07-18 ENCOUNTER — ALLIED HEALTH/NURSE VISIT (OUTPATIENT)
Dept: ONCOLOGY | Facility: CLINIC | Age: 49
End: 2024-07-18

## 2024-07-18 VITALS
HEART RATE: 81 BPM | SYSTOLIC BLOOD PRESSURE: 108 MMHG | WEIGHT: 169 LBS | BODY MASS INDEX: 28.5 KG/M2 | TEMPERATURE: 98 F | DIASTOLIC BLOOD PRESSURE: 72 MMHG | RESPIRATION RATE: 18 BRPM | OXYGEN SATURATION: 98 %

## 2024-07-18 DIAGNOSIS — Z00.6 EXAMINATION OF PARTICIPANT OR CONTROL IN CLINICAL RESEARCH: Primary | ICD-10-CM

## 2024-07-18 DIAGNOSIS — Z17.0 MALIGNANT NEOPLASM OF UPPER-INNER QUADRANT OF RIGHT BREAST IN FEMALE, ESTROGEN RECEPTOR POSITIVE (H): Primary | ICD-10-CM

## 2024-07-18 DIAGNOSIS — C50.211 MALIGNANT NEOPLASM OF UPPER-INNER QUADRANT OF RIGHT BREAST IN FEMALE, ESTROGEN RECEPTOR POSITIVE (H): Primary | ICD-10-CM

## 2024-07-18 LAB
ALBUMIN SERPL BCG-MCNC: 4 G/DL (ref 3.5–5.2)
ALP SERPL-CCNC: 54 U/L (ref 40–150)
ALT SERPL W P-5'-P-CCNC: 32 U/L (ref 0–50)
ANION GAP SERPL CALCULATED.3IONS-SCNC: 9 MMOL/L (ref 7–15)
AST SERPL W P-5'-P-CCNC: 26 U/L (ref 0–45)
BASOPHILS # BLD AUTO: 0.1 10E3/UL (ref 0–0.2)
BASOPHILS NFR BLD AUTO: 2 %
BILIRUB SERPL-MCNC: 0.5 MG/DL
BUN SERPL-MCNC: 9.4 MG/DL (ref 6–20)
CALCIUM SERPL-MCNC: 9.2 MG/DL (ref 8.8–10.4)
CHLORIDE SERPL-SCNC: 107 MMOL/L (ref 98–107)
CREAT SERPL-MCNC: 0.54 MG/DL (ref 0.51–0.95)
EGFRCR SERPLBLD CKD-EPI 2021: >90 ML/MIN/1.73M2
EOSINOPHIL # BLD AUTO: 0.3 10E3/UL (ref 0–0.7)
EOSINOPHIL NFR BLD AUTO: 4 %
ERYTHROCYTE [DISTWIDTH] IN BLOOD BY AUTOMATED COUNT: 15.2 % (ref 10–15)
GLUCOSE SERPL-MCNC: 120 MG/DL (ref 70–99)
HCO3 SERPL-SCNC: 23 MMOL/L (ref 22–29)
HCT VFR BLD AUTO: 29.2 % (ref 35–47)
HGB BLD-MCNC: 9.6 G/DL (ref 11.7–15.7)
IMM GRANULOCYTES # BLD: 0.1 10E3/UL
IMM GRANULOCYTES NFR BLD: 1 %
LYMPHOCYTES # BLD AUTO: 2.4 10E3/UL (ref 0.8–5.3)
LYMPHOCYTES NFR BLD AUTO: 39 %
MCH RBC QN AUTO: 29.4 PG (ref 26.5–33)
MCHC RBC AUTO-ENTMCNC: 32.9 G/DL (ref 31.5–36.5)
MCV RBC AUTO: 90 FL (ref 78–100)
MONOCYTES # BLD AUTO: 0.6 10E3/UL (ref 0–1.3)
MONOCYTES NFR BLD AUTO: 9 %
NEUTROPHILS # BLD AUTO: 2.8 10E3/UL (ref 1.6–8.3)
NEUTROPHILS NFR BLD AUTO: 45 %
NRBC # BLD AUTO: 0 10E3/UL
NRBC BLD AUTO-RTO: 0 /100
PLATELET # BLD AUTO: 372 10E3/UL (ref 150–450)
POTASSIUM SERPL-SCNC: 4.4 MMOL/L (ref 3.4–5.3)
PROT SERPL-MCNC: 6.5 G/DL (ref 6.4–8.3)
RBC # BLD AUTO: 3.26 10E6/UL (ref 3.8–5.2)
SODIUM SERPL-SCNC: 139 MMOL/L (ref 135–145)
WBC # BLD AUTO: 6.3 10E3/UL (ref 4–11)

## 2024-07-18 PROCEDURE — 258N000003 HC RX IP 258 OP 636: Performed by: INTERNAL MEDICINE

## 2024-07-18 PROCEDURE — 250N000011 HC RX IP 250 OP 636: Performed by: INTERNAL MEDICINE

## 2024-07-18 PROCEDURE — 85004 AUTOMATED DIFF WBC COUNT: CPT | Performed by: INTERNAL MEDICINE

## 2024-07-18 PROCEDURE — 96375 TX/PRO/DX INJ NEW DRUG ADDON: CPT

## 2024-07-18 PROCEDURE — 36591 DRAW BLOOD OFF VENOUS DEVICE: CPT

## 2024-07-18 PROCEDURE — 80053 COMPREHEN METABOLIC PANEL: CPT

## 2024-07-18 PROCEDURE — 96413 CHEMO IV INFUSION 1 HR: CPT

## 2024-07-18 RX ORDER — HEPARIN SODIUM (PORCINE) LOCK FLUSH IV SOLN 100 UNIT/ML 100 UNIT/ML
5 SOLUTION INTRAVENOUS ONCE
Status: COMPLETED | OUTPATIENT
Start: 2024-07-18 | End: 2024-07-18

## 2024-07-18 RX ORDER — HEPARIN SODIUM (PORCINE) LOCK FLUSH IV SOLN 100 UNIT/ML 100 UNIT/ML
5 SOLUTION INTRAVENOUS
Status: DISCONTINUED | OUTPATIENT
Start: 2024-07-18 | End: 2024-07-18 | Stop reason: HOSPADM

## 2024-07-18 RX ORDER — PACLITAXEL 100 MG/20ML
100 INJECTION, POWDER, LYOPHILIZED, FOR SUSPENSION INTRAVENOUS ONCE
Status: COMPLETED | OUTPATIENT
Start: 2024-07-18 | End: 2024-07-18

## 2024-07-18 RX ORDER — ONDANSETRON 2 MG/ML
4 INJECTION INTRAMUSCULAR; INTRAVENOUS EVERY 6 HOURS PRN
Status: DISCONTINUED | OUTPATIENT
Start: 2024-07-18 | End: 2024-07-18 | Stop reason: HOSPADM

## 2024-07-18 RX ADMIN — Medication 5 ML: at 17:13

## 2024-07-18 RX ADMIN — Medication 5 ML: at 15:29

## 2024-07-18 RX ADMIN — ONDANSETRON 4 MG: 2 INJECTION INTRAMUSCULAR; INTRAVENOUS at 16:13

## 2024-07-18 RX ADMIN — SODIUM CHLORIDE 250 ML: 9 INJECTION, SOLUTION INTRAVENOUS at 16:16

## 2024-07-18 RX ADMIN — PACLITAXEL 180 MG: 100 INJECTION, POWDER, LYOPHILIZED, FOR SUSPENSION INTRAVENOUS at 16:30

## 2024-07-18 ASSESSMENT — PAIN SCALES - GENERAL: PAINLEVEL: NO PAIN (0)

## 2024-07-18 NOTE — NURSING NOTE
Chief Complaint   Patient presents with    Port Draw     Labs collected from port by RN. Vitals taken. Checked in for appointment(s).      Port accessed with 20 gauge 3/4 inch flat needle by RN, labs collected, line flushed with saline and heparin.  Vitals taken. Pt checked in for appointment(s).     Coleen Tirado RN

## 2024-07-18 NOTE — PATIENT INSTRUCTIONS
[facility] Triage and after hours / weekends / holidays:  [phone]    Please call the triage or after hours line if you experience a temperature greater than or equal to 100.4, shaking chills, have uncontrolled nausea, vomiting and/or diarrhea, dizziness, shortness of breath, chest pain, bleeding, unexplained bruising, or if you have any other new/concerning symptoms, questions or concerns.      If you are having any concerning symptoms or wish to speak to a provider before your next infusion visit, please call triage to notify them so we can adequately serve you.     If you need a refill on a narcotic prescription or other medication, please call before your infusion appointment.

## 2024-07-18 NOTE — PROGRESS NOTES
Infusion Nursing Note:  Sharon Fong presents today for Cycle 12, Day 1- Abraxane Infusion.    Patient seen by provider today: No   present during visit today: Not Applicable.    Note: Patient reports feeling nauseated on arrival to infusion suite today. Denies the following signs of infection: no fever, chills, rash, chest pain, shortness of breath, or diarrhea. Patient reports dry, nonproductive cough; not new. Patient reports intermittent nausea at home over the last week. Has not been taking any PRN's. Is using farida hard candy at home. Encouraged patient to take PRN's on a schedule at home to see if that helps her and allows her to eat more regularly. Declined any extra nausea medications in infusion today other than the Zofran. Patient reports no other changes or concerns. Wishes to proceed with treatment today.     Ices her hands and feet during       Intravenous Access:  Implanted Port.    Treatment Conditions:  Lab Results   Component Value Date    HGB 9.6 (L) 07/18/2024    WBC 6.3 07/18/2024    ANEUTAUTO 2.8 07/18/2024     07/18/2024        Lab Results   Component Value Date     07/18/2024    POTASSIUM 4.4 07/18/2024    MAG 2.1 04/17/2024    CR 0.54 07/18/2024    JJ 9.2 07/18/2024    BILITOTAL 0.5 07/18/2024    ALBUMIN 4.0 07/18/2024    ALT 32 07/18/2024    AST 26 07/18/2024       Results reviewed, labs MET treatment parameters, ok to proceed with treatment.      Post Infusion Assessment:  Patient tolerated infusion without incident.  Blood return noted pre and post infusion.  Site patent and intact, free from redness, edema or discomfort.  No evidence of extravasations.  Access discontinued per protocol.       Discharge Plan:   Patient declined prescription refills.  Discharge instructions reviewed with: Patient.  Patient and/or family verbalized understanding of discharge instructions and all questions answered.  AVS to patient via [email or web address].  Patient will return 7/25/24  for next appointment.   Patient discharged in stable condition accompanied by: .  Departure Mode: Ambulatory.      Selam Barone RN

## 2024-07-19 NOTE — NURSING NOTE
[id number]: Study Visit Note   Subject name: Sharon Fong     Visit: Block B C12D1    Did the study visit occur within the appropriate window allowed by the protocol? yes    Since the last study visit, She has been doing fair. She reports generalized body aches and  intermittent nausea.    I have personally interviewed Sharon Fong and reviewed her medical record for adverse events and concomitant medications and these have been recorded on the corresponding logs in Sharon Fong's research file.     Sharon Fong was given the opportunity to ask any trial related questions.  Labs were reviewed - any significant lab values were addressed and reviewed.    MARILYN Rg, RN  CRC-RN,   Office [phone]

## 2024-07-23 ENCOUNTER — ANCILLARY PROCEDURE (OUTPATIENT)
Dept: MRI IMAGING | Facility: CLINIC | Age: 49
End: 2024-07-23
Attending: INTERNAL MEDICINE

## 2024-07-23 DIAGNOSIS — Z17.0 MALIGNANT NEOPLASM OF UPPER-INNER QUADRANT OF RIGHT BREAST IN FEMALE, ESTROGEN RECEPTOR POSITIVE (H): ICD-10-CM

## 2024-07-23 DIAGNOSIS — Z00.6 EXAMINATION OF PARTICIPANT OR CONTROL IN CLINICAL RESEARCH: ICD-10-CM

## 2024-07-23 DIAGNOSIS — C50.211 MALIGNANT NEOPLASM OF UPPER-INNER QUADRANT OF RIGHT BREAST IN FEMALE, ESTROGEN RECEPTOR POSITIVE (H): ICD-10-CM

## 2024-07-23 RX ORDER — GADOBUTROL 604.72 MG/ML
7.7 INJECTION INTRAVENOUS ONCE
Status: COMPLETED | OUTPATIENT
Start: 2024-07-23 | End: 2024-07-23

## 2024-07-23 RX ADMIN — GADOBUTROL 7.7 ML: 604.72 INJECTION INTRAVENOUS at 10:47

## 2024-07-23 NOTE — NURSING NOTE
[id number]: Study Visit Note   Subject name: Sharon Fong     Visit: C11D1    Did the study visit occur within the appropriate window allowed by the protocol? yes    Since the last study visit, She has been doing fair. She reports fatigue and joint pains but is managing these. She has no new complaints today.     I have personally interviewed Sharon Fong and reviewed her medical record for adverse events and concomitant medications and these have been recorded on the corresponding logs in Sharon Fong's research file.     Sharon Fong was given the opportunity to ask any trial related questions.  Labs were reviewed - any significant lab values were addressed and reviewed.    MARILYN Rg, RN  CRC-RN,   Office [phone]

## 2024-07-24 ENCOUNTER — ANCILLARY PROCEDURE (OUTPATIENT)
Dept: MAMMOGRAPHY | Facility: CLINIC | Age: 49
End: 2024-07-24
Attending: INTERNAL MEDICINE
Payer: COMMERCIAL

## 2024-07-24 DIAGNOSIS — Z85.3 PERSONAL HISTORY OF MALIGNANT NEOPLASM OF BREAST: ICD-10-CM

## 2024-07-24 DIAGNOSIS — Z00.6 EXAMINATION OF PARTICIPANT OR CONTROL IN CLINICAL RESEARCH: ICD-10-CM

## 2024-07-24 PROCEDURE — 38505 NEEDLE BIOPSY LYMPH NODES: CPT | Mod: RT | Performed by: RADIOLOGY

## 2024-07-24 PROCEDURE — 76942 ECHO GUIDE FOR BIOPSY: CPT | Mod: XS | Performed by: RADIOLOGY

## 2024-07-24 PROCEDURE — 19083 BX BREAST 1ST LESION US IMAG: CPT | Mod: RT | Performed by: RADIOLOGY

## 2024-07-24 PROCEDURE — 88305 TISSUE EXAM BY PATHOLOGIST: CPT | Mod: 26 | Performed by: PATHOLOGY

## 2024-07-24 PROCEDURE — 88305 TISSUE EXAM BY PATHOLOGIST: CPT | Mod: TC | Performed by: INTERNAL MEDICINE

## 2024-07-24 RX ORDER — LIDOCAINE HYDROCHLORIDE AND EPINEPHRINE 10; 10 MG/ML; UG/ML
10 INJECTION, SOLUTION INFILTRATION; PERINEURAL ONCE
Status: ACTIVE | OUTPATIENT
Start: 2024-07-24

## 2024-07-24 NOTE — PROGRESS NOTES
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 She started screening for the ISPY 2.2 trial and was found to have a MammaPrint high risk/blueprint luminal B subtype.  She was started on treatment in block B due to lack of availability of block A agents.  Her course has been complicated by allergic reaction to Taxol requiring change to Abraxane.  Her 3-week MRI showed a tumor volume reduction of 28.9% (did not meet prespecified threshold) and therefore she received a 6-week MRI demonstrated a 57% reduction tumor volume from baseline (which again did not meet prespecified threshold) to continue on block B.  The recommendations from my spine were to escalate related to AC.  However, in light of ongoing significant response to Abraxane, we decided to continue to complete the full 12 weeks of treatment.  Her 12 week breast MRI demonstrates a slightly smaller mass. I-SPY read pending. Dr. Bernal recommends proceeding with AC. She presents today prior to C1D1 AC.     Interval History  Sharon returns clinic today accompanied by her  Fransisco. She is feeling fatigued. She has noticed some swelling in her knees and ankles for which he has taken aleve for. She has some general aches in her thighs. Her knees and ankles aren't particularly painful. No nausea today, for chemotherapy induced nausea she previously had headaches with zofran. She tried compazine instead and didn't like how it made her feel. Since then, she went back to trial of zofran and has been doing ok with it. She wonders if the headaches were actually hormonal. No fevers or chills. No chest pain, shortness of breath, or cough. No neuropathy. No mucositis. No rash. She has some pain in the location of her breast mass.     ROS: 10 point ROS neg other  than the symptoms noted above in the HPI.    Her full oncologic history is as follows:   Oncologic History  Patient Active Problem List    Diagnosis Date Noted    Malignant neoplasm of upper-inner quadrant of right female breast (H) 2024     Priority: Medium     Right breast cancer  Has had longstanding history of multiple biopsies, infection, and partial mastectomy for a right breast multifocal abscess dating back to at least .     patient presented with palpable lump at 1:00 in the right breast    3/21/2024 diagnostic mammo showed clustered fine indeterminate calcs at 12:00.  No mammographic abnormality identified to correspond to the palpable area of fullness at 1:00.  On US, at 1:00, 10 cm FN there was a 3.7 x 2.3 x 1.5 cm solid hypoechoic irregularly marginated mass.  Additional hypoechoic lesion located at 2:00, 3 cm FN measuring 1.0 x 0.5 cm.  Another hypoechoic lesion at 2:00, 12 cm FN measuring 0.7 cm.  At least 2 enlarged right axillary lymph nodes -largest measuring up to 0.6 cm.    3/28/2024 ultrasound-guided biopsy of the followin:00 lesion at least DCIS  1:00 lesion grade 2 IDC.  No LVI.  ER (3+, 80 to 90%), UT (3+, 90 to 100%), HER2 2+ and FISH negative   Right axillary LN: metastatic carcinoma    2024 CT CAP demonstrated multifocal enhancement in the right breast with mildly abnormal right axillary lymph nodes.  Otherwise no evidence of distant metastatic disease.    2024 NM bone scan negative for osteoblastic metastatic disease.    4/15/2024 MRI breast demonstrated the following  Right breast at 1:00 mass measuring 2.6 x 2.0 by 3.0 cm with extensive surrounding clumped NME occupying most of the right breast and measuring 8.9 x 4.7 x 8.7   Left breast showed a fibroadenoma at 8-9:00 position.  In addition, 7 mm of linear branching NME at 3-4:00 position.  This was biopsied and found to be benign breast tissue.  Few asymmetric right internal mammary chain LN. multiple  enlarged right level 1 axillary nodes.  Additionally multiple LEFT level 1 axillary nodes that are indeterminant -which were normal on left axillary US.    4/26/2024 MammaPrint -0.192 (high risk) and blueprint luminal B    4/30/2024 started weekly Taxol -changed to Abraxane starting week 2 due to allergic reaction    5/17/2024 W3 MRI  Minimally decreased size of the postoperative right breast cancer at the approximate 1:00 position measuring approximately 2.4 x 1.9 x 2.9 cm (previously 2.6 x 2.0 x 3.0 cm).  There is overall similar extent although mildly decreased volume of abnormal surrounding NME  Minimal interval enlargement of multiple bilateral level I lymph nodes, including the biopsy-proven right axillary node with indwelling biopsy marker. No significant change in the multiple small asymmetric right internal mammary chain lymph nodes.  Tumor volume reduction of 28.9% from baseline     6/10/2024 (6-week MRI breast) 8.8 cm anteroposterior on axial MIPS, unchanged.  Mildly decreased size of the biopsy-proven cancer in the right breast at the approximate 1:00 position middle depth measuring approximately 2.0 x 1.4 x 2.4 cm (previously 2.4 x 1.9 x 2.9 cm).   Extensive surrounding NME is largely unchanged changed, but the volume has minimally decreased.  Stable bilateral axillary lymph nodes, including the biopsy-proven right level I axillary node. Stable asymmetric small right internal mammary chain lymph nodes.  FTV reduction of 57% from baseline       Inflammatory arthritis 06/01/2015     Priority: Medium    Seizure (H) 11/15/2013     Priority: Medium    Vitamin D deficiency 04/23/2013     Priority: Medium        Current Medications:  Current Outpatient Medications   Medication Sig Dispense Refill    acetaminophen (TYLENOL) 500 MG tablet Take 500 mg by mouth as needed for mild pain (Patient not taking: Reported on 6/6/2024)      amitriptyline (ELAVIL) 10 MG tablet Take 20 mg by mouth at bedtime (Patient not  taking: Reported on 7/11/2024)      aspirin-acetaminophen-caffeine (EXCEDRIN MIGRAINE) 250-250-65 MG tablet       FT ANTACID & ANTIGAS 200-200-20 MG/5ML SUSP suspension       ibuprofen (ADVIL/MOTRIN) 200 MG capsule       lidocaine-prilocaine (EMLA) 2.5-2.5 % external cream Apply topically as needed for moderate pain 30 g 1    magic mouthwash suspension (diphenhydrAMINE, lidocaine, aluminum-magnesium & simethicone) Swish and swallow 10 mLs in mouth every 6 hours as needed for mouth sores (Patient not taking: Reported on 6/20/2024) 100 mL 1    ondansetron (ZOFRAN) 8 MG tablet Take 1 tablet (8 mg) by mouth every 8 hours as needed for nausea 30 tablet 3     ECOG Performance Status: 0    Physical Examination:  /72 (BP Location: Right arm, Patient Position: Sitting, Cuff Size: Adult Regular)   Pulse 82   Temp 97.7  F (36.5  C) (Oral)   Resp 16   Wt 76.7 kg (169 lb)   SpO2 100%   BMI 28.50 kg/m    General:  Well appearing, well-nourished adult female in NAD.  HEENT:  Normocephalic.  Sclera anicteric.  MMM.  No lesions of the oropharynx.  Breast: 2.5 x 2.0cm mass at 1:00 7cm FN - (previously measured 2.5x2.5cm)  Lymph:  palpable axillary LN bilaterally, consistent with past exams.   Chest:  Breathing comfortably on room air  Abd:  Soft/NT/ND.  No hepatosplenomegaly.  Ext:  No pitting edema of the bilateral lower extremities.    Musculo:  Strength 5/5 throughout.  Neuro:  Cranial nerves grossly intact.  Psych:  Mood and affect appear normal.      Laboratory Data:  Lab Results   Component Value Date    WBC 6.2 07/25/2024    HGB 9.6 (L) 07/25/2024    HCT 30.0 (L) 07/25/2024    MCV 90 07/25/2024     07/25/2024     Lab Results   Component Value Date     07/25/2024    BUN 12.8 07/25/2024    ANIONGAP 9 07/25/2024     Lab Results   Component Value Date    ALT 72 (H) 07/25/2024    AST 54 (H) 07/25/2024    ALKPHOS 52 07/25/2024     Lab Results   Component Value Date    INR 1.02 04/17/2024     Breast MRI  7/23/24:   FINDINGS:      Longest diameter of suspicious enhancement: 8.0 cm in AP dimension,  measured on MIPs (previously 8.8 cm on 6/10/2024, differences are  likely partially attributed to breast positioning).     There is slightly decreased volume of biopsy-proven cancer in the  right breast with overall mass and nonmass enhancement measuring 8.0 x  4.5 x 6.8 cm. Irregular enhancing mass in the right breast at  approximately 1:00 position, mid depth now measures 1.8 x 1.1 x 1.6 cm  (previously 2.0 x 1.4 x 2.4 cm). This enhancing mass shows large areas  of washout on kinetic imaging. Nonmass enhancement continues to extend  from anterior to posterior depth, with anterior nonmass enhancement  extending to the base of the nipple.     Left breast biopsy clip. No suspicious enhancement in the left breast.     Stable prominent bilateral axillary nodes, including the biopsied  right level 1 axillary node. Small asymmetric right internal mammary  chain lymph nodes are also similar to prior (for example a 4 mm lymph  node, axial image 77).  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  She is on the I SPY 2.2 clinical trial and is receiving weekly Abraxane. She has completed 12 weeks of Abraxane.  She presents today for follow-up.    # Right breast cancer  - She completed 12 weeks of neoadjuvant Abraxane. She had her 12 week breast MRI on 7/23/24, I-SPY read and biopsy pending. Her 12 week MRI suggests a slight decreased volume in the mass and non-mass enhancement measuring 8.0x 4.5 x 6.8 cm. The right breast mass depth now measures 1.8 x 1.1  x 1.6 cm. Recommendation is to proceed with ddAC x 4 cycles every 2 weeks. After discussion with Dr. Bernal, we will not wait for the I-Spy recommendations and instead proceed with cycle 1 today.   - I reviewed common side effects with patient in detail including, but not limited to, cytopenias, nausea and vomiting, GI upset, fatigue, cardiotoxicity, alopecia, etc. She has alopecia at this time secondary to taxol.   - She has already established care with Dr. Collier and is planned to have a mastectomy after neoadjuvant chemotherapy  - Will need adjuvant radiation therapy -will place referral after her surgery  - Labs demonstrate stable anemia. She has grade 1 LFT elevation that I suspect is due to her recent abraxane infusions.     # Arthralgias  likely associated with Taxol though she also has a history of hypermobility/inflammatory arthritis which could be exacerbated by current systemic therapy.  -Okay to use NSAIDs -though not on a daily basis  -Can consider longer acting ones a such as naproxen  - Will discuss with Dr. Bernal her comfort level to continue these during AC due to higher risk for cytopenias. I have recommend tylenol as an alternative.     # Contralateral (left) axillary lymph node enlargement  Indeterminant  - Will continue to follow on subsequent scans.  These appeared normal on baseline ultrasound    #Hx of migraines  - Zofran has been added back for premeds as she did not feel well on Compazine  - She will reach out to us if nausea becomes an issue so we can consider additional agents  - Once she starts AC, she will be receiving Aloxi.  Discussed that her headaches may potentially get worse on this.  If that occurs after cycle 1, will need to consider alternative antiemetic premeds for her including olanzapine. I did go ahead and send her in an RX for olanzapine 5 mg at HS that she can trial instead of compazine given she did not tolerate this well previously.     The longitudinal plan of care  for the diagnosis(es)/condition(s) as documented were addressed during this visit. Due to the added complexity in care, I will continue to support Sharon in the subsequent management and with ongoing continuity of care.    50 minutes spent on the date of the encounter doing chart review, review of test results, interpretation of tests, patient visit, and documentation     Angie Herrera PA-C

## 2024-07-25 ENCOUNTER — ALLIED HEALTH/NURSE VISIT (OUTPATIENT)
Dept: ONCOLOGY | Facility: CLINIC | Age: 49
End: 2024-07-25

## 2024-07-25 ENCOUNTER — APPOINTMENT (OUTPATIENT)
Dept: LAB | Facility: CLINIC | Age: 49
End: 2024-07-25
Attending: INTERNAL MEDICINE
Payer: COMMERCIAL

## 2024-07-25 ENCOUNTER — ONCOLOGY VISIT (OUTPATIENT)
Dept: ONCOLOGY | Facility: CLINIC | Age: 49
End: 2024-07-25
Payer: COMMERCIAL

## 2024-07-25 ENCOUNTER — PATIENT OUTREACH (OUTPATIENT)
Dept: ONCOLOGY | Facility: CLINIC | Age: 49
End: 2024-07-25

## 2024-07-25 ENCOUNTER — INFUSION THERAPY VISIT (OUTPATIENT)
Dept: ONCOLOGY | Facility: CLINIC | Age: 49
End: 2024-07-25
Attending: INTERNAL MEDICINE
Payer: COMMERCIAL

## 2024-07-25 VITALS
DIASTOLIC BLOOD PRESSURE: 72 MMHG | HEART RATE: 82 BPM | OXYGEN SATURATION: 100 % | SYSTOLIC BLOOD PRESSURE: 105 MMHG | WEIGHT: 169 LBS | BODY MASS INDEX: 28.5 KG/M2 | TEMPERATURE: 97.7 F | RESPIRATION RATE: 16 BRPM

## 2024-07-25 DIAGNOSIS — Z00.6 EXAMINATION OF PARTICIPANT OR CONTROL IN CLINICAL RESEARCH: ICD-10-CM

## 2024-07-25 DIAGNOSIS — C50.211 MALIGNANT NEOPLASM OF UPPER-INNER QUADRANT OF RIGHT BREAST IN FEMALE, ESTROGEN RECEPTOR POSITIVE (H): ICD-10-CM

## 2024-07-25 DIAGNOSIS — R11.0 CHEMOTHERAPY-INDUCED NAUSEA: ICD-10-CM

## 2024-07-25 DIAGNOSIS — Z17.0 MALIGNANT NEOPLASM OF UPPER-INNER QUADRANT OF RIGHT BREAST IN FEMALE, ESTROGEN RECEPTOR POSITIVE (H): Primary | ICD-10-CM

## 2024-07-25 DIAGNOSIS — Z17.0 MALIGNANT NEOPLASM OF UPPER-INNER QUADRANT OF RIGHT BREAST IN FEMALE, ESTROGEN RECEPTOR POSITIVE (H): ICD-10-CM

## 2024-07-25 DIAGNOSIS — T45.1X5A CHEMOTHERAPY-INDUCED NAUSEA: ICD-10-CM

## 2024-07-25 DIAGNOSIS — C50.211 MALIGNANT NEOPLASM OF UPPER-INNER QUADRANT OF RIGHT BREAST IN FEMALE, ESTROGEN RECEPTOR POSITIVE (H): Primary | ICD-10-CM

## 2024-07-25 LAB
ALBUMIN SERPL BCG-MCNC: 4 G/DL (ref 3.5–5.2)
ALP SERPL-CCNC: 52 U/L (ref 40–150)
ALT SERPL W P-5'-P-CCNC: 72 U/L (ref 0–50)
ANION GAP SERPL CALCULATED.3IONS-SCNC: 9 MMOL/L (ref 7–15)
AST SERPL W P-5'-P-CCNC: 54 U/L (ref 0–45)
BASOPHILS # BLD AUTO: 0.1 10E3/UL (ref 0–0.2)
BASOPHILS NFR BLD AUTO: 2 %
BILIRUB SERPL-MCNC: 0.6 MG/DL
BUN SERPL-MCNC: 12.8 MG/DL (ref 6–20)
CALCIUM SERPL-MCNC: 9.1 MG/DL (ref 8.8–10.4)
CHLORIDE SERPL-SCNC: 107 MMOL/L (ref 98–107)
CREAT SERPL-MCNC: 0.53 MG/DL (ref 0.51–0.95)
EGFRCR SERPLBLD CKD-EPI 2021: >90 ML/MIN/1.73M2
EOSINOPHIL # BLD AUTO: 0.3 10E3/UL (ref 0–0.7)
EOSINOPHIL NFR BLD AUTO: 5 %
ERYTHROCYTE [DISTWIDTH] IN BLOOD BY AUTOMATED COUNT: 15.5 % (ref 10–15)
GLUCOSE SERPL-MCNC: 139 MG/DL (ref 70–99)
HCO3 SERPL-SCNC: 24 MMOL/L (ref 22–29)
HCT VFR BLD AUTO: 30 % (ref 35–47)
HGB BLD-MCNC: 9.6 G/DL (ref 11.7–15.7)
IMM GRANULOCYTES # BLD: 0.1 10E3/UL
IMM GRANULOCYTES NFR BLD: 2 %
LYMPHOCYTES # BLD AUTO: 2 10E3/UL (ref 0.8–5.3)
LYMPHOCYTES NFR BLD AUTO: 32 %
MCH RBC QN AUTO: 28.9 PG (ref 26.5–33)
MCHC RBC AUTO-ENTMCNC: 32 G/DL (ref 31.5–36.5)
MCV RBC AUTO: 90 FL (ref 78–100)
MONOCYTES # BLD AUTO: 0.5 10E3/UL (ref 0–1.3)
MONOCYTES NFR BLD AUTO: 8 %
NEUTROPHILS # BLD AUTO: 3.2 10E3/UL (ref 1.6–8.3)
NEUTROPHILS NFR BLD AUTO: 51 %
NRBC # BLD AUTO: 0 10E3/UL
NRBC BLD AUTO-RTO: 0 /100
PLATELET # BLD AUTO: 320 10E3/UL (ref 150–450)
POTASSIUM SERPL-SCNC: 4.2 MMOL/L (ref 3.4–5.3)
PROT SERPL-MCNC: 6.5 G/DL (ref 6.4–8.3)
RBC # BLD AUTO: 3.32 10E6/UL (ref 3.8–5.2)
SODIUM SERPL-SCNC: 140 MMOL/L (ref 135–145)
WBC # BLD AUTO: 6.2 10E3/UL (ref 4–11)

## 2024-07-25 PROCEDURE — 99213 OFFICE O/P EST LOW 20 MIN: CPT | Mod: 25

## 2024-07-25 PROCEDURE — 96377 APPLICATON ON-BODY INJECTOR: CPT | Mod: 59

## 2024-07-25 PROCEDURE — 96411 CHEMO IV PUSH ADDL DRUG: CPT

## 2024-07-25 PROCEDURE — 250N000011 HC RX IP 250 OP 636: Performed by: INTERNAL MEDICINE

## 2024-07-25 PROCEDURE — 250N000011 HC RX IP 250 OP 636

## 2024-07-25 PROCEDURE — G2211 COMPLEX E/M VISIT ADD ON: HCPCS

## 2024-07-25 PROCEDURE — 36591 DRAW BLOOD OFF VENOUS DEVICE: CPT

## 2024-07-25 PROCEDURE — 96375 TX/PRO/DX INJ NEW DRUG ADDON: CPT

## 2024-07-25 PROCEDURE — 99215 OFFICE O/P EST HI 40 MIN: CPT

## 2024-07-25 PROCEDURE — 96372 THER/PROPH/DIAG INJ SC/IM: CPT | Performed by: INTERNAL MEDICINE

## 2024-07-25 PROCEDURE — 258N000003 HC RX IP 258 OP 636: Performed by: INTERNAL MEDICINE

## 2024-07-25 PROCEDURE — 85025 COMPLETE CBC W/AUTO DIFF WBC: CPT

## 2024-07-25 PROCEDURE — 96413 CHEMO IV INFUSION 1 HR: CPT

## 2024-07-25 PROCEDURE — 96367 TX/PROPH/DG ADDL SEQ IV INF: CPT

## 2024-07-25 PROCEDURE — 82040 ASSAY OF SERUM ALBUMIN: CPT

## 2024-07-25 RX ORDER — HEPARIN SODIUM (PORCINE) LOCK FLUSH IV SOLN 100 UNIT/ML 100 UNIT/ML
5 SOLUTION INTRAVENOUS
Status: CANCELLED | OUTPATIENT
Start: 2024-07-25

## 2024-07-25 RX ORDER — LORAZEPAM 2 MG/ML
0.5 INJECTION INTRAMUSCULAR EVERY 4 HOURS PRN
Status: CANCELLED | OUTPATIENT
Start: 2024-07-25

## 2024-07-25 RX ORDER — HEPARIN SODIUM (PORCINE) LOCK FLUSH IV SOLN 100 UNIT/ML 100 UNIT/ML
5 SOLUTION INTRAVENOUS
Status: DISCONTINUED | OUTPATIENT
Start: 2024-07-25 | End: 2024-07-25 | Stop reason: HOSPADM

## 2024-07-25 RX ORDER — PALONOSETRON 0.05 MG/ML
0.25 INJECTION, SOLUTION INTRAVENOUS ONCE
Status: CANCELLED | OUTPATIENT
Start: 2024-07-25

## 2024-07-25 RX ORDER — METHYLPREDNISOLONE SODIUM SUCCINATE 125 MG/2ML
125 INJECTION, POWDER, LYOPHILIZED, FOR SOLUTION INTRAMUSCULAR; INTRAVENOUS
Status: CANCELLED
Start: 2024-07-25

## 2024-07-25 RX ORDER — EPINEPHRINE 1 MG/ML
0.3 INJECTION, SOLUTION INTRAMUSCULAR; SUBCUTANEOUS EVERY 5 MIN PRN
Status: CANCELLED | OUTPATIENT
Start: 2024-07-25

## 2024-07-25 RX ORDER — MEPERIDINE HYDROCHLORIDE 25 MG/ML
25 INJECTION INTRAMUSCULAR; INTRAVENOUS; SUBCUTANEOUS EVERY 30 MIN PRN
Status: CANCELLED | OUTPATIENT
Start: 2024-07-25

## 2024-07-25 RX ORDER — DEXAMETHASONE 4 MG/1
8 TABLET ORAL DAILY
Qty: 6 TABLET | Refills: 3 | Status: SHIPPED | OUTPATIENT
Start: 2024-07-25

## 2024-07-25 RX ORDER — ALBUTEROL SULFATE 0.83 MG/ML
2.5 SOLUTION RESPIRATORY (INHALATION)
Status: CANCELLED | OUTPATIENT
Start: 2024-07-25

## 2024-07-25 RX ORDER — ALBUTEROL SULFATE 90 UG/1
1-2 AEROSOL, METERED RESPIRATORY (INHALATION)
Status: CANCELLED
Start: 2024-07-25

## 2024-07-25 RX ORDER — DIPHENHYDRAMINE HYDROCHLORIDE 50 MG/ML
50 INJECTION INTRAMUSCULAR; INTRAVENOUS
Status: CANCELLED
Start: 2024-07-25

## 2024-07-25 RX ORDER — HEPARIN SODIUM (PORCINE) LOCK FLUSH IV SOLN 100 UNIT/ML 100 UNIT/ML
5 SOLUTION INTRAVENOUS ONCE
Status: COMPLETED | OUTPATIENT
Start: 2024-07-25 | End: 2024-07-25

## 2024-07-25 RX ORDER — HEPARIN SODIUM,PORCINE 10 UNIT/ML
5-20 VIAL (ML) INTRAVENOUS DAILY PRN
Status: CANCELLED | OUTPATIENT
Start: 2024-07-25

## 2024-07-25 RX ORDER — DOXORUBICIN HYDROCHLORIDE 2 MG/ML
60 INJECTION, SOLUTION INTRAVENOUS ONCE
Status: COMPLETED | OUTPATIENT
Start: 2024-07-25 | End: 2024-07-25

## 2024-07-25 RX ORDER — DOXORUBICIN HYDROCHLORIDE 2 MG/ML
60 INJECTION, SOLUTION INTRAVENOUS ONCE
Status: CANCELLED | OUTPATIENT
Start: 2024-07-25

## 2024-07-25 RX ORDER — PALONOSETRON 0.05 MG/ML
0.25 INJECTION, SOLUTION INTRAVENOUS ONCE
Status: COMPLETED | OUTPATIENT
Start: 2024-07-25 | End: 2024-07-25

## 2024-07-25 RX ORDER — OLANZAPINE 5 MG/1
5 TABLET ORAL AT BEDTIME
Qty: 30 TABLET | Refills: 0 | Status: SHIPPED | OUTPATIENT
Start: 2024-07-25

## 2024-07-25 RX ADMIN — CYCLOPHOSPHAMIDE 1075 MG: 1 INJECTION, POWDER, FOR SOLUTION INTRAVENOUS; ORAL at 13:21

## 2024-07-25 RX ADMIN — DEXAMETHASONE SODIUM PHOSPHATE: 10 INJECTION, SOLUTION INTRAMUSCULAR; INTRAVENOUS at 12:34

## 2024-07-25 RX ADMIN — DOXORUBICIN HYDROCHLORIDE 110 MG: 2 INJECTION, SOLUTION INTRAVENOUS at 13:09

## 2024-07-25 RX ADMIN — Medication 5 ML: at 13:56

## 2024-07-25 RX ADMIN — Medication 5 ML: at 10:27

## 2024-07-25 RX ADMIN — SODIUM CHLORIDE 250 ML: 9 INJECTION, SOLUTION INTRAVENOUS at 12:31

## 2024-07-25 RX ADMIN — PALONOSETRON HYDROCHLORIDE 0.25 MG: 0.25 INJECTION INTRAVENOUS at 12:31

## 2024-07-25 RX ADMIN — PEGFILGRASTIM 6 MG: KIT SUBCUTANEOUS at 13:47

## 2024-07-25 ASSESSMENT — PAIN SCALES - GENERAL: PAINLEVEL: MODERATE PAIN (5)

## 2024-07-25 NOTE — PROGRESS NOTES
[facility]: Cancer Care                                                                                          Called pt to do the chemo teaching before her treatment of AC starting today. When I got a hold of the pt she had 2 other nurses in with her also to do the chemo teaching. They said they have it covered, pt hung up the phone.   Called to check in with the pt to see how infusion went. She had 3 people around her for chemo teaching, 1 on the phone,  2 in the room. LVM and let her know this writer would check in with her tomorrow about how she is feeling.  Signature:  Bette Watters, RNCC

## 2024-07-25 NOTE — NURSING NOTE
[id number]: Study Visit Note   Subject name: Sharon Fong     Visit: Block C C1D1    Did the study visit occur within the appropriate window allowed by the protocol? yes    Since the last study visit, She has been doing fair.   She reports moderate fatigue but is able to complete her ADLs.    Chemo teaching re: Adriamycin and Cyclophosphamide were completed and pt and  were understanding.    I have personally interviewed Sharon Fong and reviewed her medical record for adverse events and concomitant medications and these have been recorded on the corresponding logs in Sharon Fong's research file.     Sharon Fong was given the opportunity to ask any trial related questions.  Please see provider progress note for physical exam and other clinical information. Labs were reviewed - any significant lab values were addressed and reviewed.    MARITZA RgN, RN  CRC-RN,   Office [phone]

## 2024-07-25 NOTE — NURSING NOTE
"Oncology Rooming Note    July 25, 2024 10:31 AM   Sharon Fong is a 48 year old female who presents for:    Chief Complaint   Patient presents with    Port Draw     Labs drawn via port by RN. VS taken.    Oncology Clinic Visit     Malignant neoplasm of upper-inner quadrant of right breast in female     Initial Vitals: /72 (BP Location: Right arm, Patient Position: Sitting, Cuff Size: Adult Regular)   Pulse 82   Temp 97.7  F (36.5  C) (Oral)   Resp 16   Wt 76.7 kg (169 lb)   SpO2 100%   BMI 28.50 kg/m   Estimated body mass index is 28.5 kg/m  as calculated from the following:    Height as of 4/17/24: 1.64 m (5' 4.57\").    Weight as of this encounter: 76.7 kg (169 lb). Body surface area is 1.87 meters squared.  Moderate Pain (5) Comment: Data Unavailable   No LMP recorded. Patient has had a hysterectomy.  Allergies reviewed: Yes  Medications reviewed: Yes    Medications: Medication refills not needed today.  Pharmacy name entered into [facility]: [facility] [address] [place] [address] [address] [facility]    Frailty Screening:   Is the patient here for a new oncology consult visit in cancer care? 2. No      Clinical concerns:       Sheila Steele CMA              "

## 2024-07-25 NOTE — PROGRESS NOTES
[facility]: Cancer Care                                                                                          Patient is a research patient, she is here to start her AC. SHABNAM requested patient receive learning material on chemotherapy.  Research RN available in clinic today to do the teach. RN gave written material, research RN presented in the room to do the teach. Dr. Dolores colvin RNCC was notified.     Lianet SALASN, RN, OCN  Care Coordinator  [facility]

## 2024-07-25 NOTE — Clinical Note
7/25/2024      Sharon Fong  [address] [facility] [address]      Dear Colleague,    Thank you for referring your patient, Sharon Fong, to the [facility]. Please see a copy of my visit note below.    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 She started screening for the ISPY 2.2 trial and was found to have a MammaPrint high risk/blueprint luminal B subtype.  She was started on treatment in block B due to lack of availability of block A agents.  Her course has been complicated by allergic reaction to Taxol requiring change to Abraxane.  Her 3-week MRI showed a tumor volume reduction of 28.9% (did not meet prespecified threshold) and therefore she received a 6-week MRI demonstrated a 57% reduction tumor volume from baseline (which again did not meet prespecified threshold) to continue on block B.  The recommendations from my spine were to escalate related to AC.  However, in light of ongoing significant response to Abraxane, we decided to continue to complete the full 12 weeks of treatment.  She presents today prior to C4D1 of Abraxane.    Interval History  - has taken aleve for swelling in her knees and ankles. No painful. Some aches in the thighs.   -           Since her last visit here, she continues to experience adverse effects related to treatment. This past week, she endorses loose stools/diarrhea and bleeding from hemorrhoids.  She took Imodium to help with the diarrhea and unfortunately this caused nausea.  She then took Zofran to help with her nausea which unfortunately caused headaches.    She also endorses significant arthralgias in many joints.  She is taking Advil which is helping.    Her fatigue continues to be progressive.    Fortunately, she  does not have any neuropathy.  She is icing during her infusion.    She feels her left breast mass is slightly smaller, though largely stable compared to prior.    No other symptoms.      Her full oncologic history is as follows:   Oncologic History  Patient Active Problem List    Diagnosis Date Noted    Malignant neoplasm of upper-inner quadrant of right female breast (H) 2024     Priority: Medium     Right breast cancer  Has had longstanding history of multiple biopsies, infection, and partial mastectomy for a right breast multifocal abscess dating back to at least .     patient presented with palpable lump at 1:00 in the right breast    3/21/2024 diagnostic mammo showed clustered fine indeterminate calcs at 12:00.  No mammographic abnormality identified to correspond to the palpable area of fullness at 1:00.  On US, at 1:00, 10 cm FN there was a 3.7 x 2.3 x 1.5 cm solid hypoechoic irregularly marginated mass.  Additional hypoechoic lesion located at 2:00, 3 cm FN measuring 1.0 x 0.5 cm.  Another hypoechoic lesion at 2:00, 12 cm FN measuring 0.7 cm.  At least 2 enlarged right axillary lymph nodes -largest measuring up to 0.6 cm.    3/28/2024 ultrasound-guided biopsy of the followin:00 lesion at least DCIS  1:00 lesion grade 2 IDC.  No LVI.  ER (3+, 80 to 90%), TN (3+, 90 to 100%), HER2 2+ and FISH negative   Right axillary LN: metastatic carcinoma    2024 CT CAP demonstrated multifocal enhancement in the right breast with mildly abnormal right axillary lymph nodes.  Otherwise no evidence of distant metastatic disease.    2024 NM bone scan negative for osteoblastic metastatic disease.    4/15/2024 MRI breast demonstrated the following  Right breast at 1:00 mass measuring 2.6 x 2.0 by 3.0 cm with extensive surrounding clumped NME occupying most of the right breast and measuring 8.9 x 4.7 x 8.7   Left breast showed a fibroadenoma at 8-9:00 position.  In addition, 7 mm of linear branching  NME at 3-4:00 position.  This was biopsied and found to be benign breast tissue.  Few asymmetric right internal mammary chain LN. multiple enlarged right level 1 axillary nodes.  Additionally multiple LEFT level 1 axillary nodes that are indeterminant -which were normal on left axillary US.    4/26/2024 MammaPrint -0.192 (high risk) and blueprint luminal B    4/30/2024 started weekly Taxol -changed to Abraxane starting week 2 due to allergic reaction    5/17/2024 W3 MRI  Minimally decreased size of the postoperative right breast cancer at the approximate 1:00 position measuring approximately 2.4 x 1.9 x 2.9 cm (previously 2.6 x 2.0 x 3.0 cm).  There is overall similar extent although mildly decreased volume of abnormal surrounding NME  Minimal interval enlargement of multiple bilateral level I lymph nodes, including the biopsy-proven right axillary node with indwelling biopsy marker. No significant change in the multiple small asymmetric right internal mammary chain lymph nodes.  Tumor volume reduction of 28.9% from baseline     6/10/2024 (6-week MRI breast) 8.8 cm anteroposterior on axial MIPS, unchanged.  Mildly decreased size of the biopsy-proven cancer in the right breast at the approximate 1:00 position middle depth measuring approximately 2.0 x 1.4 x 2.4 cm (previously 2.4 x 1.9 x 2.9 cm).   Extensive surrounding NME is largely unchanged changed, but the volume has minimally decreased.  Stable bilateral axillary lymph nodes, including the biopsy-proven right level I axillary node. Stable asymmetric small right internal mammary chain lymph nodes.  FTV reduction of 57% from baseline       Inflammatory arthritis 06/01/2015     Priority: Medium    Seizure (H) 11/15/2013     Priority: Medium    Vitamin D deficiency 04/23/2013     Priority: Medium            Current Medications:  Current Outpatient Medications   Medication Sig Dispense Refill    acetaminophen (TYLENOL) 500 MG tablet Take 500 mg by mouth as needed  for mild pain (Patient not taking: Reported on 6/6/2024)      amitriptyline (ELAVIL) 10 MG tablet Take 20 mg by mouth at bedtime (Patient not taking: Reported on 7/11/2024)      aspirin-acetaminophen-caffeine (EXCEDRIN MIGRAINE) 250-250-65 MG tablet       FT ANTACID & ANTIGAS 200-200-20 MG/5ML SUSP suspension       ibuprofen (ADVIL/MOTRIN) 200 MG capsule       lidocaine-prilocaine (EMLA) 2.5-2.5 % external cream Apply topically as needed for moderate pain 30 g 1    magic mouthwash suspension (diphenhydrAMINE, lidocaine, aluminum-magnesium & simethicone) Swish and swallow 10 mLs in mouth every 6 hours as needed for mouth sores (Patient not taking: Reported on 6/20/2024) 100 mL 1    ondansetron (ZOFRAN) 8 MG tablet Take 1 tablet (8 mg) by mouth every 8 hours as needed for nausea 30 tablet 3         ECOG Performance Status: 0    Physical Examination:  /72 (BP Location: Right arm, Patient Position: Sitting, Cuff Size: Adult Regular)   Pulse 82   Temp 97.7  F (36.5  C) (Oral)   Resp 16   Wt 76.7 kg (169 lb)   SpO2 100%   BMI 28.50 kg/m    General:  Well appearing, well-nourished adult female in NAD.  HEENT:  Normocephalic.  Sclera anicteric.  MMM.  No lesions of the oropharynx.  Breast: 2.5 x 2.5cm mass at 1:00 7cm FN - largely stable   Lymph:  palpable axillary LN bilaterally -stable compared to prior  Chest:  Breathing comfortably on room air  Abd:  Soft/NT/ND.  No hepatosplenomegaly.  Ext:  No pitting edema of the bilateral lower extremities.    Musculo:  Strength 5/5 throughout.  Neuro:  Cranial nerves grossly intact.  Psych:  Mood and affect appear normal.      Laboratory Data:  Lab Results   Component Value Date    WBC 6.2 07/25/2024    HGB 9.6 (L) 07/25/2024    HCT 30.0 (L) 07/25/2024    MCV 90 07/25/2024     07/25/2024     Lab Results   Component Value Date     07/25/2024    BUN 12.8 07/25/2024    ANIONGAP 9 07/25/2024     Lab Results   Component Value Date    ALT 72 (H) 07/25/2024    AST  54 (H) 07/25/2024    ALKPHOS 52 07/25/2024     Lab Results   Component Value Date    INR 1.02 04/17/2024         Radiology data:  I have personally reviewed the images of / or the following radiology data: As detailed in the oncology timeline      Pathology and other data:  I have personally reviewed the following data: As detailed in the oncology timeline      Assessment and Recommendations  Sharon Fong  is a 48 year old premenopausal woman with a new diagnosis of 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  She is on the I SPY 2.2 clinical trial and is receiving weekly Abraxane.  She presents today for follow-up.      # Right breast cancer  - Continue neoadjuvant Abraxane  - She will have another MRI and biopsy at week 12.  Since there will most likely be disease in her breast, she wonders why she needs a lymph node biopsy.  Will reach out to our research team to clarify this for her.  - She has already established care with Dr. Collier and is planned to have a mastectomy after neoadjuvant chemotherapy  - Will need adjuvant radiation therapy -will place referral after her surgery    # Arthralgias  likely associated with Taxol though she also has a history of hypermobility/inflammatory arthritis which could be exacerbated by current systemic therapy.  -Okay to use NSAIDs -though not on a daily basis  -Can consider longer acting ones a such as naproxen    # Contralateral (left) axillary lymph node enlargement  Indeterminant  - Will continue to follow on subsequent scans.  These appeared normal on baseline ultrasound    #Hx of migraines  - Zofran has been added back for premeds as she did not feel well on Compazine  - She will reach out to us if nausea becomes an issue so we can consider additional agents  - Once she starts AC, she will be receiving Aloxi.  Discussed that her headaches may potentially get worse on this.  If that occurs after cycle 1, will need to consider alternative  antiemetic premeds for her including olanzapine        - headaches with zofran to 4 mg. Excedrin. Compazine-- didn't feel well.            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 She started screening for the ISPY 2.2 trial and was found to have a MammaPrint high risk/blueprint luminal B subtype.  She was started on treatment in block B due to lack of availability of block A agents.  Her course has been complicated by allergic reaction to Taxol requiring change to Abraxane.  Her 3-week MRI showed a tumor volume reduction of 28.9% (did not meet prespecified threshold) and therefore she received a 6-week MRI demonstrated a 57% reduction tumor volume from baseline (which again did not meet prespecified threshold) to continue on block B.  The recommendations from my spine were to escalate related to AC.  However, in light of ongoing significant response to Abraxane, we decided to continue to complete the full 12 weeks of treatment.  Her 12 week breast MRI demonstrates a slightly smaller mass. I-SPY read pending. Dr. Bernal recommends proceeding with AC. She presents today prior to C1D1 AC.     Interval History  Sharon returns clinic today accompanied by her  Fransisco. She is feeling fatigued. She has noticed some swelling in her knees and ankles for which he has taken aleve for. She has some general aches in her thighs. Her knees and ankles aren't particularly painful. No nausea today, for chemotherapy induced nausea she previously had headaches with zofran. She tried compazine instead and didn't like how it made her feel. Since then, she went back to trial of zofran and has been doing ok with it. She wonders if the headaches were actually hormonal. No fevers or chills. No chest pain, shortness of  breath, or cough. No neuropathy. No mucositis. No rash. She has some pain in the location of her breast mass.     ROS: 10 point ROS neg other than the symptoms noted above in the HPI.    Her full oncologic history is as follows:   Oncologic History  Patient Active Problem List    Diagnosis Date Noted     Malignant neoplasm of upper-inner quadrant of right female breast (H) 2024     Priority: Medium     Right breast cancer  Has had longstanding history of multiple biopsies, infection, and partial mastectomy for a right breast multifocal abscess dating back to at least 2024 patient presented with palpable lump at 1:00 in the right breast    3/21/2024 diagnostic mammo showed clustered fine indeterminate calcs at 12:00.  No mammographic abnormality identified to correspond to the palpable area of fullness at 1:00.  On US, at 1:00, 10 cm FN there was a 3.7 x 2.3 x 1.5 cm solid hypoechoic irregularly marginated mass.  Additional hypoechoic lesion located at 2:00, 3 cm FN measuring 1.0 x 0.5 cm.  Another hypoechoic lesion at 2:00, 12 cm FN measuring 0.7 cm.  At least 2 enlarged right axillary lymph nodes -largest measuring up to 0.6 cm.    3/28/2024 ultrasound-guided biopsy of the followin:00 lesion at least DCIS  1:00 lesion grade 2 IDC.  No LVI.  ER (3+, 80 to 90%), GA (3+, 90 to 100%), HER2 2+ and FISH negative   Right axillary LN: metastatic carcinoma    2024 CT CAP demonstrated multifocal enhancement in the right breast with mildly abnormal right axillary lymph nodes.  Otherwise no evidence of distant metastatic disease.    2024 NM bone scan negative for osteoblastic metastatic disease.    4/15/2024 MRI breast demonstrated the following  Right breast at 1:00 mass measuring 2.6 x 2.0 by 3.0 cm with extensive surrounding clumped NME occupying most of the right breast and measuring 8.9 x 4.7 x 8.7   Left breast showed a fibroadenoma at 8-9:00 position.  In addition, 7 mm of linear branching  NME at 3-4:00 position.  This was biopsied and found to be benign breast tissue.  Few asymmetric right internal mammary chain LN. multiple enlarged right level 1 axillary nodes.  Additionally multiple LEFT level 1 axillary nodes that are indeterminant -which were normal on left axillary US.    4/26/2024 MammaPrint -0.192 (high risk) and blueprint luminal B    4/30/2024 started weekly Taxol -changed to Abraxane starting week 2 due to allergic reaction    5/17/2024 W3 MRI  Minimally decreased size of the postoperative right breast cancer at the approximate 1:00 position measuring approximately 2.4 x 1.9 x 2.9 cm (previously 2.6 x 2.0 x 3.0 cm).  There is overall similar extent although mildly decreased volume of abnormal surrounding NME  Minimal interval enlargement of multiple bilateral level I lymph nodes, including the biopsy-proven right axillary node with indwelling biopsy marker. No significant change in the multiple small asymmetric right internal mammary chain lymph nodes.  Tumor volume reduction of 28.9% from baseline     6/10/2024 (6-week MRI breast) 8.8 cm anteroposterior on axial MIPS, unchanged.  Mildly decreased size of the biopsy-proven cancer in the right breast at the approximate 1:00 position middle depth measuring approximately 2.0 x 1.4 x 2.4 cm (previously 2.4 x 1.9 x 2.9 cm).   Extensive surrounding NME is largely unchanged changed, but the volume has minimally decreased.  Stable bilateral axillary lymph nodes, including the biopsy-proven right level I axillary node. Stable asymmetric small right internal mammary chain lymph nodes.  FTV reduction of 57% from baseline        Inflammatory arthritis 06/01/2015     Priority: Medium     Seizure (H) 11/15/2013     Priority: Medium     Vitamin D deficiency 04/23/2013     Priority: Medium        Current Medications:  Current Outpatient Medications   Medication Sig Dispense Refill     acetaminophen (TYLENOL) 500 MG tablet Take 500 mg by mouth as needed  for mild pain (Patient not taking: Reported on 6/6/2024)       amitriptyline (ELAVIL) 10 MG tablet Take 20 mg by mouth at bedtime (Patient not taking: Reported on 7/11/2024)       aspirin-acetaminophen-caffeine (EXCEDRIN MIGRAINE) 250-250-65 MG tablet        FT ANTACID & ANTIGAS 200-200-20 MG/5ML SUSP suspension        ibuprofen (ADVIL/MOTRIN) 200 MG capsule        lidocaine-prilocaine (EMLA) 2.5-2.5 % external cream Apply topically as needed for moderate pain 30 g 1     magic mouthwash suspension (diphenhydrAMINE, lidocaine, aluminum-magnesium & simethicone) Swish and swallow 10 mLs in mouth every 6 hours as needed for mouth sores (Patient not taking: Reported on 6/20/2024) 100 mL 1     ondansetron (ZOFRAN) 8 MG tablet Take 1 tablet (8 mg) by mouth every 8 hours as needed for nausea 30 tablet 3     ECOG Performance Status: 0    Physical Examination:  /72 (BP Location: Right arm, Patient Position: Sitting, Cuff Size: Adult Regular)   Pulse 82   Temp 97.7  F (36.5  C) (Oral)   Resp 16   Wt 76.7 kg (169 lb)   SpO2 100%   BMI 28.50 kg/m    General:  Well appearing, well-nourished adult female in NAD.  HEENT:  Normocephalic.  Sclera anicteric.  MMM.  No lesions of the oropharynx.  Breast: 2.5 x 2.0cm mass at 1:00 7cm FN - (previously measured 2.5x2.5cm)  Lymph:  palpable axillary LN bilaterally, consistent with past exams.   Chest:  Breathing comfortably on room air  Abd:  Soft/NT/ND.  No hepatosplenomegaly.  Ext:  No pitting edema of the bilateral lower extremities.    Musculo:  Strength 5/5 throughout.  Neuro:  Cranial nerves grossly intact.  Psych:  Mood and affect appear normal.      Laboratory Data:  Lab Results   Component Value Date    WBC 6.2 07/25/2024    HGB 9.6 (L) 07/25/2024    HCT 30.0 (L) 07/25/2024    MCV 90 07/25/2024     07/25/2024     Lab Results   Component Value Date     07/25/2024    BUN 12.8 07/25/2024    ANIONGAP 9 07/25/2024     Lab Results   Component Value Date    ALT  72 (H) 07/25/2024    AST 54 (H) 07/25/2024    ALKPHOS 52 07/25/2024     Lab Results   Component Value Date    INR 1.02 04/17/2024     Radiology data:  I have personally reviewed the images of / or the following radiology data: As detailed in the oncology timeline    Pathology and other data:  I have personally reviewed the following data: As detailed in the oncology timeline    Assessment and Recommendations  Sharon Fong  is a 48 year old premenopausal woman with a new diagnosis of 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  She is on the I SPY 2.2 clinical trial and is receiving weekly Abraxane. She has completed 12 weeks of Abraxane.  She presents today for follow-up.    # Right breast cancer  - She completed 12 weeks of neoadjuvant Abraxane. She had her 12 week breast MRI on 7/23/24, I-SPY read and biopsy pending. Her 12 week MRI suggests a slight decreased volume in the mass and non-mass enhancement measuring 8.0x 4.5 x 6.8 cm. The right breast mass depth now measures 1.8 x 1.1 x 1.6 cm. Recommendation is to proceed with ddAC x 4 cycles every 2 weeks. After discussion with Dr. Bernal, we will not wait for the I-Spy recommendations and instead proceed with cycle 1 today.   - I reviewed common side effects with patient in detail including, but not limited to, cytopenias, nausea and vomiting, GI upset, fatigue, cardiotoxicity,   - She will have another MRI and biopsy at week 12.  Since there will most likely be disease in her breast, she wonders why she needs a lymph node biopsy.  Will reach out to our research team to clarify this for her.  - She has already established care with Dr. Collier and is planned to have a mastectomy after neoadjuvant chemotherapy  - Will need adjuvant radiation therapy -will place referral after her surgery    # Arthralgias  likely associated with Taxol though she also has a history of hypermobility/inflammatory arthritis which could be exacerbated by  current systemic therapy.  -Okay to use NSAIDs -though not on a daily basis  -Can consider longer acting ones a such as naproxen    # Contralateral (left) axillary lymph node enlargement  Indeterminant  - Will continue to follow on subsequent scans.  These appeared normal on baseline ultrasound    #Hx of migraines  - Zofran has been added back for premeds as she did not feel well on Compazine  - She will reach out to us if nausea becomes an issue so we can consider additional agents  - Once she starts AC, she will be receiving Aloxi.  Discussed that her headaches may potentially get worse on this.  If that occurs after cycle 1, will need to consider alternative antiemetic premeds for her including olanzapine        - headaches with zofran to 4 mg. Excedrin. Compazine-- didn't feel well.              Again, thank you for allowing me to participate in the care of your patient.        Sincerely,        Angie Herrera PA-C

## 2024-07-25 NOTE — NURSING NOTE
"Chief Complaint   Patient presents with    Port Draw     Labs drawn via port by RN. VS taken.     Port accessed with 20 gauge, 3/4\" power needle by RN, labs collected, line flushed with saline and heparin.  Vitals taken. Pt checked in for appointment(s).     2 research kits collected and sent down to 1st floor lab. 1 research kit collected placed in ISPY pink bin.    Cari Hester RN    "

## 2024-07-25 NOTE — PROGRESS NOTES
Infusion Nursing Note:  Sharon Fong presents today for C1D1 Adriamycin and Cytoxan.    Patient seen by provider today: Yes: FANNY Alcaraz   present during visit today: Not Applicable.    Note: Sharon presents to infusion today following her clinic visit. She is new to these drugs but not new to infusion. Education provided prior by yvette Reis RN.    Intravenous Access:  Implanted Port.    Treatment Conditions:  Lab Results   Component Value Date    HGB 9.6 (L) 07/25/2024    WBC 6.2 07/25/2024    ANEUTAUTO 3.2 07/25/2024     07/25/2024        Lab Results   Component Value Date     07/25/2024    POTASSIUM 4.2 07/25/2024    MAG 2.1 04/17/2024    CR 0.53 07/25/2024    JJ 9.1 07/25/2024    BILITOTAL 0.6 07/25/2024    ALBUMIN 4.0 07/25/2024    ALT 72 (H) 07/25/2024    AST 54 (H) 07/25/2024       Results reviewed, labs MET treatment parameters, ok to proceed with treatment.  ECHO/MUGA completed 4/22/24  EF 60-65%.      Post Infusion Assessment:  Patient tolerated infusion without incident.  Blood return noted pre and post infusion.  Blood return noted during Adriamycin administration per protocol.  Site patent and intact, free from redness, edema or discomfort.  No evidence of extravasations.  Access discontinued per protocol.     Neulasta Onpro On-Body injector applied to R arm at 1347.  Writer discussed Neulasta injection would start tomorrow 7/26 at 1647, approximately 27 hours after application applied today.    Written and Verbal instruction reviewed with patient.  Pt instructed when the dose delivery starts, it will take about 45 minutes to complete.  Pt aware Neulasta Onpro On-Body should have green flashing light and to call triage or on-call MD if injector flashes red or appears to be leaking.   Pt aware to keep Onpro On-Body Neulasta 4 inches away from electrical equipment and to avoid showering 4 hours prior to injection.     Discharge Plan:   Prescription refills  given for dexamethasone. Patient states she has enough zofran at home.  Discharge instructions reviewed with: Patient and Family.  Patient and/or family verbalized understanding of discharge instructions and all questions answered.  AVS to patient via [employer].  Patient will return 8/8 for next appointment.   Patient discharged in stable condition accompanied by: .  Departure Mode: Ambulatory.      Concepcion Wiggins RN

## 2024-07-26 LAB
PATH REPORT.COMMENTS IMP SPEC: ABNORMAL
PATH REPORT.COMMENTS IMP SPEC: YES
PATH REPORT.FINAL DX SPEC: ABNORMAL
PATH REPORT.GROSS SPEC: ABNORMAL
PATH REPORT.MICROSCOPIC SPEC OTHER STN: ABNORMAL
PATH REPORT.RELEVANT HX SPEC: ABNORMAL
PHOTO IMAGE: ABNORMAL

## 2024-07-29 ENCOUNTER — APPOINTMENT (OUTPATIENT)
Dept: GENERAL RADIOLOGY | Facility: CLINIC | Age: 49
DRG: 641 | End: 2024-07-29
Payer: COMMERCIAL

## 2024-07-29 ENCOUNTER — NURSE TRIAGE (OUTPATIENT)
Dept: ONCOLOGY | Facility: CLINIC | Age: 49
End: 2024-07-29
Payer: COMMERCIAL

## 2024-07-29 ENCOUNTER — HOSPITAL ENCOUNTER (INPATIENT)
Facility: CLINIC | Age: 49
LOS: 1 days | Discharge: HOME | DRG: 641 | End: 2024-07-30
Attending: EMERGENCY MEDICINE | Admitting: HOSPITALIST
Payer: COMMERCIAL

## 2024-07-29 ENCOUNTER — TELEPHONE (OUTPATIENT)
Dept: ONCOLOGY | Facility: CLINIC | Age: 49
End: 2024-07-29
Payer: COMMERCIAL
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
## 2024-07-29 DIAGNOSIS — Z17.0 MALIGNANT NEOPLASM OF UPPER-INNER QUADRANT OF RIGHT BREAST IN FEMALE, ESTROGEN RECEPTOR POSITIVE (H): Chronic | ICD-10-CM

## 2024-07-29 DIAGNOSIS — G40.909 NONINTRACTABLE EPILEPSY WITHOUT STATUS EPILEPTICUS, UNSPECIFIED EPILEPSY TYPE (H): ICD-10-CM

## 2024-07-29 DIAGNOSIS — Z79.899 NEED FOR PROPHYLACTIC CHEMOTHERAPY: ICD-10-CM

## 2024-07-29 DIAGNOSIS — R55 SYNCOPE, UNSPECIFIED SYNCOPE TYPE: Primary | ICD-10-CM

## 2024-07-29 DIAGNOSIS — C50.919 MALIGNANT NEOPLASM OF FEMALE BREAST, UNSPECIFIED ESTROGEN RECEPTOR STATUS, UNSPECIFIED LATERALITY, UNSPECIFIED SITE OF BREAST (H): ICD-10-CM

## 2024-07-29 DIAGNOSIS — K59.00 CONSTIPATION, UNSPECIFIED CONSTIPATION TYPE: ICD-10-CM

## 2024-07-29 DIAGNOSIS — I95.9 HYPOTENSION, UNSPECIFIED HYPOTENSION TYPE: ICD-10-CM

## 2024-07-29 DIAGNOSIS — C50.211 MALIGNANT NEOPLASM OF UPPER-INNER QUADRANT OF RIGHT BREAST IN FEMALE, ESTROGEN RECEPTOR POSITIVE (H): Chronic | ICD-10-CM

## 2024-07-29 LAB
ALBUMIN SERPL BCG-MCNC: 4 G/DL (ref 3.5–5.2)
ALP SERPL-CCNC: 68 U/L (ref 40–150)
ALT SERPL W P-5'-P-CCNC: 25 U/L (ref 0–50)
ANION GAP SERPL CALCULATED.3IONS-SCNC: 10 MMOL/L (ref 7–15)
AST SERPL W P-5'-P-CCNC: 14 U/L (ref 0–45)
BASOPHILS # BLD AUTO: ABNORMAL 10*3/UL
BASOPHILS # BLD MANUAL: 0 10E3/UL (ref 0–0.2)
BASOPHILS NFR BLD AUTO: ABNORMAL %
BASOPHILS NFR BLD MANUAL: 0 %
BILIRUB SERPL-MCNC: 0.7 MG/DL
BUN SERPL-MCNC: 24.6 MG/DL (ref 6–20)
CALCIUM SERPL-MCNC: 9 MG/DL (ref 8.8–10.4)
CHLORIDE SERPL-SCNC: 107 MMOL/L (ref 98–107)
CREAT SERPL-MCNC: 0.61 MG/DL (ref 0.51–0.95)
CREAT SERPL-MCNC: 0.61 MG/DL (ref 0.51–0.95)
EGFRCR SERPLBLD CKD-EPI 2021: >90 ML/MIN/1.73M2
EGFRCR SERPLBLD CKD-EPI 2021: >90 ML/MIN/1.73M2
EOSINOPHIL # BLD AUTO: ABNORMAL 10*3/UL
EOSINOPHIL # BLD MANUAL: 0 10E3/UL (ref 0–0.7)
EOSINOPHIL NFR BLD AUTO: ABNORMAL %
EOSINOPHIL NFR BLD MANUAL: 0 %
ERYTHROCYTE [DISTWIDTH] IN BLOOD BY AUTOMATED COUNT: 15.8 % (ref 10–15)
GLUCOSE SERPL-MCNC: 144 MG/DL (ref 70–99)
HCO3 SERPL-SCNC: 24 MMOL/L (ref 22–29)
HCT VFR BLD AUTO: 28.6 % (ref 35–47)
HGB BLD-MCNC: 9.1 G/DL (ref 11.7–15.7)
IMM GRANULOCYTES # BLD: ABNORMAL 10*3/UL
IMM GRANULOCYTES NFR BLD: ABNORMAL %
INR PPP: 1.01 (ref 0.85–1.15)
LACTATE SERPL-SCNC: 1.3 MMOL/L (ref 0.7–2)
LYMPHOCYTES # BLD AUTO: ABNORMAL 10*3/UL
LYMPHOCYTES # BLD MANUAL: 2.5 10E3/UL (ref 0.8–5.3)
LYMPHOCYTES NFR BLD AUTO: ABNORMAL %
LYMPHOCYTES NFR BLD MANUAL: 16 %
MCH RBC QN AUTO: 29.7 PG (ref 26.5–33)
MCHC RBC AUTO-ENTMCNC: 31.8 G/DL (ref 31.5–36.5)
MCV RBC AUTO: 94 FL (ref 78–100)
MONOCYTES # BLD AUTO: ABNORMAL 10*3/UL
MONOCYTES # BLD MANUAL: 0 10E3/UL (ref 0–1.3)
MONOCYTES NFR BLD AUTO: ABNORMAL %
MONOCYTES NFR BLD MANUAL: 0 %
NEUTROPHILS # BLD AUTO: ABNORMAL 10*3/UL
NEUTROPHILS # BLD MANUAL: 13.3 10E3/UL (ref 1.6–8.3)
NEUTROPHILS NFR BLD AUTO: ABNORMAL %
NEUTROPHILS NFR BLD MANUAL: 84 %
NRBC # BLD AUTO: 0 10E3/UL
NRBC BLD AUTO-RTO: 0 /100
PLAT MORPH BLD: ABNORMAL
PLATELET # BLD AUTO: 236 10E3/UL (ref 150–450)
POTASSIUM SERPL-SCNC: 3.8 MMOL/L (ref 3.4–5.3)
PROT SERPL-MCNC: 6.3 G/DL (ref 6.4–8.3)
RBC # BLD AUTO: 3.06 10E6/UL (ref 3.8–5.2)
RBC MORPH BLD: ABNORMAL
SODIUM SERPL-SCNC: 141 MMOL/L (ref 135–145)
WBC # BLD AUTO: 15.8 10E3/UL (ref 4–11)

## 2024-07-29 PROCEDURE — 36415 COLL VENOUS BLD VENIPUNCTURE: CPT | Performed by: EMERGENCY MEDICINE

## 2024-07-29 PROCEDURE — 85610 PROTHROMBIN TIME: CPT | Performed by: EMERGENCY MEDICINE

## 2024-07-29 PROCEDURE — 96361 HYDRATE IV INFUSION ADD-ON: CPT | Performed by: EMERGENCY MEDICINE

## 2024-07-29 PROCEDURE — 74018 RADEX ABDOMEN 1 VIEW: CPT

## 2024-07-29 PROCEDURE — G0378 HOSPITAL OBSERVATION PER HR: HCPCS

## 2024-07-29 PROCEDURE — 74018 RADEX ABDOMEN 1 VIEW: CPT | Mod: 26 | Performed by: RADIOLOGY

## 2024-07-29 PROCEDURE — 83605 ASSAY OF LACTIC ACID: CPT | Performed by: EMERGENCY MEDICINE

## 2024-07-29 PROCEDURE — 93005 ELECTROCARDIOGRAM TRACING: CPT | Performed by: EMERGENCY MEDICINE

## 2024-07-29 PROCEDURE — 258N000003 HC RX IP 258 OP 636: Performed by: EMERGENCY MEDICINE

## 2024-07-29 PROCEDURE — 99285 EMERGENCY DEPT VISIT HI MDM: CPT | Performed by: EMERGENCY MEDICINE

## 2024-07-29 PROCEDURE — 85007 BL SMEAR W/DIFF WBC COUNT: CPT | Performed by: EMERGENCY MEDICINE

## 2024-07-29 PROCEDURE — 80053 COMPREHEN METABOLIC PANEL: CPT | Performed by: EMERGENCY MEDICINE

## 2024-07-29 PROCEDURE — 250N000011 HC RX IP 250 OP 636: Performed by: EMERGENCY MEDICINE

## 2024-07-29 PROCEDURE — 120N000002 HC R&B MED SURG/OB UMMC

## 2024-07-29 PROCEDURE — 99285 EMERGENCY DEPT VISIT HI MDM: CPT | Mod: 25 | Performed by: EMERGENCY MEDICINE

## 2024-07-29 PROCEDURE — 99222 1ST HOSP IP/OBS MODERATE 55: CPT | Mod: GC | Performed by: HOSPITALIST

## 2024-07-29 PROCEDURE — 85027 COMPLETE CBC AUTOMATED: CPT | Performed by: EMERGENCY MEDICINE

## 2024-07-29 PROCEDURE — 96374 THER/PROPH/DIAG INJ IV PUSH: CPT | Performed by: EMERGENCY MEDICINE

## 2024-07-29 PROCEDURE — 93010 ELECTROCARDIOGRAM REPORT: CPT | Performed by: EMERGENCY MEDICINE

## 2024-07-29 RX ORDER — ONDANSETRON 2 MG/ML
4 INJECTION INTRAMUSCULAR; INTRAVENOUS EVERY 30 MIN PRN
Status: DISCONTINUED | OUTPATIENT
Start: 2024-07-29 | End: 2024-07-29

## 2024-07-29 RX ORDER — ONDANSETRON 2 MG/ML
4 INJECTION INTRAMUSCULAR; INTRAVENOUS EVERY 6 HOURS PRN
Status: DISCONTINUED | OUTPATIENT
Start: 2024-07-29 | End: 2024-07-30 | Stop reason: HOSPADM

## 2024-07-29 RX ORDER — POLYETHYLENE GLYCOL 3350 17 G/17G
17 POWDER, FOR SOLUTION ORAL 2 TIMES DAILY PRN
Status: DISCONTINUED | OUTPATIENT
Start: 2024-07-29 | End: 2024-07-30

## 2024-07-29 RX ORDER — OLANZAPINE 5 MG/1
5 TABLET ORAL AT BEDTIME
Status: DISCONTINUED | OUTPATIENT
Start: 2024-07-29 | End: 2024-07-29

## 2024-07-29 RX ORDER — AMOXICILLIN 250 MG
1 CAPSULE ORAL 2 TIMES DAILY PRN
Status: DISCONTINUED | OUTPATIENT
Start: 2024-07-29 | End: 2024-07-30

## 2024-07-29 RX ORDER — AMOXICILLIN 250 MG
2 CAPSULE ORAL 2 TIMES DAILY PRN
Status: DISCONTINUED | OUTPATIENT
Start: 2024-07-29 | End: 2024-07-30

## 2024-07-29 RX ORDER — ENOXAPARIN SODIUM 100 MG/ML
40 INJECTION SUBCUTANEOUS EVERY 24 HOURS
Status: DISCONTINUED | OUTPATIENT
Start: 2024-07-30 | End: 2024-07-30 | Stop reason: HOSPADM

## 2024-07-29 RX ORDER — DEXAMETHASONE 2 MG/1
8 TABLET ORAL DAILY
Status: DISCONTINUED | OUTPATIENT
Start: 2024-07-29 | End: 2024-07-29

## 2024-07-29 RX ORDER — ONDANSETRON 4 MG/1
4 TABLET, ORALLY DISINTEGRATING ORAL EVERY 6 HOURS PRN
Status: DISCONTINUED | OUTPATIENT
Start: 2024-07-29 | End: 2024-07-30 | Stop reason: HOSPADM

## 2024-07-29 RX ADMIN — SODIUM CHLORIDE 1000 ML: 9 INJECTION, SOLUTION INTRAVENOUS at 13:46

## 2024-07-29 RX ADMIN — ONDANSETRON 4 MG: 2 INJECTION INTRAMUSCULAR; INTRAVENOUS at 15:57

## 2024-07-29 ASSESSMENT — ACTIVITIES OF DAILY LIVING (ADL)
ADLS_ACUITY_SCORE: 35

## 2024-07-29 NOTE — ED TRIAGE NOTES
Pt brought in by family for low bp, dizziness weakness, and a fall in the bathroom this morning where she was not responsive for a couple of minutes. They say she did not hit her head. Pt says she does not remember falling. Pt denies pain. Says the dizziness started this am. She is currently undergoing chemo

## 2024-07-29 NOTE — TELEPHONE ENCOUNTER
Oncology Nurse Triage - Reporting Symptoms    Situation:   Sharon reporting the following symptoms: BP low unresponsive in bathroom      Background:   Treating Provider:   Dr Bernal     Date of last office visit: 7/25/24 Angie Herrera     Recent treatments: Yes: C1D1 Adriamycin and Cytoxan.        Assessment  Onset of symptoms: Not eating much. Not drinking much liquid.   BP 82/62   Went to bathroom and was unresponsive in bathroom for 2-3 minutes happened at about 11:45.   In bed and responsive at present. Weak.     Advised that they should go to the ER since she was unresponsive in bathroom.      If becomes unresponsive while driving they should pull to side of road and call 911.     Call placed to [facility] gave warm hand off to charge nurse: Dk            Recommendations:

## 2024-07-29 NOTE — PROGRESS NOTES
"Received voicemail from Fransisco (Sharon's  at 11:49 saying that Sharon had fainted in the bathroom and was unresponsive for \"a couple of minutes.\" He stated that her blood pressure was 81/64.  Encouraged Fransisco to take Sharon to the ER and call triage for further advice.     Smiley Frank, MARITZAN, RN  CRC-RN,   Office [phone]    "

## 2024-07-29 NOTE — ED PROVIDER NOTES
[place] EMERGENCY DEPARTMENT ([facility])    7/29/24       ED PROVIDER NOTE   History   No chief complaint on file.    LISA Fong is a 48 year old female with a past medical history of breast cancer, seizure disorder, and inflammatory arthritis who presents to the ED for generalized weakness and dizziness.  She states she has been feeling generally weak since having chemotherapy on 7/25/2024.  This morning, patient fell in the bathroom and was unresponsive for a couple minutes. Her family was at the scene of the fall and denies the patient hit her head.  She denies any current pain.  Does endorse feeling generally weak.  She states she feels like she has been getting enough to eat and drink.  No fevers.  She did endorse some abdominal pain after the fall but this has resolved.  No other symptoms noted.    Past Medical History  Past Medical History:   Diagnosis Date    Breast cancer (H)     Hypermobility arthralgia     Migraine     Seizure disorder (H) 2014    Once post surgery     Past Surgical History:   Procedure Laterality Date    AS KNEE SCOPE, DIAGNOSTIC      COSMETIC SURGERY Bilateral     removal of axillary excessive tissue    HYSTERECTOMY  12/2022    fibroids    INSERT PORT VASCULAR ACCESS Left 4/17/2024    Procedure: Insert port vascular access chest;  Surgeon: Chapin Loza MD;  Location: [facility] OR    IR CHEST PORT PLACEMENT > 5 YRS OF AGE  4/17/2024    IR LYMPH NODE BIOPSY  03/28/2024    LUMPECTOMY BREAST Right 10/08/2010    for abscess     acetaminophen (TYLENOL) 500 MG tablet  amitriptyline (ELAVIL) 10 MG tablet  aspirin-acetaminophen-caffeine (EXCEDRIN MIGRAINE) 250-250-65 MG tablet  dexAMETHasone (DECADRON) 4 MG tablet  FT ANTACID & ANTIGAS 200-200-20 MG/5ML SUSP suspension  ibuprofen (ADVIL/MOTRIN) 200 MG capsule  lidocaine-prilocaine (EMLA) 2.5-2.5 % external cream  magic mouthwash suspension (diphenhydrAMINE, lidocaine, aluminum-magnesium & simethicone)  OLANZapine  (ZYPREXA) 5 MG tablet  ondansetron (ZOFRAN) 8 MG tablet      Allergies   Allergen Reactions    Citrullus Vulgaris Unknown and Other (See Comments)     Rash, swollen lips    Corn-Containing Products Other (See Comments)    Paclitaxel Other (See Comments)     PAIN in shoulder and pelvis - see infusion notes from 4/30/24 and 5/7/24    Salicylic Acid Unknown and Other (See Comments)     Family History  Family History   Problem Relation Age of Onset    Hypertension Mother     Hypertension Father     Cerebrovascular Disease Father     Thyroid Disease Sister     Glaucoma No family hx of     Macular Degeneration No family hx of      Social History   Social History     Tobacco Use    Smoking status: Never     Passive exposure: Never    Smokeless tobacco: Never   Substance Use Topics    Alcohol use: Never    Drug use: Never      Past medical history, past surgical history, medications, allergies, family history, and social history were reviewed with the patient. No additional pertinent items.   A medically appropriate review of systems was performed with pertinent positives and negatives noted in the HPI, and all other systems negative.    Physical Exam      Physical Exam  Vitals and nursing note reviewed.   Constitutional:       General: She is not in acute distress.     Appearance: She is well-developed. She is ill-appearing. She is not diaphoretic.   HENT:      Head: Normocephalic and atraumatic.      Mouth/Throat:      Pharynx: No oropharyngeal exudate.   Eyes:      General: No scleral icterus.        Right eye: No discharge.         Left eye: No discharge.      Pupils: Pupils are equal, round, and reactive to light.   Cardiovascular:      Rate and Rhythm: Normal rate and regular rhythm.      Heart sounds: Normal heart sounds. No murmur heard.     No friction rub. No gallop.      Comments: Port in left upper chest  Pulmonary:      Effort: Pulmonary effort is normal. No respiratory distress.      Breath sounds: Normal  breath sounds. No wheezing.   Chest:      Chest wall: No tenderness.   Abdominal:      General: Bowel sounds are normal. There is no distension.      Palpations: Abdomen is soft.      Tenderness: There is no abdominal tenderness.   Musculoskeletal:         General: No tenderness or deformity. Normal range of motion.      Cervical back: Normal range of motion and neck supple.   Skin:     General: Skin is warm and dry.      Coloration: Skin is not pale.      Findings: No erythema or rash.   Neurological:      Mental Status: She is alert and oriented to person, place, and time.      Cranial Nerves: No cranial nerve deficit.           ED Course, Procedures, & Data      Procedures                No results found for any visits on 07/29/24.  Medications - No data to display  Labs Ordered and Resulted from Time of ED Arrival to Time of ED Departure - No data to display  No orders to display               EKG Interpretation:      Interpreted by Manuel Contreras DO  Time reviewed: 2005   Symptoms at time of EKG: generalized weakness   Rhythm: normal sinus   Rate: 76   Axis: normal  Ectopy: none  Conduction: normal  ST Segments/ T Waves: No ST-T wave changes  Q Waves: none  Comparison to prior: Unchanged from 4/12/2024    Clinical Impression: no acute abnormalities    Assessment & Plan    Is a 48-year-old female currently on chemotherapy for breast cancer presents with hypotension and an episode of syncope.  He has been feeling weak since having chemotherapy 4 days ago.  She did have an episode of syncope today.  Patient was initially noted to be hypotensive, pressure did go as low as 60 systolic.  Patient was given IV fluids and pressure improved.  Patient was initially feeling weak but did have improvement with fluids.  Lab work shows WBC count of 15.8, patient has received Neulasta.  Lactic acid is not elevated.  Discussed all results with patient and family.  I believe would be reasonable to monitor patient at this time.   We will admit for further monitoring workup and treatment.    I have reviewed the nursing notes. I have reviewed the findings, diagnosis, plan and need for follow up with the patient.    New Prescriptions    No medications on file       Final diagnoses:   None       Manuel Contreras DO  [facility] EMERGENCY DEPARTMENT  7/29/2024     Manuel Contreras,   07/29/24 1948       Manuel Contreras,   07/29/24 2017

## 2024-07-30 VITALS
DIASTOLIC BLOOD PRESSURE: 68 MMHG | OXYGEN SATURATION: 99 % | SYSTOLIC BLOOD PRESSURE: 102 MMHG | RESPIRATION RATE: 18 BRPM | TEMPERATURE: 98.3 F | HEART RATE: 91 BPM

## 2024-07-30 LAB
ALBUMIN SERPL BCG-MCNC: 4 G/DL (ref 3.5–5.2)
ALP SERPL-CCNC: 71 U/L (ref 40–150)
ALT SERPL W P-5'-P-CCNC: 19 U/L (ref 0–50)
ANION GAP SERPL CALCULATED.3IONS-SCNC: 10 MMOL/L (ref 7–15)
AST SERPL W P-5'-P-CCNC: 13 U/L (ref 0–45)
ATRIAL RATE - MUSE: 76 BPM
BILIRUB SERPL-MCNC: 0.8 MG/DL
BUN SERPL-MCNC: 17.4 MG/DL (ref 6–20)
CALCIUM SERPL-MCNC: 9 MG/DL (ref 8.8–10.4)
CHLORIDE SERPL-SCNC: 105 MMOL/L (ref 98–107)
CREAT SERPL-MCNC: 0.56 MG/DL (ref 0.51–0.95)
DIASTOLIC BLOOD PRESSURE - MUSE: NORMAL MMHG
EGFRCR SERPLBLD CKD-EPI 2021: >90 ML/MIN/1.73M2
ERYTHROCYTE [DISTWIDTH] IN BLOOD BY AUTOMATED COUNT: 15.6 % (ref 10–15)
GLUCOSE SERPL-MCNC: 98 MG/DL (ref 70–99)
HCO3 SERPL-SCNC: 24 MMOL/L (ref 22–29)
HCT VFR BLD AUTO: 27.8 % (ref 35–47)
HGB BLD-MCNC: 9.2 G/DL (ref 11.7–15.7)
INTERPRETATION ECG - MUSE: NORMAL
MCH RBC QN AUTO: 30.5 PG (ref 26.5–33)
MCHC RBC AUTO-ENTMCNC: 33.1 G/DL (ref 31.5–36.5)
MCV RBC AUTO: 92 FL (ref 78–100)
P AXIS - MUSE: 56 DEGREES
PLATELET # BLD AUTO: 228 10E3/UL (ref 150–450)
POTASSIUM SERPL-SCNC: 4 MMOL/L (ref 3.4–5.3)
PR INTERVAL - MUSE: 134 MS
PROT SERPL-MCNC: 6.1 G/DL (ref 6.4–8.3)
QRS DURATION - MUSE: 70 MS
QT - MUSE: 378 MS
QTC - MUSE: 425 MS
R AXIS - MUSE: 50 DEGREES
RBC # BLD AUTO: 3.02 10E6/UL (ref 3.8–5.2)
SODIUM SERPL-SCNC: 139 MMOL/L (ref 135–145)
SYSTOLIC BLOOD PRESSURE - MUSE: NORMAL MMHG
T AXIS - MUSE: 33 DEGREES
VENTRICULAR RATE- MUSE: 76 BPM
WBC # BLD AUTO: 8.9 10E3/UL (ref 4–11)

## 2024-07-30 PROCEDURE — 80053 COMPREHEN METABOLIC PANEL: CPT

## 2024-07-30 PROCEDURE — 96376 TX/PRO/DX INJ SAME DRUG ADON: CPT

## 2024-07-30 PROCEDURE — G0378 HOSPITAL OBSERVATION PER HR: HCPCS

## 2024-07-30 PROCEDURE — 250N000011 HC RX IP 250 OP 636

## 2024-07-30 PROCEDURE — 258N000003 HC RX IP 258 OP 636

## 2024-07-30 PROCEDURE — 99239 HOSP IP/OBS DSCHRG MGMT >30: CPT | Mod: GC | Performed by: INTERNAL MEDICINE

## 2024-07-30 PROCEDURE — 96361 HYDRATE IV INFUSION ADD-ON: CPT

## 2024-07-30 PROCEDURE — 85027 COMPLETE CBC AUTOMATED: CPT

## 2024-07-30 PROCEDURE — 36415 COLL VENOUS BLD VENIPUNCTURE: CPT

## 2024-07-30 RX ORDER — AMOXICILLIN 250 MG
1 CAPSULE ORAL AT BEDTIME
Status: DISCONTINUED | OUTPATIENT
Start: 2024-07-30 | End: 2024-07-30 | Stop reason: HOSPADM

## 2024-07-30 RX ORDER — POLYETHYLENE GLYCOL 3350 17 G/17G
17 POWDER, FOR SOLUTION ORAL DAILY
Status: DISCONTINUED | OUTPATIENT
Start: 2024-07-30 | End: 2024-07-30 | Stop reason: HOSPADM

## 2024-07-30 RX ORDER — AMOXICILLIN 250 MG
2 CAPSULE ORAL AT BEDTIME
Status: DISCONTINUED | OUTPATIENT
Start: 2024-07-30 | End: 2024-07-30 | Stop reason: HOSPADM

## 2024-07-30 RX ORDER — POLYETHYLENE GLYCOL 3350 17 G/17G
17 POWDER, FOR SOLUTION ORAL DAILY
Qty: 510 G | Refills: 1 | Status: SHIPPED | OUTPATIENT
Start: 2024-07-30

## 2024-07-30 RX ORDER — HEPARIN SODIUM (PORCINE) LOCK FLUSH IV SOLN 100 UNIT/ML 100 UNIT/ML
100 SOLUTION INTRAVENOUS ONCE
Status: COMPLETED | OUTPATIENT
Start: 2024-07-30 | End: 2024-07-30

## 2024-07-30 RX ADMIN — SODIUM CHLORIDE, POTASSIUM CHLORIDE, SODIUM LACTATE AND CALCIUM CHLORIDE 500 ML: 600; 310; 30; 20 INJECTION, SOLUTION INTRAVENOUS at 13:49

## 2024-07-30 RX ADMIN — ONDANSETRON 4 MG: 2 INJECTION INTRAMUSCULAR; INTRAVENOUS at 04:30

## 2024-07-30 RX ADMIN — Medication 100 UNITS: at 15:11

## 2024-07-30 ASSESSMENT — ACTIVITIES OF DAILY LIVING (ADL)
ADLS_ACUITY_SCORE: 42
ADLS_ACUITY_SCORE: 35
ADLS_ACUITY_SCORE: 35
ADLS_ACUITY_SCORE: 42
ADLS_ACUITY_SCORE: 42
ADLS_ACUITY_SCORE: 35
ADLS_ACUITY_SCORE: 42
ADLS_ACUITY_SCORE: 35
ADLS_ACUITY_SCORE: 42
ADLS_ACUITY_SCORE: 35
ADLS_ACUITY_SCORE: 42

## 2024-07-30 NOTE — DISCHARGE SUMMARY
[facility]  Discharge Summary - Medicine & Pediatrics       Date of Admission:  7/29/2024  Date of Discharge:  7/30/2024  Discharging Provider: Dr. Mabry  Discharge Service: Medicine Service, JAMES TEAM 5    Discharge Diagnoses   Vasovagal Syncope  Hypotension  Dehydration    Clinically Significant Risk Factors          Follow-ups Needed After Discharge   Follow-up Appointments     Adult [facility] Follow-up and recommended labs and tests      Follow up with primary care provider, Physician No Ref-Primary, within 7   days for hospital follow- up.  No follow up labs or test are needed.      Appointments on [facility] and/or [facility] (with [facility] or [facility]   provider or service). Call [phone] if you haven't heard regarding   these appointments within 7 days of discharge.            Unresulted Labs Ordered in the Past 30 Days of this Admission       No orders found for last 31 day(s).          Discharge Disposition   Discharged to home  Condition at discharge: Stable    Hospital Course   Sharon Fong was admitted on 7/29/2024 for syncope related to vasovagal event and hypovolemia.  The following problems were addressed during her hospitalization:    Vasovagal Syncope  Generalized weakness  Dehydration  Presenting with generalized weakness + LOC. Vitals on arrival with BP 89/67, afebrile, maintaining oxygen saturations on room air. Labs with WBC 15, lactate 1.2, BMP WNL. EKG showing sinus rhythm. No signs of seizure. Most likely related to vasovagal syncope and hypovolemia.  Orthostatics were unremarkable.  Patient had no additional syncopal episodes during this admission.  She did receive an additional 500 cc bolus per patient preference.  Patient is stable for discharge.  Discussed with oncology on discharge and would recommend additional IV fluid infusions when getting chemotherapy infusions.     Mild left lower quadrant pain -  resolved  Constipation  Suspect in part related to constipation.  Patient did have a bowel movement prior to discharge.  Plan for discharge with MiraLAX daily.     Malignant neoplasm of the R breast (HR+/HER2 -)  Left axillary lymph node enlargement  Following with Oncology outpatient. Current plan for neoadjuvant chemotherapy followed by mastectomy. Completed 12 weeks of Abraxane with transition to Adriamycin and Cytoxan on 7/25/24.  Discussed with oncology prior to discharge who are comfortable with discharge plan and will follow-up outpatient.     Anemia of chronic disease  Baseline hemoglobin ~10. Stable on presentation. No signs of active bleeding.     Leukocytosis   Recently received Neupogen after chemotherapy. Suspect elevated WBC in part related to Neupogen use. No fever currently with normal lactate 1.2, less likely infection and resolved prior to discharge.     History of seizure  Occurred after anesthesia 2013. No recurrent symptoms since.     Consultations This Hospital Stay   None    Code Status   Full Code       The patient was discussed with Dr. Clotilde Ferrera MD  [address] EMERGENCY DEPARTMENT  [address] [facility] [address]  Phone: [phone]  ______________________________________________________________________    Physical Exam   Vital Signs: Temp: 98.3  F (36.8  C) Temp src: Oral BP: 96/71 Pulse: 90   Resp: 18 SpO2: 100 % O2 Device: None (Room air)    Weight: 0 lbs 0 oz  Constitutional: Awake, alert, cooperative, in NAD.  Eyes: Sclera clear, conjunctiva normal.  ENT: Normocephalic, without obvious abnormality, oral pharynx with moist mucus membranes  Respiratory: Non-labored breathing, good air exchange, clear to auscultation bilaterally, no crackles or wheezing.  Cardiovascular: RRR, no murmur noted.  GI: Normal bowel sounds, non-tender, non-distended and soft  Skin: No concerning lesions or rash on exposed areas.  Musculoskeletal: No lower  extremity edema, moves all extremities equally  Neurologic: Awake, alert & oriented x3.     Psych: Appropriate affect      Primary Care Physician   Physician No Ref-Primary    Discharge Orders      Comprehensive metabolic panel     Reason for your hospital stay    You were treated for syncope, likely due to vasovagal episodes and low blood volume due to dehydration. We gave you fluids and are working to set up additional fluid infusions with your next chemo treatments.     Please follow up with your PCP in 1-2 weeks for a post-hospital follow up. Please come back to the ED if you notice any additional episodes, heart palpitations, chest pain or shortness of breath.     Activity    Your activity upon discharge: activity as tolerated     Adult [facility] Follow-up and recommended labs and tests    Follow up with primary care provider, Physician No Ref-Primary, within 7 days for hospital follow- up.  No follow up labs or test are needed.      Appointments on [place] and/or [facility] (with [facility] or [facility] provider or service). Call [phone] if you haven't heard regarding these appointments within 7 days of discharge.     Diet    Follow this diet upon discharge: Orders Placed This Encounter      Regular Diet Adult     Check Out Appointment Request    - Clinic visit with NP/SHABNAM with labs (CBC, CMP) around 7/2/2024, possible IVF infusion     CBC with platelets differential       Significant Results and Procedures   Results for orders placed or performed during the hospital encounter of 07/29/24   XR Abdomen Port 1 View    Narrative    EXAM: XR ABDOMEN PORT 1 VIEW  LOCATION: [facility]  DATE: 7/29/2024    INDICATION: Mild abdominal pain, assess stool burden.  COMPARISON: None.      Impression    IMPRESSION: Single supine view of the abdomen and pelvis was obtained. Moderate amount of stool throughout the colon, nonspecific, can be seen with constipation, otherwise  nonobstructive bowel gas pattern. Multiple pelvic phleboliths.       Discharge Medications   Current Discharge Medication List        START taking these medications    Details   polyethylene glycol (MIRALAX) 17 GM/Dose powder Take 17 g by mouth daily  Qty: 510 g, Refills: 1    Associated Diagnoses: Constipation, unspecified constipation type           CONTINUE these medications which have NOT CHANGED    Details   aspirin-acetaminophen-caffeine (EXCEDRIN MIGRAINE) 250-250-65 MG tablet       dexAMETHasone (DECADRON) 4 MG tablet Take 2 tablets (8 mg) by mouth daily Take for 3 days, starting the day after chemo. Take with food.  Qty: 6 tablet, Refills: 3    Associated Diagnoses: Malignant neoplasm of upper-inner quadrant of right breast in female, estrogen receptor positive (H)      FT ANTACID & ANTIGAS 200-200-20 MG/5ML SUSP suspension       ibuprofen (ADVIL/MOTRIN) 200 MG capsule       lidocaine-prilocaine (EMLA) 2.5-2.5 % external cream Apply topically as needed for moderate pain  Qty: 30 g, Refills: 1    Associated Diagnoses: Malignant neoplasm of upper-inner quadrant of right breast in female, estrogen receptor positive (H)      OLANZapine (ZYPREXA) 5 MG tablet Take 1 tablet (5 mg) by mouth at bedtime  Qty: 30 tablet, Refills: 0    Associated Diagnoses: Chemotherapy-induced nausea      ondansetron (ZOFRAN) 8 MG tablet Take 1 tablet (8 mg) by mouth every 8 hours as needed for nausea  Qty: 30 tablet, Refills: 3    Associated Diagnoses: Malignant neoplasm of upper-inner quadrant of right breast in female, estrogen receptor positive (H)           STOP taking these medications       acetaminophen (TYLENOL) 500 MG tablet Comments:   Reason for Stopping:         amitriptyline (ELAVIL) 10 MG tablet Comments:   Reason for Stopping:         magic mouthwash suspension (diphenhydrAMINE, lidocaine, aluminum-magnesium & simethicone) Comments:   Reason for Stopping:             Allergies   Allergies   Allergen Reactions     Citrullus Vulgaris Unknown and Other (See Comments)     Rash, swollen lips    Corn-Containing Products Other (See Comments)    Paclitaxel Other (See Comments)     PAIN in shoulder and pelvis - see infusion notes from 4/30/24 and 5/7/24    Salicylic Acid Unknown and Other (See Comments)

## 2024-07-30 NOTE — H&P
"[facility]    Internal Medicine History and Physical - [facility]       Date of Admission:  7/29/2024    Chief Complaint   \"Passed out\"    History is obtained from the patient    History of Present Illness   Sharon Fong is a 48 year old female w/ PMHx of right breast cancer, seizure disorder and inflammatory arthritis who presents with generalized weakness.    Sharon recently started a new chemotherapy with her first transfusion on 7/25.  Since that time she has felt generalized weakness.  Intake with food has been okay however does note decreased fluid intake (1 cup of lemon water in the morning, 1/2 body amour). This morning she got up to use the bathroom with the help of her . Once there he left her in the bathroom and waited outside. All of a sudden he heard a cup drop to the floor so he went into the bathroom. He found her sitting on the toilet with her tilted backwards and eyes closed.  Her arms were hanging down at her side.  This went on for 2 to 3 minutes.  She does not remember anything from this event.  Did not hit her head as she was not off the toilet.  Was attempting to have a bowel movement.  She does not remember any prodromal symptoms, other than feeling generally tired.  Afterwards, she started to respond to questions.  Really more yes and no answers.  Her  was able to get her up from the toilet and move her to the bed along with the assistance of their daughter. Did not initially want to call 911, but afterwards did call her oncology team who recommended that she come into be seen.    Currently Sharon tells me that she is feeling slightly better with most symtpoms occurring around nausea.  Denies any current headaches, rhinorrhea, sore throat, cough, palpitations, chest pain, vomiting, hematemesis, hematochezia, dysuria, hematuria, or new rashes of concern. Does endorse some abdominal tenderness on going for the last one " day. Dull in intensity. Reports more constipation lately, but having one bowel movement daily. It is dark in color.    In ED   - 1 L LR    Review of Systems   The 10 point Review of Systems is negative other than noted in the HPI or here.    Past Medical History    I have reviewed this patient's medical history and updated it with pertinent information if needed.   Past Medical History:   Diagnosis Date    Breast cancer (H)     Hypermobility arthralgia     Migraine     Seizure disorder (H) 2014    Once post surgery        Past Surgical History   I have reviewed this patient's surgical history and updated it with pertinent information if needed.  Past Surgical History:   Procedure Laterality Date    AS KNEE SCOPE, DIAGNOSTIC      COSMETIC SURGERY Bilateral     removal of axillary excessive tissue    HYSTERECTOMY  12/2022    fibroids    INSERT PORT VASCULAR ACCESS Left 4/17/2024    Procedure: Insert port vascular access chest;  Surgeon: Chapin Loza MD;  Location: [facility] OR    IR CHEST PORT PLACEMENT > 5 YRS OF AGE  4/17/2024    IR LYMPH NODE BIOPSY  03/28/2024    LUMPECTOMY BREAST Right 10/08/2010    for abscess        Social History   Social History     Tobacco Use    Smoking status: Never     Passive exposure: Never    Smokeless tobacco: Never   Substance Use Topics    Alcohol use: Never    Drug use: Never       Family History   I have reviewed this patient's family history and updated it with pertinent information if needed.   Family History   Problem Relation Age of Onset    Hypertension Mother     Hypertension Father     Cerebrovascular Disease Father     Thyroid Disease Sister     Glaucoma No family hx of     Macular Degeneration No family hx of        Prior to Admission Medications   Prior to Admission Medications   Prescriptions Last Dose Informant Patient Reported? Taking?   FT ANTACID & ANTIGAS 200-200-20 MG/5ML SUSP suspension   Yes No   OLANZapine (ZYPREXA) 5 MG tablet   No No   Sig: Take 1 tablet (5  mg) by mouth at bedtime   acetaminophen (TYLENOL) 500 MG tablet   Yes No   Sig: Take 500 mg by mouth as needed for mild pain   Patient not taking: Reported on 6/6/2024   amitriptyline (ELAVIL) 10 MG tablet   Yes No   Sig: Take 20 mg by mouth at bedtime   Patient not taking: Reported on 7/11/2024   aspirin-acetaminophen-caffeine (EXCEDRIN MIGRAINE) 250-250-65 MG tablet   Yes No   dexAMETHasone (DECADRON) 4 MG tablet   No No   Sig: Take 2 tablets (8 mg) by mouth daily Take for 3 days, starting the day after chemo. Take with food.   ibuprofen (ADVIL/MOTRIN) 200 MG capsule   Yes No   lidocaine-prilocaine (EMLA) 2.5-2.5 % external cream   No No   Sig: Apply topically as needed for moderate pain   magic mouthwash suspension (diphenhydrAMINE, lidocaine, aluminum-magnesium & simethicone)   No No   Sig: Swish and swallow 10 mLs in mouth every 6 hours as needed for mouth sores   Patient not taking: Reported on 6/20/2024   ondansetron (ZOFRAN) 8 MG tablet   No No   Sig: Take 1 tablet (8 mg) by mouth every 8 hours as needed for nausea      Facility-Administered Medications: None     Allergies   Allergies   Allergen Reactions    Citrullus Vulgaris Unknown and Other (See Comments)     Rash, swollen lips    Corn-Containing Products Other (See Comments)    Paclitaxel Other (See Comments)     PAIN in shoulder and pelvis - see infusion notes from 4/30/24 and 5/7/24    Salicylic Acid Unknown and Other (See Comments)       Physical Exam   Vital Signs: Temp: 97.7  F (36.5  C) Temp src: Oral BP: 114/73 Pulse: 75     SpO2: 98 % O2 Device: None (Room air)    Weight: 0 lbs 0 oz    Constitutional: Awake, alert, cooperative, in NAD.  Eyes: Sclera clear, conjunctiva normal.  ENT: Normocephalic, without obvious abnormality, oral pharynx with moist mucus membranes  Respiratory: Non-labored breathing, good air exchange, clear to auscultation bilaterally, no crackles or wheezing.  Cardiovascular: RRR, no murmur noted.  GI: + bowel sounds, soft,  non-distended, mild tenderness to palpation over the left lower quadrant  Skin: No concerning lesions or rash on exposed areas.  Musculoskeletal: No edema black LEs.  Neurologic: Awake, alert & oriented x3.     Psych: Appropriate affect      Assessment & Plan   Sharon Fong is a 48 year old female w/ PMHx of right breast cancer, seizure disorder and inflammatory arthritis who presents with generalized weakness.    Syncope  Generalized weakness  Presenting with generalized weakness + LOC. Vitals on arrival with BP 89/67, afebrile, maintaining oxygen saturations on room air. Labs with WBC 15, lactate 1.2, BMP WNL. Suspect multifactorial likely related to hypotension due to hypovolemia (poor fluid intake) + possible situational syncope due to defecation. Less likely cardiac (EKG without ST elevations or ST depressions, ECHO 4/2024 with no valvular abnormalities, EF 60%), metabolic (no hypoglycemia) and seizures. If further decompensates would consider further CT chest to rule out PE, but at this juncture less likely without shortness of breath and tachycardia.  - telemetry  - orthostatic blood pressures  - PT/OT in am if unable to ambulate    Mild left lower quadrant pain  Constipation  Suspect in part related to constipation. If worsening pain overnight could consider further imaging with CT abdomen.  - KUB  - prn bowel regimen    Malignant neoplasm of the R breast (HR+/HER2 -)  Left axillary lymph node enlargement  Following with Oncology outpatient. Current plan for neoadjuvant chemotherapy followed by mastectomy. Completed 12 weeks of Abraxane with transition to Adriamycin and Cytoxan on 7/25/24.    Anemia of chronic disease  Baseline hemoglobin ~10. Stable on presentation. No signs of active bleeding.    Leukocytosis   Recently received Neupogen after chemotherapy. Suspect elevated WBC in part related to Neupogen use. No fever currently with normal lactate 1.2, but if clinically decompensates will start broad  antibiotics.  - daily cbc    History of migraines  - Zofran 4 mg prn /olanzapine 5 mg prn for migraines/nausea    History of seizure  Occurred after anesthesia 2013. No recurrent symptoms since.     Diet: Regular Diet Adult  Fluids: s/p 1 L fluids; PO intake  Lines: PIV  Medina Catheter: Not present    DVT Prophylaxis: Enoxaparin (Lovenox) SQ  Code Status: Full Code     Expected Discharge Date: 07/30/2024                The patient was discussed with Dr. Lundberg.    Chelsea Goodrich MD  [facility]       Data     I have personally reviewed the following data over the past 24 hrs:    15.8 (H)  \   9.1 (L)   / 236     141 107 24.6 (H) /  144 (H)   3.8 24 0.61; 0.61 \     ALT: 25 AST: 14 AP: 68 TBILI: 0.7   ALB: 4.0 TOT PROTEIN: 6.3 (L) LIPASE: N/A     Procal: N/A CRP: N/A Lactic Acid: 1.3       INR:  1.01 PTT:  N/A   D-dimer:  N/A Fibrinogen:  N/A       Imaging results reviewed over the past 24 hrs:   No results found for this or any previous visit (from the past 24 hour(s)).

## 2024-07-30 NOTE — DISCHARGE SUMMARY
Shift:   Pt presented to ED with weakness and dizziness. Fell in bathroom at home, spouse present.      Past Medical History:   Diagnosis Date    Breast cancer (H)     Hypermobility arthralgia     Migraine     Seizure disorder (H) 2014    Once post surgery     VS: Temp: 98.3  F (36.8  C) Temp src: Oral BP: 96/71 Pulse: 91   Resp: 18 SpO2: 99 % O2 Device: None (Room air)    Pain: denies pain  Neuro: WDL  Cardiac: WDL  Respiratory: WDL  Diet/Appetite: tolerated regular diet  /GI: voiding spontaneously,1BM in AM   LDAs: L Single Lumen Port; Heparin Locked  Skin: WDL  Activity: up at [facility], independent  Test/Procedures: na  Pertinent Labs/Lab Collection: na     Plan: discharge at 1530. recvd 500ml bolus of LR prior to discharge. Follow up with Oncology team.

## 2024-07-31 NOTE — PROGRESS NOTES
"Aug 1, 2024    Medical Oncology Note    Sharon Fong    Age: 48 year old        CC: Breast Cancer      HPI: Sharon Fong is a 48 year old premenopausal woman with recent diagnosis of 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 She started screening for the ISPY 2.2 trial and was found to have a MammaPrint high risk/blueprint luminal B subtype.  She was started on treatment in block B due to lack of availability of block A agents.  Her course has been complicated by allergic reaction to Taxol requiring change to Abraxane.  Her 3-week MRI showed a tumor volume reduction of 28.9% (did not meet prespecified threshold) and therefore she received a 6-week MRI demonstrated a 57% reduction tumor volume from baseline (which again did not meet prespecified threshold) to continue on block B.  The recommendations from I-SPY were to escalate related to AC.  However, in light of ongoing significant response to Abraxane, we decided to continue to complete the full 12 weeks of treatment.  Her 12 week breast MRI demonstrates a slightly smaller mass. I-SPY read pending. Dr. Bernal recommends proceeding with AC. She presents today prior to C1D1 AC.     Interval History  Sharon is here today for an follow up after a recent ER visit. She felt very fatigued and could not ambulate without the support of her  the first 4 days after AC cycle 1. On 7/29, she felt very dizzy and faint. She was sitting down and became \"unresponsive\" for a few minutes. She is not sure if everything blacked out. Her  was near her side so she did not fall. She sat down and then they proceeded to the ER per our direction after calling into triage. She has no fevers or chills. No chest pain, shortness of breath, or cough. She is experiencing some urinary leakage which she generally has not had issues with in the past. No pain with urination or hematuria. No headaches. She had had one seizure back in 2014 but has " not had any recurrent episodes this did not feel similar to when she had a seizure previously. No diarrhea. She is having constipation that she is managing with miralax.     She continues to feel very tired today. Her entire body hurts. Her joints are okay today. No tylenol today or antihistamine. She took zofran this AM at 7 am. She is taking olazapine at 1/2 tablet to 1 tablet at HS which has been helpful for nausea.     During our visit, Sharon had a recurrent episode of dizziness and feeling faint. IVF fluids were started and her symptoms were quickly improved. She did not feel dehydrated during our initial episode of dizziness and again today but admits she could increase hydration and oral intake.       ROS: 10 point ROS neg other than the symptoms noted above in the HPI.    Her full oncologic history is as follows:   Oncologic History  Patient Active Problem List    Diagnosis Date Noted    Hypotension, unspecified hypotension type 07/29/2024     Priority: Medium    Syncope, unspecified syncope type 07/29/2024     Priority: Medium    Malignant neoplasm of upper-inner quadrant of right female breast (H) 04/02/2024     Priority: Medium     Right breast cancer  Has had longstanding history of multiple biopsies, infection, and partial mastectomy for a right breast multifocal abscess dating back to at least 2010.    2024 patient presented with palpable lump at 1:00 in the right breast    3/21/2024 diagnostic mammo showed clustered fine indeterminate calcs at 12:00.  No mammographic abnormality identified to correspond to the palpable area of fullness at 1:00.  On US, at 1:00, 10 cm FN there was a 3.7 x 2.3 x 1.5 cm solid hypoechoic irregularly marginated mass.  Additional hypoechoic lesion located at 2:00, 3 cm FN measuring 1.0 x 0.5 cm.  Another hypoechoic lesion at 2:00, 12 cm FN measuring 0.7 cm.  At least 2 enlarged right axillary lymph nodes -largest measuring up to 0.6 cm.    3/28/2024 ultrasound-guided  biopsy of the followin:00 lesion at least DCIS  1:00 lesion grade 2 IDC.  No LVI.  ER (3+, 80 to 90%), TX (3+, 90 to 100%), HER2 2+ and FISH negative   Right axillary LN: metastatic carcinoma    2024 CT CAP demonstrated multifocal enhancement in the right breast with mildly abnormal right axillary lymph nodes.  Otherwise no evidence of distant metastatic disease.    2024 NM bone scan negative for osteoblastic metastatic disease.    4/15/2024 MRI breast demonstrated the following  Right breast at 1:00 mass measuring 2.6 x 2.0 by 3.0 cm with extensive surrounding clumped NME occupying most of the right breast and measuring 8.9 x 4.7 x 8.7   Left breast showed a fibroadenoma at 8-9:00 position.  In addition, 7 mm of linear branching NME at 3-4:00 position.  This was biopsied and found to be benign breast tissue.  Few asymmetric right internal mammary chain LN. multiple enlarged right level 1 axillary nodes.  Additionally multiple LEFT level 1 axillary nodes that are indeterminant -which were normal on left axillary US.    2024 MammaPrint -0.192 (high risk) and blueprint luminal B    2024 started weekly Taxol -changed to Abraxane starting week 2 due to allergic reaction    2024 W3 MRI  Minimally decreased size of the postoperative right breast cancer at the approximate 1:00 position measuring approximately 2.4 x 1.9 x 2.9 cm (previously 2.6 x 2.0 x 3.0 cm).  There is overall similar extent although mildly decreased volume of abnormal surrounding NME  Minimal interval enlargement of multiple bilateral level I lymph nodes, including the biopsy-proven right axillary node with indwelling biopsy marker. No significant change in the multiple small asymmetric right internal mammary chain lymph nodes.  Tumor volume reduction of 28.9% from baseline     6/10/2024 (6-week MRI breast) 8.8 cm anteroposterior on axial MIPS, unchanged.  Mildly decreased size of the biopsy-proven cancer in the right breast  at the approximate 1:00 position middle depth measuring approximately 2.0 x 1.4 x 2.4 cm (previously 2.4 x 1.9 x 2.9 cm).   Extensive surrounding NME is largely unchanged changed, but the volume has minimally decreased.  Stable bilateral axillary lymph nodes, including the biopsy-proven right level I axillary node. Stable asymmetric small right internal mammary chain lymph nodes.  FTV reduction of 57% from baseline       Inflammatory arthritis 06/01/2015     Priority: Medium    Seizure (H) 11/15/2013     Priority: Medium    Vitamin D deficiency 04/23/2013     Priority: Medium        Current Medications:  Current Outpatient Medications   Medication Sig Dispense Refill    aspirin-acetaminophen-caffeine (EXCEDRIN MIGRAINE) 250-250-65 MG tablet       dexAMETHasone (DECADRON) 4 MG tablet Take 2 tablets (8 mg) by mouth daily Take for 3 days, starting the day after chemo. Take with food. 6 tablet 3    FT ANTACID & ANTIGAS 200-200-20 MG/5ML SUSP suspension       ibuprofen (ADVIL/MOTRIN) 200 MG capsule       lidocaine-prilocaine (EMLA) 2.5-2.5 % external cream Apply topically as needed for moderate pain 30 g 1    OLANZapine (ZYPREXA) 5 MG tablet Take 1 tablet (5 mg) by mouth at bedtime 30 tablet 0    ondansetron (ZOFRAN) 8 MG tablet Take 1 tablet (8 mg) by mouth every 8 hours as needed for nausea 30 tablet 3    polyethylene glycol (MIRALAX) 17 GM/Dose powder Take 17 g by mouth daily 510 g 1     ECOG Performance Status: 0    Physical Examination:  /77   Pulse 91   Temp 98.2  F (36.8  C)   Resp 16   Wt 75.1 kg (165 lb 9.6 oz)   SpO2 99%   BMI 27.93 kg/m    General: Fatigued appearing, well-nourished adult female in NAD.Sitting in exam chair and briefly became dizzy requiring resting head on the way and closing eyes. Never loss consciousness during the event.   HEENT:  Normocephalic.  Sclera anicteric.  MMM.  No lesions of the oropharynx.  Breast: Deferred.   Lymph:  palpable axillary LN bilaterally, consistent with  past exams.   Chest:  Breathing comfortably on room air  Abd:  Soft/NT/ND.  No hepatosplenomegaly.  Ext:  No pitting edema of the bilateral lower extremities.    Musculo:  Strength 5/5 throughout.  Neuro:  Cranial nerves grossly intact.  Psych:  Mood and affect appear normal.      Laboratory Data:  Lab Results   Component Value Date    WBC 3.2 (L) 08/01/2024    HGB 9.4 (L) 08/01/2024    HCT 28.6 (L) 08/01/2024    MCV 90 08/01/2024     08/01/2024     Lab Results   Component Value Date     07/30/2024    BUN 17.4 07/30/2024    ANIONGAP 10 07/30/2024     Lab Results   Component Value Date    ALT 19 07/30/2024    AST 13 07/30/2024    ALKPHOS 71 07/30/2024     Lab Results   Component Value Date    INR 1.01 07/29/2024     Breast MRI 7/23/24:   FINDINGS:      Longest diameter of suspicious enhancement: 8.0 cm in AP dimension,  measured on MIPs (previously 8.8 cm on 6/10/2024, differences are  likely partially attributed to breast positioning).     There is slightly decreased volume of biopsy-proven cancer in the  right breast with overall mass and nonmass enhancement measuring 8.0 x  4.5 x 6.8 cm. Irregular enhancing mass in the right breast at  approximately 1:00 position, mid depth now measures 1.8 x 1.1 x 1.6 cm  (previously 2.0 x 1.4 x 2.4 cm). This enhancing mass shows large areas  of washout on kinetic imaging. Nonmass enhancement continues to extend  from anterior to posterior depth, with anterior nonmass enhancement  extending to the base of the nipple.     Left breast biopsy clip. No suspicious enhancement in the left breast.     Stable prominent bilateral axillary nodes, including the biopsied  right level 1 axillary node. Small asymmetric right internal mammary  chain lymph nodes are also similar to prior (for example a 4 mm lymph  node, axial image 77).    Radiology data:  I have personally reviewed the images of / or the following radiology data: As detailed in the oncology timeline    Pathology  and other data:  I have personally reviewed the following data: As detailed in the oncology timeline    Assessment and Recommendations  Sharon Fong  is a 48 year old premenopausal woman with a new diagnosis of 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  She is on the I SPY 2.2 clinical trial and is receiving weekly Abraxane. She has completed 12 weeks of Abraxane. She began ddAC cycle 1 on 7/25/24. She presents today for follow-up after a recent ER visit on 7/28/24 for dizziness.     # Right breast cancer  - She completed 12 weeks of neoadjuvant Abraxane. She had her 12 week breast MRI on 7/23/24, I-SPY read and biopsy pending. Her 12 week MRI suggests a slight decreased volume in the mass and non-mass enhancement measuring 8.0x 4.5 x 6.8 cm. The right breast mass depth now measures 1.8 x 1.1 x 1.6 cm. Recommendation is to proceed with ddAC x 4 cycles every 2 weeks. We opted to proceed with cycle 1 AC on 07/25/24 without I-SPY read.  - She has had a difficult time so far with ddAC and has had significant fatigued especially days the first 4 days after chemotherapy. She had an episode of dizziness with a time of unresponsiveness- not consistent with her past seizure in 2014 per patient report. I suspect her dizziness on 7/28 and again today is secondary to extreme fatigue from AC and dehydration. She did present to clinic with mild tachycardia. EKG performed in the infusion center demonstrated normal sinus rhythm. Will await cardiology review.   - 1 L NS today 8/1/24 with resolution of dizziness prior to discharge from infusion center. Repeat vitals all remained stable.   - I did discuss with Sharon that olanzapine may be contributing to the dizziness as well. However, given dehydration it is difficult to discern.   - For cycle 2, I do think Sharon would benefit with 1 L NS at the time of chemotherapy and additional IVF 1-2 times a week. We could repeat her dex 12 mg, emend, and  aloxi 72 hours after her chemotherapy as well with IV antimetics prn. Additionally, she may benefit from PO dex 8 mg x 5 days instead of 3. As nausea is well controlled, Suparna will decrease her zyprexa from 5 mg to 2.5 mg at HS. We will check [facility] coverage for Sup[place]. If not available, [facility] is her preferred site when she is not getting chemotherapy. Addendum Reviewed message that patient has coverage for [facility].   - Ativan prn is an option for nausea as well.   - Will ask RNCC to call Sharon tomorrow 8/2/24 to follow up on how she is feeling. Prior to leaving today, she was feeling well and better than before presenting.     - She has already established care with Dr. Collier and is planned to have a mastectomy after neoadjuvant chemotherapy  - Will need adjuvant radiation therapy -will place referral after her surgery    # Arthralgias  likely associated with Taxol though she also has a history of hypermobility/inflammatory arthritis which could be exacerbated by current systemic therapy.  -Okay to use NSAIDs -though not on a daily basis  -Can consider longer acting ones a such as naproxen  - Will discuss with Dr. Bernal her comfort level to continue these during AC due to higher risk for cytopenias. I have recommend tylenol as an alternative.     # Contralateral (left) axillary lymph node enlargement  Indeterminant  - Will continue to follow on subsequent scans.  These appeared normal on baseline ultrasound    #Hx of migraines  - Zofran has been added back for premeds as she did not feel well on Compazine  - She will reach out to us if nausea becomes an issue so we can consider additional agents  - tolerated Aloxi withoutreturn of headaches.     The longitudinal plan of care for the diagnosis(es)/condition(s) as documented were addressed during this visit. Due to the added complexity in care, I will continue to support Sharon in the subsequent management and with ongoing continuity of care.    50 minutes spent on  the date of the encounter doing chart review, review of test results, interpretation of tests, patient visit, and documentation     Angie Herrera PA-C

## 2024-08-01 ENCOUNTER — HOME INFUSION (PRE-WILLOW HOME INFUSION) (OUTPATIENT)
Dept: PHARMACY | Facility: CLINIC | Age: 49
End: 2024-08-01

## 2024-08-01 ENCOUNTER — INFUSION THERAPY VISIT (OUTPATIENT)
Dept: TRANSPLANT | Facility: CLINIC | Age: 49
End: 2024-08-01
Attending: INTERNAL MEDICINE
Payer: COMMERCIAL

## 2024-08-01 ENCOUNTER — APPOINTMENT (OUTPATIENT)
Dept: LAB | Facility: CLINIC | Age: 49
End: 2024-08-01
Attending: INTERNAL MEDICINE
Payer: COMMERCIAL

## 2024-08-01 ENCOUNTER — ONCOLOGY VISIT (OUTPATIENT)
Dept: ONCOLOGY | Facility: CLINIC | Age: 49
End: 2024-08-01
Payer: COMMERCIAL

## 2024-08-01 VITALS
BODY MASS INDEX: 27.93 KG/M2 | WEIGHT: 165.6 LBS | RESPIRATION RATE: 16 BRPM | OXYGEN SATURATION: 99 % | TEMPERATURE: 98.2 F | HEART RATE: 91 BPM | SYSTOLIC BLOOD PRESSURE: 108 MMHG | DIASTOLIC BLOOD PRESSURE: 77 MMHG

## 2024-08-01 VITALS
RESPIRATION RATE: 16 BRPM | HEART RATE: 91 BPM | SYSTOLIC BLOOD PRESSURE: 126 MMHG | DIASTOLIC BLOOD PRESSURE: 88 MMHG | OXYGEN SATURATION: 100 %

## 2024-08-01 DIAGNOSIS — Z17.0 MALIGNANT NEOPLASM OF UPPER-INNER QUADRANT OF RIGHT BREAST IN FEMALE, ESTROGEN RECEPTOR POSITIVE (H): ICD-10-CM

## 2024-08-01 DIAGNOSIS — R30.0 DYSURIA: Primary | ICD-10-CM

## 2024-08-01 DIAGNOSIS — C50.211 MALIGNANT NEOPLASM OF UPPER-INNER QUADRANT OF RIGHT BREAST IN FEMALE, ESTROGEN RECEPTOR POSITIVE (H): ICD-10-CM

## 2024-08-01 DIAGNOSIS — R00.0 TACHYCARDIA: ICD-10-CM

## 2024-08-01 DIAGNOSIS — R30.0 DYSURIA: ICD-10-CM

## 2024-08-01 LAB
ALBUMIN SERPL BCG-MCNC: 4 G/DL (ref 3.5–5.2)
ALBUMIN UR-MCNC: NEGATIVE MG/DL
ALP SERPL-CCNC: 62 U/L (ref 40–150)
ALT SERPL W P-5'-P-CCNC: 20 U/L (ref 0–50)
ANION GAP SERPL CALCULATED.3IONS-SCNC: 11 MMOL/L (ref 7–15)
APPEARANCE UR: CLEAR
AST SERPL W P-5'-P-CCNC: 15 U/L (ref 0–45)
BASOPHILS # BLD AUTO: ABNORMAL 10*3/UL
BASOPHILS NFR BLD AUTO: ABNORMAL %
BILIRUB SERPL-MCNC: 0.5 MG/DL
BILIRUB UR QL STRIP: NEGATIVE
BUN SERPL-MCNC: 10 MG/DL (ref 6–20)
CALCIUM SERPL-MCNC: 9.2 MG/DL (ref 8.8–10.4)
CHLORIDE SERPL-SCNC: 103 MMOL/L (ref 98–107)
COLOR UR AUTO: ABNORMAL
CREAT SERPL-MCNC: 0.6 MG/DL (ref 0.51–0.95)
EGFRCR SERPLBLD CKD-EPI 2021: >90 ML/MIN/1.73M2
EOSINOPHIL # BLD AUTO: ABNORMAL 10*3/UL
EOSINOPHIL NFR BLD AUTO: ABNORMAL %
ERYTHROCYTE [DISTWIDTH] IN BLOOD BY AUTOMATED COUNT: 14.7 % (ref 10–15)
GLUCOSE SERPL-MCNC: 128 MG/DL (ref 70–99)
GLUCOSE UR STRIP-MCNC: NEGATIVE MG/DL
HCO3 SERPL-SCNC: 22 MMOL/L (ref 22–29)
HCT VFR BLD AUTO: 28.6 % (ref 35–47)
HGB BLD-MCNC: 9.4 G/DL (ref 11.7–15.7)
HGB UR QL STRIP: NEGATIVE
IMM GRANULOCYTES # BLD: ABNORMAL 10*3/UL
IMM GRANULOCYTES NFR BLD: ABNORMAL %
KETONES UR STRIP-MCNC: NEGATIVE MG/DL
LEUKOCYTE ESTERASE UR QL STRIP: NEGATIVE
LYMPHOCYTES # BLD AUTO: ABNORMAL 10*3/UL
LYMPHOCYTES NFR BLD AUTO: ABNORMAL %
MCH RBC QN AUTO: 29.7 PG (ref 26.5–33)
MCHC RBC AUTO-ENTMCNC: 32.9 G/DL (ref 31.5–36.5)
MCV RBC AUTO: 90 FL (ref 78–100)
MONOCYTES # BLD AUTO: ABNORMAL 10*3/UL
MONOCYTES NFR BLD AUTO: ABNORMAL %
MUCOUS THREADS #/AREA URNS LPF: PRESENT /LPF
NEUTROPHILS # BLD AUTO: ABNORMAL 10*3/UL
NEUTROPHILS NFR BLD AUTO: ABNORMAL %
NITRATE UR QL: NEGATIVE
NRBC # BLD AUTO: 0 10E3/UL
NRBC BLD AUTO-RTO: 0 /100
PH UR STRIP: 6 [PH] (ref 5–7)
PLATELET # BLD AUTO: 193 10E3/UL (ref 150–450)
POTASSIUM SERPL-SCNC: 3.9 MMOL/L (ref 3.4–5.3)
PROT SERPL-MCNC: 6.5 G/DL (ref 6.4–8.3)
RBC # BLD AUTO: 3.17 10E6/UL (ref 3.8–5.2)
RBC URINE: 2 /HPF
SODIUM SERPL-SCNC: 136 MMOL/L (ref 135–145)
SP GR UR STRIP: 1.01 (ref 1–1.03)
SQUAMOUS EPITHELIAL: <1 /HPF
UROBILINOGEN UR STRIP-MCNC: NORMAL MG/DL
WBC # BLD AUTO: 3.2 10E3/UL (ref 4–11)
WBC URINE: <1 /HPF

## 2024-08-01 PROCEDURE — 258N000003 HC RX IP 258 OP 636

## 2024-08-01 PROCEDURE — 93005 ELECTROCARDIOGRAM TRACING: CPT

## 2024-08-01 PROCEDURE — 80053 COMPREHEN METABOLIC PANEL: CPT

## 2024-08-01 PROCEDURE — 250N000011 HC RX IP 250 OP 636

## 2024-08-01 PROCEDURE — 99213 OFFICE O/P EST LOW 20 MIN: CPT | Mod: 25

## 2024-08-01 PROCEDURE — 85027 COMPLETE CBC AUTOMATED: CPT

## 2024-08-01 PROCEDURE — 99215 OFFICE O/P EST HI 40 MIN: CPT

## 2024-08-01 PROCEDURE — 96360 HYDRATION IV INFUSION INIT: CPT

## 2024-08-01 PROCEDURE — 81001 URINALYSIS AUTO W/SCOPE: CPT

## 2024-08-01 PROCEDURE — 93010 ELECTROCARDIOGRAM REPORT: CPT | Performed by: INTERNAL MEDICINE

## 2024-08-01 PROCEDURE — 36591 DRAW BLOOD OFF VENOUS DEVICE: CPT

## 2024-08-01 RX ORDER — HEPARIN SODIUM,PORCINE 10 UNIT/ML
5-20 VIAL (ML) INTRAVENOUS DAILY PRN
OUTPATIENT
Start: 2024-08-01

## 2024-08-01 RX ORDER — METHYLPREDNISOLONE SODIUM SUCCINATE 125 MG/2ML
125 INJECTION, POWDER, LYOPHILIZED, FOR SOLUTION INTRAMUSCULAR; INTRAVENOUS
Start: 2024-08-01

## 2024-08-01 RX ORDER — EPINEPHRINE 1 MG/ML
0.3 INJECTION, SOLUTION INTRAMUSCULAR; SUBCUTANEOUS EVERY 5 MIN PRN
OUTPATIENT
Start: 2024-08-01

## 2024-08-01 RX ORDER — MEPERIDINE HYDROCHLORIDE 25 MG/ML
25 INJECTION INTRAMUSCULAR; INTRAVENOUS; SUBCUTANEOUS EVERY 30 MIN PRN
OUTPATIENT
Start: 2024-08-01

## 2024-08-01 RX ORDER — DIPHENHYDRAMINE HYDROCHLORIDE 50 MG/ML
50 INJECTION INTRAMUSCULAR; INTRAVENOUS
Start: 2024-08-01

## 2024-08-01 RX ORDER — ALBUTEROL SULFATE 90 UG/1
1-2 AEROSOL, METERED RESPIRATORY (INHALATION)
Start: 2024-08-01

## 2024-08-01 RX ORDER — HEPARIN SODIUM (PORCINE) LOCK FLUSH IV SOLN 100 UNIT/ML 100 UNIT/ML
5 SOLUTION INTRAVENOUS
Status: DISCONTINUED | OUTPATIENT
Start: 2024-08-01 | End: 2024-08-01 | Stop reason: HOSPADM

## 2024-08-01 RX ORDER — ALBUTEROL SULFATE 0.83 MG/ML
2.5 SOLUTION RESPIRATORY (INHALATION)
OUTPATIENT
Start: 2024-08-01

## 2024-08-01 RX ORDER — HEPARIN SODIUM (PORCINE) LOCK FLUSH IV SOLN 100 UNIT/ML 100 UNIT/ML
5 SOLUTION INTRAVENOUS
OUTPATIENT
Start: 2024-08-01

## 2024-08-01 RX ADMIN — Medication 5 ML: at 09:58

## 2024-08-01 RX ADMIN — SODIUM CHLORIDE 1000 ML: 9 INJECTION, SOLUTION INTRAVENOUS at 08:53

## 2024-08-01 RX ADMIN — Medication 5 ML: at 08:12

## 2024-08-01 ASSESSMENT — PAIN SCALES - GENERAL: PAINLEVEL: NO PAIN (0)

## 2024-08-01 NOTE — PROGRESS NOTES
Pt has coverage for IVF through their [facility] plan. Pt has met their deductible and OOP so she should be covered at 100%.     ([facility]) In reference to 08/01/2024.    Please contact Intake with any questions, [phone] or In Basket pool, [facility] ([phone]).

## 2024-08-01 NOTE — NURSING NOTE
"Oncology Rooming Note    August 1, 2024 8:21 AM   Sharon Fong is a 48 year old female who presents for:    Chief Complaint   Patient presents with    Port Draw     Vitals taken, port accessed, labs drawn, heparin locked, checked into next appt    Oncology Clinic Visit     Malignant neoplasm of upper-inner quadrant of right female breast      Initial Vitals: /73 (BP Location: Left arm, Patient Position: Sitting, Cuff Size: Adult Large)   Pulse 111   Temp 97.8  F (36.6  C) (Oral)   Resp 16   Wt 75.1 kg (165 lb 9.6 oz)   SpO2 99%   BMI 27.93 kg/m   Estimated body mass index is 27.93 kg/m  as calculated from the following:    Height as of 4/17/24: 1.64 m (5' 4.57\").    Weight as of this encounter: 75.1 kg (165 lb 9.6 oz). Body surface area is 1.85 meters squared.  No Pain (0) Comment: Data Unavailable   No LMP recorded. Patient has had a hysterectomy.  Allergies reviewed: Yes  Medications reviewed: Yes    Medications: Medication refills not needed today.  Pharmacy name entered into [employer]: [facility] - [place], [place] - [address]    Frailty Screening:   Is the patient here for a new oncology consult visit in cancer care? 2. No      Clinical concerns: no other complaints      Bong Isabel"

## 2024-08-01 NOTE — PROGRESS NOTES
Infusion Nursing Note:  Sharon Fong presents today for add-on IVF.    Patient seen by provider today: Yes: Angie Herrera PA-C   present during visit today: Not Applicable.    Note: VSS. Pt assessed by provider prior to infusion. Pt received 1L 0.9% NS for dizziness. Orthostatic VS checked following infusion and were improved (see flowsheet for details). Pt reported feeling somewhat better after infusion. UA collected and EKG completed. Per written communication 8/1/24 at 1005 Angie Herrera PA-C/Stephy Gonzalez RN- Okay to discharge if she feels comfortable. If she needs Zyprexa at home she should keep it to the lowest possible dose as it could be related to dizzy spells; she should try a half tab instead. Relayed information about Zyprexa to patient and she reports that is already only taking a half tab- informed Angie Herrera of this.      Intravenous Access:  Implanted Port.    Treatment Conditions:  Not Applicable.      Post Infusion Assessment:  Patient tolerated infusion without incident.  Blood return noted pre and post infusion.  Site patent and intact, free from redness, edema or discomfort.  No evidence of extravasations.  Access discontinued per protocol.       Discharge Plan:   Patient discharged in stable condition accompanied by: friend.  Departure Mode: Ambulatory.      Stephy Gonzalez RN

## 2024-08-01 NOTE — LETTER
"8/1/2024      Sharon Fong  [address] [address]      Dear Colleague,    Thank you for referring your patient, Sharon Fong, to the [facility]. Please see a copy of my visit note below.    Aug 1, 2024    Medical Oncology Note    Sharon Fong    Age: 48 year old        CC: Breast Cancer      HPI: Sharon Fong is a 48 year old premenopausal woman with recent diagnosis of 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 She started screening for the ISPY 2.2 trial and was found to have a MammaPrint high risk/blueprint luminal B subtype.  She was started on treatment in block B due to lack of availability of block A agents.  Her course has been complicated by allergic reaction to Taxol requiring change to Abraxane.  Her 3-week MRI showed a tumor volume reduction of 28.9% (did not meet prespecified threshold) and therefore she received a 6-week MRI demonstrated a 57% reduction tumor volume from baseline (which again did not meet prespecified threshold) to continue on block B.  The recommendations from I-SPY were to escalate related to AC.  However, in light of ongoing significant response to Abraxane, we decided to continue to complete the full 12 weeks of treatment.  Her 12 week breast MRI demonstrates a slightly smaller mass. I-SPY read pending. Dr. Bernal recommends proceeding with AC. She presents today prior to C1D1 AC.     Interval History  Sharon is here today for an follow up after a recent ER visit. She felt very fatigued and could not ambulate without the support of her  the first 4 days after AC cycle 1. On 7/29, she felt very dizzy and faint. She was sitting down and became \"unresponsive\" for a few minutes. She is not sure if everything blacked out. Her  was near her side so she did not fall. She sat down and then they proceeded to the ER per our direction after calling into triage. She has no " fevers or chills. No chest pain, shortness of breath, or cough. She is experiencing some urinary leakage which she generally has not had issues with in the past. No pain with urination or hematuria. No headaches. She had had one seizure back in 2014 but has not had any recurrent episodes this did not feel similar to when she had a seizure previously. No diarrhea. She is having constipation that she is managing with miralax.     She continues to feel very tired today. Her entire body hurts. Her joints are okay today. No tylenol today or antihistamine. She took zofran this AM at 7 am. She is taking olazapine at 1/2 tablet to 1 tablet at HS which has been helpful for nausea.     During our visit, Sharon had a recurrent episode of dizziness and feeling faint. IVF fluids were started and her symptoms were quickly improved. She did not feel dehydrated during our initial episode of dizziness and again today but admits she could increase hydration and oral intake.       ROS: 10 point ROS neg other than the symptoms noted above in the HPI.    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    2024 patient presented with palpable lump at 1:00 in the right breast    3/21/2024 diagnostic mammo showed clustered fine indeterminate calcs at 12:00.  No mammographic abnormality identified to correspond to the palpable area of fullness at 1:00.  On US, at 1:00, 10 cm FN there was a 3.7 x 2.3 x 1.5 cm solid hypoechoic irregularly marginated  mass.  Additional hypoechoic lesion located at 2:00, 3 cm FN measuring 1.0 x 0.5 cm.  Another hypoechoic lesion at 2:00, 12 cm FN measuring 0.7 cm.  At least 2 enlarged right axillary lymph nodes -largest measuring up to 0.6 cm.    3/28/2024 ultrasound-guided biopsy of the followin:00 lesion at least DCIS  1:00 lesion grade 2 IDC.  No LVI.  ER (3+, 80 to 90%), CO (3+, 90 to 100%), HER2 2+ and FISH negative   Right axillary LN: metastatic carcinoma    2024 CT CAP demonstrated multifocal enhancement in the right breast with mildly abnormal right axillary lymph nodes.  Otherwise no evidence of distant metastatic disease.    2024 NM bone scan negative for osteoblastic metastatic disease.    4/15/2024 MRI breast demonstrated the following  Right breast at 1:00 mass measuring 2.6 x 2.0 by 3.0 cm with extensive surrounding clumped NME occupying most of the right breast and measuring 8.9 x 4.7 x 8.7   Left breast showed a fibroadenoma at 8-9:00 position.  In addition, 7 mm of linear branching NME at 3-4:00 position.  This was biopsied and found to be benign breast tissue.  Few asymmetric right internal mammary chain LN. multiple enlarged right level 1 axillary nodes.  Additionally multiple LEFT level 1 axillary nodes that are indeterminant -which were normal on left axillary US.    2024 MammaPrint -0.192 (high risk) and blueprint luminal B    2024 started weekly Taxol -changed to Abraxane starting week 2 due to allergic reaction    2024 W3 MRI  Minimally decreased size of the postoperative right breast cancer at the approximate 1:00 position measuring approximately 2.4 x 1.9 x 2.9 cm (previously 2.6 x 2.0 x 3.0 cm).  There is overall similar extent although mildly decreased volume of abnormal surrounding NME  Minimal interval enlargement of multiple bilateral level I lymph nodes, including the biopsy-proven right axillary node with indwelling biopsy marker. No significant change in the multiple  small asymmetric right internal mammary chain lymph nodes.  Tumor volume reduction of 28.9% from baseline     6/10/2024 (6-week MRI breast) 8.8 cm anteroposterior on axial MIPS, unchanged.  Mildly decreased size of the biopsy-proven cancer in the right breast at the approximate 1:00 position middle depth measuring approximately 2.0 x 1.4 x 2.4 cm (previously 2.4 x 1.9 x 2.9 cm).   Extensive surrounding NME is largely unchanged changed, but the volume has minimally decreased.  Stable bilateral axillary lymph nodes, including the biopsy-proven right level I axillary node. Stable asymmetric small right internal mammary chain lymph nodes.  FTV reduction of 57% from baseline        Inflammatory arthritis 06/01/2015     Priority: Medium     Seizure (H) 11/15/2013     Priority: Medium     Vitamin D deficiency 04/23/2013     Priority: Medium        Current Medications:  Current Outpatient Medications   Medication Sig Dispense Refill     aspirin-acetaminophen-caffeine (EXCEDRIN MIGRAINE) 250-250-65 MG tablet        dexAMETHasone (DECADRON) 4 MG tablet Take 2 tablets (8 mg) by mouth daily Take for 3 days, starting the day after chemo. Take with food. 6 tablet 3     FT ANTACID & ANTIGAS 200-200-20 MG/5ML SUSP suspension        ibuprofen (ADVIL/MOTRIN) 200 MG capsule        lidocaine-prilocaine (EMLA) 2.5-2.5 % external cream Apply topically as needed for moderate pain 30 g 1     OLANZapine (ZYPREXA) 5 MG tablet Take 1 tablet (5 mg) by mouth at bedtime 30 tablet 0     ondansetron (ZOFRAN) 8 MG tablet Take 1 tablet (8 mg) by mouth every 8 hours as needed for nausea 30 tablet 3     polyethylene glycol (MIRALAX) 17 GM/Dose powder Take 17 g by mouth daily 510 g 1     ECOG Performance Status: 0    Physical Examination:  /77   Pulse 91   Temp 98.2  F (36.8  C)   Resp 16   Wt 75.1 kg (165 lb 9.6 oz)   SpO2 99%   BMI 27.93 kg/m    General: Fatigued appearing, well-nourished adult female in NAD.Sitting in exam chair and  briefly became dizzy requiring resting head on the way and closing eyes. Never loss consciousness during the event.   HEENT:  Normocephalic.  Sclera anicteric.  MMM.  No lesions of the oropharynx.  Breast: Deferred.   Lymph:  palpable axillary LN bilaterally, consistent with past exams.   Chest:  Breathing comfortably on room air  Abd:  Soft/NT/ND.  No hepatosplenomegaly.  Ext:  No pitting edema of the bilateral lower extremities.    Musculo:  Strength 5/5 throughout.  Neuro:  Cranial nerves grossly intact.  Psych:  Mood and affect appear normal.      Laboratory Data:  Lab Results   Component Value Date    WBC 3.2 (L) 08/01/2024    HGB 9.4 (L) 08/01/2024    HCT 28.6 (L) 08/01/2024    MCV 90 08/01/2024     08/01/2024     Lab Results   Component Value Date     07/30/2024    BUN 17.4 07/30/2024    ANIONGAP 10 07/30/2024     Lab Results   Component Value Date    ALT 19 07/30/2024    AST 13 07/30/2024    ALKPHOS 71 07/30/2024     Lab Results   Component Value Date    INR 1.01 07/29/2024     Breast MRI 7/23/24:   FINDINGS:      Longest diameter of suspicious enhancement: 8.0 cm in AP dimension,  measured on MIPs (previously 8.8 cm on 6/10/2024, differences are  likely partially attributed to breast positioning).     There is slightly decreased volume of biopsy-proven cancer in the  right breast with overall mass and nonmass enhancement measuring 8.0 x  4.5 x 6.8 cm. Irregular enhancing mass in the right breast at  approximately 1:00 position, mid depth now measures 1.8 x 1.1 x 1.6 cm  (previously 2.0 x 1.4 x 2.4 cm). This enhancing mass shows large areas  of washout on kinetic imaging. Nonmass enhancement continues to extend  from anterior to posterior depth, with anterior nonmass enhancement  extending to the base of the nipple.     Left breast biopsy clip. No suspicious enhancement in the left breast.     Stable prominent bilateral axillary nodes, including the biopsied  right level 1 axillary node. Small  asymmetric right internal mammary  chain lymph nodes are also similar to prior (for example a 4 mm lymph  node, axial image 77).    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  She is on the I SPY 2.2 clinical trial and is receiving weekly Abraxane. She has completed 12 weeks of Abraxane. She began ddAC cycle 1 on 7/25/24. She presents today for follow-up after a recent ER visit on 7/28/24 for dizziness.     # Right breast cancer  - She completed 12 weeks of neoadjuvant Abraxane. She had her 12 week breast MRI on 7/23/24, I-SPY read and biopsy pending. Her 12 week MRI suggests a slight decreased volume in the mass and non-mass enhancement measuring 8.0x 4.5 x 6.8 cm. The right breast mass depth now measures 1.8 x 1.1 x 1.6 cm. Recommendation is to proceed with ddAC x 4 cycles every 2 weeks. We opted to proceed with cycle 1 AC on 07/25/24 without I-SPY read.  - She has had a difficult time so far with ddAC and has had significant fatigued especially days the first 4 days after chemotherapy. She had an episode of dizziness with a time of unresponsiveness- not consistent with her past seizure in 2014 per patient report. I suspect her dizziness on 7/28 and again today is secondary to extreme fatigue from AC and dehydration. She did present to clinic with mild tachycardia. EKG performed in the infusion center demonstrated normal sinus rhythm. Will await cardiology review.   - 1 L NS today 8/1/24 with resolution of dizziness prior to discharge from infusion center. Repeat vitals all remained stable.   - I did discuss with Sharon that  olanzapine may be contributing to the dizziness as well. However, given dehydration it is difficult to discern.   - For cycle 2, I do think Sharon would benefit with 1 L NS at the time of chemotherapy and additional IVF 1-2 times a week. We could repeat her dex 12 mg, emend, and aloxi 72 hours after her chemotherapy as well with IV antimetics prn. Additionally, she may benefit from PO dex 8 mg x 5 days instead of 3. As nausea is well controlled, Sharon will decrease her zyprexa from 5 mg to 2.5 mg at HS. We will check [facility] coverage for [place]. If not available, MG is her preferred site when she is not getting chemotherapy. AddendumReviewed message that patient has coverage for [facility].   - Ativan prn is an option for nausea as well.   - Will ask RN[facility] to call Sharon tomorrow 8/2/24 to follow up on how she is feeling. Prior to leaving today, she was feeling well and better than before presenting.     - She has already established care with Dr. Collier and is planned to have a mastectomy after neoadjuvant chemotherapy  - Will need adjuvant radiation therapy -will place referral after her surgery    # Arthralgias  likely associated with Taxol though she also has a history of hypermobility/inflammatory arthritis which could be exacerbated by current systemic therapy.  -Okay to use NSAIDs -though not on a daily basis  -Can consider longer acting ones a such as naproxen  - Will discuss with Dr. Bernal her comfort level to continue these during AC due to higher risk for cytopenias. I have recommend tylenol as an alternative.     # Contralateral (left) axillary lymph node enlargement  Indeterminant  - Will continue to follow on subsequent scans.  These appeared normal on baseline ultrasound    #Hx of migraines  - Zofran has been added back for premeds as she did not feel well on Compazine  - She will reach out to us if nausea becomes an issue so we can consider additional agents  - tolerated Aloxi withoutreturn of headaches.      The longitudinal plan of care for the diagnosis(es)/condition(s) as documented were addressed during this visit. Due to the added complexity in care, I will continue to support Sharon in the subsequent management and with ongoing continuity of care.    50 minutes spent on the date of the encounter doing chart review, review of test results, interpretation of tests, patient visit, and documentation     Angie Herrera PA-C                    Again, thank you for allowing me to participate in the care of your patient.        Sincerely,        Angie Herrera PA-C

## 2024-08-01 NOTE — NURSING NOTE
Chief Complaint   Patient presents with    Port Draw     Vitals taken, port accessed, labs drawn, heparin locked, checked into next appt     /73 (BP Location: Left arm, Patient Position: Sitting, Cuff Size: Adult Large)   Pulse 111   Temp 97.8  F (36.6  C) (Oral)   Resp 16   Wt 75.1 kg (165 lb 9.6 oz)   SpO2 99%   BMI 27.93 kg/m    Jayy Higgins RN on 8/1/2024 at 8:03 AM

## 2024-08-02 ENCOUNTER — PATIENT OUTREACH (OUTPATIENT)
Dept: ONCOLOGY | Facility: CLINIC | Age: 49
End: 2024-08-02
Payer: COMMERCIAL

## 2024-08-02 NOTE — PROGRESS NOTES
[facility]: Cancer Care Follow-Up Note                                    Discussion with Patient:                                                      Called pt to check in and see how she is doing. Pt at clinic seeing Angie yesterday and reported feeling dizzy at the clinic but at home she has not felt any dizziness. Checked in about other symptoms.       Goals          General     Other (pt-stated)      Notes - Note created  5/8/2024 12:01 PM by Bette Watters, RN     Goal Statement: I will use my clinic and care team resources as directed.  Date Goal set: 5/8/2024  Barriers: disease burden  Strengths: support and health awareness  Date to Achieve By: ongoing  Patient expressed understanding of goal: Yes  Action steps to achieve this goal:  I will contact triage with new, worsening or uncontrolled symptoms.   I will contact triage with temperature over 100.4  I will call with difficulties of scheduling and/or transportation.   I will request needed refills when there are 7 days of medication remaining.   I will not send urgent or symptomatic messages through [employer].   I will contact scheduling to arrange or make changes in my appointments.                Dates of Treatment:                                                      Infusion given in last 28 days       Administered MAR Action Medication Dose Rate Visit    07/11/2024 13:55 New Bag PACLitaxel-protein bound (ABRAXANE) infusion 180 mg 180 mg 72 mL/hr Infusion Therapy Visit on 07/11/2024 in [facility]    07/18/2024 16:30 New Bag PACLitaxel-protein bound (ABRAXANE) infusion 180 mg 180 mg 72 mL/hr Infusion Therapy Visit on 07/18/2024 in [facility]    07/25/2024 13:09 Given DOXOrubicin (ADRIAMYCIN) injection 110 mg 110 mg   Infusion Therapy Visit on 07/25/2024 in [facility]    07/25/2024 13:21 New Bag cycloPHOSphamide (CYTOXAN) 1,075 mg in sodium chloride 0.9 % 328.75  "mL infusion 1,075 mg 657.5 mL/hr Infusion Therapy Visit on 07/25/2024 in [facility]            Assessment:                                                      Pt reported feeling ongoing fatigue, joint pain- she is taking claritin but does not feel it is working as well as the tylenol did. She is having some weakness when moving around, she is being cautious about movement. She is eating ok but reporting food tastes \"weird\", she is having a hard time drinking as much as she would like and feels like she should. She has not heard yet from Home infusion abt IVFs.Pt reports having some nausea but it is managed with her taking the zofran every 6 hrs.Pt asking abt UA from yesterday, let her know that it looks ok.     Intervention/Education provided during outreach:                                                       Update to the provider. Called [facility] to see when they may reach out to the pt about starting her on services. Pt said the tylenol helped more with the bjoint aches and pains she had, let her know it was ok to use the tylenol for the achiness. No other questions or concerns today.    Patient to follow up as scheduled at next appt  Patient to call/[employer] message with updates  Confirmed patient has clinic and triage numbers    Signature:  Bette Watters RN  "

## 2024-08-05 LAB
ATRIAL RATE - MUSE: 80 BPM
DIASTOLIC BLOOD PRESSURE - MUSE: NORMAL MMHG
INTERPRETATION ECG - MUSE: NORMAL
P AXIS - MUSE: 71 DEGREES
PR INTERVAL - MUSE: 136 MS
QRS DURATION - MUSE: 74 MS
QT - MUSE: 382 MS
QTC - MUSE: 440 MS
R AXIS - MUSE: 73 DEGREES
SYSTOLIC BLOOD PRESSURE - MUSE: NORMAL MMHG
T AXIS - MUSE: 53 DEGREES
VENTRICULAR RATE- MUSE: 80 BPM

## 2024-08-05 RX ORDER — DEXAMETHASONE SODIUM PHOSPHATE 10 MG/ML
12 INJECTION, SOLUTION INTRAMUSCULAR; INTRAVENOUS
Start: 2024-08-05

## 2024-08-05 RX ORDER — PALONOSETRON 0.05 MG/ML
0.25 INJECTION, SOLUTION INTRAVENOUS ONCE
Start: 2024-08-05

## 2024-08-06 ENCOUNTER — VIRTUAL VISIT (OUTPATIENT)
Dept: ONCOLOGY | Facility: CLINIC | Age: 49
End: 2024-08-06
Attending: INTERNAL MEDICINE
Payer: COMMERCIAL

## 2024-08-06 VITALS — HEIGHT: 65 IN | WEIGHT: 163 LBS | BODY MASS INDEX: 27.16 KG/M2

## 2024-08-06 DIAGNOSIS — Z17.0 MALIGNANT NEOPLASM OF UPPER-INNER QUADRANT OF RIGHT BREAST IN FEMALE, ESTROGEN RECEPTOR POSITIVE (H): Primary | ICD-10-CM

## 2024-08-06 DIAGNOSIS — C50.211 MALIGNANT NEOPLASM OF UPPER-INNER QUADRANT OF RIGHT BREAST IN FEMALE, ESTROGEN RECEPTOR POSITIVE (H): Primary | ICD-10-CM

## 2024-08-06 DIAGNOSIS — R11.0 CHEMOTHERAPY-INDUCED NAUSEA: ICD-10-CM

## 2024-08-06 DIAGNOSIS — Z00.6 EXAMINATION OF PARTICIPANT OR CONTROL IN CLINICAL RESEARCH: ICD-10-CM

## 2024-08-06 DIAGNOSIS — T45.1X5A CHEMOTHERAPY-INDUCED NAUSEA: ICD-10-CM

## 2024-08-06 PROCEDURE — 99215 OFFICE O/P EST HI 40 MIN: CPT | Mod: 95 | Performed by: INTERNAL MEDICINE

## 2024-08-06 PROCEDURE — G2211 COMPLEX E/M VISIT ADD ON: HCPCS | Mod: 95 | Performed by: INTERNAL MEDICINE

## 2024-08-06 RX ORDER — ALBUTEROL SULFATE 0.83 MG/ML
2.5 SOLUTION RESPIRATORY (INHALATION)
Status: CANCELLED | OUTPATIENT
Start: 2024-08-08

## 2024-08-06 RX ORDER — METHYLPREDNISOLONE SODIUM SUCCINATE 125 MG/2ML
125 INJECTION, POWDER, LYOPHILIZED, FOR SOLUTION INTRAMUSCULAR; INTRAVENOUS
Status: CANCELLED
Start: 2024-08-08

## 2024-08-06 RX ORDER — HEPARIN SODIUM (PORCINE) LOCK FLUSH IV SOLN 100 UNIT/ML 100 UNIT/ML
5 SOLUTION INTRAVENOUS
Status: CANCELLED | OUTPATIENT
Start: 2024-08-08

## 2024-08-06 RX ORDER — LORAZEPAM 2 MG/ML
0.5 INJECTION INTRAMUSCULAR EVERY 4 HOURS PRN
Status: CANCELLED | OUTPATIENT
Start: 2024-08-08

## 2024-08-06 RX ORDER — MEPERIDINE HYDROCHLORIDE 25 MG/ML
25 INJECTION INTRAMUSCULAR; INTRAVENOUS; SUBCUTANEOUS EVERY 30 MIN PRN
Status: CANCELLED | OUTPATIENT
Start: 2024-08-08

## 2024-08-06 RX ORDER — LORAZEPAM 0.5 MG/1
0.5 TABLET ORAL EVERY 6 HOURS PRN
Qty: 30 TABLET | Refills: 0 | Status: SHIPPED | OUTPATIENT
Start: 2024-08-06

## 2024-08-06 RX ORDER — HEPARIN SODIUM,PORCINE 10 UNIT/ML
5-20 VIAL (ML) INTRAVENOUS DAILY PRN
Status: CANCELLED | OUTPATIENT
Start: 2024-08-08

## 2024-08-06 RX ORDER — PALONOSETRON 0.05 MG/ML
0.25 INJECTION, SOLUTION INTRAVENOUS ONCE
Status: CANCELLED | OUTPATIENT
Start: 2024-08-08

## 2024-08-06 RX ORDER — EPINEPHRINE 1 MG/ML
0.3 INJECTION, SOLUTION INTRAMUSCULAR; SUBCUTANEOUS EVERY 5 MIN PRN
Status: CANCELLED | OUTPATIENT
Start: 2024-08-08

## 2024-08-06 RX ORDER — ALBUTEROL SULFATE 90 UG/1
1-2 AEROSOL, METERED RESPIRATORY (INHALATION)
Status: CANCELLED
Start: 2024-08-08

## 2024-08-06 RX ORDER — DOXORUBICIN HYDROCHLORIDE 2 MG/ML
60 INJECTION, SOLUTION INTRAVENOUS ONCE
Status: CANCELLED | OUTPATIENT
Start: 2024-08-08

## 2024-08-06 RX ORDER — DIPHENHYDRAMINE HYDROCHLORIDE 50 MG/ML
50 INJECTION INTRAMUSCULAR; INTRAVENOUS
Status: CANCELLED
Start: 2024-08-08

## 2024-08-06 ASSESSMENT — PAIN SCALES - GENERAL: PAINLEVEL: NO PAIN (0)

## 2024-08-06 NOTE — NURSING NOTE
Current patient location: [address] [place] [address]    Is the patient currently in the state of [place]? YES    Visit mode:VIDEO    If the visit is dropped, the patient can be reconnected by: VIDEO VISIT: Text to cell phone:   Telephone Information:   Mobile [phone]       Will anyone else be joining the visit? NO  (If patient encounters technical issues they should call [phone]    How would you like to obtain your AVS? MyChart    Are changes needed to the allergy or medication list? Pt stated no changes to allergies and Pt stated no med changes    Are refills needed on medications prescribed by this physician? NO    Rooming Documentation:  Onc distress completed      Reason for visit: NERIS Clements LPN

## 2024-08-06 NOTE — PROGRESS NOTES
Virtual Visit Details    Type of service:  Video Visit     Originating Location (pt. 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 She started screening for the ISPY 2.2 trial and was found to have a MammaPrint high risk/blueprint luminal B subtype.  She was started on treatment in block B due to lack of availability of block A agents.  Her course has been complicated by allergic reaction to Taxol requiring change to Abraxane.  Her 3-week MRI showed a tumor volume reduction of 28.9% (did not meet prespecified threshold) and therefore she received a 6-week MRI demonstrated a 57% reduction tumor volume from baseline (which again did not meet prespecified threshold) to continue on block B.  The recommendations from I-SPY were to escalate related to AC.  However, in light of ongoing significant response to Abraxane, we decided to continue to complete the full 12 weeks of treatment.  Her 12 week breast MRI demonstrates a slightly smaller mass.  We proceeded to ddAC on 7/24/2024 and has had a tough course thus far.  She presents today prior to cycle 2.      Interval History  Cycle 1 of treatment was very tough for her with worsening fatigue and weakness.  She reports sleeping excessively for the first 3 to 4 days after infusion.  She also had profound weakness and decreased p.o. p.o. intake as well as nausea.  This resulted in hypotension and a fainting spell that required evaluation in the emergency department.    After receiving IV fluids, she feels better now.    She reports diffuse myalgias continue to be an issue.  She is not able to walk more than 5 minutes related to diffuse pain.  She is intermittently  taking Tylenol which seems to help with this pain.    She also reports burning and tingling sensation in her toes over the past 3 nights.  There has not been significant pain or persistence of the symptoms.    She also endorses intermittent episodes of urinary leakage.  This is very small amounts and not associated with any urinary frequency or urgency.  She has not noticed any blood in her urine.      Fatigued    Sleeping     Nausea - add ativan and 2.5mg of olanzapine     Muscle pain - can't walk more than 5 min  - CK. Tylrnol     Burning and tingling in toes at night for 3 nights - add iron     Urinalysis         ROS: 10 point ROS neg other than the symptoms noted above in the HPI.    Her full oncologic history is as follows:   Oncologic History  Patient Active Problem List    Diagnosis Date Noted    Malignant neoplasm of upper-inner quadrant of right female breast (H) 2024     Priority: High     Right breast cancer  Has had longstanding history of multiple biopsies, infection, and partial mastectomy for a right breast multifocal abscess dating back to at least .     patient presented with palpable lump at 1:00 in the right breast    3/21/2024 diagnostic mammo showed clustered fine indeterminate calcs at 12:00.  No mammographic abnormality identified to correspond to the palpable area of fullness at 1:00.  On US, at 1:00, 10 cm FN there was a 3.7 x 2.3 x 1.5 cm solid hypoechoic irregularly marginated mass.  Additional hypoechoic lesion located at 2:00, 3 cm FN measuring 1.0 x 0.5 cm.  Another hypoechoic lesion at 2:00, 12 cm FN measuring 0.7 cm.  At least 2 enlarged right axillary lymph nodes -largest measuring up to 0.6 cm.    3/28/2024 ultrasound-guided biopsy of the followin:00 lesion at least DCIS  1:00 lesion grade 2 IDC.  No LVI.  ER (3+, 80 to 90%), OK (3+, 90 to 100%), HER2 2+ and FISH negative   Right axillary LN: metastatic carcinoma    2024 CT CAP demonstrated multifocal  enhancement in the right breast with mildly abnormal right axillary lymph nodes.  Otherwise no evidence of distant metastatic disease.    4/12/2024 NM bone scan negative for osteoblastic metastatic disease.    4/15/2024 MRI breast demonstrated the following  Right breast at 1:00 mass measuring 2.6 x 2.0 by 3.0 cm with extensive surrounding clumped NME occupying most of the right breast and measuring 8.9 x 4.7 x 8.7   Left breast showed a fibroadenoma at 8-9:00 position.  In addition, 7 mm of linear branching NME at 3-4:00 position.  This was biopsied and found to be benign breast tissue.  Few asymmetric right internal mammary chain LN. multiple enlarged right level 1 axillary nodes.  Additionally multiple LEFT level 1 axillary nodes that are indeterminant -which were normal on left axillary US.    4/26/2024 MammaPrint -0.192 (high risk) and blueprint luminal B    4/30/2024 - 7/17/2024 weekly Taxol -changed to Abraxane starting week 2 due to allergic reaction    5/17/2024 W3 MRI  Minimally decreased size of the postoperative right breast cancer at the approximate 1:00 position measuring approximately 2.4 x 1.9 x 2.9 cm (previously 2.6 x 2.0 x 3.0 cm).  There is overall similar extent although mildly decreased volume of abnormal surrounding NME  Minimal interval enlargement of multiple bilateral level I lymph nodes, including the biopsy-proven right axillary node with indwelling biopsy marker. No significant change in the multiple small asymmetric right internal mammary chain lymph nodes.  Tumor volume reduction of 28.9% from baseline     6/10/2024 (6-week MRI breast) 8.8 cm anteroposterior on axial MIPS, unchanged.  Mildly decreased size of the biopsy-proven cancer in the right breast at the approximate 1:00 position middle depth measuring approximately 2.0 x 1.4 x 2.4 cm (previously 2.4 x 1.9 x 2.9 cm).   Extensive surrounding NME is largely unchanged changed, but the volume has minimally decreased.  Stable  bilateral axillary lymph nodes, including the biopsy-proven right level I axillary node. Stable asymmetric small right internal mammary chain lymph nodes.  FTV reduction of 57% from baseline     7/23/2024 (12-week MRI breast) Longest diameter of suspicious enhancement: 8.0 cm(previously 8.8 cm).   Irregular enhancing mass in the right breast at approximately 1:00 positionmeasures 1.8 x 1.1 x 1.6 cm (previously 2.0 x 1.4 x 2.4 cm).   NME continues to extend from anterior to posterior depth, with anterior NME extending to the base of the nipple.  Stable prominent bilateral axillary nodes, including the biopsied right level 1 axillary node. Small asymmetric right internal mammary chain lymph nodes are also similar to prior  FTV reduction of X from baseline     7/24/2024 Start ddAC      Hypotension, unspecified hypotension type 07/29/2024     Priority: Medium    Syncope, unspecified syncope type 07/29/2024     Priority: Medium    Inflammatory arthritis 06/01/2015     Priority: Medium    Seizure (H) 11/15/2013     Priority: Medium    Vitamin D deficiency 04/23/2013     Priority: Medium        Current Medications:  Current Outpatient Medications   Medication Sig Dispense Refill    aspirin-acetaminophen-caffeine (EXCEDRIN MIGRAINE) 250-250-65 MG tablet       dexAMETHasone (DECADRON) 4 MG tablet Take 2 tablets (8 mg) by mouth daily Take for 3 days, starting the day after chemo. Take with food. 6 tablet 3    FT ANTACID & ANTIGAS 200-200-20 MG/5ML SUSP suspension       ibuprofen (ADVIL/MOTRIN) 200 MG capsule       lidocaine-prilocaine (EMLA) 2.5-2.5 % external cream Apply topically as needed for moderate pain 30 g 1    OLANZapine (ZYPREXA) 5 MG tablet Take 1 tablet (5 mg) by mouth at bedtime 30 tablet 0    ondansetron (ZOFRAN) 8 MG tablet Take 1 tablet (8 mg) by mouth every 8 hours as needed for nausea 30 tablet 3    polyethylene glycol (MIRALAX) 17 GM/Dose powder Take 17 g by mouth daily 510 g 1     ECOG Performance Status:  "0    Physical Examination:  Ht 1.64 m (5' 4.57\")   Wt 73.9 kg (163 lb)   BMI 27.49 kg/m    General: Fatigued appearing, well-nourished adult female in NAD.  HEENT:  Normocephalic.  Sclera anicteric.  MMM.  No lesions of the oropharynx.  Chest:  Breathing comfortably on room air  Neuro:  Cranial nerves grossly intact.  Psych:  Mood and affect appear normal.      Laboratory Data:  Lab Results   Component Value Date    WBC 3.2 (L) 08/01/2024    HGB 9.4 (L) 08/01/2024    HCT 28.6 (L) 08/01/2024    MCV 90 08/01/2024     08/01/2024     Lab Results   Component Value Date     08/01/2024    BUN 10.0 08/01/2024    ANIONGAP 11 08/01/2024     Lab Results   Component Value Date    ALT 20 08/01/2024    AST 15 08/01/2024    ALKPHOS 62 08/01/2024     Lab Results   Component Value Date    INR 1.01 07/29/2024       Radiology data:  I have personally reviewed the images of / or the following radiology data: As detailed in the oncology timeline    Pathology and other data:  I have personally reviewed the following data: As detailed in the oncology timeline    Assessment and Recommendations  Sharon Fong  is a 48 year old premenopausal woman with a new diagnosis of 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  She is on the I SPY 2.2 clinical trial and completed 12 weeks of Abraxane. She began ddAC cycle 1 on 7/25/24. She presents today for follow-up prior to cycle 2 of therapy.      # Right breast cancer  - Continue with cycle 2 of ddAC this week.  Will obtain labs prior to infusion on Thursday.  Discussed multiple ways to help address symptoms as detailed below.  - Awaiting FTV measurements from ISPY team  - Will reach out to Dr. Collier's team to let them know she is approaching end of neoadjuvant systemic therapy  - She will also need adjuvant radiation therapy    # Chemotherapy associated nausea  -Will consider repeating dexamethasone, Emend, Aloxi on day 4 or 5 if she still having " significant issues  -As needed Ativan given to use as needed for the first 72 hours  -Recommend decreasing olanzapine to 2.5 mg nightly starting on day 1 of infusion.  If this is not sufficient to control her nausea, she can increase this to 5 mg.  -We have arranged for her to receive IV fluids at home and will also provide additional 1L of IVF on the day of infusions    # Arthralgias  # Myalgias  Potentially related to systemic therapy versus exacerbation of her underlying hypermobility/inflammatory arthritis  - Will add on a CK to labs later this week  - Recommend continuing with as needed Tylenol as this has been helpful    # Urinary incontinence  -Will add on a UA for later this week.  No symptoms that are consistent with infection on history today.  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  -Will add on iron studies to labs from this week to rule out underlying RLS related to iron deficiency    # Contralateral (left) axillary lymph node enlargement  Indeterminant  - Will continue to follow on subsequent scans.  These appeared normal on baseline ultrasound    #Hx of migraines  - Zofran has been added back for premeds as she did not feel well on Compazine  - tolerated Aloxi without return of headaches.     The longitudinal plan of care for the diagnosis(es)/condition(s) as documented were addressed during this visit. Due to the added complexity in care, I will continue to support Sharon in the subsequent management and with ongoing continuity of care.    45 minutes spent on the date of the encounter doing chart review, review of test results, interpretation of tests, patient visit, and documentation     Dame Dolores MD

## 2024-08-06 NOTE — LETTER
8/6/2024      Sharon Fong  [address] [address]      Dear Colleague,    Thank you for referring your patient, Sharon Fong, to the [facility]. Please see a copy of my visit note below.    Virtual Visit Details    Type of service:  Video Visit     Originating Location (pt. 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 She started screening for the ISPY 2.2 trial and was found to have a MammaPrint high risk/blueprint luminal B subtype.  She was started on treatment in block B due to lack of availability of block A agents.  Her course has been complicated by allergic reaction to Taxol requiring change to Abraxane.  Her 3-week MRI showed a tumor volume reduction of 28.9% (did not meet prespecified threshold) and therefore she received a 6-week MRI demonstrated a 57% reduction tumor volume from baseline (which again did not meet prespecified threshold) to continue on block B.  The recommendations from I-SPY were to escalate related to AC.  However, in light of ongoing significant response to Abraxane, we decided to continue to complete the full 12 weeks of treatment.  Her 12 week breast MRI demonstrates a slightly smaller mass.  We proceeded to ddAC on 7/24/2024 and has had a tough course thus far.  She presents today prior to cycle 2.      Interval History  Cycle 1 of treatment was very tough for her with worsening fatigue and weakness.  She reports sleeping excessively for the first 3 to 4 days after infusion.  She also had profound weakness and decreased p.o. p.o. intake as well as nausea.  This resulted in hypotension and a fainting spell that  required evaluation in the emergency department.    After receiving IV fluids, she feels better now.    She reports diffuse myalgias continue to be an issue.  She is not able to walk more than 5 minutes related to diffuse pain.  She is intermittently taking Tylenol which seems to help with this pain.    She also reports burning and tingling sensation in her toes over the past 3 nights.  There has not been significant pain or persistence of the symptoms.    She also endorses intermittent episodes of urinary leakage.  This is very small amounts and not associated with any urinary frequency or urgency.  She has not noticed any blood in her urine.      Fatigued    Sleeping     Nausea - add ativan and 2.5mg of olanzapine     Muscle pain - can't walk more than 5 min  - CK. Tylrnol     Burning and tingling in toes at night for 3 nights - add iron     Urinalysis         ROS: 10 point ROS neg other than the symptoms noted above in the HPI.    Her full oncologic history is as follows:   Oncologic History  Patient Active Problem List    Diagnosis Date Noted     Malignant neoplasm of upper-inner quadrant of right female breast (H) 04/02/2024     Priority: High     Right breast cancer  Has had longstanding history of multiple biopsies, infection, and partial mastectomy for a right breast multifocal abscess dating back to at least 2010.    2024 patient presented with palpable lump at 1:00 in the right breast    3/21/2024 diagnostic mammo showed clustered fine indeterminate calcs at 12:00.  No mammographic abnormality identified to correspond to the palpable area of fullness at 1:00.  On US, at 1:00, 10 cm FN there was a 3.7 x 2.3 x 1.5 cm solid hypoechoic irregularly marginated mass.  Additional hypoechoic lesion located at 2:00, 3 cm FN measuring 1.0 x 0.5 cm.  Another hypoechoic lesion at 2:00, 12 cm FN measuring 0.7 cm.  At least 2 enlarged right axillary lymph nodes -largest measuring up to 0.6 cm.    3/28/2024  ultrasound-guided biopsy of the followin:00 lesion at least DCIS  1:00 lesion grade 2 IDC.  No LVI.  ER (3+, 80 to 90%), IA (3+, 90 to 100%), HER2 2+ and FISH negative   Right axillary LN: metastatic carcinoma    2024 CT CAP demonstrated multifocal enhancement in the right breast with mildly abnormal right axillary lymph nodes.  Otherwise no evidence of distant metastatic disease.    2024 NM bone scan negative for osteoblastic metastatic disease.    4/15/2024 MRI breast demonstrated the following  Right breast at 1:00 mass measuring 2.6 x 2.0 by 3.0 cm with extensive surrounding clumped NME occupying most of the right breast and measuring 8.9 x 4.7 x 8.7   Left breast showed a fibroadenoma at 8-9:00 position.  In addition, 7 mm of linear branching NME at 3-4:00 position.  This was biopsied and found to be benign breast tissue.  Few asymmetric right internal mammary chain LN. multiple enlarged right level 1 axillary nodes.  Additionally multiple LEFT level 1 axillary nodes that are indeterminant -which were normal on left axillary US.    2024 MammaPrint -0.192 (high risk) and blueprint luminal B    2024 - 2024 weekly Taxol -changed to Abraxane starting week 2 due to allergic reaction    2024 W3 MRI  Minimally decreased size of the postoperative right breast cancer at the approximate 1:00 position measuring approximately 2.4 x 1.9 x 2.9 cm (previously 2.6 x 2.0 x 3.0 cm).  There is overall similar extent although mildly decreased volume of abnormal surrounding NME  Minimal interval enlargement of multiple bilateral level I lymph nodes, including the biopsy-proven right axillary node with indwelling biopsy marker. No significant change in the multiple small asymmetric right internal mammary chain lymph nodes.  Tumor volume reduction of 28.9% from baseline     6/10/2024 (6-week MRI breast) 8.8 cm anteroposterior on axial MIPS, unchanged.  Mildly decreased size of the biopsy-proven  cancer in the right breast at the approximate 1:00 position middle depth measuring approximately 2.0 x 1.4 x 2.4 cm (previously 2.4 x 1.9 x 2.9 cm).   Extensive surrounding NME is largely unchanged changed, but the volume has minimally decreased.  Stable bilateral axillary lymph nodes, including the biopsy-proven right level I axillary node. Stable asymmetric small right internal mammary chain lymph nodes.  FTV reduction of 57% from baseline     7/23/2024 (12-week MRI breast) Longest diameter of suspicious enhancement: 8.0 cm(previously 8.8 cm).   Irregular enhancing mass in the right breast at approximately 1:00 positionmeasures 1.8 x 1.1 x 1.6 cm (previously 2.0 x 1.4 x 2.4 cm).   NME continues to extend from anterior to posterior depth, with anterior NME extending to the base of the nipple.  Stable prominent bilateral axillary nodes, including the biopsied right level 1 axillary node. Small asymmetric right internal mammary chain lymph nodes are also similar to prior  FTV reduction of X from baseline     7/24/2024 Start ddAC       Hypotension, unspecified hypotension type 07/29/2024     Priority: Medium     Syncope, unspecified syncope type 07/29/2024     Priority: Medium     Inflammatory arthritis 06/01/2015     Priority: Medium     Seizure (H) 11/15/2013     Priority: Medium     Vitamin D deficiency 04/23/2013     Priority: Medium        Current Medications:  Current Outpatient Medications   Medication Sig Dispense Refill     aspirin-acetaminophen-caffeine (EXCEDRIN MIGRAINE) 250-250-65 MG tablet        dexAMETHasone (DECADRON) 4 MG tablet Take 2 tablets (8 mg) by mouth daily Take for 3 days, starting the day after chemo. Take with food. 6 tablet 3     FT ANTACID & ANTIGAS 200-200-20 MG/5ML SUSP suspension        ibuprofen (ADVIL/MOTRIN) 200 MG capsule        lidocaine-prilocaine (EMLA) 2.5-2.5 % external cream Apply topically as needed for moderate pain 30 g 1     OLANZapine (ZYPREXA) 5 MG tablet Take 1 tablet  "(5 mg) by mouth at bedtime 30 tablet 0     ondansetron (ZOFRAN) 8 MG tablet Take 1 tablet (8 mg) by mouth every 8 hours as needed for nausea 30 tablet 3     polyethylene glycol (MIRALAX) 17 GM/Dose powder Take 17 g by mouth daily 510 g 1     ECOG Performance Status: 0    Physical Examination:  Ht 1.64 m (5' 4.57\")   Wt 73.9 kg (163 lb)   BMI 27.49 kg/m    General: Fatigued appearing, well-nourished adult female in NAD.  HEENT:  Normocephalic.  Sclera anicteric.  MMM.  No lesions of the oropharynx.  Chest:  Breathing comfortably on room air  Neuro:  Cranial nerves grossly intact.  Psych:  Mood and affect appear normal.      Laboratory Data:  Lab Results   Component Value Date    WBC 3.2 (L) 08/01/2024    HGB 9.4 (L) 08/01/2024    HCT 28.6 (L) 08/01/2024    MCV 90 08/01/2024     08/01/2024     Lab Results   Component Value Date     08/01/2024    BUN 10.0 08/01/2024    ANIONGAP 11 08/01/2024     Lab Results   Component Value Date    ALT 20 08/01/2024    AST 15 08/01/2024    ALKPHOS 62 08/01/2024     Lab Results   Component Value Date    INR 1.01 07/29/2024       Radiology data:  I have personally reviewed the images of / or the following radiology data: As detailed in the oncology timeline    Pathology and other data:  I have personally reviewed the following data: As detailed in the oncology timeline    Assessment and Recommendations  Sharon Fong  is a 48 year old premenopausal woman with a new diagnosis of 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  She is on the I SPY 2.2 clinical trial and completed 12 weeks of Abraxane. She began ddAC cycle 1 on 7/25/24. She presents today for follow-up prior to cycle 2 of therapy.      # Right breast cancer  - Continue with cycle 2 of ddAC this week.  Will obtain labs prior to infusion on Thursday.  Discussed multiple ways to help address symptoms as detailed below.  - Awaiting FTV measurements from ISPY team  - Will reach out to " Dr. Collier's team to let them know she is approaching end of neoadjuvant systemic therapy  - She will also need adjuvant radiation therapy    # Chemotherapy associated nausea  -Will consider repeating dexamethasone, Emend, Aloxi on day 4 or 5 if she still having significant issues  -As needed Ativan given to use as needed for the first 72 hours  -Recommend decreasing olanzapine to 2.5 mg nightly starting on day 1 of infusion.  If this is not sufficient to control her nausea, she can increase this to 5 mg.  -We have arranged for her to receive IV fluids at home and will also provide additional 1L of IVF on the day of infusions    # Arthralgias  # Myalgias  Potentially related to systemic therapy versus exacerbation of her underlying hypermobility/inflammatory arthritis  - Will add on a CK to labs later this week  - Recommend continuing with as needed Tylenol as this has been helpful    # Urinary incontinence  -Will add on a UA for later this week.  No symptoms that are consistent with infection on history today.  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  -Will add on iron studies to labs from this week to rule out underlying RLS related to iron deficiency    # Contralateral (left) axillary lymph node enlargement  Indeterminant  - Will continue to follow on subsequent scans.  These appeared normal on baseline ultrasound    #Hx of migraines  - Zofran has been added back for premeds as she did not feel well on Compazine  - tolerated Aloxi without return of headaches.     The longitudinal plan of care for the diagnosis(es)/condition(s) as documented were addressed during this visit. Due to the added complexity in care, I will continue to support Sharon in the subsequent management and with ongoing  continuity of care.    45 minutes spent on the date of the encounter doing chart review, review of test results, interpretation of tests, patient visit, and documentation     Dame Dolores MD      Again, thank you for allowing me to participate in the care of your patient.        Sincerely,        Dame Dolores MD

## 2024-08-08 ENCOUNTER — ALLIED HEALTH/NURSE VISIT (OUTPATIENT)
Dept: ONCOLOGY | Facility: CLINIC | Age: 49
End: 2024-08-08

## 2024-08-08 ENCOUNTER — APPOINTMENT (OUTPATIENT)
Dept: LAB | Facility: CLINIC | Age: 49
End: 2024-08-08
Attending: INTERNAL MEDICINE
Payer: COMMERCIAL

## 2024-08-08 ENCOUNTER — INFUSION THERAPY VISIT (OUTPATIENT)
Dept: ONCOLOGY | Facility: CLINIC | Age: 49
End: 2024-08-08
Attending: INTERNAL MEDICINE
Payer: COMMERCIAL

## 2024-08-08 VITALS
OXYGEN SATURATION: 98 % | BODY MASS INDEX: 28.38 KG/M2 | DIASTOLIC BLOOD PRESSURE: 81 MMHG | TEMPERATURE: 98.2 F | WEIGHT: 168.3 LBS | SYSTOLIC BLOOD PRESSURE: 123 MMHG | RESPIRATION RATE: 17 BRPM | HEART RATE: 100 BPM

## 2024-08-08 DIAGNOSIS — Z00.6 EXAMINATION OF PARTICIPANT OR CONTROL IN CLINICAL RESEARCH: ICD-10-CM

## 2024-08-08 DIAGNOSIS — Z17.0 MALIGNANT NEOPLASM OF UPPER-INNER QUADRANT OF RIGHT BREAST IN FEMALE, ESTROGEN RECEPTOR POSITIVE (H): ICD-10-CM

## 2024-08-08 DIAGNOSIS — C50.211 MALIGNANT NEOPLASM OF UPPER-INNER QUADRANT OF RIGHT BREAST IN FEMALE, ESTROGEN RECEPTOR POSITIVE (H): Primary | ICD-10-CM

## 2024-08-08 DIAGNOSIS — Z17.0 MALIGNANT NEOPLASM OF UPPER-INNER QUADRANT OF RIGHT BREAST IN FEMALE, ESTROGEN RECEPTOR POSITIVE (H): Primary | ICD-10-CM

## 2024-08-08 DIAGNOSIS — C50.211 MALIGNANT NEOPLASM OF UPPER-INNER QUADRANT OF RIGHT BREAST IN FEMALE, ESTROGEN RECEPTOR POSITIVE (H): ICD-10-CM

## 2024-08-08 LAB
ALBUMIN SERPL BCG-MCNC: 3.9 G/DL (ref 3.5–5.2)
ALBUMIN UR-MCNC: NEGATIVE MG/DL
ALP SERPL-CCNC: 80 U/L (ref 40–150)
ALT SERPL W P-5'-P-CCNC: 24 U/L (ref 0–50)
ANION GAP SERPL CALCULATED.3IONS-SCNC: 9 MMOL/L (ref 7–15)
APPEARANCE UR: CLEAR
AST SERPL W P-5'-P-CCNC: 18 U/L (ref 0–45)
BASOPHILS # BLD AUTO: ABNORMAL 10*3/UL
BASOPHILS # BLD MANUAL: 0 10E3/UL (ref 0–0.2)
BASOPHILS NFR BLD AUTO: ABNORMAL %
BASOPHILS NFR BLD MANUAL: 0 %
BILIRUB SERPL-MCNC: 0.3 MG/DL
BILIRUB UR QL STRIP: NEGATIVE
BUN SERPL-MCNC: 10.7 MG/DL (ref 6–20)
CALCIUM SERPL-MCNC: 9.3 MG/DL (ref 8.8–10.4)
CHLORIDE SERPL-SCNC: 105 MMOL/L (ref 98–107)
CK SERPL-CCNC: 24 U/L (ref 26–192)
COLOR UR AUTO: ABNORMAL
CREAT SERPL-MCNC: 0.47 MG/DL (ref 0.51–0.95)
EGFRCR SERPLBLD CKD-EPI 2021: >90 ML/MIN/1.73M2
EOSINOPHIL # BLD AUTO: ABNORMAL 10*3/UL
EOSINOPHIL # BLD MANUAL: 0 10E3/UL (ref 0–0.7)
EOSINOPHIL NFR BLD AUTO: ABNORMAL %
EOSINOPHIL NFR BLD MANUAL: 0 %
ERYTHROCYTE [DISTWIDTH] IN BLOOD BY AUTOMATED COUNT: 16.4 % (ref 10–15)
FERRITIN SERPL-MCNC: 125 NG/ML (ref 6–175)
GLUCOSE SERPL-MCNC: 140 MG/DL (ref 70–99)
GLUCOSE UR STRIP-MCNC: NEGATIVE MG/DL
HCO3 SERPL-SCNC: 24 MMOL/L (ref 22–29)
HCT VFR BLD AUTO: 28 % (ref 35–47)
HGB BLD-MCNC: 8.9 G/DL (ref 11.7–15.7)
HGB UR QL STRIP: NEGATIVE
IMM GRANULOCYTES # BLD: ABNORMAL 10*3/UL
IMM GRANULOCYTES NFR BLD: ABNORMAL %
IRON SERPL-MCNC: 55 UG/DL (ref 37–145)
KETONES UR STRIP-MCNC: NEGATIVE MG/DL
LEUKOCYTE ESTERASE UR QL STRIP: NEGATIVE
LYMPHOCYTES # BLD AUTO: ABNORMAL 10*3/UL
LYMPHOCYTES # BLD MANUAL: 2.4 10E3/UL (ref 0.8–5.3)
LYMPHOCYTES NFR BLD AUTO: ABNORMAL %
LYMPHOCYTES NFR BLD MANUAL: 21 %
MCH RBC QN AUTO: 29.2 PG (ref 26.5–33)
MCHC RBC AUTO-ENTMCNC: 31.8 G/DL (ref 31.5–36.5)
MCV RBC AUTO: 92 FL (ref 78–100)
MONOCYTES # BLD AUTO: ABNORMAL 10*3/UL
MONOCYTES # BLD MANUAL: 0.5 10E3/UL (ref 0–1.3)
MONOCYTES NFR BLD AUTO: ABNORMAL %
MONOCYTES NFR BLD MANUAL: 4 %
MUCOUS THREADS #/AREA URNS LPF: PRESENT /LPF
NEUTROPHILS # BLD AUTO: ABNORMAL 10*3/UL
NEUTROPHILS # BLD MANUAL: 8.6 10E3/UL (ref 1.6–8.3)
NEUTROPHILS NFR BLD AUTO: ABNORMAL %
NEUTROPHILS NFR BLD MANUAL: 75 %
NITRATE UR QL: NEGATIVE
NRBC # BLD AUTO: 0 10E3/UL
NRBC BLD AUTO-RTO: 0 /100
PH UR STRIP: 6.5 [PH] (ref 5–7)
PLAT MORPH BLD: ABNORMAL
PLATELET # BLD AUTO: 177 10E3/UL (ref 150–450)
POLYCHROMASIA BLD QL SMEAR: SLIGHT
POTASSIUM SERPL-SCNC: 3.8 MMOL/L (ref 3.4–5.3)
PROT SERPL-MCNC: 6.6 G/DL (ref 6.4–8.3)
RBC # BLD AUTO: 3.05 10E6/UL (ref 3.8–5.2)
RBC MORPH BLD: ABNORMAL
RBC URINE: 1 /HPF
SODIUM SERPL-SCNC: 138 MMOL/L (ref 135–145)
SP GR UR STRIP: 1.02 (ref 1–1.03)
SQUAMOUS EPITHELIAL: 1 /HPF
TRANSITIONAL EPI: <1 /HPF
UROBILINOGEN UR STRIP-MCNC: NORMAL MG/DL
WBC # BLD AUTO: 11.5 10E3/UL (ref 4–11)
WBC URINE: 2 /HPF

## 2024-08-08 PROCEDURE — 96372 THER/PROPH/DIAG INJ SC/IM: CPT | Performed by: INTERNAL MEDICINE

## 2024-08-08 PROCEDURE — 96375 TX/PRO/DX INJ NEW DRUG ADDON: CPT

## 2024-08-08 PROCEDURE — 36591 DRAW BLOOD OFF VENOUS DEVICE: CPT

## 2024-08-08 PROCEDURE — 96367 TX/PROPH/DG ADDL SEQ IV INF: CPT

## 2024-08-08 PROCEDURE — 85007 BL SMEAR W/DIFF WBC COUNT: CPT | Performed by: INTERNAL MEDICINE

## 2024-08-08 PROCEDURE — 96411 CHEMO IV PUSH ADDL DRUG: CPT

## 2024-08-08 PROCEDURE — 36591 DRAW BLOOD OFF VENOUS DEVICE: CPT | Performed by: INTERNAL MEDICINE

## 2024-08-08 PROCEDURE — 96413 CHEMO IV INFUSION 1 HR: CPT

## 2024-08-08 PROCEDURE — 258N000003 HC RX IP 258 OP 636: Performed by: INTERNAL MEDICINE

## 2024-08-08 PROCEDURE — 80053 COMPREHEN METABOLIC PANEL: CPT

## 2024-08-08 PROCEDURE — 250N000011 HC RX IP 250 OP 636: Performed by: INTERNAL MEDICINE

## 2024-08-08 PROCEDURE — 85027 COMPLETE CBC AUTOMATED: CPT | Performed by: INTERNAL MEDICINE

## 2024-08-08 PROCEDURE — 96377 APPLICATON ON-BODY INJECTOR: CPT | Mod: 59 | Performed by: INTERNAL MEDICINE

## 2024-08-08 PROCEDURE — 83540 ASSAY OF IRON: CPT

## 2024-08-08 PROCEDURE — 81001 URINALYSIS AUTO W/SCOPE: CPT

## 2024-08-08 PROCEDURE — 82728 ASSAY OF FERRITIN: CPT

## 2024-08-08 PROCEDURE — 82550 ASSAY OF CK (CPK): CPT

## 2024-08-08 RX ORDER — HEPARIN SODIUM (PORCINE) LOCK FLUSH IV SOLN 100 UNIT/ML 100 UNIT/ML
5 SOLUTION INTRAVENOUS ONCE
Status: COMPLETED | OUTPATIENT
Start: 2024-08-08 | End: 2024-08-08

## 2024-08-08 RX ORDER — DOXORUBICIN HYDROCHLORIDE 2 MG/ML
60 INJECTION, SOLUTION INTRAVENOUS ONCE
Status: COMPLETED | OUTPATIENT
Start: 2024-08-08 | End: 2024-08-08

## 2024-08-08 RX ORDER — PALONOSETRON 0.05 MG/ML
0.25 INJECTION, SOLUTION INTRAVENOUS ONCE
Status: COMPLETED | OUTPATIENT
Start: 2024-08-08 | End: 2024-08-08

## 2024-08-08 RX ORDER — HEPARIN SODIUM (PORCINE) LOCK FLUSH IV SOLN 100 UNIT/ML 100 UNIT/ML
5 SOLUTION INTRAVENOUS
Status: DISCONTINUED | OUTPATIENT
Start: 2024-08-08 | End: 2024-08-08 | Stop reason: HOSPADM

## 2024-08-08 RX ADMIN — FOSAPREPITANT: 150 INJECTION, POWDER, LYOPHILIZED, FOR SOLUTION INTRAVENOUS at 15:02

## 2024-08-08 RX ADMIN — CYCLOPHOSPHAMIDE 1075 MG: 1 INJECTION, POWDER, FOR SOLUTION INTRAVENOUS; ORAL at 15:36

## 2024-08-08 RX ADMIN — Medication 5 ML: at 13:48

## 2024-08-08 RX ADMIN — Medication 5 ML: at 16:08

## 2024-08-08 RX ADMIN — PALONOSETRON HYDROCHLORIDE 0.25 MG: 0.25 INJECTION INTRAVENOUS at 15:03

## 2024-08-08 RX ADMIN — SODIUM CHLORIDE 1000 ML: 9 INJECTION, SOLUTION INTRAVENOUS at 14:15

## 2024-08-08 RX ADMIN — DOXORUBICIN HYDROCHLORIDE 110 MG: 2 INJECTION, SOLUTION INTRAVENOUS at 15:23

## 2024-08-08 RX ADMIN — PEGFILGRASTIM 6 MG: KIT SUBCUTANEOUS at 15:39

## 2024-08-08 ASSESSMENT — PAIN SCALES - GENERAL: PAINLEVEL: NO PAIN (0)

## 2024-08-08 NOTE — NURSING NOTE
[id number]: Study Visit Note   Subject name: Sharon Fong     Visit: Block C C14D1    Did the study visit occur within the appropriate window allowed by the protocol? yes    Since the last study visit, She has been doing fair.     I have personally interviewed Sharon Fong and reviewed her medical record for adverse events and concomitant medications and these have been recorded on the corresponding logs in Sharon Fong's research file.     Sharon Fong was given the opportunity to ask any trial related questions.  Labs were reviewed - any significant lab values were addressed and reviewed.    MARITZA RgN, RN  CRC-RN,   Office [phone]

## 2024-08-08 NOTE — PATIENT INSTRUCTIONS
[facility] Triage and after hours / weekends / holidays:  [phone]    Please call the triage or after hours line if you experience a temperature greater than or equal to 100.4, shaking chills, have uncontrolled nausea, vomiting and/or diarrhea, dizziness, shortness of breath, chest pain, bleeding, unexplained bruising, or if you have any other new/concerning symptoms, questions, or concerns.      If you are having any concerning symptoms or wish to speak to a provider before your next infusion visit, please call your care coordinator or triage to notify them so we can adequately serve you.     If you need a refill on a narcotic prescription or other medication, please call before your infusion appointment.

## 2024-08-08 NOTE — PROGRESS NOTES
Infusion Nursing Note:  Sharon Fong presents today for Cycle 2 Day 1 doxorubicin, cyclophosphamide; IV fluids; neulasta onpro application.    Patient seen by provider today: No, visit with Dr. Bernal on 08/06   present during visit today: Not Applicable.    Note: Sharon presents today feeling overall okay. Denies pain or nausea/vomiting. Denies fevers/chills. Offers no changes or concerns since visit with Dr. Bernal on 08/06.    Neulasta Onpro On-Body injector applied to R posterior arm at 1540.  Writer discussed Neulasta injection would start tomorrow 08/09 at 1840, approximately 27 hours after application applied today.    Written and Verbal instruction reviewed with patient.  Pt instructed when the dose delivery starts, it will take about 45 minutes to complete.  Pt aware Neulasta Onpro On-Body should have green flashing light and to call triage or on-call MD if injector flashes red or appears to be leaking.   Pt aware to keep Onpro On-Body Neulasta 4 inches away from electrical equipment and to avoid showering 4 hours prior to injection.   Neulasta Onpro Lot number: [id number]      Intravenous Access:  Implanted Port.    Treatment Conditions:     Latest Reference Range & Units 08/08/24 13:46   Sodium 135 - 145 mmol/L 138   Potassium 3.4 - 5.3 mmol/L 3.8   Chloride 98 - 107 mmol/L 105   Carbon Dioxide (CO2) 22 - 29 mmol/L 24   Urea Nitrogen 6.0 - 20.0 mg/dL 10.7   Creatinine 0.51 - 0.95 mg/dL 0.47 (L)   GFR Estimate >60 mL/min/1.73m2 >90   Calcium 8.8 - 10.4 mg/dL 9.3   Anion Gap 7 - 15 mmol/L 9   Albumin 3.5 - 5.2 g/dL 3.9   Protein Total 6.4 - 8.3 g/dL 6.6   Alkaline Phosphatase 40 - 150 U/L 80   ALT 0 - 50 U/L 24   AST 0 - 45 U/L 18   Bilirubin Total <=1.2 mg/dL 0.3   CK Total 26 - 192 U/L 24 (L)   Ferritin 6 - 175 ng/mL 125   Glucose 70 - 99 mg/dL 140 (H)   Iron 37 - 145 ug/dL 55      Latest Reference Range & Units 08/08/24 14:07   WBC 4.0 - 11.0 10e3/uL 11.5 (H)   Hemoglobin 11.7 - 15.7 g/dL  8.9 (L)   Hematocrit 35.0 - 47.0 % 28.0 (L)   Platelet Count 150 - 450 10e3/uL 177   RBC Count 3.80 - 5.20 10e6/uL 3.05 (L)   MCV 78 - 100 fL 92   MCH 26.5 - 33.0 pg 29.2   MCHC 31.5 - 36.5 g/dL 31.8   RDW 10.0 - 15.0 % 16.4 (H)   % Neutrophils % 75   % Lymphocytes % 21   % Monocytes % 4   % Eosinophils % 0   % Basophils % 0   Absolute Basophils 0.0 - 0.2 10e3/uL 0.0   Absolute Neutrophil 1.6 - 8.3 10e3/uL 8.6 (H)   Absolute Lymphocytes 0.8 - 5.3 10e3/uL 2.4   Absolute Monocytes 0.0 - 1.3 10e3/uL 0.5   Absolute Eosinophils 0.0 - 0.7 10e3/uL 0.0   Absolute NRBCs 10e3/uL 0.0   NRBCs per 100 WBC <1 /100 0   RBC Morphology  Confirmed RBC Indices   Platelet Morphology Automated Count Confirmed. Platelet morphology is normal.  Automated Count Confirmed. Platelet morphology is normal.   Polychromasia None Seen  Slight !     Results reviewed, labs MET treatment parameters, ok to proceed with treatment.  ECHO/MUGA completed 04/22/24  EF 60-65%.      Post Infusion Assessment:  Patient tolerated infusion without incident.  Blood return noted pre and post infusion.  Blood return noted during doxorubicin administration every 2 cc.  Site patent and intact, free from redness, edema or discomfort.  No evidence of extravasations.  Access discontinued per protocol.       Discharge Plan:   Prescription refills given for decadron.  Discharge instructions reviewed with: Patient.  Patient and/or family verbalized understanding of discharge instructions and all questions answered.  AVS to patient via [employer].  Patient will return 08/21 for next infusion appointment.   Patient discharged in stable condition accompanied by: .  Departure Mode: Ambulatory.      Elsie Pederson RN

## 2024-08-19 ENCOUNTER — ANCILLARY PROCEDURE (OUTPATIENT)
Dept: MRI IMAGING | Facility: CLINIC | Age: 49
End: 2024-08-19
Attending: INTERNAL MEDICINE

## 2024-08-19 ENCOUNTER — LAB (OUTPATIENT)
Dept: LAB | Facility: CLINIC | Age: 49
End: 2024-08-19
Attending: INTERNAL MEDICINE
Payer: COMMERCIAL

## 2024-08-19 DIAGNOSIS — C50.211 MALIGNANT NEOPLASM OF UPPER-INNER QUADRANT OF RIGHT BREAST IN FEMALE, ESTROGEN RECEPTOR POSITIVE (H): Primary | ICD-10-CM

## 2024-08-19 DIAGNOSIS — Z17.0 MALIGNANT NEOPLASM OF UPPER-INNER QUADRANT OF RIGHT BREAST IN FEMALE, ESTROGEN RECEPTOR POSITIVE (H): Primary | ICD-10-CM

## 2024-08-19 DIAGNOSIS — Z17.0 MALIGNANT NEOPLASM OF UPPER-INNER QUADRANT OF RIGHT BREAST IN FEMALE, ESTROGEN RECEPTOR POSITIVE (H): ICD-10-CM

## 2024-08-19 DIAGNOSIS — C50.211 MALIGNANT NEOPLASM OF UPPER-INNER QUADRANT OF RIGHT BREAST IN FEMALE, ESTROGEN RECEPTOR POSITIVE (H): ICD-10-CM

## 2024-08-19 DIAGNOSIS — Z00.6 EXAMINATION OF PARTICIPANT OR CONTROL IN CLINICAL RESEARCH: ICD-10-CM

## 2024-08-19 LAB
ALBUMIN SERPL BCG-MCNC: 4 G/DL (ref 3.5–5.2)
ALP SERPL-CCNC: 107 U/L (ref 40–150)
ALT SERPL W P-5'-P-CCNC: 24 U/L (ref 0–50)
ANION GAP SERPL CALCULATED.3IONS-SCNC: 12 MMOL/L (ref 7–15)
AST SERPL W P-5'-P-CCNC: 23 U/L (ref 0–45)
BASOPHILS # BLD AUTO: ABNORMAL 10*3/UL
BASOPHILS # BLD MANUAL: 0.4 10E3/UL (ref 0–0.2)
BASOPHILS NFR BLD AUTO: ABNORMAL %
BASOPHILS NFR BLD MANUAL: 2 %
BILIRUB SERPL-MCNC: 0.3 MG/DL
BUN SERPL-MCNC: 7.8 MG/DL (ref 6–20)
CALCIUM SERPL-MCNC: 9.6 MG/DL (ref 8.8–10.4)
CHLORIDE SERPL-SCNC: 105 MMOL/L (ref 98–107)
CREAT SERPL-MCNC: 0.48 MG/DL (ref 0.51–0.95)
DACRYOCYTES BLD QL SMEAR: ABNORMAL
EGFRCR SERPLBLD CKD-EPI 2021: >90 ML/MIN/1.73M2
EOSINOPHIL # BLD AUTO: ABNORMAL 10*3/UL
EOSINOPHIL # BLD MANUAL: 0 10E3/UL (ref 0–0.7)
EOSINOPHIL NFR BLD AUTO: ABNORMAL %
EOSINOPHIL NFR BLD MANUAL: 0 %
ERYTHROCYTE [DISTWIDTH] IN BLOOD BY AUTOMATED COUNT: 16.1 % (ref 10–15)
GLUCOSE SERPL-MCNC: 113 MG/DL (ref 70–99)
HCO3 SERPL-SCNC: 22 MMOL/L (ref 22–29)
HCT VFR BLD AUTO: 27.8 % (ref 35–47)
HGB BLD-MCNC: 8.8 G/DL (ref 11.7–15.7)
IMM GRANULOCYTES # BLD: ABNORMAL 10*3/UL
IMM GRANULOCYTES NFR BLD: ABNORMAL %
LYMPHOCYTES # BLD AUTO: ABNORMAL 10*3/UL
LYMPHOCYTES # BLD MANUAL: 0.9 10E3/UL (ref 0.8–5.3)
LYMPHOCYTES NFR BLD AUTO: ABNORMAL %
LYMPHOCYTES NFR BLD MANUAL: 5 %
MCH RBC QN AUTO: 28.9 PG (ref 26.5–33)
MCHC RBC AUTO-ENTMCNC: 31.7 G/DL (ref 31.5–36.5)
MCV RBC AUTO: 91 FL (ref 78–100)
METAMYELOCYTES # BLD MANUAL: 0.6 10E3/UL
METAMYELOCYTES NFR BLD MANUAL: 3 %
MONOCYTES # BLD AUTO: ABNORMAL 10*3/UL
MONOCYTES # BLD MANUAL: 1.3 10E3/UL (ref 0–1.3)
MONOCYTES NFR BLD AUTO: ABNORMAL %
MONOCYTES NFR BLD MANUAL: 7 %
MYELOCYTES # BLD MANUAL: 0.9 10E3/UL
MYELOCYTES NFR BLD MANUAL: 5 %
NEUTROPHILS # BLD AUTO: ABNORMAL 10*3/UL
NEUTROPHILS # BLD MANUAL: 14.3 10E3/UL (ref 1.6–8.3)
NEUTROPHILS NFR BLD AUTO: ABNORMAL %
NEUTROPHILS NFR BLD MANUAL: 77 %
NRBC # BLD AUTO: 0.1 10E3/UL
NRBC # BLD AUTO: 0.2 10E3/UL
NRBC BLD AUTO-RTO: 1 /100
NRBC BLD MANUAL-RTO: 1 %
PLAT MORPH BLD: ABNORMAL
PLATELET # BLD AUTO: 184 10E3/UL (ref 150–450)
POLYCHROMASIA BLD QL SMEAR: ABNORMAL
POTASSIUM SERPL-SCNC: 4.1 MMOL/L (ref 3.4–5.3)
PROMYELOCYTES # BLD MANUAL: 0.2 10E3/UL
PROMYELOCYTES NFR BLD MANUAL: 1 %
PROT SERPL-MCNC: 6.8 G/DL (ref 6.4–8.3)
RBC # BLD AUTO: 3.04 10E6/UL (ref 3.8–5.2)
RBC MORPH BLD: ABNORMAL
SODIUM SERPL-SCNC: 139 MMOL/L (ref 135–145)
WBC # BLD AUTO: 18.6 10E3/UL (ref 4–11)

## 2024-08-19 PROCEDURE — 36591 DRAW BLOOD OFF VENOUS DEVICE: CPT

## 2024-08-19 PROCEDURE — 85007 BL SMEAR W/DIFF WBC COUNT: CPT

## 2024-08-19 PROCEDURE — 85027 COMPLETE CBC AUTOMATED: CPT

## 2024-08-19 PROCEDURE — 82247 BILIRUBIN TOTAL: CPT

## 2024-08-19 RX ORDER — GADOBUTROL 604.72 MG/ML
0.1 INJECTION INTRAVENOUS ONCE
Status: COMPLETED | OUTPATIENT
Start: 2024-08-19 | End: 2024-08-19

## 2024-08-19 RX ADMIN — GADOBUTROL 7.4 ML: 604.72 INJECTION INTRAVENOUS at 09:29

## 2024-08-19 NOTE — NURSING NOTE
Chief Complaint   Patient presents with    Lab Only     Labs drawn with piv start by rn.     Labs drawn with PIV start by rn.  Pt tolerated well.      Xiao Jeronimo RN

## 2024-08-20 ENCOUNTER — ONCOLOGY VISIT (OUTPATIENT)
Dept: ONCOLOGY | Facility: CLINIC | Age: 49
End: 2024-08-20
Attending: INTERNAL MEDICINE
Payer: COMMERCIAL

## 2024-08-20 VITALS
WEIGHT: 167.3 LBS | SYSTOLIC BLOOD PRESSURE: 114 MMHG | TEMPERATURE: 98.3 F | OXYGEN SATURATION: 99 % | BODY MASS INDEX: 28.21 KG/M2 | DIASTOLIC BLOOD PRESSURE: 77 MMHG | RESPIRATION RATE: 16 BRPM | HEART RATE: 107 BPM

## 2024-08-20 DIAGNOSIS — C50.211 MALIGNANT NEOPLASM OF UPPER-INNER QUADRANT OF RIGHT BREAST IN FEMALE, ESTROGEN RECEPTOR POSITIVE (H): Primary | Chronic | ICD-10-CM

## 2024-08-20 DIAGNOSIS — Z17.0 MALIGNANT NEOPLASM OF UPPER-INNER QUADRANT OF RIGHT BREAST IN FEMALE, ESTROGEN RECEPTOR POSITIVE (H): Primary | Chronic | ICD-10-CM

## 2024-08-20 DIAGNOSIS — K21.9 GASTROESOPHAGEAL REFLUX DISEASE WITHOUT ESOPHAGITIS: ICD-10-CM

## 2024-08-20 PROCEDURE — G2211 COMPLEX E/M VISIT ADD ON: HCPCS | Performed by: INTERNAL MEDICINE

## 2024-08-20 PROCEDURE — 99215 OFFICE O/P EST HI 40 MIN: CPT | Performed by: INTERNAL MEDICINE

## 2024-08-20 PROCEDURE — 99213 OFFICE O/P EST LOW 20 MIN: CPT | Performed by: INTERNAL MEDICINE

## 2024-08-20 RX ORDER — DOXORUBICIN HYDROCHLORIDE 2 MG/ML
60 INJECTION, SOLUTION INTRAVENOUS ONCE
Status: CANCELLED | OUTPATIENT
Start: 2024-08-21

## 2024-08-20 RX ORDER — ALBUTEROL SULFATE 90 UG/1
1-2 AEROSOL, METERED RESPIRATORY (INHALATION)
Status: CANCELLED
Start: 2024-08-21

## 2024-08-20 RX ORDER — MEPERIDINE HYDROCHLORIDE 25 MG/ML
25 INJECTION INTRAMUSCULAR; INTRAVENOUS; SUBCUTANEOUS EVERY 30 MIN PRN
Status: CANCELLED | OUTPATIENT
Start: 2024-08-21

## 2024-08-20 RX ORDER — HEPARIN SODIUM,PORCINE 10 UNIT/ML
5-20 VIAL (ML) INTRAVENOUS DAILY PRN
Status: CANCELLED | OUTPATIENT
Start: 2024-08-21

## 2024-08-20 RX ORDER — DIPHENHYDRAMINE HYDROCHLORIDE 50 MG/ML
50 INJECTION INTRAMUSCULAR; INTRAVENOUS
Status: CANCELLED
Start: 2024-08-21

## 2024-08-20 RX ORDER — EPINEPHRINE 1 MG/ML
0.3 INJECTION, SOLUTION INTRAMUSCULAR; SUBCUTANEOUS EVERY 5 MIN PRN
Status: CANCELLED | OUTPATIENT
Start: 2024-08-21

## 2024-08-20 RX ORDER — OMEPRAZOLE 40 MG/1
40 CAPSULE, DELAYED RELEASE ORAL DAILY
Qty: 30 CAPSULE | Refills: 1 | Status: SHIPPED | OUTPATIENT
Start: 2024-08-20

## 2024-08-20 RX ORDER — HEPARIN SODIUM (PORCINE) LOCK FLUSH IV SOLN 100 UNIT/ML 100 UNIT/ML
5 SOLUTION INTRAVENOUS
Status: CANCELLED | OUTPATIENT
Start: 2024-08-21

## 2024-08-20 RX ORDER — ALBUTEROL SULFATE 0.83 MG/ML
2.5 SOLUTION RESPIRATORY (INHALATION)
Status: CANCELLED | OUTPATIENT
Start: 2024-08-21

## 2024-08-20 RX ORDER — PALONOSETRON 0.05 MG/ML
0.25 INJECTION, SOLUTION INTRAVENOUS ONCE
Status: CANCELLED | OUTPATIENT
Start: 2024-08-21

## 2024-08-20 RX ORDER — METHYLPREDNISOLONE SODIUM SUCCINATE 125 MG/2ML
125 INJECTION, POWDER, LYOPHILIZED, FOR SOLUTION INTRAMUSCULAR; INTRAVENOUS
Status: CANCELLED
Start: 2024-08-21

## 2024-08-20 RX ORDER — LORAZEPAM 2 MG/ML
0.5 INJECTION INTRAMUSCULAR EVERY 4 HOURS PRN
Status: CANCELLED | OUTPATIENT
Start: 2024-08-21

## 2024-08-20 ASSESSMENT — PAIN SCALES - GENERAL: PAINLEVEL: NO PAIN (0)

## 2024-08-20 NOTE — LETTER
8/20/2024      Sharon Fong  [address] [facility] [address]      Dear Colleague,    Thank you for referring your patient, Sharon Fong, to the [facility]. Please see a copy of my visit note below.    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 She started screening for the ISPY 2.2 trial and was found to have a MammaPrint high risk/blueprint luminal B subtype.  She was started on treatment in block B due to lack of availability of block A agents.  Her course has been complicated by allergic reaction to Taxol requiring change to Abraxane.  Her 3-week MRI showed a tumor volume reduction of 28.9% (did not meet prespecified threshold) and therefore she received a 6-week MRI demonstrated a 57% reduction tumor volume from baseline (which again did not meet prespecified threshold) to continue on block B.  The recommendations from I-SPY was to escalate early to AC.  However, in light of ongoing significant response to Abraxane, we decided to continue to complete the full 12 weeks of treatment.  Her 12 week breast MRI demonstrates a slightly smaller mass.  We proceeded to ddAC on 7/24/2024 and has had a tough course thus far.  She presents today prior to cycle 3.      Interval History  Sharon continues to struggle with AC.  The first because especially been most challenging with nausea, decreased appetite, and profound fatigue.  With cycle 2 of therapy, she started taking olanzapine on day 2 which significantly improved her nausea and also allowed her to sleep more easily.      She has been able to receive twice weekly IV fluids at home which has significantly helped with her symptoms.  She is still continuing to experience intermittent symptomatic hypotension with  systolics down to the 80s.  This typically occurs if she is sitting up for prolonged period of time.  These episodes seem to be short-lived.    She also endorses increased fluid retention especially in her lower extremities.  There is no associated pain, asymmetry, or erythema.    She also endorses intermittent abdominal discomfort.  She is experiencing more gastric reflux and requiring daily Tums.    Lastly, she endorses ongoing diffuse arthralgias and myalgias, especially during the first week of systemic therapy.      ROS: 10 point ROS neg other than the symptoms noted above in the HPI.    Her full oncologic history is as follows:   Oncologic History  Patient Active Problem List    Diagnosis Date Noted     Malignant neoplasm of upper-inner quadrant of right female breast (H) 2024     Priority: High     Right breast cancer  Has had longstanding history of multiple biopsies, infection, and partial mastectomy for a right breast multifocal abscess dating back to at least .     patient presented with palpable lump at 1:00 in the right breast    3/21/2024 diagnostic mammo showed clustered fine indeterminate calcs at 12:00.  No mammographic abnormality identified to correspond to the palpable area of fullness at 1:00.  On US, at 1:00, 10 cm FN there was a 3.7 x 2.3 x 1.5 cm solid hypoechoic irregularly marginated mass.  Additional hypoechoic lesion located at 2:00, 3 cm FN measuring 1.0 x 0.5 cm.  Another hypoechoic lesion at 2:00, 12 cm FN measuring 0.7 cm.  At least 2 enlarged right axillary lymph nodes -largest measuring up to 0.6 cm.    3/28/2024 ultrasound-guided biopsy of the followin:00 lesion at least DCIS  1:00 lesion grade 2 IDC.  No LVI.  ER (3+, 80 to 90%), MA (3+, 90 to 100%), HER2 2+ and FISH negative   Right axillary LN: metastatic carcinoma    2024 CT CAP demonstrated multifocal enhancement in the right breast with mildly abnormal right axillary lymph nodes.  Otherwise no evidence  of distant metastatic disease.    4/12/2024 NM bone scan negative for osteoblastic metastatic disease.    4/15/2024 Baseline MRI breast demonstrated the following  Right breast at 1:00 mass measuring 2.6 x 2.0 by 3.0 cm with extensive surrounding clumped NME occupying most of the right breast and measuring 8.9 x 4.7 x 8.7   Left breast showed a fibroadenoma at 8-9:00 position.  In addition, 7 mm of linear branching NME at 3-4:00 position.  This was biopsied and found to be benign breast tissue.  Few asymmetric right internal mammary chain LN. multiple enlarged right level 1 axillary nodes.  Additionally multiple LEFT level 1 axillary nodes that are indeterminant -which were normal on left axillary US.  FTV 38.222 cc    4/26/2024 MammaPrint -0.192 (high risk) and blueprint luminal B    4/30/2024 - 7/17/2024 weekly Taxol - changed to Abraxane starting week 2 due to allergic reaction    5/17/2024 W3 MRI  Minimally decreased size of the postoperative right breast cancer at the approximate 1:00 position measuring approximately 2.4 x 1.9 x 2.9 cm (previously 2.6 x 2.0 x 3.0 cm).  There is overall similar extent although mildly decreased volume of abnormal surrounding NME  Minimal interval enlargement of multiple bilateral level I lymph nodes, including the biopsy-proven right axillary node with indwelling biopsy marker. No significant change in the multiple small asymmetric right internal mammary chain lymph nodes.  27.167 ml - Tumor volume reduction of 28.9% from baseline     6/10/2024 (6-week MRI breast) 8.8 cm anteroposterior on axial MIPS, unchanged.  Mildly decreased size of the biopsy-proven cancer in the right breast at the approximate 1:00 position middle depth measuring approximately 2.0 x 1.4 x 2.4 cm (previously 2.4 x 1.9 x 2.9 cm).   Extensive surrounding NME is largely unchanged changed, but the volume has minimally decreased.  Stable bilateral axillary lymph nodes, including the biopsy-proven right level I  axillary node. Stable asymmetric small right internal mammary chain lymph nodes.  18.274 - FTV reduction of 57% from baseline     7/23/2024 (12-week MRI breast) Longest diameter of suspicious enhancement: 8.0 cm(previously 8.8 cm).   Irregular enhancing mass in the right breast at approximately 1:00 positionmeasures 1.8 x 1.1 x 1.6 cm (previously 2.0 x 1.4 x 2.4 cm).   NME continues to extend from anterior to posterior depth, with anterior NME extending to the base of the nipple.  Stable prominent bilateral axillary nodes, including the biopsied right level 1 axillary node. Small asymmetric right internal mammary chain lymph nodes are also similar to prior  7.585 ml - FTV reduction of 80% from baseline     7/24/2024 Study biopsy  Right breast 1:00 grade 2 residual IDC with probable treatment related changes.  Also grade 2 DCIS.  Right axillary lymph node with residual metastatic disease.  Tumor-infiltrating lymphocytes of 10%.  Some decreased cellularity suggestive of treatment response.    7/25/2024 Start ddAC    8/19/2024 W4 MRI (in block C) Longest diameter of suspicious enhancement: 8 cm (sharee has decreased volume of enhancement.  Right breast at 1:00 posterior depth measures 1.7 x 0.7 x 1.3 cm, previously 1.8 x 0.9 x 1.6 cm  The right MEÑO node just above the 3rd rib is slightly smaller than the prior exam  The right axillary lymph nodes have slightly decreased in size. No lymphadenopathy in the left axilla.    FTV measurements pending        Hypotension, unspecified hypotension type 07/29/2024     Priority: Medium     Syncope, unspecified syncope type 07/29/2024     Priority: Medium     Inflammatory arthritis 06/01/2015     Priority: Medium     Seizure (H) 11/15/2013     Priority: Medium     Vitamin D deficiency 04/23/2013     Priority: Medium       Current Medications:  Current Outpatient Medications   Medication Sig Dispense Refill     aspirin-acetaminophen-caffeine (EXCEDRIN MIGRAINE) 250-250-65 MG tablet         dexAMETHasone (DECADRON) 4 MG tablet Take 2 tablets (8 mg) by mouth daily Take for 3 days, starting the day after chemo. Take with food. 6 tablet 3     FT ANTACID & ANTIGAS 200-200-20 MG/5ML SUSP suspension        ibuprofen (ADVIL/MOTRIN) 200 MG capsule        lidocaine-prilocaine (EMLA) 2.5-2.5 % external cream Apply topically as needed for moderate pain 30 g 1     LORazepam (ATIVAN) 0.5 MG tablet Take 1 tablet (0.5 mg) by mouth every 6 hours as needed for anxiety 30 tablet 0     OLANZapine (ZYPREXA) 5 MG tablet Take 1 tablet (5 mg) by mouth at bedtime 30 tablet 0     ondansetron (ZOFRAN) 8 MG tablet Take 1 tablet (8 mg) by mouth every 8 hours as needed for nausea 30 tablet 3     polyethylene glycol (MIRALAX) 17 GM/Dose powder Take 17 g by mouth daily 510 g 1     ECOG Performance Status: 0    Physical Examination:  /77 (BP Location: Right arm, Patient Position: Sitting, Cuff Size: Adult Regular)   Pulse 107   Temp 98.3  F (36.8  C) (Oral)   Resp 16   Wt 75.9 kg (167 lb 4.8 oz)   SpO2 99%   BMI 28.21 kg/m    General: Fatigued appearing, well-nourished adult female in NAD.  HEENT:  Normocephalic.  Sclera anicteric.  MMM.  No lesions of the oropharynx.  Lymph:  No cervical, supraclavicular, or axillary LAD.  Breast: at 1:00 there is continued density of ~ 2cm, but no distinct borders. I am not able to palpate axillary LN on my exam today   Chest:  CTA bilaterally.  No wheezes or crackles.  CV:  RRR.  Nl S1 and S2.  No m/r/g.  Abd:  Soft/NT/ND.  BSs normoactive.  No hepatosplenomegaly.  Ext:  No pitting edema of the bilateral lower extremities.  Pulses 2+ and symmetric.  Musculo:  Strength 5/5 throughout.  Neuro:  Cranial nerves grossly intact.  Psych:  Mood and affect appear normal.        Laboratory Data:  Lab Results   Component Value Date    WBC 18.6 (H) 08/19/2024    HGB 8.8 (L) 08/19/2024    HCT 27.8 (L) 08/19/2024    MCV 91 08/19/2024     08/19/2024     Lab Results   Component Value Date  "    08/19/2024    BUN 7.8 08/19/2024    ANIONGAP 12 08/19/2024     Lab Results   Component Value Date    ALT 24 08/19/2024    AST 23 08/19/2024    ALKPHOS 107 08/19/2024     Lab Results   Component Value Date    INR 1.01 07/29/2024       Radiology data:  I have personally reviewed the images of / or the following radiology data: As detailed in the oncology timeline    Pathology and other data:  I have personally reviewed the following data: As detailed in the oncology timeline    7/24/2024 started biopsy  Final Diagnosis   A. RIGHT breast, 1:00, 10 cm from nipple, \"ISPY midtreatment\", ultrasound guided core biopsy:  -Residual INVASIVE BREAST CARCINOMA OF NO SPECIAL TYPE (INVASIVE DUCTAL CARCINOMA), Bill grade 2, with probable treatment related changes (receiving neoadjuvant chemotherapy)  -Ductal carcinoma in situ (DCIS), nuclear grade 2, cribriform type(s)  -Prior core biopsy site changes  -Calcifications associated with invasive carcinoma, DCIS, and arteries (medial calcific sclerosis)  -See comment     B. Lymph node, RIGHT axillary, \"ISPY midtreatment\", ultrasound guided core biopsy:  -Residual METASTATIC BREAST DUCTAL CARCINOMA (receiving neoadjuvant chemotherapy)  -Metastasis is at least 2 mm in size  -Prior core biopsy site changes  -See comment   Electronically signed by Elaine Solares MD on 7/26/2024 at  3:51 PM   Comment  [id number]   Specimen A (RIGHT breast):  Invasive carcinoma involves four of four tissue cores, and is 12 mm in greatest dimension in a single core.  The [place] grade is 2 (tubule formation 2 + nuclear pleomorphism 3 + mitotic activity 1 = [place] score 6).  Invasive carcinoma tumor cells are in clusters, cribriform groups and tubules. There are some areas of intratumoral fibrotic and focally elastotic stroma, and one large cluster of stromal foamy macrophages, with some areas of decreased tumor cellularity, suggestive of response to neoadjuvant chemotherapy.  DCIS " Is a minor component of the tumor in this specimen.  Tumor infiltrating lymphocytes (TILs) occupy approximately 10% of the tumor bed stroma in this biopsy material.     Specimen B (RIGHT axillary lymph node):  Metastatic carcinoma involves three of four lymph node cores, and is 2 mm in greatest dimension. No extranodal extension is identified in this core biopsy material. The tumor cells are arranged in cribriform groups and tubules. Focally, the metastatic tumor has intratumoral fibrosis. No well-defined fibrous scarring is identified. 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  She is on the I SPY 2.2 clinical trial and completed 12 weeks of Abraxane. She began ddAC cycle 1 on 7/25/24. She presents today for follow-up prior to cycle 3 of therapy.      # Right breast cancer  We reviewed results of her MRI and biopsy in depth today.   - Continue with cycle 3 of ddAC this week - will dose reduce neulasta given robust response to onpro thus far  - Awaiting FTV measurements from ISPY team  - She will also need adjuvant radiation therapy - she prefers this at MG    # Chemotherapy associated nausea  - PRN Ativan given to use as needed for the first 72 hours  - Start Olanzapine on D1 of therapy at bedtime   - Will increase IVF at home to be 3x a week for week 1 and keep at 2x a week for week 2  -  Can bring back to infusion on D4 or 5 to repeat dexamethasone, Emend, Aloxi if she still having significant issues    # Gastric reflux  # Abdominal discomfort  - Recommend starting omeprazole daily    # Arthralgias  # Myalgias  Potentially related to systemic therapy versus exacerbation of her underlying hypermobility/inflammatory arthritis  - Recommend  continuing with as needed Tylenol as this has been helpful    # Contralateral (left) axillary lymph node enlargement  Normal on most baseline US and follow up MRI    #Hx of migraines  - Zofran has been added back for premeds as she did not feel well on Compazine  - tolerated Aloxi without return of headaches.       The longitudinal plan of care for the diagnosis(es)/condition(s) as documented were addressed during this visit. Due to the added complexity in care, I will continue to support Sharon in the subsequent management and with ongoing continuity of care.    40 minutes spent on the date of the encounter doing chart review, review of test results, interpretation of tests, patient visit, and documentation     Dame Dolores MD      Again, thank you for allowing me to participate in the care of your patient.        Sincerely,        Dame Dolores MD

## 2024-08-20 NOTE — NURSING NOTE
"Oncology Rooming Note    August 20, 2024 10:32 AM   Sharon Fong is a 48 year old female who presents for:    Chief Complaint   Patient presents with    Oncology Clinic Visit     Malignant neoplasm of upper-inner quadrant of right breast     Initial Vitals: /77 (BP Location: Right arm, Patient Position: Sitting, Cuff Size: Adult Regular)   Pulse 107   Temp 98.3  F (36.8  C) (Oral)   Resp 16   Wt 75.9 kg (167 lb 4.8 oz)   SpO2 99%   BMI 28.21 kg/m   Estimated body mass index is 28.21 kg/m  as calculated from the following:    Height as of 8/6/24: 1.64 m (5' 4.57\").    Weight as of this encounter: 75.9 kg (167 lb 4.8 oz). Body surface area is 1.86 meters squared.  No Pain (0) Comment: Data Unavailable   No LMP recorded. Patient has had a hysterectomy.  Allergies reviewed: Yes  Medications reviewed: Yes    Medications: Medication refills not needed today.  Pharmacy name entered into [facility]: [facility] - [place], [place] - [address] [place] [address]    Frailty Screening:   Is the patient here for a new oncology consult visit in cancer care? 2. No      Clinical concerns:  none      Francine Marie"

## 2024-08-20 NOTE — PROGRESS NOTES
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 She started screening for the ISPY 2.2 trial and was found to have a MammaPrint high risk/blueprint luminal B subtype.  She was started on treatment in block B due to lack of availability of block A agents.  Her course has been complicated by allergic reaction to Taxol requiring change to Abraxane.  Her 3-week MRI showed a tumor volume reduction of 28.9% (did not meet prespecified threshold) and therefore she received a 6-week MRI demonstrated a 57% reduction tumor volume from baseline (which again did not meet prespecified threshold) to continue on block B.  The recommendations from I-SPY was to escalate early to AC.  However, in light of ongoing significant response to Abraxane, we decided to continue to complete the full 12 weeks of treatment.  Her 12 week breast MRI demonstrates a slightly smaller mass.  We proceeded to ddAC on 7/24/2024 and has had a tough course thus far.  She presents today prior to cycle 3.      Interval History  Sharon continues to struggle with AC.  The first because especially been most challenging with nausea, decreased appetite, and profound fatigue.  With cycle 2 of therapy, she started taking olanzapine on day 2 which significantly improved her nausea and also allowed her to sleep more easily.      She has been able to receive twice weekly IV fluids at home which has significantly helped with her symptoms.  She is still continuing to experience intermittent symptomatic hypotension with systolics down to the 80s.  This typically occurs if she is sitting up for prolonged period of time.  These episodes seem to be short-lived.    She also endorses increased fluid retention especially in her lower extremities.  There is no associated pain,  asymmetry, or erythema.    She also endorses intermittent abdominal discomfort.  She is experiencing more gastric reflux and requiring daily Tums.    Lastly, she endorses ongoing diffuse arthralgias and myalgias, especially during the first week of systemic therapy.      ROS: 10 point ROS neg other than the symptoms noted above in the HPI.    Her full oncologic history is as follows:   Oncologic History  Patient Active Problem List    Diagnosis Date Noted    Malignant neoplasm of upper-inner quadrant of right female breast (H) 2024     Priority: High     Right breast cancer  Has had longstanding history of multiple biopsies, infection, and partial mastectomy for a right breast multifocal abscess dating back to at least .     patient presented with palpable lump at 1:00 in the right breast    3/21/2024 diagnostic mammo showed clustered fine indeterminate calcs at 12:00.  No mammographic abnormality identified to correspond to the palpable area of fullness at 1:00.  On US, at 1:00, 10 cm FN there was a 3.7 x 2.3 x 1.5 cm solid hypoechoic irregularly marginated mass.  Additional hypoechoic lesion located at 2:00, 3 cm FN measuring 1.0 x 0.5 cm.  Another hypoechoic lesion at 2:00, 12 cm FN measuring 0.7 cm.  At least 2 enlarged right axillary lymph nodes -largest measuring up to 0.6 cm.    3/28/2024 ultrasound-guided biopsy of the followin:00 lesion at least DCIS  1:00 lesion grade 2 IDC.  No LVI.  ER (3+, 80 to 90%), CT (3+, 90 to 100%), HER2 2+ and FISH negative   Right axillary LN: metastatic carcinoma    2024 CT CAP demonstrated multifocal enhancement in the right breast with mildly abnormal right axillary lymph nodes.  Otherwise no evidence of distant metastatic disease.    2024 NM bone scan negative for osteoblastic metastatic disease.    4/15/2024 Baseline MRI breast demonstrated the following  Right breast at 1:00 mass measuring 2.6 x 2.0 by 3.0 cm with extensive surrounding clumped  NME occupying most of the right breast and measuring 8.9 x 4.7 x 8.7   Left breast showed a fibroadenoma at 8-9:00 position.  In addition, 7 mm of linear branching NME at 3-4:00 position.  This was biopsied and found to be benign breast tissue.  Few asymmetric right internal mammary chain LN. multiple enlarged right level 1 axillary nodes.  Additionally multiple LEFT level 1 axillary nodes that are indeterminant -which were normal on left axillary US.  FTV 38.222 cc    4/26/2024 MammaPrint -0.192 (high risk) and blueprint luminal B    4/30/2024 - 7/17/2024 weekly Taxol - changed to Abraxane starting week 2 due to allergic reaction    5/17/2024 W3 MRI  Minimally decreased size of the postoperative right breast cancer at the approximate 1:00 position measuring approximately 2.4 x 1.9 x 2.9 cm (previously 2.6 x 2.0 x 3.0 cm).  There is overall similar extent although mildly decreased volume of abnormal surrounding NME  Minimal interval enlargement of multiple bilateral level I lymph nodes, including the biopsy-proven right axillary node with indwelling biopsy marker. No significant change in the multiple small asymmetric right internal mammary chain lymph nodes.  27.167 ml - Tumor volume reduction of 28.9% from baseline     6/10/2024 (6-week MRI breast) 8.8 cm anteroposterior on axial MIPS, unchanged.  Mildly decreased size of the biopsy-proven cancer in the right breast at the approximate 1:00 position middle depth measuring approximately 2.0 x 1.4 x 2.4 cm (previously 2.4 x 1.9 x 2.9 cm).   Extensive surrounding NME is largely unchanged changed, but the volume has minimally decreased.  Stable bilateral axillary lymph nodes, including the biopsy-proven right level I axillary node. Stable asymmetric small right internal mammary chain lymph nodes.  18.274 - FTV reduction of 57% from baseline     7/23/2024 (12-week MRI breast) Longest diameter of suspicious enhancement: 8.0 cm(previously 8.8 cm).   Irregular enhancing  mass in the right breast at approximately 1:00 positionmeasures 1.8 x 1.1 x 1.6 cm (previously 2.0 x 1.4 x 2.4 cm).   NME continues to extend from anterior to posterior depth, with anterior NME extending to the base of the nipple.  Stable prominent bilateral axillary nodes, including the biopsied right level 1 axillary node. Small asymmetric right internal mammary chain lymph nodes are also similar to prior  7.585 ml - FTV reduction of 80% from baseline     7/24/2024 Study biopsy  Right breast 1:00 grade 2 residual IDC with probable treatment related changes.  Also grade 2 DCIS.  Right axillary lymph node with residual metastatic disease.  Tumor-infiltrating lymphocytes of 10%.  Some decreased cellularity suggestive of treatment response.    7/25/2024 Start ddAC    8/19/2024 W4 MRI (in block C) Longest diameter of suspicious enhancement: 8 cm (sharee has decreased volume of enhancement.  Right breast at 1:00 posterior depth measures 1.7 x 0.7 x 1.3 cm, previously 1.8 x 0.9 x 1.6 cm  The right MEÑO node just above the 3rd rib is slightly smaller than the prior exam  The right axillary lymph nodes have slightly decreased in size. No lymphadenopathy in the left axilla.    FTV measurements pending       Hypotension, unspecified hypotension type 07/29/2024     Priority: Medium    Syncope, unspecified syncope type 07/29/2024     Priority: Medium    Inflammatory arthritis 06/01/2015     Priority: Medium    Seizure (H) 11/15/2013     Priority: Medium    Vitamin D deficiency 04/23/2013     Priority: Medium       Current Medications:  Current Outpatient Medications   Medication Sig Dispense Refill    aspirin-acetaminophen-caffeine (EXCEDRIN MIGRAINE) 250-250-65 MG tablet       dexAMETHasone (DECADRON) 4 MG tablet Take 2 tablets (8 mg) by mouth daily Take for 3 days, starting the day after chemo. Take with food. 6 tablet 3    FT ANTACID & ANTIGAS 200-200-20 MG/5ML SUSP suspension       ibuprofen (ADVIL/MOTRIN) 200 MG capsule        lidocaine-prilocaine (EMLA) 2.5-2.5 % external cream Apply topically as needed for moderate pain 30 g 1    LORazepam (ATIVAN) 0.5 MG tablet Take 1 tablet (0.5 mg) by mouth every 6 hours as needed for anxiety 30 tablet 0    OLANZapine (ZYPREXA) 5 MG tablet Take 1 tablet (5 mg) by mouth at bedtime 30 tablet 0    ondansetron (ZOFRAN) 8 MG tablet Take 1 tablet (8 mg) by mouth every 8 hours as needed for nausea 30 tablet 3    polyethylene glycol (MIRALAX) 17 GM/Dose powder Take 17 g by mouth daily 510 g 1     ECOG Performance Status: 0    Physical Examination:  /77 (BP Location: Right arm, Patient Position: Sitting, Cuff Size: Adult Regular)   Pulse 107   Temp 98.3  F (36.8  C) (Oral)   Resp 16   Wt 75.9 kg (167 lb 4.8 oz)   SpO2 99%   BMI 28.21 kg/m    General: Fatigued appearing, well-nourished adult female in NAD.  HEENT:  Normocephalic.  Sclera anicteric.  MMM.  No lesions of the oropharynx.  Lymph:  No cervical, supraclavicular, or axillary LAD.  Breast: at 1:00 there is continued density of ~ 2cm, but no distinct borders. I am not able to palpate axillary LN on my exam today   Chest:  CTA bilaterally.  No wheezes or crackles.  CV:  RRR.  Nl S1 and S2.  No m/r/g.  Abd:  Soft/NT/ND.  BSs normoactive.  No hepatosplenomegaly.  Ext:  No pitting edema of the bilateral lower extremities.  Pulses 2+ and symmetric.  Musculo:  Strength 5/5 throughout.  Neuro:  Cranial nerves grossly intact.  Psych:  Mood and affect appear normal.        Laboratory Data:  Lab Results   Component Value Date    WBC 18.6 (H) 08/19/2024    HGB 8.8 (L) 08/19/2024    HCT 27.8 (L) 08/19/2024    MCV 91 08/19/2024     08/19/2024     Lab Results   Component Value Date     08/19/2024    BUN 7.8 08/19/2024    ANIONGAP 12 08/19/2024     Lab Results   Component Value Date    ALT 24 08/19/2024    AST 23 08/19/2024    ALKPHOS 107 08/19/2024     Lab Results   Component Value Date    INR 1.01 07/29/2024       Radiology data:  I have  "personally reviewed the images of / or the following radiology data: As detailed in the oncology timeline    Pathology and other data:  I have personally reviewed the following data: As detailed in the oncology timeline    7/24/2024 started biopsy  Final Diagnosis   A. RIGHT breast, 1:00, 10 cm from nipple, \"ISPY midtreatment\", ultrasound guided core biopsy:  -Residual INVASIVE BREAST CARCINOMA OF NO SPECIAL TYPE (INVASIVE DUCTAL CARCINOMA), Bill grade 2, with probable treatment related changes (receiving neoadjuvant chemotherapy)  -Ductal carcinoma in situ (DCIS), nuclear grade 2, cribriform type(s)  -Prior core biopsy site changes  -Calcifications associated with invasive carcinoma, DCIS, and arteries (medial calcific sclerosis)  -See comment     B. Lymph node, RIGHT axillary, \"ISPY midtreatment\", ultrasound guided core biopsy:  -Residual METASTATIC BREAST DUCTAL CARCINOMA (receiving neoadjuvant chemotherapy)  -Metastasis is at least 2 mm in size  -Prior core biopsy site changes  -See comment   Electronically signed by Elaine Solares MD on 7/26/2024 at  3:51 PM   Comment  [id number]   Specimen A (RIGHT breast):  Invasive carcinoma involves four of four tissue cores, and is 12 mm in greatest dimension in a single core.  The [place] grade is 2 (tubule formation 2 + nuclear pleomorphism 3 + mitotic activity 1 = [place] score 6).  Invasive carcinoma tumor cells are in clusters, cribriform groups and tubules. There are some areas of intratumoral fibrotic and focally elastotic stroma, and one large cluster of stromal foamy macrophages, with some areas of decreased tumor cellularity, suggestive of response to neoadjuvant chemotherapy.  DCIS Is a minor component of the tumor in this specimen.  Tumor infiltrating lymphocytes (TILs) occupy approximately 10% of the tumor bed stroma in this biopsy material.     Specimen B (RIGHT axillary lymph node):  Metastatic carcinoma involves three of four lymph node " cores, and is 2 mm in greatest dimension. No extranodal extension is identified in this core biopsy material. The tumor cells are arranged in cribriform groups and tubules. Focally, the metastatic tumor has intratumoral fibrosis. No well-defined fibrous scarring is identified. 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  She is on the I SPY 2.2 clinical trial and completed 12 weeks of Abraxane. She began ddAC cycle 1 on 7/25/24. She presents today for follow-up prior to cycle 3 of therapy.      # Right breast cancer  We reviewed results of her MRI and biopsy in depth today.   - Continue with cycle 3 of ddAC this week - will dose reduce neulasta given robust response to onpro thus far  - Awaiting FTV measurements from ISPY team  - She will also need adjuvant radiation therapy - she prefers this at [facility]    # Chemotherapy associated nausea  - PRN Ativan given to use as needed for the first 72 hours  - Start Olanzapine on D1 of therapy at bedtime   - Will increase IVF at home to be 3x a week for week 1 and keep at 2x a week for week 2  -  Can bring back to infusion on D4 or 5 to repeat dexamethasone, Emend, Aloxi if she still having significant issues    # Gastric reflux  # Abdominal discomfort  - Recommend starting omeprazole daily    # Arthralgias  # Myalgias  Potentially related to systemic therapy versus exacerbation of her underlying hypermobility/inflammatory arthritis  - Recommend continuing with as needed Tylenol as this has been helpful    # Contralateral (left) axillary lymph node enlargement  Normal on most baseline US and follow up MRI    #Hx of migraines  - Zofran has been added back for premeds as she did not feel well on Compazine  -  tolerated Aloxi without return of headaches.       The longitudinal plan of care for the diagnosis(es)/condition(s) as documented were addressed during this visit. Due to the added complexity in care, I will continue to support Sharon in the subsequent management and with ongoing continuity of care.    40 minutes spent on the date of the encounter doing chart review, review of test results, interpretation of tests, patient visit, and documentation     Dame Dolores MD

## 2024-08-21 ENCOUNTER — INFUSION THERAPY VISIT (OUTPATIENT)
Dept: ONCOLOGY | Facility: CLINIC | Age: 49
End: 2024-08-21
Attending: NURSE PRACTITIONER
Payer: COMMERCIAL

## 2024-08-21 VITALS
SYSTOLIC BLOOD PRESSURE: 113 MMHG | OXYGEN SATURATION: 99 % | DIASTOLIC BLOOD PRESSURE: 79 MMHG | TEMPERATURE: 99 F | RESPIRATION RATE: 16 BRPM | HEART RATE: 99 BPM

## 2024-08-21 DIAGNOSIS — Z17.0 MALIGNANT NEOPLASM OF UPPER-INNER QUADRANT OF RIGHT BREAST IN FEMALE, ESTROGEN RECEPTOR POSITIVE (H): Primary | ICD-10-CM

## 2024-08-21 DIAGNOSIS — C50.211 MALIGNANT NEOPLASM OF UPPER-INNER QUADRANT OF RIGHT BREAST IN FEMALE, ESTROGEN RECEPTOR POSITIVE (H): Primary | ICD-10-CM

## 2024-08-21 PROCEDURE — 96375 TX/PRO/DX INJ NEW DRUG ADDON: CPT

## 2024-08-21 PROCEDURE — 96367 TX/PROPH/DG ADDL SEQ IV INF: CPT

## 2024-08-21 PROCEDURE — 250N000011 HC RX IP 250 OP 636: Performed by: INTERNAL MEDICINE

## 2024-08-21 PROCEDURE — 258N000003 HC RX IP 258 OP 636: Performed by: INTERNAL MEDICINE

## 2024-08-21 PROCEDURE — 96411 CHEMO IV PUSH ADDL DRUG: CPT

## 2024-08-21 PROCEDURE — 96413 CHEMO IV INFUSION 1 HR: CPT

## 2024-08-21 RX ORDER — HEPARIN SODIUM (PORCINE) LOCK FLUSH IV SOLN 100 UNIT/ML 100 UNIT/ML
5 SOLUTION INTRAVENOUS
Status: DISCONTINUED | OUTPATIENT
Start: 2024-08-21 | End: 2024-08-21 | Stop reason: HOSPADM

## 2024-08-21 RX ORDER — DOXORUBICIN HYDROCHLORIDE 2 MG/ML
60 INJECTION, SOLUTION INTRAVENOUS ONCE
Status: COMPLETED | OUTPATIENT
Start: 2024-08-21 | End: 2024-08-21

## 2024-08-21 RX ORDER — PALONOSETRON 0.05 MG/ML
0.25 INJECTION, SOLUTION INTRAVENOUS ONCE
Status: COMPLETED | OUTPATIENT
Start: 2024-08-21 | End: 2024-08-21

## 2024-08-21 RX ADMIN — DOXORUBICIN HYDROCHLORIDE 110 MG: 2 INJECTION, SOLUTION INTRAVENOUS at 16:28

## 2024-08-21 RX ADMIN — Medication 5 ML: at 17:30

## 2024-08-21 RX ADMIN — CYCLOPHOSPHAMIDE 1075 MG: 1 INJECTION, POWDER, FOR SOLUTION INTRAVENOUS; ORAL at 16:38

## 2024-08-21 RX ADMIN — FOSAPREPITANT: 150 INJECTION, POWDER, LYOPHILIZED, FOR SOLUTION INTRAVENOUS at 15:51

## 2024-08-21 RX ADMIN — PALONOSETRON HYDROCHLORIDE 0.25 MG: 0.25 INJECTION INTRAVENOUS at 15:47

## 2024-08-21 RX ADMIN — SODIUM CHLORIDE 1000 ML: 9 INJECTION, SOLUTION INTRAVENOUS at 15:38

## 2024-08-21 ASSESSMENT — PAIN SCALES - GENERAL: PAINLEVEL: NO PAIN (0)

## 2024-08-21 NOTE — NURSING NOTE
DANIELAPY CLINICAL TRIAL VISIT NOTE  Date of Visit: 7/2/2024  Study: I-SPY 2 TRIAL -Investigation of Serial Studies to Predict your Therapeutic Response with Imaging and Molecular Analysis 2      [facility] [id number]  Subject Name: Sharon Fong  Subject ID: [id number]    Visit: C10D1 - Abraxane  Did the study visit occur within the appropriate window allowed by the protocol? Yes    Abnormal Assessment Findings Per Dr. Bernal and AE Grading:     - Anemia: Grade 1 start 6/14/24 and stop 7/3/24, Grade 2 start 7/3/24  - Diarrhea: Grade 1  - Hemorrhoidal hemorrhage: Grade 1  - Fatigue: Grade 1  - Intermittent nausea: Grade 1  - Intermittent headaches: Grade 1  - Arthralgia: Grade 1    - All other criteria grade 0/normal.   I have personally interviewed Sharon Fong and reviewed her medical record for adverse events and these have been recorded on the corresponding logs in Sharon Fong's research file.      Concomitant Medications:  I have personally reviewed concomitant medications and these have been recorded on the corresponding logs in the research file.  - Imodium  - Zofran 8 mg prn  - Advil 200 mg    Lab Results:  Labs including CBC with diff and CMP were drawn. Labs were reviewed- any significant lab values were addressed and reviewed.     Abnormal labs:  Infusion Therapy Visit on 06/27/2024   Component Date Value    Glucose 06/27/2024 129 (H)     AST 06/27/2024 49 (H)     ALT 06/27/2024 60 (H)     RBC Count 06/27/2024 3.55 (L)     Hemoglobin 06/27/2024 10.4 (L)     Hematocrit 06/27/2024 30.6 (L)      Lab Results   Component Value Date     HGB 9.8 (L) 07/03/2024     Component Value Date     ALT 59 (H) 07/03/2024     Research RN Impression:   Sharon was here for Cycle 10. Diarrhea, nausea, headaches, fatigue, and joint pain have been bothersome and managed with medication. She'll have labs and infusion on 7/3.    Sharon Fong was given the opportunity to ask any trial related questions. The patient  will be back for her infusion this week and next week. The patient knows to call with any new or worsening symptoms or concerns. Please see provider progress note for full physical exam and other clinical information.      Juliann Hyman, Clinical Research Coordinator, RN  [facility] [place], [facility]   Clinical Trials Office  Solid Tumor Unit       17-Mar-2021

## 2024-08-21 NOTE — PROGRESS NOTES
Infusion Nursing Note:  Sharon Fong presents today for Cycle 1, Day 15 Adriamycin, Cytoxan.    Patient seen by provider today: No   present during visit today: Not Applicable.    Note: Pt assessed upon arrival to infusion suite. Denies fever, chills, cough, SOB or other signs/symptoms of infection. Pt had visit with Dr. Bernal yesterday and denies any new issues, changes or concerns since this visit.     Intravenous Access:  Implanted Port.    Treatment Conditions:  Lab Results   Component Value Date    HGB 8.8 (L) 08/19/2024    WBC 18.6 (H) 08/19/2024    ANEU 14.3 (H) 08/19/2024    ANEUTAUTO 3.2 07/25/2024     08/19/2024        Lab Results   Component Value Date     08/19/2024    POTASSIUM 4.1 08/19/2024    MAG 2.1 04/17/2024    CR 0.48 (L) 08/19/2024    JJ 9.6 08/19/2024    BILITOTAL 0.3 08/19/2024    ALBUMIN 4.0 08/19/2024    ALT 24 08/19/2024    AST 23 08/19/2024     Results reviewed, labs MET treatment parameters, ok to proceed with treatment.    Post Infusion Assessment:  Patient tolerated infusion without incident.  Blood return noted pre and post infusion.  Blood return noted during Adriamycin push administration every 2-3 cc.  Site patent and intact, free from redness, edema or discomfort.  No evidence of extravasations.  Access discontinued per protocol.     Discharge Plan:   Prescription refills given for Dexamethasone and Zofran.  AVS to patient via [employer].  Patient will return 9/5/24 for next appointment.   Patient discharged in stable condition accompanied by: family.  Departure Mode: Ambulatory.      Callie Nair RN

## 2024-08-22 ENCOUNTER — INFUSION THERAPY VISIT (OUTPATIENT)
Dept: INFUSION THERAPY | Facility: CLINIC | Age: 49
End: 2024-08-22
Attending: INTERNAL MEDICINE
Payer: COMMERCIAL

## 2024-08-22 VITALS
OXYGEN SATURATION: 99 % | SYSTOLIC BLOOD PRESSURE: 114 MMHG | TEMPERATURE: 98.5 F | HEART RATE: 97 BPM | DIASTOLIC BLOOD PRESSURE: 70 MMHG

## 2024-08-22 DIAGNOSIS — C50.211 MALIGNANT NEOPLASM OF UPPER-INNER QUADRANT OF RIGHT BREAST IN FEMALE, ESTROGEN RECEPTOR POSITIVE (H): Primary | Chronic | ICD-10-CM

## 2024-08-22 DIAGNOSIS — Z17.0 MALIGNANT NEOPLASM OF UPPER-INNER QUADRANT OF RIGHT BREAST IN FEMALE, ESTROGEN RECEPTOR POSITIVE (H): Primary | Chronic | ICD-10-CM

## 2024-08-22 PROCEDURE — 96372 THER/PROPH/DIAG INJ SC/IM: CPT | Performed by: INTERNAL MEDICINE

## 2024-08-22 PROCEDURE — 99207 PR NO CHARGE LOS: CPT

## 2024-08-22 PROCEDURE — 250N000011 HC RX IP 250 OP 636: Performed by: INTERNAL MEDICINE

## 2024-08-22 RX ADMIN — PEGFILGRASTIM 6 MG: 6 INJECTION SUBCUTANEOUS at 15:47

## 2024-08-22 ASSESSMENT — PAIN SCALES - GENERAL: PAINLEVEL: NO PAIN (0)

## 2024-08-22 NOTE — PROGRESS NOTES
Infusion Nursing Note:  Sharon Fong presents today for Neulasta.    Patient seen by provider today: No   present during visit today: Not Applicable.    Note: Assessment performed by Devorah MARR RN prior to injection today. .      Intravenous Access:  No Intravenous access/labs at this visit.    Treatment Conditions:  Not Applicable.      Post Infusion Assessment:  Patient tolerated injection without incident.  Site patent and intact, free from redness, edema or discomfort.       Discharge Plan:   Patient discharged in stable condition accompanied by: self.  Departure Mode: Ambulatory.      Maria Luisa Reilly MA

## 2024-08-27 ENCOUNTER — LAB (OUTPATIENT)
Dept: LAB | Facility: CLINIC | Age: 49
End: 2024-08-27
Payer: COMMERCIAL

## 2024-08-27 DIAGNOSIS — Z17.0 MALIGNANT NEOPLASM OF UPPER-INNER QUADRANT OF RIGHT BREAST IN FEMALE, ESTROGEN RECEPTOR POSITIVE (H): ICD-10-CM

## 2024-08-27 DIAGNOSIS — C50.211 MALIGNANT NEOPLASM OF UPPER-INNER QUADRANT OF RIGHT BREAST IN FEMALE, ESTROGEN RECEPTOR POSITIVE (H): ICD-10-CM

## 2024-08-27 LAB
ALBUMIN SERPL BCG-MCNC: 4.4 G/DL (ref 3.5–5.2)
ALP SERPL-CCNC: 109 U/L (ref 40–150)
ALT SERPL W P-5'-P-CCNC: 13 U/L (ref 0–50)
ANION GAP SERPL CALCULATED.3IONS-SCNC: 11 MMOL/L (ref 7–15)
AST SERPL W P-5'-P-CCNC: 13 U/L (ref 0–45)
BASOPHILS # BLD AUTO: 0 10E3/UL (ref 0–0.2)
BASOPHILS NFR BLD AUTO: 2 %
BILIRUB SERPL-MCNC: 0.6 MG/DL
BUN SERPL-MCNC: 12.6 MG/DL (ref 6–20)
CALCIUM SERPL-MCNC: 10.1 MG/DL (ref 8.8–10.4)
CHLORIDE SERPL-SCNC: 103 MMOL/L (ref 98–107)
CREAT SERPL-MCNC: 0.53 MG/DL (ref 0.51–0.95)
DACRYOCYTES BLD QL SMEAR: SLIGHT
EGFRCR SERPLBLD CKD-EPI 2021: >90 ML/MIN/1.73M2
EOSINOPHIL # BLD AUTO: 0 10E3/UL (ref 0–0.7)
EOSINOPHIL NFR BLD AUTO: 1 %
ERYTHROCYTE [DISTWIDTH] IN BLOOD BY AUTOMATED COUNT: 15.4 % (ref 10–15)
FRAGMENTS BLD QL SMEAR: SLIGHT
GLUCOSE SERPL-MCNC: 116 MG/DL (ref 70–99)
HCO3 SERPL-SCNC: 24 MMOL/L (ref 22–29)
HCT VFR BLD AUTO: 27.7 % (ref 35–47)
HGB BLD-MCNC: 9 G/DL (ref 11.7–15.7)
IMM GRANULOCYTES # BLD: 0.1 10E3/UL
IMM GRANULOCYTES NFR BLD: 4 %
LYMPHOCYTES # BLD AUTO: 0.5 10E3/UL (ref 0.8–5.3)
LYMPHOCYTES NFR BLD AUTO: 20 %
MCH RBC QN AUTO: 29.5 PG (ref 26.5–33)
MCHC RBC AUTO-ENTMCNC: 32.5 G/DL (ref 31.5–36.5)
MCV RBC AUTO: 91 FL (ref 78–100)
MONOCYTES # BLD AUTO: 0.1 10E3/UL (ref 0–1.3)
MONOCYTES NFR BLD AUTO: 4 %
NEUTROPHILS # BLD AUTO: 1.7 10E3/UL (ref 1.6–8.3)
NEUTROPHILS NFR BLD AUTO: 69 %
NRBC # BLD AUTO: 0 10E3/UL
NRBC BLD AUTO-RTO: 0 /100
PLAT MORPH BLD: ABNORMAL
PLATELET # BLD AUTO: 212 10E3/UL (ref 150–450)
POTASSIUM SERPL-SCNC: 4.2 MMOL/L (ref 3.4–5.3)
PROT SERPL-MCNC: 7 G/DL (ref 6.4–8.3)
RBC # BLD AUTO: 3.05 10E6/UL (ref 3.8–5.2)
RBC MORPH BLD: ABNORMAL
SODIUM SERPL-SCNC: 138 MMOL/L (ref 135–145)
WBC # BLD AUTO: 2.5 10E3/UL (ref 4–11)

## 2024-08-27 PROCEDURE — 80053 COMPREHEN METABOLIC PANEL: CPT

## 2024-08-27 PROCEDURE — 36415 COLL VENOUS BLD VENIPUNCTURE: CPT

## 2024-08-27 PROCEDURE — 85025 COMPLETE CBC W/AUTO DIFF WBC: CPT

## 2024-08-30 ENCOUNTER — ONCOLOGY VISIT (OUTPATIENT)
Dept: ONCOLOGY | Facility: CLINIC | Age: 49
End: 2024-08-30
Attending: SURGERY
Payer: COMMERCIAL

## 2024-08-30 ENCOUNTER — PATIENT OUTREACH (OUTPATIENT)
Dept: ONCOLOGY | Facility: CLINIC | Age: 49
End: 2024-08-30
Payer: COMMERCIAL

## 2024-08-30 VITALS
SYSTOLIC BLOOD PRESSURE: 108 MMHG | DIASTOLIC BLOOD PRESSURE: 75 MMHG | HEART RATE: 111 BPM | BODY MASS INDEX: 27.78 KG/M2 | TEMPERATURE: 98.6 F | WEIGHT: 164.7 LBS | OXYGEN SATURATION: 96 % | RESPIRATION RATE: 12 BRPM

## 2024-08-30 DIAGNOSIS — Z17.0 MALIGNANT NEOPLASM OF UPPER-INNER QUADRANT OF RIGHT BREAST IN FEMALE, ESTROGEN RECEPTOR POSITIVE (H): Primary | ICD-10-CM

## 2024-08-30 DIAGNOSIS — C50.211 MALIGNANT NEOPLASM OF UPPER-INNER QUADRANT OF RIGHT BREAST IN FEMALE, ESTROGEN RECEPTOR POSITIVE (H): Primary | ICD-10-CM

## 2024-08-30 PROCEDURE — 99213 OFFICE O/P EST LOW 20 MIN: CPT | Performed by: SURGERY

## 2024-08-30 ASSESSMENT — PAIN SCALES - GENERAL: PAINLEVEL: NO PAIN (0)

## 2024-08-30 NOTE — LETTER
8/30/2024      Sharon Fong  [address] [address]      Dear Colleague,    Thank you for referring your patient, Sharon Fong, to the [facility]. Please see a copy of my visit note below.    Patient is here for follow-up visit.  I saw her last in May.  At that time she was diagnosed with a [id number] left breast cancer.  By her MRI she had non-mass like enhancement of 8.9 cm.  Her breast cancer was estrogen receptor positive and HER2 negative.  She had a lymph node that was biopsied and positive as well.  She has been receiving chemotherapy.  She is presently on dose dense Adriamycin and Cytoxan chemotherapy.  Her last MRI was on August 19 which demonstrated 8 cm area of enhancement and decrease in the size of her axillary lymph nodes.  She underwent genetic testing and did not have a deleterious mutation.  On physical examination her lymph nodes are not palpable.    Impression T3 N2 M0 breast cancer    Plan: I have talked to her about the various surgical options.  I did recommend a mastectomy, sentinel node biopsy and removal of her clipped lymph node.  She is also interested in having a double mastectomy.  She knows that this will not improve her overall survival rates.  We will plan to do a bilateral simple mastectomy as an outpatient with a sentinel node biopsy and removal of the clipped lymph node.  Will do the surgery in about 4 to 5 weeks.  If she has further questions we will schedule another meeting.  He is    Total time of visit was 25 minutes which included reviewing her most recent imaging the in person visit coordinating her care      Again, thank you for allowing me to participate in the care of your patient.        Sincerely,        Boom Collier MD

## 2024-08-30 NOTE — PROGRESS NOTES
Patient is here for follow-up visit.  I saw her last in May.  At that time she was diagnosed with a [id number] left breast cancer.  By her MRI she had non-mass like enhancement of 8.9 cm.  Her breast cancer was estrogen receptor positive and HER2 negative.  She had a lymph node that was biopsied and positive as well.  She has been receiving chemotherapy.  She is presently on dose dense Adriamycin and Cytoxan chemotherapy.  Her last MRI was on August 19 which demonstrated 8 cm area of enhancement and decrease in the size of her axillary lymph nodes.  She underwent genetic testing and did not have a deleterious mutation.  On physical examination her lymph nodes are not palpable.    Impression T3 N2 M0 breast cancer    Plan: I have talked to her about the various surgical options.  I did recommend a mastectomy, sentinel node biopsy and removal of her clipped lymph node.  She is also interested in having a double mastectomy.  She knows that this will not improve her overall survival rates.  We will plan to do a bilateral simple mastectomy as an outpatient with a sentinel node biopsy and removal of the clipped lymph node.  Will do the surgery in about 4 to 5 weeks.  If she has further questions we will schedule another meeting.  He is    Total time of visit was 25 minutes which included reviewing her most recent imaging the in person visit coordinating her care

## 2024-08-30 NOTE — NURSING NOTE
"Oncology Rooming Note    August 30, 2024 10:30 AM   Sharon Fong is a 48 year old female who presents for:    Chief Complaint   Patient presents with    Oncology Clinic Visit     Malignant neoplasm of upper-inner quadrant of right breast     Initial Vitals: /75 (BP Location: Right arm, Patient Position: Sitting, Cuff Size: Adult Regular)   Pulse 111   Temp 98.6  F (37  C) (Oral)   Resp 12   Wt 74.7 kg (164 lb 11.2 oz)   SpO2 96%   BMI 27.78 kg/m   Estimated body mass index is 27.78 kg/m  as calculated from the following:    Height as of 8/6/24: 1.64 m (5' 4.57\").    Weight as of this encounter: 74.7 kg (164 lb 11.2 oz). Body surface area is 1.84 meters squared.  No Pain (0) Comment: Data Unavailable   No LMP recorded. Patient has had a hysterectomy.  Allergies reviewed: Yes  Medications reviewed: Yes    Medications: Medication refills not needed today.  Pharmacy name entered into [facility]: [facility] - [place], [place] - [address] [place] [address]    Frailty Screening:   Is the patient here for a new oncology consult visit in cancer care? 2. No      Clinical concerns: none      Sanam Scott"

## 2024-08-30 NOTE — PROGRESS NOTES
[facility]: Surgical Oncology Plan of Care Education Note                                     Discussion with Patient:                                                         Met with Sharon and her  following her visit with Dr. Collier on 8/30/2024. Introduced self and explained role of RNCC.       Plan:                                                       Reviewed plan for bilateral simple mastectomy with tag localized sentinel lymph node biopsy at the [facility]. Discussed need for H&P within 30 days of surgery. Sharon will schedule with her PCP. Reviewed shower instructions and provided written hand-out and bottle of surgical scrub.     The process for scheduling the tag placement was reviewed with Rashel. She is aware [facility] will be calling to schedule the tag placement. The tag placement will be scheduled for at least two days prior to her surgery.     Drain instructions were reviewed and provided to patient.      Sharon requested surgery the week of 9/23. Reviewed that this would be only 2-3 weeks from her last chemotherapy date and her blood counts may not have returned to normal ranges by that date. Reviewed that we can aim for that week depending on Dr. Collier's availability, but if her blood counts are too low, her surgery could end up being rescheduled. Reese verbalized understanding to the plan.     Informed patient they should get a call within the next three business days from our OR  with surgery date, H&P plan and date of post-op visit with their surgeon. Writer answered all questions and concerns to the best of her ability and provided her contact information. Reviewed use of [employer] as alternative way to contact team.  Encouraged patient to contact with questions.         Cony Pearson, RNCC  [facility]  Surgical Onolcogy      Approximately 20 minutes was spent in discussion with patient.

## 2024-09-02 NOTE — PROGRESS NOTES
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 She started screening for the ISPY 2.2 trial and was found to have a MammaPrint high risk/blueprint luminal B subtype.  She was started on treatment in block B due to lack of availability of block A agents.  Her course has been complicated by allergic reaction to Taxol requiring change to Abraxane.  Her 3-week MRI showed a tumor volume reduction of 28.9% (did not meet prespecified threshold) and therefore she received a 6-week MRI demonstrated a 57% reduction tumor volume from baseline (which again did not meet prespecified threshold) to continue on block B.  The recommendations from I-SPY was to escalate early to AC.  However, in light of ongoing significant response to Abraxane, we decided to continue to complete the full 12 weeks of treatment.  Her 12 week breast MRI demonstrates a slightly smaller mass.  We proceeded to ddAC on 7/24/2024 and has had a tough course thus far.  She presents today prior to cycle 4.      Interval History  Sharon reports doing much better with cycle 3 of systemic therapy.  She has much less nausea, abdominal pain, overall felt like she recovered more quickly with this last cycle.  She received half the dose of Neulasta and received IVF 3 times a week during week 1 and twice a week during week 2 of AC.     She feels that IV fluids and supportive medications are significantly helping with her symptoms.  She had less episodes of symptomatic hypotension.      ROS: 10 point ROS neg other than the symptoms noted above in the HPI.    Her full oncologic history is as follows:   Oncologic History  Patient Active Problem List    Diagnosis Date Noted    Malignant neoplasm of upper-inner quadrant of right female breast (H) 04/02/2024     Priority:  High     Right breast cancer  Has had longstanding history of multiple biopsies, infection, and partial mastectomy for a right breast multifocal abscess dating back to at least .     patient presented with palpable lump at 1:00 in the right breast    3/21/2024 diagnostic mammo showed clustered fine indeterminate calcs at 12:00.  No mammographic abnormality identified to correspond to the palpable area of fullness at 1:00.  On US, at 1:00, 10 cm FN there was a 3.7 x 2.3 x 1.5 cm solid hypoechoic irregularly marginated mass.  Additional hypoechoic lesion located at 2:00, 3 cm FN measuring 1.0 x 0.5 cm.  Another hypoechoic lesion at 2:00, 12 cm FN measuring 0.7 cm.  At least 2 enlarged right axillary lymph nodes -largest measuring up to 0.6 cm.    3/28/2024 ultrasound-guided biopsy of the followin:00 lesion at least DCIS  1:00 lesion grade 2 IDC.  No LVI.  ER (3+, 80 to 90%), MN (3+, 90 to 100%), HER2 2+ and FISH negative   Right axillary LN: metastatic carcinoma    2024 CT CAP demonstrated multifocal enhancement in the right breast with mildly abnormal right axillary lymph nodes.  Otherwise no evidence of distant metastatic disease.    2024 NM bone scan negative for osteoblastic metastatic disease.    4/15/2024 Baseline MRI breast demonstrated the following  Right breast at 1:00 mass measuring 2.6 x 2.0 by 3.0 cm with extensive surrounding clumped NME occupying most of the right breast and measuring 8.9 x 4.7 x 8.7   Left breast showed a fibroadenoma at 8-9:00 position.  In addition, 7 mm of linear branching NME at 3-4:00 position.  This was biopsied and found to be benign breast tissue.  Few asymmetric right internal mammary chain LN. multiple enlarged right level 1 axillary nodes.  Additionally multiple LEFT level 1 axillary nodes that are indeterminant -which were normal on left axillary US.  FTV 38.222 cc    2024 MammaPrint -0.192 (high risk) and blueprint luminal B    2024 -  7/17/2024 weekly Taxol - changed to Abraxane starting week 2 due to allergic reaction    5/17/2024 W3 MRI  Minimally decreased size of the postoperative right breast cancer at the approximate 1:00 position measuring approximately 2.4 x 1.9 x 2.9 cm (previously 2.6 x 2.0 x 3.0 cm).  There is overall similar extent although mildly decreased volume of abnormal surrounding NME  Minimal interval enlargement of multiple bilateral level I lymph nodes, including the biopsy-proven right axillary node with indwelling biopsy marker. No significant change in the multiple small asymmetric right internal mammary chain lymph nodes.  27.167 ml - Tumor volume reduction of 28.9% from baseline     6/10/2024 (6-week MRI breast) 8.8 cm anteroposterior on axial MIPS, unchanged.  Mildly decreased size of the biopsy-proven cancer in the right breast at the approximate 1:00 position middle depth measuring approximately 2.0 x 1.4 x 2.4 cm (previously 2.4 x 1.9 x 2.9 cm).   Extensive surrounding NME is largely unchanged changed, but the volume has minimally decreased.  Stable bilateral axillary lymph nodes, including the biopsy-proven right level I axillary node. Stable asymmetric small right internal mammary chain lymph nodes.  18.274 - FTV reduction of 57% from baseline     7/23/2024 (12-week MRI breast) Longest diameter of suspicious enhancement: 8.0 cm(previously 8.8 cm).   Irregular enhancing mass in the right breast at approximately 1:00 positionmeasures 1.8 x 1.1 x 1.6 cm (previously 2.0 x 1.4 x 2.4 cm).   NME continues to extend from anterior to posterior depth, with anterior NME extending to the base of the nipple.  Stable prominent bilateral axillary nodes, including the biopsied right level 1 axillary node. Small asymmetric right internal mammary chain lymph nodes are also similar to prior  7.585 ml - FTV reduction of 80% from baseline     7/24/2024 Study biopsy  Right breast 1:00 grade 2 residual IDC with probable treatment  related changes.  Also grade 2 DCIS.  Right axillary lymph node with residual metastatic disease.  Tumor-infiltrating lymphocytes of 10%.  Some decreased cellularity suggestive of treatment response.    7/25/2024 Start ddAC    8/19/2024 W4 MRI (in block C) Longest diameter of suspicious enhancement: 8 cm (sharee has decreased volume of enhancement.  Right breast at 1:00 posterior depth measures 1.7 x 0.7 x 1.3 cm, previously 1.8 x 0.9 x 1.6 cm  The right MEÑO node just above the 3rd rib is slightly smaller than the prior exam  The right axillary lymph nodes have slightly decreased in size. No lymphadenopathy in the left axilla.    FTV 6.349 ml - 83% reduction from baseline       Hypotension, unspecified hypotension type 07/29/2024     Priority: Medium    Syncope, unspecified syncope type 07/29/2024     Priority: Medium    Inflammatory arthritis 06/01/2015     Priority: Medium    Seizure (H) 11/15/2013     Priority: Medium    Vitamin D deficiency 04/23/2013     Priority: Medium       Current Medications:  Current Outpatient Medications   Medication Sig Dispense Refill    aspirin-acetaminophen-caffeine (EXCEDRIN MIGRAINE) 250-250-65 MG tablet       dexAMETHasone (DECADRON) 4 MG tablet Take 2 tablets (8 mg) by mouth daily Take for 3 days, starting the day after chemo. Take with food. 6 tablet 3    FT ANTACID & ANTIGAS 200-200-20 MG/5ML SUSP suspension       ibuprofen (ADVIL/MOTRIN) 200 MG capsule       lidocaine-prilocaine (EMLA) 2.5-2.5 % external cream Apply topically as needed for moderate pain 30 g 1    LORazepam (ATIVAN) 0.5 MG tablet Take 1 tablet (0.5 mg) by mouth every 6 hours as needed for anxiety 30 tablet 0    OLANZapine (ZYPREXA) 5 MG tablet Take 1 tablet (5 mg) by mouth at bedtime 30 tablet 0    omeprazole (PRILOSEC) 40 MG DR capsule Take 1 capsule (40 mg) by mouth daily 30 capsule 1    ondansetron (ZOFRAN) 8 MG tablet Take 1 tablet (8 mg) by mouth every 8 hours as needed for nausea 30 tablet 3     "polyethylene glycol (MIRALAX) 17 GM/Dose powder Take 17 g by mouth daily 510 g 1     ECOG Performance Status: 0    Physical Examination:  /71   Pulse 102   Temp 97.7  F (36.5  C) (Oral)   Resp 16   Wt 76.2 kg (168 lb)   SpO2 99%   BMI 28.33 kg/m    General: Fatigued appearing, well-nourished adult female in NAD.  HEENT:  Normocephalic.  Sclera anicteric.  MMM.  No lesions of the oropharynx.  Lymph:  No cervical, supraclavicular, or axillary LAD.  Breast: In the right breast at 1:00, mass is no longer palpable (previously ~2cm)  I am not able to palpate axillary LN on my exam today   Chest:  CTA bilaterally.  No wheezes or crackles.  CV:  RRR.  Nl S1 and S2.  No m/r/g.  Abd:  Soft/NT/ND.  BSs normoactive.  No hepatosplenomegaly.  Ext:  No pitting edema of the bilateral lower extremities.  Pulses 2+ and symmetric.  Musculo:  Strength 5/5 throughout.  Neuro:  Cranial nerves grossly intact.  Psych:  Mood and affect appear normal.        Laboratory Data:  Lab Results   Component Value Date    WBC 2.5 (L) 08/27/2024    HGB 9.0 (L) 08/27/2024    HCT 27.7 (L) 08/27/2024    MCV 91 08/27/2024     08/27/2024     Lab Results   Component Value Date     08/27/2024    BUN 12.6 08/27/2024    ANIONGAP 11 08/27/2024     Lab Results   Component Value Date    ALT 13 08/27/2024    AST 13 08/27/2024    ALKPHOS 109 08/27/2024     Lab Results   Component Value Date    INR 1.01 07/29/2024       Radiology data:  I have personally reviewed the images of / or the following radiology data: As detailed in the oncology timeline    Pathology and other data:  I have personally reviewed the following data: As detailed in the oncology timeline    7/24/2024 started biopsy  Final Diagnosis   A. RIGHT breast, 1:00, 10 cm from nipple, \"ISPY midtreatment\", ultrasound guided core biopsy:  -Residual INVASIVE BREAST CARCINOMA OF NO SPECIAL TYPE (INVASIVE DUCTAL CARCINOMA), Bill grade 2, with probable treatment related changes " "(receiving neoadjuvant chemotherapy)  -Ductal carcinoma in situ (DCIS), nuclear grade 2, cribriform type(s)  -Prior core biopsy site changes  -Calcifications associated with invasive carcinoma, DCIS, and arteries (medial calcific sclerosis)  -See comment     B. Lymph node, RIGHT axillary, \"ISPY midtreatment\", ultrasound guided core biopsy:  -Residual METASTATIC BREAST DUCTAL CARCINOMA (receiving neoadjuvant chemotherapy)  -Metastasis is at least 2 mm in size  -Prior core biopsy site changes  -See comment   Electronically signed by Elaine Solares MD on 7/26/2024 at  3:51 PM   Comment  [id number]   Specimen A (RIGHT breast):  Invasive carcinoma involves four of four tissue cores, and is 12 mm in greatest dimension in a single core.  The [place] grade is 2 (tubule formation 2 + nuclear pleomorphism 3 + mitotic activity 1 = Bill score 6).  Invasive carcinoma tumor cells are in clusters, cribriform groups and tubules. There are some areas of intratumoral fibrotic and focally elastotic stroma, and one large cluster of stromal foamy macrophages, with some areas of decreased tumor cellularity, suggestive of response to neoadjuvant chemotherapy.  DCIS Is a minor component of the tumor in this specimen.  Tumor infiltrating lymphocytes (TILs) occupy approximately 10% of the tumor bed stroma in this biopsy material.     Specimen B (RIGHT axillary lymph node):  Metastatic carcinoma involves three of four lymph node cores, and is 2 mm in greatest dimension. No extranodal extension is identified in this core biopsy material. The tumor cells are arranged in cribriform groups and tubules. Focally, the metastatic tumor has intratumoral fibrosis. No well-defined fibrous scarring is identified. The metastatic tumor is morphologically similar to the patient's RIGHT breast invasive carcinoma (see specimen A above)             Assessment and Recommendations  Sharon Fong  is a 48 year old premenopausal woman with palp " detected cT2-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  She is on the I SPY 2.2 clinical trial and completed 12 weeks of Abraxane. She began ddAC cycle 1 on 7/25/24. She presents today for follow-up prior to cycle 4 of therapy.      # Right breast cancer  We reviewed results of her MRI again today.  FTV measurements demonstrate modest improvement in tumor volume measurements compared to prior MRI.  - Continue with cycle 4 of ddAC this week -she may need Onpro again this week  - Surgery planned for 9/30/2024  - She will also need adjuvant radiation therapy - she prefers this at [facility]    # Chemotherapy associated nausea  - PRN Ativan as needed for the first 72 hours  - Continue olanzapine on D1 of therapy at bedtime   - Continue IVF at home to be 3x a week for week 1 and keep at 2x a week for week 2  - Can bring back to infusion on D4 or 5 to repeat dexamethasone, Emend, Aloxi if she still having significant issues    # Gastric reflux  # Abdominal discomfort  - Continue omeprazole daily    # Arthralgias  # Myalgias  Potentially related to systemic therapy versus exacerbation of her underlying hypermobility/inflammatory arthritis  - Recommend continuing with as needed Tylenol as this has been helpful    # Contralateral (left) axillary lymph node enlargement  Normal on baseline US and follow up MRI    #Hx of migraines  - Zofran has been added back for premeds as she did not feel well on Compazine  - tolerated Aloxi without return of headaches.       The longitudinal plan of care for the diagnosis(es)/condition(s) as documented were addressed during this visit. Due to the added complexity in care, I will continue to support Sharon in the subsequent management and with ongoing continuity of care.    30 minutes spent on the date of the encounter doing chart review, review of test results, interpretation of tests, patient visit, and documentation     Dame Dolores MD

## 2024-09-03 ENCOUNTER — ALLIED HEALTH/NURSE VISIT (OUTPATIENT)
Dept: ONCOLOGY | Facility: CLINIC | Age: 49
End: 2024-09-03
Payer: COMMERCIAL

## 2024-09-03 ENCOUNTER — LAB (OUTPATIENT)
Dept: LAB | Facility: CLINIC | Age: 49
End: 2024-09-03
Attending: INTERNAL MEDICINE
Payer: COMMERCIAL

## 2024-09-03 ENCOUNTER — TELEPHONE (OUTPATIENT)
Dept: ONCOLOGY | Facility: CLINIC | Age: 49
End: 2024-09-03
Payer: COMMERCIAL

## 2024-09-03 ENCOUNTER — ONCOLOGY VISIT (OUTPATIENT)
Dept: ONCOLOGY | Facility: CLINIC | Age: 49
End: 2024-09-03
Attending: INTERNAL MEDICINE
Payer: COMMERCIAL

## 2024-09-03 VITALS
OXYGEN SATURATION: 99 % | HEART RATE: 102 BPM | DIASTOLIC BLOOD PRESSURE: 71 MMHG | TEMPERATURE: 97.7 F | WEIGHT: 168 LBS | BODY MASS INDEX: 28.33 KG/M2 | RESPIRATION RATE: 16 BRPM | SYSTOLIC BLOOD PRESSURE: 112 MMHG

## 2024-09-03 DIAGNOSIS — Z17.0 MALIGNANT NEOPLASM OF UPPER-INNER QUADRANT OF RIGHT BREAST IN FEMALE, ESTROGEN RECEPTOR POSITIVE (H): Primary | ICD-10-CM

## 2024-09-03 DIAGNOSIS — Z00.6 EXAMINATION OF PARTICIPANT OR CONTROL IN CLINICAL RESEARCH: ICD-10-CM

## 2024-09-03 DIAGNOSIS — C50.211 MALIGNANT NEOPLASM OF UPPER-INNER QUADRANT OF RIGHT BREAST IN FEMALE, ESTROGEN RECEPTOR POSITIVE (H): Primary | ICD-10-CM

## 2024-09-03 PROCEDURE — 99214 OFFICE O/P EST MOD 30 MIN: CPT | Performed by: INTERNAL MEDICINE

## 2024-09-03 PROCEDURE — 99213 OFFICE O/P EST LOW 20 MIN: CPT | Performed by: INTERNAL MEDICINE

## 2024-09-03 RX ORDER — DIPHENHYDRAMINE HYDROCHLORIDE 50 MG/ML
50 INJECTION INTRAMUSCULAR; INTRAVENOUS
Status: CANCELLED
Start: 2024-09-05

## 2024-09-03 RX ORDER — METHYLPREDNISOLONE SODIUM SUCCINATE 125 MG/2ML
125 INJECTION, POWDER, LYOPHILIZED, FOR SOLUTION INTRAMUSCULAR; INTRAVENOUS
Status: CANCELLED
Start: 2024-09-05

## 2024-09-03 RX ORDER — EPINEPHRINE 1 MG/ML
0.3 INJECTION, SOLUTION INTRAMUSCULAR; SUBCUTANEOUS EVERY 5 MIN PRN
Status: CANCELLED | OUTPATIENT
Start: 2024-09-05

## 2024-09-03 RX ORDER — PALONOSETRON 0.05 MG/ML
0.25 INJECTION, SOLUTION INTRAVENOUS ONCE
Status: CANCELLED | OUTPATIENT
Start: 2024-09-05

## 2024-09-03 RX ORDER — ALBUTEROL SULFATE 90 UG/1
1-2 AEROSOL, METERED RESPIRATORY (INHALATION)
Status: CANCELLED
Start: 2024-09-05

## 2024-09-03 RX ORDER — HEPARIN SODIUM,PORCINE 10 UNIT/ML
5-20 VIAL (ML) INTRAVENOUS DAILY PRN
Status: CANCELLED | OUTPATIENT
Start: 2024-09-05

## 2024-09-03 RX ORDER — LORAZEPAM 2 MG/ML
0.5 INJECTION INTRAMUSCULAR EVERY 4 HOURS PRN
Status: CANCELLED | OUTPATIENT
Start: 2024-09-05

## 2024-09-03 RX ORDER — ALBUTEROL SULFATE 0.83 MG/ML
2.5 SOLUTION RESPIRATORY (INHALATION)
Status: CANCELLED | OUTPATIENT
Start: 2024-09-05

## 2024-09-03 RX ORDER — HEPARIN SODIUM (PORCINE) LOCK FLUSH IV SOLN 100 UNIT/ML 100 UNIT/ML
5 SOLUTION INTRAVENOUS
Status: CANCELLED | OUTPATIENT
Start: 2024-09-05

## 2024-09-03 RX ORDER — DOXORUBICIN HYDROCHLORIDE 2 MG/ML
60 INJECTION, SOLUTION INTRAVENOUS ONCE
Status: CANCELLED | OUTPATIENT
Start: 2024-09-05

## 2024-09-03 RX ORDER — MEPERIDINE HYDROCHLORIDE 25 MG/ML
25 INJECTION INTRAMUSCULAR; INTRAVENOUS; SUBCUTANEOUS EVERY 30 MIN PRN
Status: CANCELLED | OUTPATIENT
Start: 2024-09-05

## 2024-09-03 ASSESSMENT — PAIN SCALES - GENERAL: PAINLEVEL: NO PAIN (0)

## 2024-09-03 NOTE — NURSING NOTE
Chief Complaint   Patient presents with    Blood Draw     No labs ordered. Vitals taken and appointment arrived     No labs ordered. Vitals taken and appointment arrived    Georgina Elder RN

## 2024-09-03 NOTE — NURSING NOTE
"Oncology Rooming Note    September 3, 2024 3:13 PM   Sharon Fong is a 48 year old female who presents for:    Chief Complaint   Patient presents with    Blood Draw     No labs ordered. Vitals taken and appointment arrived    Oncology Clinic Visit     Breast CA     Initial Vitals: /71   Pulse 102   Temp 97.7  F (36.5  C) (Oral)   Resp 16   Wt 76.2 kg (168 lb)   SpO2 99%   BMI 28.33 kg/m   Estimated body mass index is 28.33 kg/m  as calculated from the following:    Height as of 8/6/24: 1.64 m (5' 4.57\").    Weight as of this encounter: 76.2 kg (168 lb). Body surface area is 1.86 meters squared.  No Pain (0) Comment: Data Unavailable   No LMP recorded. Patient has had a hysterectomy.  Allergies reviewed: Yes  Medications reviewed: Yes    Medications: Medication refills not needed today.  Pharmacy name entered into [employer]: [facility] [address] [place] [address] - [address] [place] [address]    Frailty Screening:   Is the patient here for a new oncology consult visit in cancer care? 2. No      Clinical concerns:        Smiley Louis              "

## 2024-09-03 NOTE — NURSING NOTE
[id number]: Study Visit Note   Subject name: Sharon Fong     Visit: Block C C4D1    Did the study visit occur within the appropriate window allowed by the protocol? yes    Since the last study visit, She has been doing fair. She reports the following:  Extreme fatigue  Nausea  Headaches  Taste changes  Body aches  Watery eyes - started after round 2 of AC    I have personally interviewed Sharon Fong and reviewed her medical record for adverse events and concomitant medications and these have been recorded on the corresponding logs in Sharon Fong's research file.     Sharon Fong was given the opportunity to ask any trial related questions.  Please see provider progress note for physical exam and other clinical information. Labs were reviewed - any significant lab values were addressed and reviewed.    MARITZA RgN, RN  CRC-RN,   Office [phone]

## 2024-09-03 NOTE — TELEPHONE ENCOUNTER
Called patient to schedule surgery with Dr. Collier    Spoke with:  Patient    Date of Surgery: 9/30/24    Arrival time Discussed with Patient:  No    Location of surgery: [facility]     Pre-Op H&P: with PCP Dr. Marquez within 30 days of the surgery date.    Post-Op Appts: 10/14/24 at 2:30 PM    Imaging:  No - N/A  Scheduled: N/A     Discussed with patient pre-op RN will call 2-3 days prior to surgery with arrival time and instructions:  Yes     Packet sent out: No  via Received in clinic by RN  [DATE] 8/30/24    Surgical Soap: Receiving via Received in clinic by RN       Informed patient that they will need an adult  to bring patient home from surgery: Yes  : UNK who at this time but is aware she needs one.       Additional Comments:  NM injection to be delivered to OR for surgeon to inject. Message sent to breast RN for tag placement.      All patients questions were answered and patient was instructed to review surgical packet and call back with any questions or concerns.       Yuliya Lomeli on 9/3/2024 at 1:16 PM

## 2024-09-03 NOTE — LETTER
9/3/2024      Sharon Fong  [address] [facility] [address]      Dear Colleague,    Thank you for referring your patient, Sharon Fong, to the [facility]. Please see a copy of my visit note below.    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 She started screening for the ISPY 2.2 trial and was found to have a MammaPrint high risk/blueprint luminal B subtype.  She was started on treatment in block B due to lack of availability of block A agents.  Her course has been complicated by allergic reaction to Taxol requiring change to Abraxane.  Her 3-week MRI showed a tumor volume reduction of 28.9% (did not meet prespecified threshold) and therefore she received a 6-week MRI demonstrated a 57% reduction tumor volume from baseline (which again did not meet prespecified threshold) to continue on block B.  The recommendations from I-SPY was to escalate early to AC.  However, in light of ongoing significant response to Abraxane, we decided to continue to complete the full 12 weeks of treatment.  Her 12 week breast MRI demonstrates a slightly smaller mass.  We proceeded to ddAC on 7/24/2024 and has had a tough course thus far.  She presents today prior to cycle 4.      Interval History  Sharon reports doing much better with cycle 3 of systemic therapy.  She has much less nausea, abdominal pain, overall felt like she recovered more quickly with this last cycle.  She received half the dose of Neulasta and received IVF 3 times a week during week 1 and twice a week during week 2 of AC.     She feels that IV fluids and supportive medications are significantly helping with her symptoms.  She had less episodes of symptomatic hypotension.      ROS: 10 point ROS neg other than  the symptoms noted above in the HPI.    Her full oncologic history is as follows:   Oncologic History  Patient Active Problem List    Diagnosis Date Noted     Malignant neoplasm of upper-inner quadrant of right female breast (H) 2024     Priority: High     Right breast cancer  Has had longstanding history of multiple biopsies, infection, and partial mastectomy for a right breast multifocal abscess dating back to at least .     patient presented with palpable lump at 1:00 in the right breast    3/21/2024 diagnostic mammo showed clustered fine indeterminate calcs at 12:00.  No mammographic abnormality identified to correspond to the palpable area of fullness at 1:00.  On US, at 1:00, 10 cm FN there was a 3.7 x 2.3 x 1.5 cm solid hypoechoic irregularly marginated mass.  Additional hypoechoic lesion located at 2:00, 3 cm FN measuring 1.0 x 0.5 cm.  Another hypoechoic lesion at 2:00, 12 cm FN measuring 0.7 cm.  At least 2 enlarged right axillary lymph nodes -largest measuring up to 0.6 cm.    3/28/2024 ultrasound-guided biopsy of the followin:00 lesion at least DCIS  1:00 lesion grade 2 IDC.  No LVI.  ER (3+, 80 to 90%), MN (3+, 90 to 100%), HER2 2+ and FISH negative   Right axillary LN: metastatic carcinoma    2024 CT CAP demonstrated multifocal enhancement in the right breast with mildly abnormal right axillary lymph nodes.  Otherwise no evidence of distant metastatic disease.    2024 NM bone scan negative for osteoblastic metastatic disease.    4/15/2024 Baseline MRI breast demonstrated the following  Right breast at 1:00 mass measuring 2.6 x 2.0 by 3.0 cm with extensive surrounding clumped NME occupying most of the right breast and measuring 8.9 x 4.7 x 8.7   Left breast showed a fibroadenoma at 8-9:00 position.  In addition, 7 mm of linear branching NME at 3-4:00 position.  This was biopsied and found to be benign breast tissue.  Few asymmetric right internal mammary chain LN. multiple  enlarged right level 1 axillary nodes.  Additionally multiple LEFT level 1 axillary nodes that are indeterminant -which were normal on left axillary US.  FTV 38.222 cc    4/26/2024 MammaPrint -0.192 (high risk) and blueprint luminal B    4/30/2024 - 7/17/2024 weekly Taxol - changed to Abraxane starting week 2 due to allergic reaction    5/17/2024 W3 MRI  Minimally decreased size of the postoperative right breast cancer at the approximate 1:00 position measuring approximately 2.4 x 1.9 x 2.9 cm (previously 2.6 x 2.0 x 3.0 cm).  There is overall similar extent although mildly decreased volume of abnormal surrounding NME  Minimal interval enlargement of multiple bilateral level I lymph nodes, including the biopsy-proven right axillary node with indwelling biopsy marker. No significant change in the multiple small asymmetric right internal mammary chain lymph nodes.  27.167 ml - Tumor volume reduction of 28.9% from baseline     6/10/2024 (6-week MRI breast) 8.8 cm anteroposterior on axial MIPS, unchanged.  Mildly decreased size of the biopsy-proven cancer in the right breast at the approximate 1:00 position middle depth measuring approximately 2.0 x 1.4 x 2.4 cm (previously 2.4 x 1.9 x 2.9 cm).   Extensive surrounding NME is largely unchanged changed, but the volume has minimally decreased.  Stable bilateral axillary lymph nodes, including the biopsy-proven right level I axillary node. Stable asymmetric small right internal mammary chain lymph nodes.  18.274 - FTV reduction of 57% from baseline     7/23/2024 (12-week MRI breast) Longest diameter of suspicious enhancement: 8.0 cm(previously 8.8 cm).   Irregular enhancing mass in the right breast at approximately 1:00 positionmeasures 1.8 x 1.1 x 1.6 cm (previously 2.0 x 1.4 x 2.4 cm).   NME continues to extend from anterior to posterior depth, with anterior NME extending to the base of the nipple.  Stable prominent bilateral axillary nodes, including the biopsied right  level 1 axillary node. Small asymmetric right internal mammary chain lymph nodes are also similar to prior  7.585 ml - FTV reduction of 80% from baseline     7/24/2024 Study biopsy  Right breast 1:00 grade 2 residual IDC with probable treatment related changes.  Also grade 2 DCIS.  Right axillary lymph node with residual metastatic disease.  Tumor-infiltrating lymphocytes of 10%.  Some decreased cellularity suggestive of treatment response.    7/25/2024 Start ddAC    8/19/2024 W4 MRI (in block C) Longest diameter of suspicious enhancement: 8 cm (sharee has decreased volume of enhancement.  Right breast at 1:00 posterior depth measures 1.7 x 0.7 x 1.3 cm, previously 1.8 x 0.9 x 1.6 cm  The right MEÑO node just above the 3rd rib is slightly smaller than the prior exam  The right axillary lymph nodes have slightly decreased in size. No lymphadenopathy in the left axilla.    FTV 6.349 ml - 83% reduction from baseline        Hypotension, unspecified hypotension type 07/29/2024     Priority: Medium     Syncope, unspecified syncope type 07/29/2024     Priority: Medium     Inflammatory arthritis 06/01/2015     Priority: Medium     Seizure (H) 11/15/2013     Priority: Medium     Vitamin D deficiency 04/23/2013     Priority: Medium       Current Medications:  Current Outpatient Medications   Medication Sig Dispense Refill     aspirin-acetaminophen-caffeine (EXCEDRIN MIGRAINE) 250-250-65 MG tablet        dexAMETHasone (DECADRON) 4 MG tablet Take 2 tablets (8 mg) by mouth daily Take for 3 days, starting the day after chemo. Take with food. 6 tablet 3     FT ANTACID & ANTIGAS 200-200-20 MG/5ML SUSP suspension        ibuprofen (ADVIL/MOTRIN) 200 MG capsule        lidocaine-prilocaine (EMLA) 2.5-2.5 % external cream Apply topically as needed for moderate pain 30 g 1     LORazepam (ATIVAN) 0.5 MG tablet Take 1 tablet (0.5 mg) by mouth every 6 hours as needed for anxiety 30 tablet 0     OLANZapine (ZYPREXA) 5 MG tablet Take 1 tablet  (5 mg) by mouth at bedtime 30 tablet 0     omeprazole (PRILOSEC) 40 MG DR capsule Take 1 capsule (40 mg) by mouth daily 30 capsule 1     ondansetron (ZOFRAN) 8 MG tablet Take 1 tablet (8 mg) by mouth every 8 hours as needed for nausea 30 tablet 3     polyethylene glycol (MIRALAX) 17 GM/Dose powder Take 17 g by mouth daily 510 g 1     ECOG Performance Status: 0    Physical Examination:  /71   Pulse 102   Temp 97.7  F (36.5  C) (Oral)   Resp 16   Wt 76.2 kg (168 lb)   SpO2 99%   BMI 28.33 kg/m    General: Fatigued appearing, well-nourished adult female in NAD.  HEENT:  Normocephalic.  Sclera anicteric.  MMM.  No lesions of the oropharynx.  Lymph:  No cervical, supraclavicular, or axillary LAD.  Breast: In the right breast at 1:00, mass is no longer palpable (previously ~2cm)  I am not able to palpate axillary LN on my exam today   Chest:  CTA bilaterally.  No wheezes or crackles.  CV:  RRR.  Nl S1 and S2.  No m/r/g.  Abd:  Soft/NT/ND.  BSs normoactive.  No hepatosplenomegaly.  Ext:  No pitting edema of the bilateral lower extremities.  Pulses 2+ and symmetric.  Musculo:  Strength 5/5 throughout.  Neuro:  Cranial nerves grossly intact.  Psych:  Mood and affect appear normal.        Laboratory Data:  Lab Results   Component Value Date    WBC 2.5 (L) 08/27/2024    HGB 9.0 (L) 08/27/2024    HCT 27.7 (L) 08/27/2024    MCV 91 08/27/2024     08/27/2024     Lab Results   Component Value Date     08/27/2024    BUN 12.6 08/27/2024    ANIONGAP 11 08/27/2024     Lab Results   Component Value Date    ALT 13 08/27/2024    AST 13 08/27/2024    ALKPHOS 109 08/27/2024     Lab Results   Component Value Date    INR 1.01 07/29/2024       Radiology data:  I have personally reviewed the images of / or the following radiology data: As detailed in the oncology timeline    Pathology and other data:  I have personally reviewed the following data: As detailed in the oncology timeline    7/24/2024 started biopsy  Final  "Diagnosis   A. RIGHT breast, 1:00, 10 cm from nipple, \"ISPY midtreatment\", ultrasound guided core biopsy:  -Residual INVASIVE BREAST CARCINOMA OF NO SPECIAL TYPE (INVASIVE DUCTAL CARCINOMA), Chicago grade 2, with probable treatment related changes (receiving neoadjuvant chemotherapy)  -Ductal carcinoma in situ (DCIS), nuclear grade 2, cribriform type(s)  -Prior core biopsy site changes  -Calcifications associated with invasive carcinoma, DCIS, and arteries (medial calcific sclerosis)  -See comment     B. Lymph node, RIGHT axillary, \"ISPY midtreatment\", ultrasound guided core biopsy:  -Residual METASTATIC BREAST DUCTAL CARCINOMA (receiving neoadjuvant chemotherapy)  -Metastasis is at least 2 mm in size  -Prior core biopsy site changes  -See comment   Electronically signed by Elaine Solares MD on 7/26/2024 at  3:51 PM   Comment  [email or web address]   Specimen A (RIGHT breast):  Invasive carcinoma involves four of four tissue cores, and is 12 mm in greatest dimension in a single core.  The Chicago grade is 2 (tubule formation 2 + nuclear pleomorphism 3 + mitotic activity 1 = Chicago score 6).  Invasive carcinoma tumor cells are in clusters, cribriform groups and tubules. There are some areas of intratumoral fibrotic and focally elastotic stroma, and one large cluster of stromal foamy macrophages, with some areas of decreased tumor cellularity, suggestive of response to neoadjuvant chemotherapy.  DCIS Is a minor component of the tumor in this specimen.  Tumor infiltrating lymphocytes (TILs) occupy approximately 10% of the tumor bed stroma in this biopsy material.     Specimen B (RIGHT axillary lymph node):  Metastatic carcinoma involves three of four lymph node cores, and is 2 mm in greatest dimension. No extranodal extension is identified in this core biopsy material. The tumor cells are arranged in cribriform groups and tubules. Focally, the metastatic tumor has intratumoral fibrosis. No well-defined fibrous " scarring is identified. 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  She is on the I SPY 2.2 clinical trial and completed 12 weeks of Abraxane. She began ddAC cycle 1 on 7/25/24. She presents today for follow-up prior to cycle 4 of therapy.      # Right breast cancer  We reviewed results of her MRI again today.  FTV measurements demonstrate modest improvement in tumor volume measurements compared to prior MRI.  - Continue with cycle 4 of ddAC this week -she may need Onpro again this week  - Surgery planned for 9/30/2024  - She will also need adjuvant radiation therapy - she prefers this at [facility]    # Chemotherapy associated nausea  - PRN Ativan as needed for the first 72 hours  - Continue olanzapine on D1 of therapy at bedtime   - Continue IVF at home to be 3x a week for week 1 and keep at 2x a week for week 2  - Can bring back to infusion on D4 or 5 to repeat dexamethasone, Emend, Aloxi if she still having significant issues    # Gastric reflux  # Abdominal discomfort  - Continue omeprazole daily    # Arthralgias  # Myalgias  Potentially related to systemic therapy versus exacerbation of her underlying hypermobility/inflammatory arthritis  - Recommend continuing with as needed Tylenol as this has been helpful    # Contralateral (left) axillary lymph node enlargement  Normal on baseline US and follow up MRI    #Hx of migraines  - Zofran has been added back for premeds as she did not feel well on Compazine  - tolerated Aloxi without return of headaches.       The longitudinal plan of care for the diagnosis(es)/condition(s) as documented were addressed during this visit. Due to the added complexity in care, I will continue to support Sharon in the subsequent management and  with ongoing continuity of care.    30 minutes spent on the date of the encounter doing chart review, review of test results, interpretation of tests, patient visit, and documentation     Dame Dolores MD      Again, thank you for allowing me to participate in the care of your patient.        Sincerely,        Dame Dolores MD

## 2024-09-05 ENCOUNTER — INFUSION THERAPY VISIT (OUTPATIENT)
Dept: ONCOLOGY | Facility: CLINIC | Age: 49
End: 2024-09-05
Attending: INTERNAL MEDICINE
Payer: COMMERCIAL

## 2024-09-05 ENCOUNTER — APPOINTMENT (OUTPATIENT)
Dept: LAB | Facility: CLINIC | Age: 49
End: 2024-09-05
Attending: INTERNAL MEDICINE
Payer: COMMERCIAL

## 2024-09-05 VITALS
OXYGEN SATURATION: 98 % | WEIGHT: 168 LBS | DIASTOLIC BLOOD PRESSURE: 76 MMHG | SYSTOLIC BLOOD PRESSURE: 107 MMHG | RESPIRATION RATE: 16 BRPM | HEART RATE: 97 BPM | TEMPERATURE: 98 F | BODY MASS INDEX: 28.33 KG/M2

## 2024-09-05 DIAGNOSIS — Z17.0 MALIGNANT NEOPLASM OF UPPER-INNER QUADRANT OF RIGHT BREAST IN FEMALE, ESTROGEN RECEPTOR POSITIVE (H): Primary | ICD-10-CM

## 2024-09-05 DIAGNOSIS — C50.211 MALIGNANT NEOPLASM OF UPPER-INNER QUADRANT OF RIGHT BREAST IN FEMALE, ESTROGEN RECEPTOR POSITIVE (H): Primary | ICD-10-CM

## 2024-09-05 LAB
ALBUMIN SERPL BCG-MCNC: 4.2 G/DL (ref 3.5–5.2)
ALP SERPL-CCNC: 73 U/L (ref 40–150)
ALT SERPL W P-5'-P-CCNC: 17 U/L (ref 0–50)
ANION GAP SERPL CALCULATED.3IONS-SCNC: 12 MMOL/L (ref 7–15)
AST SERPL W P-5'-P-CCNC: 21 U/L (ref 0–45)
BASOPHILS # BLD AUTO: ABNORMAL 10*3/UL
BASOPHILS # BLD MANUAL: 0 10E3/UL (ref 0–0.2)
BASOPHILS NFR BLD AUTO: ABNORMAL %
BASOPHILS NFR BLD MANUAL: 0 %
BILIRUB SERPL-MCNC: 0.2 MG/DL
BUN SERPL-MCNC: 10 MG/DL (ref 6–20)
CALCIUM SERPL-MCNC: 9.6 MG/DL (ref 8.8–10.4)
CHLORIDE SERPL-SCNC: 105 MMOL/L (ref 98–107)
CREAT SERPL-MCNC: 0.48 MG/DL (ref 0.51–0.95)
DACRYOCYTES BLD QL SMEAR: SLIGHT
EGFRCR SERPLBLD CKD-EPI 2021: >90 ML/MIN/1.73M2
EOSINOPHIL # BLD AUTO: ABNORMAL 10*3/UL
EOSINOPHIL # BLD MANUAL: 0 10E3/UL (ref 0–0.7)
EOSINOPHIL NFR BLD AUTO: ABNORMAL %
EOSINOPHIL NFR BLD MANUAL: 0 %
ERYTHROCYTE [DISTWIDTH] IN BLOOD BY AUTOMATED COUNT: 16.4 % (ref 10–15)
GLUCOSE SERPL-MCNC: 135 MG/DL (ref 70–99)
HCO3 SERPL-SCNC: 23 MMOL/L (ref 22–29)
HCT VFR BLD AUTO: 27.1 % (ref 35–47)
HGB BLD-MCNC: 8.7 G/DL (ref 11.7–15.7)
IMM GRANULOCYTES # BLD: ABNORMAL 10*3/UL
IMM GRANULOCYTES NFR BLD: ABNORMAL %
LYMPHOCYTES # BLD AUTO: ABNORMAL 10*3/UL
LYMPHOCYTES # BLD MANUAL: 0.5 10E3/UL (ref 0.8–5.3)
LYMPHOCYTES NFR BLD AUTO: ABNORMAL %
LYMPHOCYTES NFR BLD MANUAL: 6 %
MCH RBC QN AUTO: 29.4 PG (ref 26.5–33)
MCHC RBC AUTO-ENTMCNC: 32.1 G/DL (ref 31.5–36.5)
MCV RBC AUTO: 92 FL (ref 78–100)
METAMYELOCYTES # BLD MANUAL: 0.1 10E3/UL
METAMYELOCYTES NFR BLD MANUAL: 1 %
MONOCYTES # BLD AUTO: ABNORMAL 10*3/UL
MONOCYTES # BLD MANUAL: 0.6 10E3/UL (ref 0–1.3)
MONOCYTES NFR BLD AUTO: ABNORMAL %
MONOCYTES NFR BLD MANUAL: 7 %
MYELOCYTES # BLD MANUAL: 0.3 10E3/UL
MYELOCYTES NFR BLD MANUAL: 3 %
NEUTROPHILS # BLD AUTO: ABNORMAL 10*3/UL
NEUTROPHILS # BLD MANUAL: 7.6 10E3/UL (ref 1.6–8.3)
NEUTROPHILS NFR BLD AUTO: ABNORMAL %
NEUTROPHILS NFR BLD MANUAL: 83 %
NRBC # BLD AUTO: 0 10E3/UL
NRBC BLD AUTO-RTO: 0 /100
PLAT MORPH BLD: ABNORMAL
PLATELET # BLD AUTO: 262 10E3/UL (ref 150–450)
POLYCHROMASIA BLD QL SMEAR: SLIGHT
POTASSIUM SERPL-SCNC: 3.9 MMOL/L (ref 3.4–5.3)
PROT SERPL-MCNC: 6.8 G/DL (ref 6.4–8.3)
RBC # BLD AUTO: 2.96 10E6/UL (ref 3.8–5.2)
RBC MORPH BLD: ABNORMAL
SODIUM SERPL-SCNC: 140 MMOL/L (ref 135–145)
WBC # BLD AUTO: 9.1 10E3/UL (ref 4–11)

## 2024-09-05 PROCEDURE — 36591 DRAW BLOOD OFF VENOUS DEVICE: CPT

## 2024-09-05 PROCEDURE — 85007 BL SMEAR W/DIFF WBC COUNT: CPT | Performed by: INTERNAL MEDICINE

## 2024-09-05 PROCEDURE — 96411 CHEMO IV PUSH ADDL DRUG: CPT

## 2024-09-05 PROCEDURE — 258N000003 HC RX IP 258 OP 636: Performed by: INTERNAL MEDICINE

## 2024-09-05 PROCEDURE — 250N000011 HC RX IP 250 OP 636: Performed by: INTERNAL MEDICINE

## 2024-09-05 PROCEDURE — 85027 COMPLETE CBC AUTOMATED: CPT | Performed by: INTERNAL MEDICINE

## 2024-09-05 PROCEDURE — 96367 TX/PROPH/DG ADDL SEQ IV INF: CPT

## 2024-09-05 PROCEDURE — 96375 TX/PRO/DX INJ NEW DRUG ADDON: CPT

## 2024-09-05 PROCEDURE — 82040 ASSAY OF SERUM ALBUMIN: CPT

## 2024-09-05 PROCEDURE — 96372 THER/PROPH/DIAG INJ SC/IM: CPT | Performed by: INTERNAL MEDICINE

## 2024-09-05 PROCEDURE — 96413 CHEMO IV INFUSION 1 HR: CPT

## 2024-09-05 PROCEDURE — 96377 APPLICATON ON-BODY INJECTOR: CPT | Mod: 59 | Performed by: INTERNAL MEDICINE

## 2024-09-05 RX ORDER — PALONOSETRON 0.05 MG/ML
0.25 INJECTION, SOLUTION INTRAVENOUS ONCE
Status: COMPLETED | OUTPATIENT
Start: 2024-09-05 | End: 2024-09-05

## 2024-09-05 RX ORDER — DOXORUBICIN HYDROCHLORIDE 2 MG/ML
60 INJECTION, SOLUTION INTRAVENOUS ONCE
Status: COMPLETED | OUTPATIENT
Start: 2024-09-05 | End: 2024-09-05

## 2024-09-05 RX ORDER — HEPARIN SODIUM (PORCINE) LOCK FLUSH IV SOLN 100 UNIT/ML 100 UNIT/ML
5 SOLUTION INTRAVENOUS
Status: DISCONTINUED | OUTPATIENT
Start: 2024-09-05 | End: 2024-09-05 | Stop reason: HOSPADM

## 2024-09-05 RX ORDER — HEPARIN SODIUM (PORCINE) LOCK FLUSH IV SOLN 100 UNIT/ML 100 UNIT/ML
5 SOLUTION INTRAVENOUS ONCE
Status: COMPLETED | OUTPATIENT
Start: 2024-09-05 | End: 2024-09-05

## 2024-09-05 RX ADMIN — PALONOSETRON HYDROCHLORIDE 0.25 MG: 0.25 INJECTION INTRAVENOUS at 16:26

## 2024-09-05 RX ADMIN — FOSAPREPITANT: 150 INJECTION, POWDER, LYOPHILIZED, FOR SOLUTION INTRAVENOUS at 16:25

## 2024-09-05 RX ADMIN — CYCLOPHOSPHAMIDE 1075 MG: 1 INJECTION, POWDER, FOR SOLUTION INTRAVENOUS; ORAL at 16:55

## 2024-09-05 RX ADMIN — SODIUM CHLORIDE 1000 ML: 9 INJECTION, SOLUTION INTRAVENOUS at 15:54

## 2024-09-05 RX ADMIN — DOXORUBICIN HYDROCHLORIDE 110 MG: 2 INJECTION, SOLUTION INTRAVENOUS at 16:46

## 2024-09-05 RX ADMIN — Medication 5 ML: at 15:26

## 2024-09-05 RX ADMIN — Medication 5 ML: at 17:44

## 2024-09-05 RX ADMIN — PEGFILGRASTIM 6 MG: KIT SUBCUTANEOUS at 17:28

## 2024-09-05 ASSESSMENT — PAIN SCALES - GENERAL: PAINLEVEL: NO PAIN (0)

## 2024-09-05 NOTE — PATIENT INSTRUCTIONS
Contact Numbers    [facility] [place]: [phone]    Call with chills and/or temperature greater than or equal to 100.5, uncontrolled nausea/vomiting, diarrhea, constipation, dizziness, shortness of breath, chest pain, bleeding, unexplained bruising, or any new/concerning symptoms, questions/concerns.     If you are having any concerning symptoms or wish to speak to a provider before your next infusion visit, please call your care coordinator or triage to notify them so we can adequately serve you.       After Hours: [phone]    If after hours, weekends, or holidays, call main hospital  and ask for Oncology doctor on call.      September 2024 Sunday Monday Tuesday Wednesday Thursday Friday Saturday   1     2     3    LAB PERIPHERAL   2:45 PM   (15 min.)   [facility]RAW   [facility]    RETURN CCSL   3:15 PM   (30 min.)   Dame Bernal MD   [facility] 4     5    LAB PERIPHERAL   2:30 PM   (15 min.)   [facility]   [facility]    ONC INFUSION 2 HR (120 MIN)   3:00 PM   (120 min.)    ONC INFUSION NURSE   [facility] 6     7       8     9     10    LAB   9:45 AM   (15 min.)   LAB FIRST FLOOR [facility] 11     12     13     14       15     16     17    LAB PERIPHERAL  12:00 PM   (15 min.)   [facility]   [facility]    RETURN CCSL  12:15 PM   (30 min.)   Dame Bernal MD   [facility] 18     19     20     21       22     23     24    US BREAST LOCALIZER PLACEMENT 1ST LESION RIGHT   8:45 AM   (60 min.)   [id number]   [facility] 25     26    LAB PERIPHERAL   8:00 AM   (15 min.)   [facility]RAW   [facility] 27     28       29     30    NM SENTINAL NODE INJ   7:00 AM   (30 min.)   [email or web address]   [facility]  Imaging    MASTECTOMY, BILATERAL, SIMPLE   7:15 AM   Boom Collier MD   [facility] OR    Outpatient Visit   7:15 AM   [facility]                                      October 2024 Sunday Monday Tuesday Wednesday Thursday Friday Saturday             1     2     3    LAB PERIPHERAL  10:00 AM   (15 min.)    [facility]   [facility] 4     5       6     7     8     9     10     11     12       13     14    RETURN CCSL   2:15 PM   (20 min.)   Boom Collier MD   [facility] 15     16  Happy Birthday!     17     18     19       20     21     22     23    NEW ONCOLOGY   2:00 PM   (75 min.)   Kristen Sadler GC   [facility] 24     25     26       27     28     29     30     31                               Lab Results:  Recent Results (from the past 12 hour(s))   Comprehensive metabolic panel    Collection Time: 09/05/24  3:24 PM   Result Value Ref Range    Sodium 140 135 - 145 mmol/L    Potassium 3.9 3.4 - 5.3 mmol/L    Carbon Dioxide (CO2) 23 22 - 29 mmol/L    Anion Gap 12 7 - 15 mmol/L    Urea Nitrogen 10.0 6.0 - 20.0 mg/dL    Creatinine 0.48 (L) 0.51 - 0.95 mg/dL    GFR Estimate >90 >60 mL/min/1.73m2    Calcium 9.6 8.8 - 10.4 mg/dL    Chloride 105 98 - 107 mmol/L    Glucose 135 (H) 70 - 99 mg/dL    Alkaline Phosphatase 73 40 - 150 U/L    AST 21 0 - 45 U/L    ALT 17 0 - 50 U/L    Protein Total 6.8 6.4 - 8.3 g/dL    Albumin 4.2 3.5 - 5.2 g/dL    Bilirubin Total 0.2 <=1.2 mg/dL   CBC with platelets and differential    Collection Time: 09/05/24  3:24 PM   Result Value Ref Range    WBC Count 9.1 4.0 - 11.0 10e3/uL    RBC Count 2.96 (L) 3.80 - 5.20 10e6/uL    Hemoglobin 8.7 (L) 11.7 - 15.7 g/dL    Hematocrit 27.1 (L) 35.0 - 47.0 %    MCV 92 78 - 100 fL    MCH 29.4 26.5 - 33.0 pg    MCHC 32.1 31.5 - 36.5 g/dL    RDW 16.4 (H) 10.0 - 15.0 %    Platelet Count 262 150 - 450 10e3/uL    % Neutrophils      %  Lymphocytes      % Monocytes      % Eosinophils      % Basophils      % Immature Granulocytes      NRBCs per 100 WBC 0 <1 /100    Absolute Neutrophils      Absolute Lymphocytes      Absolute Monocytes      Absolute Eosinophils      Absolute Basophils      Absolute Immature Granulocytes      Absolute NRBCs 0.0 10e3/uL   Manual Differential    Collection Time: 09/05/24  3:24 PM   Result Value Ref Range    % Neutrophils 83 %    % Lymphocytes 6 %    % Monocytes 7 %    % Eosinophils 0 %    % Basophils 0 %    % Metamyelocytes 1 %    % Myelocytes 3 %    Absolute Neutrophils 7.6 1.6 - 8.3 10e3/uL    Absolute Lymphocytes 0.5 (L) 0.8 - 5.3 10e3/uL    Absolute Monocytes 0.6 0.0 - 1.3 10e3/uL    Absolute Eosinophils 0.0 0.0 - 0.7 10e3/uL    Absolute Basophils 0.0 0.0 - 0.2 10e3/uL    Absolute Metamyelocytes 0.1 (H) <=0.0 10e3/uL    Absolute Myelocytes 0.3 (H) <=0.0 10e3/uL    RBC Morphology Confirmed RBC Indices     Platelet Assessment  Automated Count Confirmed. Platelet morphology is normal.     Automated Count Confirmed. Platelet morphology is normal.    Polychromasia Slight (A) None Seen    Teardrop Cells Slight (A) None Seen

## 2024-09-06 ENCOUNTER — PREP FOR PROCEDURE (OUTPATIENT)
Dept: ONCOLOGY | Facility: CLINIC | Age: 49
End: 2024-09-06
Payer: COMMERCIAL

## 2024-09-10 ENCOUNTER — LAB (OUTPATIENT)
Dept: LAB | Facility: CLINIC | Age: 49
End: 2024-09-10
Payer: COMMERCIAL

## 2024-09-10 DIAGNOSIS — Z17.0 MALIGNANT NEOPLASM OF UPPER-INNER QUADRANT OF RIGHT BREAST IN FEMALE, ESTROGEN RECEPTOR POSITIVE (H): Primary | ICD-10-CM

## 2024-09-10 DIAGNOSIS — C50.211 MALIGNANT NEOPLASM OF UPPER-INNER QUADRANT OF RIGHT BREAST IN FEMALE, ESTROGEN RECEPTOR POSITIVE (H): Primary | ICD-10-CM

## 2024-09-10 LAB
ALBUMIN SERPL BCG-MCNC: 4.1 G/DL (ref 3.5–5.2)
ALP SERPL-CCNC: 103 U/L (ref 40–150)
ALT SERPL W P-5'-P-CCNC: 11 U/L (ref 0–50)
ANION GAP SERPL CALCULATED.3IONS-SCNC: 12 MMOL/L (ref 7–15)
AST SERPL W P-5'-P-CCNC: 14 U/L (ref 0–45)
BASOPHILS # BLD MANUAL: 0.2 10E3/UL (ref 0–0.2)
BASOPHILS NFR BLD MANUAL: 1 %
BILIRUB SERPL-MCNC: 0.3 MG/DL
BUN SERPL-MCNC: 15.9 MG/DL (ref 6–20)
CALCIUM SERPL-MCNC: 9.6 MG/DL (ref 8.8–10.4)
CHLORIDE SERPL-SCNC: 105 MMOL/L (ref 98–107)
CREAT SERPL-MCNC: 0.52 MG/DL (ref 0.51–0.95)
DACRYOCYTES BLD QL SMEAR: SLIGHT
EGFRCR SERPLBLD CKD-EPI 2021: >90 ML/MIN/1.73M2
ELLIPTOCYTES BLD QL SMEAR: SLIGHT
EOSINOPHIL # BLD MANUAL: 0 10E3/UL (ref 0–0.7)
EOSINOPHIL NFR BLD MANUAL: 0 %
ERYTHROCYTE [DISTWIDTH] IN BLOOD BY AUTOMATED COUNT: 15.9 % (ref 10–15)
FRAGMENTS BLD QL SMEAR: SLIGHT
GLUCOSE SERPL-MCNC: 120 MG/DL (ref 70–99)
HCO3 SERPL-SCNC: 25 MMOL/L (ref 22–29)
HCT VFR BLD AUTO: 27.1 % (ref 35–47)
HGB BLD-MCNC: 8.8 G/DL (ref 11.7–15.7)
LYMPHOCYTES # BLD MANUAL: 1.5 10E3/UL (ref 0.8–5.3)
LYMPHOCYTES NFR BLD MANUAL: 10 %
MCH RBC QN AUTO: 29.2 PG (ref 26.5–33)
MCHC RBC AUTO-ENTMCNC: 32.5 G/DL (ref 31.5–36.5)
MCV RBC AUTO: 90 FL (ref 78–100)
METAMYELOCYTES # BLD MANUAL: 0.3 10E3/UL
METAMYELOCYTES NFR BLD MANUAL: 2 %
MONOCYTES # BLD MANUAL: 0 10E3/UL (ref 0–1.3)
MONOCYTES NFR BLD MANUAL: 0 %
MYELOCYTES # BLD MANUAL: 0.2 10E3/UL
MYELOCYTES NFR BLD MANUAL: 1 %
NEUTROPHILS # BLD MANUAL: 13.2 10E3/UL (ref 1.6–8.3)
NEUTROPHILS NFR BLD MANUAL: 86 %
NRBC # BLD AUTO: 0 10E3/UL
NRBC BLD AUTO-RTO: 0 /100
PLAT MORPH BLD: ABNORMAL
PLATELET # BLD AUTO: 302 10E3/UL (ref 150–450)
POTASSIUM SERPL-SCNC: 3.9 MMOL/L (ref 3.4–5.3)
PROT SERPL-MCNC: 6.5 G/DL (ref 6.4–8.3)
RBC # BLD AUTO: 3.01 10E6/UL (ref 3.8–5.2)
RBC MORPH BLD: ABNORMAL
SODIUM SERPL-SCNC: 142 MMOL/L (ref 135–145)
STOMATOCYTES BLD QL SMEAR: SLIGHT
WBC # BLD AUTO: 15.4 10E3/UL (ref 4–11)

## 2024-09-10 PROCEDURE — 36415 COLL VENOUS BLD VENIPUNCTURE: CPT

## 2024-09-10 PROCEDURE — 85027 COMPLETE CBC AUTOMATED: CPT

## 2024-09-10 PROCEDURE — 85007 BL SMEAR W/DIFF WBC COUNT: CPT

## 2024-09-10 PROCEDURE — 80053 COMPREHEN METABOLIC PANEL: CPT

## 2024-09-16 ENCOUNTER — PATIENT OUTREACH (OUTPATIENT)
Dept: ONCOLOGY | Facility: CLINIC | Age: 49
End: 2024-09-16
Payer: COMMERCIAL

## 2024-09-16 NOTE — PROGRESS NOTES
[facility]: Cancer Care                                                                                          Called pt to check in about how she is doing now that chemo is done and she is looking forward to surgery. Pt continues to have some sporadic nausea, but she feels she is drinking enough fluid and does use the anti-emetics when any nausea begins. She reported she is ready to stop the IVFs. Will update the providers and [facility].     Signature:  Bette Watters RNCC

## 2024-09-17 ENCOUNTER — LAB (OUTPATIENT)
Dept: LAB | Facility: CLINIC | Age: 49
End: 2024-09-17
Attending: INTERNAL MEDICINE
Payer: COMMERCIAL

## 2024-09-17 DIAGNOSIS — C50.211 MALIGNANT NEOPLASM OF UPPER-INNER QUADRANT OF RIGHT BREAST IN FEMALE, ESTROGEN RECEPTOR POSITIVE (H): ICD-10-CM

## 2024-09-17 DIAGNOSIS — Z17.0 MALIGNANT NEOPLASM OF UPPER-INNER QUADRANT OF RIGHT BREAST IN FEMALE, ESTROGEN RECEPTOR POSITIVE (H): ICD-10-CM

## 2024-09-17 DIAGNOSIS — Z00.6 EXAMINATION OF PARTICIPANT OR CONTROL IN CLINICAL RESEARCH: Primary | ICD-10-CM

## 2024-09-17 LAB
ALBUMIN SERPL BCG-MCNC: 4.2 G/DL (ref 3.5–5.2)
ALP SERPL-CCNC: 100 U/L (ref 40–150)
ALT SERPL W P-5'-P-CCNC: 18 U/L (ref 0–50)
ANION GAP SERPL CALCULATED.3IONS-SCNC: 12 MMOL/L (ref 7–15)
AST SERPL W P-5'-P-CCNC: 20 U/L (ref 0–45)
BASOPHILS # BLD MANUAL: 0 10E3/UL (ref 0–0.2)
BASOPHILS NFR BLD MANUAL: 0 %
BILIRUB SERPL-MCNC: 0.4 MG/DL
BUN SERPL-MCNC: 6.8 MG/DL (ref 6–20)
CALCIUM SERPL-MCNC: 9.7 MG/DL (ref 8.8–10.4)
CHLORIDE SERPL-SCNC: 102 MMOL/L (ref 98–107)
CREAT SERPL-MCNC: 0.57 MG/DL (ref 0.51–0.95)
DACRYOCYTES BLD QL SMEAR: SLIGHT
EGFRCR SERPLBLD CKD-EPI 2021: >90 ML/MIN/1.73M2
EOSINOPHIL # BLD MANUAL: 0 10E3/UL (ref 0–0.7)
EOSINOPHIL NFR BLD MANUAL: 0 %
ERYTHROCYTE [DISTWIDTH] IN BLOOD BY AUTOMATED COUNT: 16.2 % (ref 10–15)
GLUCOSE SERPL-MCNC: 115 MG/DL (ref 70–99)
HCO3 SERPL-SCNC: 24 MMOL/L (ref 22–29)
HCT VFR BLD AUTO: 27.9 % (ref 35–47)
HGB BLD-MCNC: 8.9 G/DL (ref 11.7–15.7)
LYMPHOCYTES # BLD MANUAL: 0.7 10E3/UL (ref 0.8–5.3)
LYMPHOCYTES NFR BLD MANUAL: 4 %
MCH RBC QN AUTO: 29.2 PG (ref 26.5–33)
MCHC RBC AUTO-ENTMCNC: 31.9 G/DL (ref 31.5–36.5)
MCV RBC AUTO: 92 FL (ref 78–100)
METAMYELOCYTES # BLD MANUAL: 0.5 10E3/UL
METAMYELOCYTES NFR BLD MANUAL: 3 %
MONOCYTES # BLD MANUAL: 1 10E3/UL (ref 0–1.3)
MONOCYTES NFR BLD MANUAL: 6 %
NEUTROPHILS # BLD MANUAL: 14.4 10E3/UL (ref 1.6–8.3)
NEUTROPHILS NFR BLD MANUAL: 87 %
NRBC # BLD AUTO: 0.2 10E3/UL
NRBC # BLD AUTO: ABNORMAL 10*3/UL
NRBC BLD AUTO-RTO: ABNORMAL %
NRBC BLD MANUAL-RTO: 1 %
PLAT MORPH BLD: ABNORMAL
PLATELET # BLD AUTO: 141 10E3/UL (ref 150–450)
POLYCHROMASIA BLD QL SMEAR: SLIGHT
POTASSIUM SERPL-SCNC: 4.3 MMOL/L (ref 3.4–5.3)
PROT SERPL-MCNC: 7.1 G/DL (ref 6.4–8.3)
RBC # BLD AUTO: 3.05 10E6/UL (ref 3.8–5.2)
RBC MORPH BLD: ABNORMAL
SODIUM SERPL-SCNC: 138 MMOL/L (ref 135–145)
WBC # BLD AUTO: 16.5 10E3/UL (ref 4–11)

## 2024-09-17 PROCEDURE — 85025 COMPLETE CBC W/AUTO DIFF WBC: CPT

## 2024-09-17 PROCEDURE — 250N000011 HC RX IP 250 OP 636: Performed by: INTERNAL MEDICINE

## 2024-09-17 PROCEDURE — 82040 ASSAY OF SERUM ALBUMIN: CPT

## 2024-09-17 PROCEDURE — 85007 BL SMEAR W/DIFF WBC COUNT: CPT

## 2024-09-17 PROCEDURE — 36591 DRAW BLOOD OFF VENOUS DEVICE: CPT

## 2024-09-17 RX ORDER — HEPARIN SODIUM (PORCINE) LOCK FLUSH IV SOLN 100 UNIT/ML 100 UNIT/ML
5 SOLUTION INTRAVENOUS ONCE
Status: COMPLETED | OUTPATIENT
Start: 2024-09-17 | End: 2024-09-17

## 2024-09-17 RX ADMIN — Medication 5 ML: at 12:25

## 2024-09-17 NOTE — NURSING NOTE
Chief Complaint   Patient presents with    Port Draw     Labs collected from port by RN.      Port accessed with 20 gauge 3/4 inch flat needle by RN, labs collected, line flushed with saline and heparin.      Coleen Tirado RN

## 2024-09-18 ENCOUNTER — ANCILLARY PROCEDURE (OUTPATIENT)
Dept: MRI IMAGING | Facility: CLINIC | Age: 49
End: 2024-09-18
Attending: INTERNAL MEDICINE

## 2024-09-18 DIAGNOSIS — Z00.6 EXAMINATION OF PARTICIPANT OR CONTROL IN CLINICAL RESEARCH: ICD-10-CM

## 2024-09-18 DIAGNOSIS — Z17.0 MALIGNANT NEOPLASM OF UPPER-INNER QUADRANT OF RIGHT BREAST IN FEMALE, ESTROGEN RECEPTOR POSITIVE (H): ICD-10-CM

## 2024-09-18 DIAGNOSIS — C50.211 MALIGNANT NEOPLASM OF UPPER-INNER QUADRANT OF RIGHT BREAST IN FEMALE, ESTROGEN RECEPTOR POSITIVE (H): ICD-10-CM

## 2024-09-18 RX ORDER — GADOBUTROL 604.72 MG/ML
0.1 INJECTION INTRAVENOUS ONCE
Status: COMPLETED | OUTPATIENT
Start: 2024-09-18 | End: 2024-09-18

## 2024-09-18 RX ADMIN — GADOBUTROL 7.4 ML: 604.72 INJECTION INTRAVENOUS at 14:50

## 2024-09-20 ENCOUNTER — TELEPHONE (OUTPATIENT)
Dept: ONCOLOGY | Facility: CLINIC | Age: 49
End: 2024-09-20
Payer: COMMERCIAL

## 2024-09-20 NOTE — TELEPHONE ENCOUNTER
STD forms received via [employer] from patient.      Forms to be completed and put in folder for provider to approve.    Fax #:  [phone]  Claim: [id number]    Antionette Farfan

## 2024-09-20 NOTE — TELEPHONE ENCOUNTER
STD forms filled out and put in providers folder for review and signature.      Antionette Farfan

## 2024-09-20 NOTE — TELEPHONE ENCOUNTER
STD paperwork completed, checked for accuracy, signed and faxed to [facility] @ [phone]. A copy was made, sent to scanning and original mailed to patient at home address.    Successful transmission verified in Right Fax.    Avelina Salinas

## 2024-09-24 ENCOUNTER — ANCILLARY PROCEDURE (OUTPATIENT)
Dept: MAMMOGRAPHY | Facility: CLINIC | Age: 49
End: 2024-09-24
Attending: SURGERY
Payer: COMMERCIAL

## 2024-09-24 ENCOUNTER — ANCILLARY PROCEDURE (OUTPATIENT)
Dept: ULTRASOUND IMAGING | Facility: CLINIC | Age: 49
End: 2024-09-24
Attending: SURGERY
Payer: COMMERCIAL

## 2024-09-24 DIAGNOSIS — C50.211 MALIGNANT NEOPLASM OF UPPER-INNER QUADRANT OF RIGHT BREAST IN FEMALE, ESTROGEN RECEPTOR POSITIVE (H): ICD-10-CM

## 2024-09-24 DIAGNOSIS — Z17.0 MALIGNANT NEOPLASM OF UPPER-INNER QUADRANT OF RIGHT BREAST IN FEMALE, ESTROGEN RECEPTOR POSITIVE (H): ICD-10-CM

## 2024-09-24 PROCEDURE — 19285 PERQ DEV BREAST 1ST US IMAG: CPT | Mod: RT | Performed by: RADIOLOGY

## 2024-09-26 ENCOUNTER — LAB (OUTPATIENT)
Dept: LAB | Facility: CLINIC | Age: 49
End: 2024-09-26
Attending: INTERNAL MEDICINE
Payer: COMMERCIAL

## 2024-09-26 DIAGNOSIS — Z17.0 MALIGNANT NEOPLASM OF UPPER-INNER QUADRANT OF RIGHT BREAST IN FEMALE, ESTROGEN RECEPTOR POSITIVE (H): Primary | ICD-10-CM

## 2024-09-26 DIAGNOSIS — C50.211 MALIGNANT NEOPLASM OF UPPER-INNER QUADRANT OF RIGHT BREAST IN FEMALE, ESTROGEN RECEPTOR POSITIVE (H): Primary | ICD-10-CM

## 2024-09-26 LAB
ALBUMIN SERPL BCG-MCNC: 4.2 G/DL (ref 3.5–5.2)
ALP SERPL-CCNC: 68 U/L (ref 40–150)
ALT SERPL W P-5'-P-CCNC: 26 U/L (ref 0–50)
ANION GAP SERPL CALCULATED.3IONS-SCNC: 11 MMOL/L (ref 7–15)
AST SERPL W P-5'-P-CCNC: 33 U/L (ref 0–45)
BASOPHILS # BLD AUTO: 0.1 10E3/UL (ref 0–0.2)
BASOPHILS NFR BLD AUTO: 2 %
BILIRUB SERPL-MCNC: 0.5 MG/DL
BUN SERPL-MCNC: 10.9 MG/DL (ref 6–20)
CALCIUM SERPL-MCNC: 9.7 MG/DL (ref 8.8–10.4)
CHLORIDE SERPL-SCNC: 108 MMOL/L (ref 98–107)
CORTIS SERPL-MCNC: 7.3 UG/DL
CREAT SERPL-MCNC: 0.49 MG/DL (ref 0.51–0.95)
EGFRCR SERPLBLD CKD-EPI 2021: >90 ML/MIN/1.73M2
EOSINOPHIL # BLD AUTO: 0.2 10E3/UL (ref 0–0.7)
EOSINOPHIL NFR BLD AUTO: 3 %
ERYTHROCYTE [DISTWIDTH] IN BLOOD BY AUTOMATED COUNT: 16.1 % (ref 10–15)
GLUCOSE SERPL-MCNC: 113 MG/DL (ref 70–99)
HCO3 SERPL-SCNC: 23 MMOL/L (ref 22–29)
HCT VFR BLD AUTO: 29.3 % (ref 35–47)
HGB BLD-MCNC: 9.4 G/DL (ref 11.7–15.7)
IMM GRANULOCYTES # BLD: 0 10E3/UL
IMM GRANULOCYTES NFR BLD: 1 %
LYMPHOCYTES # BLD AUTO: 0.9 10E3/UL (ref 0.8–5.3)
LYMPHOCYTES NFR BLD AUTO: 14 %
MAGNESIUM SERPL-MCNC: 2 MG/DL (ref 1.7–2.3)
MCH RBC QN AUTO: 28.5 PG (ref 26.5–33)
MCHC RBC AUTO-ENTMCNC: 32.1 G/DL (ref 31.5–36.5)
MCV RBC AUTO: 89 FL (ref 78–100)
MONOCYTES # BLD AUTO: 0.9 10E3/UL (ref 0–1.3)
MONOCYTES NFR BLD AUTO: 15 %
NEUTROPHILS # BLD AUTO: 4 10E3/UL (ref 1.6–8.3)
NEUTROPHILS NFR BLD AUTO: 66 %
NRBC # BLD AUTO: 0 10E3/UL
NRBC BLD AUTO-RTO: 0 /100
PLATELET # BLD AUTO: 334 10E3/UL (ref 150–450)
POTASSIUM SERPL-SCNC: 3.9 MMOL/L (ref 3.4–5.3)
PROT SERPL-MCNC: 6.9 G/DL (ref 6.4–8.3)
RBC # BLD AUTO: 3.3 10E6/UL (ref 3.8–5.2)
SODIUM SERPL-SCNC: 142 MMOL/L (ref 135–145)
WBC # BLD AUTO: 6 10E3/UL (ref 4–11)

## 2024-09-26 PROCEDURE — 36415 COLL VENOUS BLD VENIPUNCTURE: CPT

## 2024-09-26 PROCEDURE — 82040 ASSAY OF SERUM ALBUMIN: CPT

## 2024-09-26 PROCEDURE — 83735 ASSAY OF MAGNESIUM: CPT

## 2024-09-26 PROCEDURE — 85025 COMPLETE CBC W/AUTO DIFF WBC: CPT

## 2024-09-26 PROCEDURE — 82533 TOTAL CORTISOL: CPT

## 2024-09-26 PROCEDURE — 300N000004 RESEARCH KIT COLLECTION

## 2024-09-26 NOTE — NURSING NOTE
Chief Complaint   Patient presents with    Blood Draw     Vpt blood draw by lab RN     Venipuncture labs drawn by lab RN. 3 research kits drawn    Georgina Elder RN

## 2024-09-27 ENCOUNTER — ANESTHESIA EVENT (OUTPATIENT)
Dept: SURGERY | Facility: AMBULATORY SURGERY CENTER | Age: 49
End: 2024-09-27
Payer: COMMERCIAL

## 2024-09-27 RX ORDER — OXYCODONE HYDROCHLORIDE 5 MG/1
5 TABLET ORAL
Status: CANCELLED | OUTPATIENT
Start: 2024-09-27

## 2024-09-27 RX ORDER — NALOXONE HYDROCHLORIDE 0.4 MG/ML
0.1 INJECTION, SOLUTION INTRAMUSCULAR; INTRAVENOUS; SUBCUTANEOUS
Status: CANCELLED | OUTPATIENT
Start: 2024-09-27

## 2024-09-27 RX ORDER — ONDANSETRON 4 MG/1
4 TABLET, ORALLY DISINTEGRATING ORAL EVERY 30 MIN PRN
Status: CANCELLED | OUTPATIENT
Start: 2024-09-27

## 2024-09-27 RX ORDER — OXYCODONE HYDROCHLORIDE 5 MG/1
10 TABLET ORAL
Status: CANCELLED | OUTPATIENT
Start: 2024-09-27

## 2024-09-27 RX ORDER — ONDANSETRON 2 MG/ML
4 INJECTION INTRAMUSCULAR; INTRAVENOUS EVERY 30 MIN PRN
Status: CANCELLED | OUTPATIENT
Start: 2024-09-27

## 2024-09-27 RX ORDER — DEXAMETHASONE SODIUM PHOSPHATE 10 MG/ML
4 INJECTION, SOLUTION INTRAMUSCULAR; INTRAVENOUS
Status: CANCELLED | OUTPATIENT
Start: 2024-09-27

## 2024-09-29 ASSESSMENT — ENCOUNTER SYMPTOMS: SEIZURES: 1

## 2024-09-29 NOTE — ANESTHESIA PREPROCEDURE EVALUATION
Anesthesia Pre-Procedure Evaluation    Patient: Sharon Fong   MRN: [id number] : 1975        Procedure : Procedure(s):  BILATERAL SIMPLE MASTECTOMY  RIGHT RADIO-LOCALIZED SENTINEL LYPMH NODE BIOPSY  Remove port vascular access left          Past Medical History:   Diagnosis Date    Breast cancer (H)     Hypermobility arthralgia     Migraine     Seizure disorder (H) 2014    Once post surgery      Past Surgical History:   Procedure Laterality Date    AS KNEE SCOPE, DIAGNOSTIC      COSMETIC SURGERY Bilateral     removal of axillary excessive tissue    HYSTERECTOMY  2022    fibroids    INSERT PORT VASCULAR ACCESS Left 2024    Procedure: Insert port vascular access chest;  Surgeon: Chapin Loaz MD;  Location: [facility] OR    IR CHEST PORT PLACEMENT > 5 YRS OF AGE  2024    IR LYMPH NODE BIOPSY  2024    LUMPECTOMY BREAST Right 10/08/2010    for abscess      Allergies   Allergen Reactions    Citrullus Vulgaris Unknown and Other (See Comments)     Rash, swollen lips    Corn-Containing Products Other (See Comments)    Paclitaxel Other (See Comments)     PAIN in shoulder and pelvis - see infusion notes from 24 and 24    Salicylic Acid Unknown and Other (See Comments)      Social History     Tobacco Use    Smoking status: Never     Passive exposure: Never    Smokeless tobacco: Never   Substance Use Topics    Alcohol use: Never      Wt Readings from Last 1 Encounters:   24 76.2 kg (168 lb)        Anesthesia Evaluation            ROS/MED HX  ENT/Pulmonary:       Neurologic:     (+)      migraines, seizures (single prior seizure),                         Cardiovascular:       METS/Exercise Tolerance:     Hematologic:       Musculoskeletal:       GI/Hepatic:       Renal/Genitourinary:       Endo:       Psychiatric/Substance Use:       Infectious Disease:       Malignancy:   (+) Malignancy,     Other:            Physical Exam    Airway        Mallampati: II       Respiratory  "Devices and Support         Dental       (+) Minor Abnormalities - some fillings, tiny chips      Cardiovascular          Rhythm and rate: regular     Pulmonary                   OUTSIDE LABS:  CBC:   Lab Results   Component Value Date    WBC 6.0 09/26/2024    WBC 16.5 (H) 09/17/2024    HGB 9.4 (L) 09/26/2024    HGB 8.9 (L) 09/17/2024    HCT 29.3 (L) 09/26/2024    HCT 27.9 (L) 09/17/2024     09/26/2024     (L) 09/17/2024     BMP:   Lab Results   Component Value Date     09/26/2024     09/17/2024    POTASSIUM 3.9 09/26/2024    POTASSIUM 4.3 09/17/2024    CHLORIDE 108 (H) 09/26/2024    CHLORIDE 102 09/17/2024    CO2 23 09/26/2024    CO2 24 09/17/2024    BUN 10.9 09/26/2024    BUN 6.8 09/17/2024    CR 0.49 (L) 09/26/2024    CR 0.57 09/17/2024     (H) 09/26/2024     (H) 09/17/2024     COAGS:   Lab Results   Component Value Date    PTT 29 04/17/2024    INR 1.01 07/29/2024     POC: No results found for: \"BGM\", \"HCG\", \"HCGS\"  HEPATIC:   Lab Results   Component Value Date    ALBUMIN 4.2 09/26/2024    PROTTOTAL 6.9 09/26/2024    ALT 26 09/26/2024    AST 33 09/26/2024    ALKPHOS 68 09/26/2024    BILITOTAL 0.5 09/26/2024     OTHER:   Lab Results   Component Value Date    LACT 1.3 07/29/2024    A1C 5.9 (H) 04/17/2024    JJ 9.7 09/26/2024    MAG 2.0 09/26/2024    TSH 2.81 04/17/2024    T4 0.97 04/17/2024       Anesthesia Plan    ASA Status:  2    NPO Status:  NPO Appropriate    Anesthesia Type: General.     - Airway: LMA   Induction: Intravenous.   Maintenance: TIVA.        Consents    Anesthesia Plan(s) and associated risks, benefits, and realistic alternatives discussed. Questions answered and patient/representative(s) expressed understanding.     - Discussed:     - Discussed with:  Patient            Postoperative Care            Comments:               Roldan Reno MD    I have reviewed the pertinent notes and labs in the chart from the past 30 days and (re)examined the patient. "  Any updates or changes from those notes are reflected in this note.

## 2024-09-30 ENCOUNTER — ANESTHESIA (OUTPATIENT)
Dept: SURGERY | Facility: AMBULATORY SURGERY CENTER | Age: 49
End: 2024-09-30
Payer: COMMERCIAL

## 2024-09-30 ENCOUNTER — HOSPITAL ENCOUNTER (OUTPATIENT)
Facility: AMBULATORY SURGERY CENTER | Age: 49
Discharge: HOME | End: 2024-09-30
Attending: SURGERY
Payer: COMMERCIAL

## 2024-09-30 VITALS
RESPIRATION RATE: 19 BRPM | TEMPERATURE: 97 F | SYSTOLIC BLOOD PRESSURE: 122 MMHG | OXYGEN SATURATION: 96 % | HEART RATE: 76 BPM | DIASTOLIC BLOOD PRESSURE: 86 MMHG

## 2024-09-30 DIAGNOSIS — G89.18 POSTOPERATIVE PAIN: Primary | ICD-10-CM

## 2024-09-30 PROCEDURE — 88341 IMHCHEM/IMCYTCHM EA ADD ANTB: CPT | Mod: TC | Performed by: SURGERY

## 2024-09-30 PROCEDURE — 38500 BIOPSY/REMOVAL LYMPH NODES: CPT | Performed by: NURSE ANESTHETIST, CERTIFIED REGISTERED

## 2024-09-30 PROCEDURE — 88360 TUMOR IMMUNOHISTOCHEM/MANUAL: CPT | Mod: 26 | Performed by: STUDENT IN AN ORGANIZED HEALTH CARE EDUCATION/TRAINING PROGRAM

## 2024-09-30 PROCEDURE — 19303 MAST SIMPLE COMPLETE: CPT | Mod: 50

## 2024-09-30 PROCEDURE — 99000 SPECIMEN HANDLING OFFICE-LAB: CPT | Performed by: PATHOLOGY

## 2024-09-30 PROCEDURE — 38900 IO MAP OF SENT LYMPH NODE: CPT | Mod: RT | Performed by: SURGERY

## 2024-09-30 PROCEDURE — 19303 MAST SIMPLE COMPLETE: CPT | Mod: 50 | Performed by: SURGERY

## 2024-09-30 PROCEDURE — 38525 BIOPSY/REMOVAL LYMPH NODES: CPT | Mod: RT | Performed by: SURGERY

## 2024-09-30 PROCEDURE — 88377 M/PHMTRC ALYS ISHQUANT/SEMIQ: CPT | Mod: 26 | Performed by: MEDICAL GENETICS

## 2024-09-30 PROCEDURE — 88307 TISSUE EXAM BY PATHOLOGIST: CPT | Mod: 26 | Performed by: STUDENT IN AN ORGANIZED HEALTH CARE EDUCATION/TRAINING PROGRAM

## 2024-09-30 PROCEDURE — 38525 BIOPSY/REMOVAL LYMPH NODES: CPT | Mod: RT

## 2024-09-30 PROCEDURE — 88377 M/PHMTRC ALYS ISHQUANT/SEMIQ: CPT | Performed by: SURGERY

## 2024-09-30 PROCEDURE — 38500 BIOPSY/REMOVAL LYMPH NODES: CPT | Performed by: ANESTHESIOLOGY

## 2024-09-30 PROCEDURE — 88341 IMHCHEM/IMCYTCHM EA ADD ANTB: CPT | Mod: 26 | Performed by: STUDENT IN AN ORGANIZED HEALTH CARE EDUCATION/TRAINING PROGRAM

## 2024-09-30 PROCEDURE — 88342 IMHCHEM/IMCYTCHM 1ST ANTB: CPT | Mod: 26 | Performed by: STUDENT IN AN ORGANIZED HEALTH CARE EDUCATION/TRAINING PROGRAM

## 2024-09-30 RX ORDER — HYDROMORPHONE HYDROCHLORIDE 1 MG/ML
0.2 INJECTION, SOLUTION INTRAMUSCULAR; INTRAVENOUS; SUBCUTANEOUS EVERY 5 MIN PRN
Status: DISCONTINUED | OUTPATIENT
Start: 2024-09-30 | End: 2024-10-01 | Stop reason: HOSPADM

## 2024-09-30 RX ORDER — BUPIVACAINE HYDROCHLORIDE 2.5 MG/ML
INJECTION, SOLUTION EPIDURAL; INFILTRATION; INTRACAUDAL PRN
Status: DISCONTINUED | OUTPATIENT
Start: 2024-09-30 | End: 2024-09-30 | Stop reason: HOSPADM

## 2024-09-30 RX ORDER — LABETALOL HYDROCHLORIDE 5 MG/ML
10 INJECTION, SOLUTION INTRAVENOUS
Status: DISCONTINUED | OUTPATIENT
Start: 2024-09-30 | End: 2024-10-01 | Stop reason: HOSPADM

## 2024-09-30 RX ORDER — CEFAZOLIN SODIUM 2 G/50ML
2 SOLUTION INTRAVENOUS SEE ADMIN INSTRUCTIONS
Status: DISCONTINUED | OUTPATIENT
Start: 2024-09-30 | End: 2024-09-30 | Stop reason: HOSPADM

## 2024-09-30 RX ORDER — ENOXAPARIN SODIUM 100 MG/ML
40 INJECTION SUBCUTANEOUS
Status: COMPLETED | OUTPATIENT
Start: 2024-09-30 | End: 2024-09-30

## 2024-09-30 RX ORDER — LIDOCAINE 40 MG/G
CREAM TOPICAL
Status: DISCONTINUED | OUTPATIENT
Start: 2024-09-30 | End: 2024-09-30 | Stop reason: HOSPADM

## 2024-09-30 RX ORDER — SODIUM CHLORIDE, SODIUM LACTATE, POTASSIUM CHLORIDE, CALCIUM CHLORIDE 600; 310; 30; 20 MG/100ML; MG/100ML; MG/100ML; MG/100ML
INJECTION, SOLUTION INTRAVENOUS CONTINUOUS
Status: DISCONTINUED | OUTPATIENT
Start: 2024-09-30 | End: 2024-10-01 | Stop reason: HOSPADM

## 2024-09-30 RX ORDER — FENTANYL CITRATE 50 UG/ML
50 INJECTION, SOLUTION INTRAMUSCULAR; INTRAVENOUS EVERY 5 MIN PRN
Status: DISCONTINUED | OUTPATIENT
Start: 2024-09-30 | End: 2024-10-01 | Stop reason: HOSPADM

## 2024-09-30 RX ORDER — NALOXONE HYDROCHLORIDE 0.4 MG/ML
0.1 INJECTION, SOLUTION INTRAMUSCULAR; INTRAVENOUS; SUBCUTANEOUS
Status: DISCONTINUED | OUTPATIENT
Start: 2024-09-30 | End: 2024-10-01 | Stop reason: HOSPADM

## 2024-09-30 RX ORDER — DEXAMETHASONE SODIUM PHOSPHATE 10 MG/ML
4 INJECTION, SOLUTION INTRAMUSCULAR; INTRAVENOUS
Status: DISCONTINUED | OUTPATIENT
Start: 2024-09-30 | End: 2024-10-01 | Stop reason: HOSPADM

## 2024-09-30 RX ORDER — OXYCODONE HYDROCHLORIDE 5 MG/1
5-10 TABLET ORAL EVERY 4 HOURS PRN
Qty: 10 TABLET | Refills: 0 | Status: SHIPPED | OUTPATIENT
Start: 2024-09-30

## 2024-09-30 RX ORDER — PROPOFOL 10 MG/ML
INJECTION, EMULSION INTRAVENOUS PRN
Status: DISCONTINUED | OUTPATIENT
Start: 2024-09-30 | End: 2024-09-30

## 2024-09-30 RX ORDER — ONDANSETRON 4 MG/1
4 TABLET, ORALLY DISINTEGRATING ORAL EVERY 30 MIN PRN
Status: DISCONTINUED | OUTPATIENT
Start: 2024-09-30 | End: 2024-10-01 | Stop reason: HOSPADM

## 2024-09-30 RX ORDER — LIDOCAINE HYDROCHLORIDE 20 MG/ML
INJECTION, SOLUTION INFILTRATION; PERINEURAL PRN
Status: DISCONTINUED | OUTPATIENT
Start: 2024-09-30 | End: 2024-09-30

## 2024-09-30 RX ORDER — ACETAMINOPHEN 325 MG/1
975 TABLET ORAL ONCE
Status: COMPLETED | OUTPATIENT
Start: 2024-09-30 | End: 2024-09-30

## 2024-09-30 RX ORDER — FENTANYL CITRATE 50 UG/ML
INJECTION, SOLUTION INTRAMUSCULAR; INTRAVENOUS PRN
Status: DISCONTINUED | OUTPATIENT
Start: 2024-09-30 | End: 2024-09-30

## 2024-09-30 RX ORDER — SODIUM CHLORIDE, SODIUM LACTATE, POTASSIUM CHLORIDE, CALCIUM CHLORIDE 600; 310; 30; 20 MG/100ML; MG/100ML; MG/100ML; MG/100ML
INJECTION, SOLUTION INTRAVENOUS CONTINUOUS
Status: DISCONTINUED | OUTPATIENT
Start: 2024-09-30 | End: 2024-09-30 | Stop reason: HOSPADM

## 2024-09-30 RX ORDER — DIPHENHYDRAMINE HYDROCHLORIDE 50 MG/ML
12.5 INJECTION INTRAMUSCULAR; INTRAVENOUS EVERY 10 MIN PRN
Status: DISCONTINUED | OUTPATIENT
Start: 2024-09-30 | End: 2024-10-01 | Stop reason: HOSPADM

## 2024-09-30 RX ORDER — ONDANSETRON 2 MG/ML
INJECTION INTRAMUSCULAR; INTRAVENOUS PRN
Status: DISCONTINUED | OUTPATIENT
Start: 2024-09-30 | End: 2024-09-30

## 2024-09-30 RX ORDER — KETOROLAC TROMETHAMINE 30 MG/ML
INJECTION, SOLUTION INTRAMUSCULAR; INTRAVENOUS PRN
Status: DISCONTINUED | OUTPATIENT
Start: 2024-09-30 | End: 2024-09-30

## 2024-09-30 RX ORDER — FENTANYL CITRATE 50 UG/ML
25 INJECTION, SOLUTION INTRAMUSCULAR; INTRAVENOUS EVERY 5 MIN PRN
Status: DISCONTINUED | OUTPATIENT
Start: 2024-09-30 | End: 2024-10-01 | Stop reason: HOSPADM

## 2024-09-30 RX ORDER — ONDANSETRON 2 MG/ML
4 INJECTION INTRAMUSCULAR; INTRAVENOUS EVERY 30 MIN PRN
Status: DISCONTINUED | OUTPATIENT
Start: 2024-09-30 | End: 2024-10-01 | Stop reason: HOSPADM

## 2024-09-30 RX ORDER — PROPOFOL 10 MG/ML
INJECTION, EMULSION INTRAVENOUS CONTINUOUS PRN
Status: DISCONTINUED | OUTPATIENT
Start: 2024-09-30 | End: 2024-09-30

## 2024-09-30 RX ORDER — DEXAMETHASONE SODIUM PHOSPHATE 4 MG/ML
INJECTION, SOLUTION INTRA-ARTICULAR; INTRALESIONAL; INTRAMUSCULAR; INTRAVENOUS; SOFT TISSUE PRN
Status: DISCONTINUED | OUTPATIENT
Start: 2024-09-30 | End: 2024-09-30

## 2024-09-30 RX ORDER — HYDROMORPHONE HYDROCHLORIDE 1 MG/ML
0.4 INJECTION, SOLUTION INTRAMUSCULAR; INTRAVENOUS; SUBCUTANEOUS EVERY 5 MIN PRN
Status: DISCONTINUED | OUTPATIENT
Start: 2024-09-30 | End: 2024-10-01 | Stop reason: HOSPADM

## 2024-09-30 RX ORDER — CEFAZOLIN SODIUM 2 G/50ML
2 SOLUTION INTRAVENOUS
Status: COMPLETED | OUTPATIENT
Start: 2024-09-30 | End: 2024-09-30

## 2024-09-30 RX ORDER — INDOCYANINE GREEN AND WATER 25 MG
KIT INJECTION PRN
Status: DISCONTINUED | OUTPATIENT
Start: 2024-09-30 | End: 2024-09-30 | Stop reason: HOSPADM

## 2024-09-30 RX ADMIN — KETOROLAC TROMETHAMINE 15 MG: 30 INJECTION, SOLUTION INTRAMUSCULAR; INTRAVENOUS at 10:12

## 2024-09-30 RX ADMIN — FENTANYL CITRATE 25 MCG: 50 INJECTION, SOLUTION INTRAMUSCULAR; INTRAVENOUS at 12:02

## 2024-09-30 RX ADMIN — ONDANSETRON 4 MG: 2 INJECTION INTRAMUSCULAR; INTRAVENOUS at 11:46

## 2024-09-30 RX ADMIN — LIDOCAINE HYDROCHLORIDE 100 MG: 20 INJECTION, SOLUTION INFILTRATION; PERINEURAL at 07:27

## 2024-09-30 RX ADMIN — CEFAZOLIN SODIUM 2 G: 2 SOLUTION INTRAVENOUS at 07:18

## 2024-09-30 RX ADMIN — FENTANYL CITRATE 50 MCG: 50 INJECTION, SOLUTION INTRAMUSCULAR; INTRAVENOUS at 07:32

## 2024-09-30 RX ADMIN — PROPOFOL 150 MCG/KG/MIN: 10 INJECTION, EMULSION INTRAVENOUS at 07:27

## 2024-09-30 RX ADMIN — PROPOFOL 150 MCG/KG/MIN: 10 INJECTION, EMULSION INTRAVENOUS at 08:50

## 2024-09-30 RX ADMIN — ONDANSETRON 4 MG: 2 INJECTION INTRAMUSCULAR; INTRAVENOUS at 10:10

## 2024-09-30 RX ADMIN — PROPOFOL 150 MCG/KG/MIN: 10 INJECTION, EMULSION INTRAVENOUS at 08:14

## 2024-09-30 RX ADMIN — ACETAMINOPHEN 975 MG: 325 TABLET ORAL at 06:39

## 2024-09-30 RX ADMIN — PROPOFOL 200 MG: 10 INJECTION, EMULSION INTRAVENOUS at 07:27

## 2024-09-30 RX ADMIN — Medication 0.5 MG: at 08:41

## 2024-09-30 RX ADMIN — ENOXAPARIN SODIUM 40 MG: 100 INJECTION SUBCUTANEOUS at 06:50

## 2024-09-30 RX ADMIN — SODIUM CHLORIDE, SODIUM LACTATE, POTASSIUM CHLORIDE, CALCIUM CHLORIDE: 600; 310; 30; 20 INJECTION, SOLUTION INTRAVENOUS at 07:05

## 2024-09-30 RX ADMIN — FENTANYL CITRATE 50 MCG: 50 INJECTION, SOLUTION INTRAMUSCULAR; INTRAVENOUS at 07:57

## 2024-09-30 RX ADMIN — FENTANYL CITRATE 25 MCG: 50 INJECTION, SOLUTION INTRAMUSCULAR; INTRAVENOUS at 11:24

## 2024-09-30 RX ADMIN — DIPHENHYDRAMINE HYDROCHLORIDE 12.5 MG: 50 INJECTION INTRAMUSCULAR; INTRAVENOUS at 12:41

## 2024-09-30 RX ADMIN — DEXAMETHASONE SODIUM PHOSPHATE 4 MG: 4 INJECTION, SOLUTION INTRA-ARTICULAR; INTRALESIONAL; INTRAMUSCULAR; INTRAVENOUS; SOFT TISSUE at 07:31

## 2024-09-30 RX ADMIN — Medication 0.5 MG: at 08:18

## 2024-09-30 RX ADMIN — PROPOFOL 150 MCG/KG/MIN: 10 INJECTION, EMULSION INTRAVENOUS at 09:34

## 2024-09-30 RX ADMIN — FENTANYL CITRATE 25 MCG: 50 INJECTION, SOLUTION INTRAMUSCULAR; INTRAVENOUS at 11:16

## 2024-09-30 NOTE — ANESTHESIA CARE TRANSFER NOTE
Patient: Sharon Fong    Procedure: Procedure(s):  BILATERAL SIMPLE MASTECTOMY  RIGHT RADIO-LOCALIZED SENTINEL LYPMH NODE BIOPSY  Remove port vascular access left       Diagnosis: Malignant neoplasm of upper-inner quadrant of right breast in female, estrogen receptor positive [C50.211, Z17.0]  Diagnosis Additional Information: No value filed.    Anesthesia Type:   General     Note:    Oropharynx: oropharynx clear of all foreign objects and spontaneously breathing  Level of Consciousness: awake  Oxygen Supplementation: face mask  Level of Supplemental Oxygen (L/min / FiO2): 6  Independent Airway: airway patency satisfactory and stable  Dentition: dentition unchanged  Vital Signs Stable: post-procedure vital signs reviewed and stable  Report to RN Given: handoff report given  Patient transferred to: PACU  Comments: Refer to RN flowsheet for post-operative vital signs.   Handoff Report: Identifed the Patient, Identified the Reponsible Provider, Reviewed the pertinent medical history, Discussed the surgical course, Reviewed Intra-OP anesthesia mangement and issues during anesthesia, Set expectations for post-procedure period and Allowed opportunity for questions and acknowledgement of understanding  Vitals:  Vitals Value Taken Time   BP     Temp     Pulse     Resp     SpO2         Electronically Signed By: TEETEE Monet CRNA  September 30, 2024  11:02 AM

## 2024-09-30 NOTE — ANESTHESIA PROCEDURE NOTES
Airway       Patient location during procedure: OR  Staff -        CRNA: Brianna Ramirez APRN CRNA       Performed By: CRNA  Consent for Airway        Urgency: elective  Indications and Patient Condition       Indications for airway management: emerald-procedural and airway protection       Induction type:intravenous       Mask difficulty assessment: 0 - not attempted    Final Airway Details       Final airway type: supraglottic airway    Supraglottic Airway Details        Type: LMA       Brand: I-Gel       LMA size: 4    Post intubation assessment        Placement verified by: capnometry, equal breath sounds and chest rise        Number of attempts at approach: 1       Secured with: tape       Ease of procedure: easy       Dentition: Intact

## 2024-09-30 NOTE — DISCHARGE INSTRUCTIONS
[facility]  Home Care Following Anesthesia  For 24 hours after surgery:  Get plenty of rest.  A responsible adult must stay with you for at least 24 hours after you leave the surgery center.  Do not drive or use heavy equipment.  If you have weakness or tingling, don't drive or use heavy equipment until this feeling goes away.   Do not drink alcohol.   Avoid strenuous or risky activities.  Ask for help when climbing stairs.  You may feel lightheaded.  IF so, sit for a few minutes before standing.  Have someone help you get up.   If you have nausea (feel sick to your stomach): Drink only clear liquids such as apple juice, ginger ale, broth or 7-Up.  Rest may also help.  Be sure to drink enough fluids.  Move to a regular diet as you feel able.   You may have a slight fever.  Call the doctor if your fever is over 100 F (37.7 C) (taken under the tongue) or lasts longer than 24 hours.  You may have a dry mouth, a sore throat, muscle aches or trouble sleeping. These should go away after 24 hours.  Do not make important or legal decisions.   It is recommended to avoid smoking.               Tips for taking pain medications  To get the best pain relief possible, remember these points:  Take pain medications as directed, before pain becomes severe.  Pain medication can upset your stomach: taking it with food may help.  Constipation is a common side effect of pain medication. Drink plenty of  fluids.  Eat foods high in fiber. Take a stool softener if recommended by your doctor or pharmacist.  Do not drink alcohol, drive or operate machinery while taking pain medications.  Ask about other ways to control pain, such as with heat, ice or relaxation.    Tylenol/Acetaminophen Consumption    If you feel your pain relief is insufficient, you may take Tylenol/Acetaminophen in addition to your narcotic pain medication.   Be careful not to exceed 4,000 mg of Tylenol/Acetaminophen in a 24 hour  period from all sources.  If you are taking extra strength Tylenol/acetaminophen (500 mg), the maximum dose is 8 tablets in 24 hours.  If you are taking regular strength acetaminophen (325 mg), the maximum dose is 12 tablets in 24 hours.    Call a doctor for any of the following:  Signs of infection (fever, growing tenderness at the surgery site, a large amount of drainage or bleeding, severe pain, foul-smelling drainage, redness, swelling).  It has been over 8 to 10 hours since surgery and you are still not able to urinate (pass water).  Headache for over 24 hours.  Numbness, tingling or weakness the day after surgery (if you had spinal anesthesia).  Signs of Covid-19 infection (temperature over 100 degrees, shortness of breath, cough, loss of taste/smell, generalized body aches, persistent headache, chills, sore throat, nausea/vomiting/diarrhea)  Your doctor is:       Dr. Boom Collier, [facility]: [phone]               Or dial [phone] and ask for the resident on call for:  [facility]  For emergency care, call the:  [facility]:  [phone] (TTY for hearing impaired: [phone])    Ok to take more Tylenol at 1230.

## 2024-09-30 NOTE — OP NOTE
Preoperative Diagnosis: Right breast cancer    Postoperative Diagnosis: Same    Procedure: Bilateral simple mastectomy, lymphatic mapping, right axillary sentinel lymph node biopsy x 3    Surgeons: Dr. Boom Collier    Anesthesia: General    Indications for Surgery: The patient is a 48-year-old woman with a right-sided breast cancer.  She had a biopsy showing positive axillary lymph nodes and a clip was placed.  She has not completed chemotherapy.    Procedure in Detail: After informed consent the patient was brought the operating given a general anesthetic.  I injected ICG into her right breast.  She was prepped and draped in usual fashion.  We started on the right side.  I made an alonso wing incision starting inframammary fold and extending towards the axilla.  I then include the nipple-areolar complex.  The Bovie cautery was used to incise subcutaneous tissues.  The superior skin flap was raised to the clavicle with the Bovie cautery.  A medial skin flap was raised to the lateral border the sternum.  An inferior skin flap was raised inframammary fold.  Next the breast and pectoralis fashion removed the pectoralis major from superior to inferior and from medial to lateral.  After specimen was dissected off the lateral border the pectoralis major muscle it was divided oriented and sent to pathology.  Next we used the localizer to identify the tagged sentinel node.  We identified a fluorescent lymph node with the radiofrequency seed.  This was removed and was highly fluorescent.  The seed fell out of the tract.  We identified 2 other fluorescent lymph nodes that were removed and sent to pathology.  Strict hemostasis was obtained with the Bovie cautery with surgical clips.  We made a mirror image incision on the patient's left side.  The Bovie cautery was used to incise the subcutaneous tissues.  A superior skin flap was raised to the clavicle.  An medial skin flap was raised to the lateral border the sternum.  An  inferior skin flap was raised inframammary fold next the breast and pectoralis fashion removed the pectoralis major muscle from superior to inferior and from medial to lateral.  After the specimen was dissected off the lateral border the pectoralis major muscle it was divided oriented and sent to pathology.  Strict hemostasis was obtained with the Bovie cautery with surgical clips.  On both sides performed a pec block with quarter percent Marcaine.  A 15 round Daniel-Reyna drain was used and secured the skin with a 3-0 nylon suture.  The dermis was closed with a running 3 OV lock suture.  The dermis was closed with a running 4-0 PDS subcuticular stitch.  Dermabond was placed and the patient was taken to the recovery room after placement of an Ace wrap.      [place] Node Biopsy for Breast Cancer - Right  Operation performed with curative intent Yes   Tracer(s) used to identify sentinel nodes in the upfront surgery (non-neoadjuvant) setting Dye   Tracer(s) used to identify sentinel nodes in the neoadjuvant setting Dye   All nodes (colored or non-colored) present at the end of a dye-filled lymphatic channel were removed Yes   All significantly radioactive nodes were removed N/A   All palpably suspicious nodes were removed Yes   Biopsy-proven positive nodes marked with clips prior to chemotherapy were identified and removed Yes       Boom Collier MD, MS  Surgical Oncology

## 2024-09-30 NOTE — ANESTHESIA POSTPROCEDURE EVALUATION
Patient: Sharon Fong    Procedure: Procedure(s):  BILATERAL SIMPLE MASTECTOMY  RIGHT RADIO-LOCALIZED SENTINEL LYPMH NODE BIOPSY  Remove port vascular access left       Anesthesia Type:  General    Note:  Disposition: Outpatient   Postop Pain Control: Uneventful            Sign Out: Well controlled pain   PONV: No   Neuro/Psych: Uneventful            Sign Out: Acceptable/Baseline neuro status   Airway/Respiratory: Uneventful            Sign Out: Acceptable/Baseline resp. status   CV/Hemodynamics: Uneventful            Sign Out: Acceptable CV status; No obvious hypovolemia; No obvious fluid overload   Other NRE: NONE   DID A NON-ROUTINE EVENT OCCUR?            Last vitals:  Vitals Value Taken Time   /82 09/30/24 1245   Temp 36.1  C (96.9  F) 09/30/24 1215   Pulse 76 09/30/24 1245   Resp 22 09/30/24 1230   SpO2 100 % 09/30/24 1246   Vitals shown include unfiled device data.    Electronically Signed By: Roldan Reno MD  September 30, 2024  3:07 PM

## 2024-10-02 ENCOUNTER — MYC MEDICAL ADVICE (OUTPATIENT)
Dept: ONCOLOGY | Facility: CLINIC | Age: 49
End: 2024-10-02
Payer: COMMERCIAL

## 2024-10-07 ENCOUNTER — MYC MEDICAL ADVICE (OUTPATIENT)
Dept: ONCOLOGY | Facility: CLINIC | Age: 49
End: 2024-10-07
Payer: COMMERCIAL

## 2024-10-07 NOTE — TELEPHONE ENCOUNTER
Oncology Nurse Triage    Situation:   Sharon reporting the following symptoms:  - concern for pus/redness around incision site    Background:   Treating Provider:   Dr. Collier     On 9/30/2024.     BILATERAL SIMPLE MASTECTOMY Bilateral General   RIGHT RADIO-LOCALIZED SENTINEL LYPMH NODE BIOPSY Right General   Remove port vascular access left         Assessment:     Onset: First observed this morning.    Eating/drinking okay.     Current management of surgical site, take a shower and uses ace wrap. Helps secure the drain tube.   Last 24hour total of about 30ml on left side, other side right 28ml     Denies swelling, denies pain.     Denies fever, chest pain, SOB.     Recommendations:   Routed high priority to surgical team to advise.

## 2024-10-08 NOTE — CONFIDENTIAL NOTE
TELEPHONE CALL  Oct 7, 2024    Sharon Fong is a 48 year old female s/p b/l mastectomy for right breast cancer 9/30/24 with Dr. Collier.     Image was reviewed. No erythema. She was called to follow up on how she is doing but there was no answer and a message was left. Ok to monitor. If she develops erythema or other concern for infection an antibiotic should be started.     Chula Samuel PA-C

## 2024-10-10 ENCOUNTER — MYC MEDICAL ADVICE (OUTPATIENT)
Dept: ONCOLOGY | Facility: CLINIC | Age: 49
End: 2024-10-10
Payer: COMMERCIAL

## 2024-10-10 LAB
PATH REPORT.COMMENTS IMP SPEC: ABNORMAL
PATH REPORT.COMMENTS IMP SPEC: YES
PATH REPORT.FINAL DX SPEC: ABNORMAL
PATH REPORT.GROSS SPEC: ABNORMAL
PATH REPORT.MICROSCOPIC SPEC OTHER STN: ABNORMAL
PATH REPORT.RELEVANT HX SPEC: ABNORMAL
PATHOLOGY SYNOPTIC REPORT: ABNORMAL
PHOTO IMAGE: ABNORMAL

## 2024-10-10 NOTE — TELEPHONE ENCOUNTER
[facility]: Surgical Oncology Cancer Care Short Note                                     Discussion with Patient:                                                         OUTBOUND CALL:     Spoke with Sharon regarding her [employer] message. Reviewed that some leaking around the insertion site of the GAMALIEL drain is not unusual. She stated he has about 15 ml of output per drain, per day.     Discussed that leaking around the insertion site can sometimes be caused by a small clot in the tubing which Is preventing fluid from emptying into the drain. Confirmed Sharon is currently stripping the tubing twice daily. Recommended Sharon start stripping the drain 4-6 times daily between now and her post-op visit with Dr. Collier on 10/14/2024. Catarinoarlindy verbalized understanding of the recommendation.     Sharon is otherwise doing well. Reviewed plan for post-op visit and visit with Dr. Bernal on 10/14/2024.     Encouraged Sharon to call with any further questions or concerns. Direct contact number provided.     Cony Pearson RNCC  [facility]   Surgical Oncology       Approximately 10 minutes was spent in conversation with the patient/caregiver.

## 2024-10-14 ENCOUNTER — LAB (OUTPATIENT)
Dept: LAB | Facility: CLINIC | Age: 49
End: 2024-10-14
Attending: INTERNAL MEDICINE
Payer: COMMERCIAL

## 2024-10-14 ENCOUNTER — ONCOLOGY VISIT (OUTPATIENT)
Dept: ONCOLOGY | Facility: CLINIC | Age: 49
End: 2024-10-14
Attending: INTERNAL MEDICINE
Payer: COMMERCIAL

## 2024-10-14 ENCOUNTER — ONCOLOGY VISIT (OUTPATIENT)
Dept: ONCOLOGY | Facility: CLINIC | Age: 49
End: 2024-10-14
Attending: SURGERY
Payer: COMMERCIAL

## 2024-10-14 VITALS
WEIGHT: 167.1 LBS | OXYGEN SATURATION: 100 % | SYSTOLIC BLOOD PRESSURE: 113 MMHG | BODY MASS INDEX: 28.18 KG/M2 | DIASTOLIC BLOOD PRESSURE: 80 MMHG | RESPIRATION RATE: 12 BRPM | HEART RATE: 94 BPM | TEMPERATURE: 98.6 F

## 2024-10-14 DIAGNOSIS — Z17.0 MALIGNANT NEOPLASM OF UPPER-INNER QUADRANT OF RIGHT BREAST IN FEMALE, ESTROGEN RECEPTOR POSITIVE (H): Primary | ICD-10-CM

## 2024-10-14 DIAGNOSIS — C50.211 MALIGNANT NEOPLASM OF UPPER-INNER QUADRANT OF RIGHT BREAST IN FEMALE, ESTROGEN RECEPTOR POSITIVE (H): Primary | ICD-10-CM

## 2024-10-14 DIAGNOSIS — Z17.0 MALIGNANT NEOPLASM OF UPPER-INNER QUADRANT OF RIGHT BREAST IN FEMALE, ESTROGEN RECEPTOR POSITIVE (H): Chronic | ICD-10-CM

## 2024-10-14 DIAGNOSIS — Z79.811 LONG TERM (CURRENT) USE OF AROMATASE INHIBITORS: ICD-10-CM

## 2024-10-14 DIAGNOSIS — C50.211 MALIGNANT NEOPLASM OF UPPER-INNER QUADRANT OF RIGHT BREAST IN FEMALE, ESTROGEN RECEPTOR POSITIVE (H): Primary | Chronic | ICD-10-CM

## 2024-10-14 DIAGNOSIS — Z17.0 MALIGNANT NEOPLASM OF UPPER-INNER QUADRANT OF RIGHT BREAST IN FEMALE, ESTROGEN RECEPTOR POSITIVE (H): Primary | Chronic | ICD-10-CM

## 2024-10-14 DIAGNOSIS — C50.211 MALIGNANT NEOPLASM OF UPPER-INNER QUADRANT OF RIGHT BREAST IN FEMALE, ESTROGEN RECEPTOR POSITIVE (H): Chronic | ICD-10-CM

## 2024-10-14 LAB
ESTRADIOL SERPL-MCNC: 9 PG/ML
FSH SERPL IRP2-ACNC: 76.2 MIU/ML
INTERPRETATION: NORMAL

## 2024-10-14 PROCEDURE — 96402 CHEMO HORMON ANTINEOPL SQ/IM: CPT

## 2024-10-14 PROCEDURE — 99215 OFFICE O/P EST HI 40 MIN: CPT | Performed by: INTERNAL MEDICINE

## 2024-10-14 PROCEDURE — 36415 COLL VENOUS BLD VENIPUNCTURE: CPT

## 2024-10-14 PROCEDURE — 82670 ASSAY OF TOTAL ESTRADIOL: CPT

## 2024-10-14 PROCEDURE — G2211 COMPLEX E/M VISIT ADD ON: HCPCS | Performed by: INTERNAL MEDICINE

## 2024-10-14 PROCEDURE — 250N000011 HC RX IP 250 OP 636: Mod: JZ | Performed by: INTERNAL MEDICINE

## 2024-10-14 PROCEDURE — 83001 ASSAY OF GONADOTROPIN (FSH): CPT

## 2024-10-14 PROCEDURE — 99211 OFF/OP EST MAY X REQ PHY/QHP: CPT | Mod: 25 | Performed by: INTERNAL MEDICINE

## 2024-10-14 RX ORDER — LETROZOLE 2.5 MG/1
2.5 TABLET, FILM COATED ORAL DAILY
Qty: 30 TABLET | Refills: 1 | Status: SHIPPED | OUTPATIENT
Start: 2024-10-14

## 2024-10-14 RX ADMIN — GOSERELIN ACETATE 10.8 MG: 10.8 IMPLANT SUBCUTANEOUS at 16:55

## 2024-10-14 ASSESSMENT — PAIN SCALES - GENERAL: PAINLEVEL: MODERATE PAIN (5)

## 2024-10-14 NOTE — PROGRESS NOTES
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 She was treated on ISPY 2.2 trial and was found to have a MammaPrint high risk/blueprint luminal B subtype.  She was started on treatment in block B due to lack of availability of block A agents at the time of enrollment.  She completed weekly Taxol -> Abraxane/ddAC and went on to have a bilateral mastectomy with surgical pathology showing evidence of [id number] HR+/HER2 negative IDC (RCB II) in the right breast.       Interval History  Sharon presents today for follow-up after recently undergoing bilateral mastectomy.  She does not have drains removed prior to my visit today and feels much better.  Her pain is well-controlled and she does not have any limited range of motion.    She denies any residual issues or side effects from systemic therapy.      ROS: 10 point ROS neg other than the symptoms noted above in the HPI.    Her full oncologic history is as follows:   Oncologic History  Patient Active Problem List    Diagnosis Date Noted    Malignant neoplasm of upper-inner quadrant of right female breast (H) 04/02/2024     Priority: High     Right breast cancer  Has had longstanding history of multiple biopsies, infection, and partial mastectomy for a right breast multifocal abscess dating back to at least 2010.    2024 patient presented with palpable lump at 1:00 in the right breast    3/21/2024 diagnostic mammo showed clustered fine indeterminate calcs at 12:00.  No mammographic abnormality identified to correspond to the palpable area of fullness at 1:00.  On US, at 1:00, 10 cm FN there was a 3.7 x 2.3 x 1.5 cm solid hypoechoic irregularly marginated mass.  Additional hypoechoic lesion located at 2:00, 3 cm FN measuring 1.0 x 0.5 cm.  Another hypoechoic lesion at  2:00, 12 cm FN measuring 0.7 cm.  At least 2 enlarged right axillary lymph nodes -largest measuring up to 0.6 cm.    3/28/2024 ultrasound-guided biopsy of the followin:00 lesion at least DCIS  1:00 lesion grade 2 IDC.  No LVI.  ER (3+, 80 to 90%), MD (3+, 90 to 100%), HER2 2+ and FISH negative   Right axillary LN: metastatic carcinoma    2024 CT CAP demonstrated multifocal enhancement in the right breast with mildly abnormal right axillary lymph nodes.  Otherwise no evidence of distant metastatic disease.    2024 NM bone scan negative for osteoblastic metastatic disease.    4/15/2024 Baseline MRI breast demonstrated the following  Right breast at 1:00 mass measuring 2.6 x 2.0 by 3.0 cm with extensive surrounding clumped NME occupying most of the right breast and measuring 8.9 x 4.7 x 8.7   Left breast showed a fibroadenoma at 8-9:00 position.  In addition, 7 mm of linear branching NME at 3-4:00 position.  This was biopsied and found to be benign breast tissue.  Few asymmetric right internal mammary chain LN. multiple enlarged right level 1 axillary nodes.  Additionally multiple LEFT level 1 axillary nodes that are indeterminant -which were normal on left axillary US.  FTV 38.222 cc    2024 MammaPrint -0.192 (high risk) and blueprint luminal B    2024 - 2024 weekly Taxol - changed to Abraxane starting week 2 due to allergic reaction    2024 W3 MRI  Minimally decreased size of the postoperative right breast cancer at the approximate 1:00 position measuring approximately 2.4 x 1.9 x 2.9 cm (previously 2.6 x 2.0 x 3.0 cm).  There is overall similar extent although mildly decreased volume of abnormal surrounding NME  Minimal interval enlargement of multiple bilateral level I lymph nodes, including the biopsy-proven right axillary node with indwelling biopsy marker. No significant change in the multiple small asymmetric right internal mammary chain lymph nodes.  27.167 ml - Tumor volume  reduction of 28.9% from baseline     6/10/2024 (6-week MRI breast) 8.8 cm anteroposterior on axial MIPS, unchanged.  Mildly decreased size of the biopsy-proven cancer in the right breast at the approximate 1:00 position middle depth measuring approximately 2.0 x 1.4 x 2.4 cm (previously 2.4 x 1.9 x 2.9 cm).   Extensive surrounding NME is largely unchanged changed, but the volume has minimally decreased.  Stable bilateral axillary lymph nodes, including the biopsy-proven right level I axillary node. Stable asymmetric small right internal mammary chain lymph nodes.  18.274 - FTV reduction of 57% from baseline     7/23/2024 (12-week MRI breast) Longest diameter of suspicious enhancement: 8.0 cm(previously 8.8 cm).   Irregular enhancing mass in the right breast at approximately 1:00 positionmeasures 1.8 x 1.1 x 1.6 cm (previously 2.0 x 1.4 x 2.4 cm).   NME continues to extend from anterior to posterior depth, with anterior NME extending to the base of the nipple.  Stable prominent bilateral axillary nodes, including the biopsied right level 1 axillary node. Small asymmetric right internal mammary chain lymph nodes are also similar to prior  7.585 ml - FTV reduction of 80% from baseline     7/24/2024 Study biopsy  Right breast 1:00 grade 2 residual IDC with probable treatment related changes.  Also grade 2 DCIS.  Right axillary lymph node with residual metastatic disease.  Tumor-infiltrating lymphocytes of 10%.  Some decreased cellularity suggestive of treatment response.    7/25/2024 Start ddAC    8/19/2024 W4 MRI (in block C) Longest diameter of suspicious enhancement: 8 cm (sharee has decreased volume of enhancement.  Right breast at 1:00 posterior depth measures 1.7 x 0.7 x 1.3 cm, previously 1.8 x 0.9 x 1.6 cm  The right MEÑO node just above the 3rd rib is slightly smaller than the prior exam  The right axillary lymph nodes have slightly decreased in size. No lymphadenopathy in the left axilla.    FTV 6.349 ml - 83%  reduction from baseline       Hypotension, unspecified hypotension type 07/29/2024     Priority: Medium    Syncope, unspecified syncope type 07/29/2024     Priority: Medium    Inflammatory arthritis 06/01/2015     Priority: Medium    Seizure (H) 11/15/2013     Priority: Medium    Vitamin D deficiency 04/23/2013     Priority: Medium       Current Medications:  Current Outpatient Medications   Medication Sig Dispense Refill    aspirin-acetaminophen-caffeine (EXCEDRIN MIGRAINE) 250-250-65 MG tablet       dexAMETHasone (DECADRON) 4 MG tablet Take 2 tablets (8 mg) by mouth daily Take for 3 days, starting the day after chemo. Take with food. 6 tablet 3    FT ANTACID & ANTIGAS 200-200-20 MG/5ML SUSP suspension       ibuprofen (ADVIL/MOTRIN) 200 MG capsule       lidocaine-prilocaine (EMLA) 2.5-2.5 % external cream Apply topically as needed for moderate pain 30 g 1    LORazepam (ATIVAN) 0.5 MG tablet Take 1 tablet (0.5 mg) by mouth every 6 hours as needed for anxiety 30 tablet 0    OLANZapine (ZYPREXA) 5 MG tablet Take 1 tablet (5 mg) by mouth at bedtime 30 tablet 0    omeprazole (PRILOSEC) 40 MG DR capsule Take 1 capsule (40 mg) by mouth daily 30 capsule 1    ondansetron (ZOFRAN) 8 MG tablet Take 1 tablet (8 mg) by mouth every 8 hours as needed for nausea 30 tablet 3    oxyCODONE (ROXICODONE) 5 MG tablet Take 1-2 tablets (5-10 mg) by mouth every 4 hours as needed for moderate to severe pain. 10 tablet 0    polyethylene glycol (MIRALAX) 17 GM/Dose powder Take 17 g by mouth daily 510 g 1         ECOG Performance Status: 0    Physical Examination:  There were no vitals taken for this visit.  General: Fatigued appearing, well-nourished adult female in NAD.  HEENT:  Normocephalic.  Sclera anicteric.  MMM.  No lesions of the oropharynx.  Lymph:  No cervical, supraclavicular, or axillary LAD.  Breast: In the right breast at 1:00, mass is no longer palpable (previously ~2cm)  I am not able to palpate axillary LN on my exam today    Chest:  CTA bilaterally.  No wheezes or crackles.  CV:  RRR.  Nl S1 and S2.  No m/r/g.  Abd:  Soft/NT/ND.  BSs normoactive.  No hepatosplenomegaly.  Ext:  No pitting edema of the bilateral lower extremities.  Pulses 2+ and symmetric.  Musculo:  Strength 5/5 throughout.  Neuro:  Cranial nerves grossly intact.  Psych:  Mood and affect appear normal.        Laboratory Data:  Lab Results   Component Value Date    WBC 6.0 09/26/2024    HGB 9.4 (L) 09/26/2024    HCT 29.3 (L) 09/26/2024    MCV 89 09/26/2024     09/26/2024     Lab Results   Component Value Date     09/26/2024    BUN 10.9 09/26/2024    ANIONGAP 11 09/26/2024     Lab Results   Component Value Date    ALT 26 09/26/2024    AST 33 09/26/2024    ALKPHOS 68 09/26/2024     Lab Results   Component Value Date    INR 1.01 07/29/2024       Radiology data:  I have personally reviewed the images of / or the following radiology data: As detailed in the oncology timeline      Pathology and other data:  I have personally reviewed the following data: As detailed in the oncology timeline    9/30/2024   Final Diagnosis   A. RIGHT breast, mastectomy:  - INVASIVE BREAST CARCINOMA OF NO SPECIAL TYPE (INVASIVE DUCTAL CARCINOMA), [place] Grade 2, size 16 mm x 12 mm, residual after neoadjuvant chemotherapy  - Ductal carcinoma in situ (DCIS), nuclear grade 2, solid and cribriform types  - Margins are uninvolved by invasive carcinoma and, >5 mm from all resection margins   - Margins are uninvolved by DCIS and, >5 mm from all resection margins   - Atypical ductal hyperplasia (ADH)  - Lobular carcinoma in-situ (LCIS), classic type  - Calcification associated with invasive carcinoma, DCIS, and benign acini   - Treatment related changes   - Prior core biopsy markers and biopsy site changes  - Benign skin and nipple    - Other findings: fibroadenomatoid changes and benign intraductal papillomas  - Invasive carcinoma is estrogen receptor positive, progesterone receptor  positive, and HER2 equivocal (score 2+) by immunohistochemistry  - HER2 by FISH is in process, and will be reported separately by [employer]   - See comment, 'Breast Biomarker Reporting Template', and tumor synoptic below     B. Lymph node, RIGHT axillary #1, sentinel, biopsy:  - METASTATIC BREAST DUCTAL CARCINOMA in one of two lymph nodes (1/2)  - Greatest metastatic focus 3 mm  - No extranodal extension identified  - Treatment related changes  - Prior biopsy site changes     C. Lymph node, RIGHT axillary #2, sentinel, biopsy:  - One lymph node, negative for carcinoma (0/1)     D. Lymph node, RIGHT axillary #3, sentinel, biopsy:  - One lymph node, negative for carcinoma (0/1)     E. LEFT breast, mastectomy:  - Atypical ductal hyperplasia (ADH)  - Fibroadenoma (1 cm)  - Benign skin and nipple  - Negative for malignancy    Electronically signed by Bora Florence MD on 10/10/2024 at 12:27 PM   Comment  [facility]MAY[facility]   The invasive carcinoma is present predominately as a single cohesive mass, size 16 mm x12 mm. The invasive carcinoma shows morphology consistent with treatment-related changes. The total tumor cellularity is 25%. DCIS comprises 5% of tumor cellularity. Please see tumor synoptic for MD Reilly residual cancer burden score.    Synoptic Checklist   Breast Biomarker Reporting Template   Protocol posted: 12/13/2023BREAST BIOMARKER REPORTING TEMPLATE - A  Test(s) Performed     Estrogen Receptor (ER) Status  Positive (greater than 10% of cells demonstrate nuclear positivity)   Percentage of Cells with Nuclear Positivity  %   Average Intensity of Staining  Strong   Test Type  Food and Drug Administration (FDA) cleared (test / vendor): [facility]   Primary Antibody  SP1   Scoring System  No separate scoring system used   Test(s) Performed     Progesterone Receptor (PgR) Status  Positive   Percentage of Cells with Nuclear Positivity  3 %   Average Intensity of Staining  Weak   Test Type  Food and Drug  Administration (FDA) cleared (test / vendor): [facility]   Primary Antibody  1E2   Scoring System  No separate scoring system used   Test(s) Performed     HER2 by Immunohistochemistry  Equivocal (Score 2+)   Test Type  Food and Drug Administration (FDA) cleared (test / vendor): [employer]   Primary Antibody  4B5   Cold Ischemia and Fixation Times  Meet requirements specified in latest version of the ASCO / CAP Guidelines   Testing Performed on Block Number(s)  A14   METHODS   Fixative  Formalin   Image Analysis  Not performed   .   INVASIVE CARCINOMA OF THE BREAST: Resection   8th Edition - Protocol posted: 12/13/2023INVASIVE CARCINOMA OF THE BREAST: RESECTION - All Specimens  SPECIMEN   Procedure  Total mastectomy   Specimen Laterality  Right   TUMOR   Histologic Type  Invasive carcinoma of no special type (ductal)   Histologic Grade (Bill Histologic Score)     Glandular (Acinar) / Tubular Differentiation  Score 2   Nuclear Pleomorphism  Score 3   Mitotic Rate  Score 1   Overall Grade  Grade 2 (scores of 6 or 7)   Tumor Size  Greatest dimension of largest invasive focus (Millimeters): 16 mm   Additional Dimension (Millimeters)  12 mm     11 mm   Tumor Focality  Single focus of invasive carcinoma   Ductal Carcinoma In Situ (DCIS)  Present     Negative for extensive intraductal component (EIC)   Size (Extent) of DCIS  Estimated size (extent) of DCIS is at least (Millimeters): 16 mm   Architectural Patterns  Cribriform     Solid   Nuclear Grade  Grade II (intermediate)   Necrosis  Present, focal (small foci or single cell necrosis)   Lobular Carcinoma In Situ (LCIS)  Present   Lymphatic and / or Vascular Invasion  Not identified   Microcalcifications  Present in DCIS     Present in invasive carcinoma     Present in non-neoplastic tissue   Treatment Effect in the Breast  No definite response to presurgical therapy in the invasive carcinoma   Treatment Effect in the Lymph Nodes  No definite response to presurgical  therapy in metastatic carcinoma   Residual Cancer [place] (RCB) Calculation     Primary Tumor Bed     Greatest Dimension of Primary Tumor Bed Area (Millimeters)  16 mm   Second Greatest Dimension of Primary Tumor Bed Area (Millimeters)  12 mm   Percentage of Overall Cancer Cellularity  25 %   Percentage of Cancer that is in situ Disease  5 %   Lymph Nodes     Number of Positive Lymph Nodes  1   Diameter of Largest [place] Metastasis (Millimeters)  3 mm   RCB Calculations     Residual Cancer [place]  2.92   Residual Cancer [place] Class  RCB-II   MARGINS   Margin Status for Invasive Carcinoma  All margins negative for invasive carcinoma   Distance from Invasive Carcinoma to Closest Margin  7 mm   Closest Margin(s) to Invasive Carcinoma  Anterior     Superior   Distance from Invasive Carcinoma to Anterior Margin  Greater than: 7 mm   Distance from Invasive Carcinoma to Posterior Margin  Greater than: 5 mm   Distance from Invasive Carcinoma to Superior Margin  7 mm   Distance from Invasive Carcinoma to Inferior Margin  Greater than: 5 mm   Distance from Invasive Carcinoma to Medial Margin  Greater than: 5 mm   Distance from Invasive Carcinoma to Lateral Margin  Greater than: 5 mm   Margin Status for DCIS  All margins negative for DCIS   Distance from DCIS to Closest Margin  Greater than: 5 mm   Distance from DCIS to Anterior Margin  Greater than: 5 mm   Distance from DCIS to Posterior Margin  Greater than: 5 mm   Distance from DCIS to Superior Margin  Greater than: 5 mm   Distance from DCIS to Inferior Margin  Greater than: 5 mm   Distance from DCIS to Medial Margin  Greater than: 5 mm   Distance from DCIS to Lateral Margin  Greater than: 5 mm   REGIONAL LYMPH NODES   Regional Lymph Node Status  Tumor present in regional lymph node(s)   Number of Lymph Nodes with Macrometastases  1   Number of Lymph Nodes with Micrometastases  0   Size of Largest [place] Metastatic Deposit  3 mm   Extranodal Extension  Not identified   Total  Number of Lymph Nodes Examined (sentinel and non-sentinel)  4   Number of [place] Nodes Examined  4   pTNM CLASSIFICATION (AJCC 8th Edition)   Reporting of pT, pN, and (when applicable) pM categories is based on information available to the pathologist at the time the report is issued. As per the AJCC (Chapter 1, 8th Ed.) it is the managing physician s responsibility to establish the final pathologic stage based upon all pertinent information, including but potentially not limited to this pathology report.   Modified Classification  y   pT Category  pT1c   pN Category  pN1a   N Suffix  (sn)   Comment(s)  Best block for ancillary studies: A14. Alternate block: A15.   .            Assessment and Recommendations  Sharon Fong  is a 48 year old premenopausal woman with palp detected clinical stage II-III HR+/HER2 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  She underwent treatment on the I SPY 2.2 clinical trial and completed 12 weeks of Taxol/Abraxane and 4 cycles of ddAC.  She underwent a bilateral mastectomy with evidence of dT8tN7h (RCB 2) disease.  She is here for discussions regarding adjuvant therapy.      # Right breast cancer  Given presence of residual lymph node involvement, I recommend a consultation with the radiation oncology team for consideration of postmastectomy radiation.  She prefers this at [place].  - We discussed use of Zoladex to ensure that she remains in menopause.  Chemotherapy likely has already pushed her into menopause, but will keep her on Zoladex every 3 months for the next year or so and reassess.  - Given her tumor is HR+, I recommend at least 5 years of adjuvant endocrine therapy with aromatase inhibitors.  We discussed starting this 2 to 3 weeks after initiation of Zoladex.  - Since she has a clinical stage II (or stage III disease if we include the internal mammary nodes), she would be eligible for adjuvant Ribociclib per data from [facility] trial.  I recommend starting this after  she completes adjuvant radiation therapy.    # Bone health  - I recommend obtaining a baseline DEXA scan  - Encouraged her to start at 1000 mg of calcium and 1000 units of vitamin D daily  - We discussed the role of adjuvant bisphosphonates in both preventing bone density loss and also their impact on breast cancer outcomes. Adjuvant bisphosphonates reduces the rate of breast cancer recurrence in the bone and improve breast cancer survival, but there is definite benefit only in women who were postmenopausal when treatment began.. We discussed potential side effects including but not limited to infusion reactions and osteonecrosis of the jaw.  She will need dental clearance prior to initiation of this.      RTC in 2 months to initiate Ribociclib      50 minutes spent on the date of the encounter doing chart review, review of test results, interpretation of tests, patient visit, and documentation     Dame Dolores MD

## 2024-10-14 NOTE — NURSING NOTE
Administrations This Visit       goserelin (ZOLADEX) injection 10.8 mg       Admin Date  10/14/2024 Action  $Given Dose  10.8 mg Route  Subcutaneous Documented By  Concepcion Wiggins, RN                  Patient tolerated Zoladex to RLQ abdomen without incident. Patient iced prior.    Concepcion Wiggins, RN

## 2024-10-14 NOTE — PROGRESS NOTES
The patient is here for postoperative visit after undergoing bilateral mastectomies and a sentinel lymph node biopsy following neoadjuvant chemotherapy.  She has been doing well and her drain output has been less than 30 cc/day.  I removed both drains without any difficulty but she had some pain in the left side.  All of her incisions are healing well with no evidence of infection.  I reviewed her path report which demonstrated 16 mm of residual invasive disease and 1 positive sentinel lymph node.  She is scheduled to see oncology later today.  She understands that she will need radiation therapy.  I will see in the future if any problems arise.

## 2024-10-14 NOTE — NURSING NOTE
"Oncology Rooming Note    October 14, 2024 2:59 PM   Sharon Fong is a 48 year old female who presents for:    Chief Complaint   Patient presents with    Oncology Clinic Visit    Breast Cancer     Malignant neoplasm of upper-inner quadrant of right breast, female     Initial Vitals: There were no vitals taken for this visit. Estimated body mass index is 28.18 kg/m  as calculated from the following:    Height as of 8/6/24: 1.64 m (5' 4.57\").    Weight as of an earlier encounter on 10/14/24: 75.8 kg (167 lb 1.6 oz). There is no height or weight on file to calculate BSA.  Data Unavailable Comment: Data Unavailable   No LMP recorded. Patient has had a hysterectomy.  Allergies reviewed: Yes  Medications reviewed: Yes    Medications: Medication refills not needed today.  Pharmacy name entered into [facility]: [facility] [address] [place] [address] - [address] [place] [address]    Frailty Screening:   Is the patient here for a new oncology consult visit in cancer care? 2. No      Clinical concerns: Pt had appt earlier and staying in same room for this provider. Rooming process was not completed because of this.        Fatoumata Marc, EMT     "

## 2024-10-14 NOTE — LETTER
10/14/2024      Sharon Fong  [address] [facility] [address]      Dear Colleague,    Thank you for referring your patient, Sharon Fong, to the [facility]. Please see a copy of my visit note below.    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 She was treated on ISPY 2.2 trial and was found to have a MammaPrint high risk/blueprint luminal B subtype.  She was started on treatment in block B due to lack of availability of block A agents at the time of enrollment.  She completed weekly Taxol -> Abraxane/ddAC and went on to have a bilateral mastectomy with surgical pathology showing evidence of [id number] HR+/HER2 negative IDC (RCB II) in the right breast.       Interval History  Sharon presents today for follow-up after recently undergoing bilateral mastectomy.  She does not have drains removed prior to my visit today and feels much better.  Her pain is well-controlled and she does not have any limited range of motion.    She denies any residual issues or side effects from systemic therapy.      ROS: 10 point ROS neg other than the symptoms noted above in the HPI.    Her full oncologic history is as follows:   Oncologic History  Patient Active Problem List    Diagnosis Date Noted     Malignant neoplasm of upper-inner quadrant of right female breast (H) 04/02/2024     Priority: High     Right breast cancer  Has had longstanding history of multiple biopsies, infection, and partial mastectomy for a right breast multifocal abscess dating back to at least 2010. 2024 patient presented with palpable lump at 1:00 in the right breast    3/21/2024 diagnostic mammo showed clustered fine indeterminate calcs at 12:00.  No mammographic abnormality identified to correspond to  the palpable area of fullness at 1:00.  On US, at 1:00, 10 cm FN there was a 3.7 x 2.3 x 1.5 cm solid hypoechoic irregularly marginated mass.  Additional hypoechoic lesion located at 2:00, 3 cm FN measuring 1.0 x 0.5 cm.  Another hypoechoic lesion at 2:00, 12 cm FN measuring 0.7 cm.  At least 2 enlarged right axillary lymph nodes -largest measuring up to 0.6 cm.    3/28/2024 ultrasound-guided biopsy of the followin:00 lesion at least DCIS  1:00 lesion grade 2 IDC.  No LVI.  ER (3+, 80 to 90%), NJ (3+, 90 to 100%), HER2 2+ and FISH negative   Right axillary LN: metastatic carcinoma    2024 CT CAP demonstrated multifocal enhancement in the right breast with mildly abnormal right axillary lymph nodes.  Otherwise no evidence of distant metastatic disease.    2024 NM bone scan negative for osteoblastic metastatic disease.    4/15/2024 Baseline MRI breast demonstrated the following  Right breast at 1:00 mass measuring 2.6 x 2.0 by 3.0 cm with extensive surrounding clumped NME occupying most of the right breast and measuring 8.9 x 4.7 x 8.7   Left breast showed a fibroadenoma at 8-9:00 position.  In addition, 7 mm of linear branching NME at 3-4:00 position.  This was biopsied and found to be benign breast tissue.  Few asymmetric right internal mammary chain LN. multiple enlarged right level 1 axillary nodes.  Additionally multiple LEFT level 1 axillary nodes that are indeterminant -which were normal on left axillary US.  FTV 38.222 cc    2024 MammaPrint -0.192 (high risk) and blueprint luminal B    2024 - 2024 weekly Taxol - changed to Abraxane starting week 2 due to allergic reaction    2024 W3 MRI  Minimally decreased size of the postoperative right breast cancer at the approximate 1:00 position measuring approximately 2.4 x 1.9 x 2.9 cm (previously 2.6 x 2.0 x 3.0 cm).  There is overall similar extent although mildly decreased volume of abnormal surrounding NME  Minimal interval  enlargement of multiple bilateral level I lymph nodes, including the biopsy-proven right axillary node with indwelling biopsy marker. No significant change in the multiple small asymmetric right internal mammary chain lymph nodes.  27.167 ml - Tumor volume reduction of 28.9% from baseline     6/10/2024 (6-week MRI breast) 8.8 cm anteroposterior on axial MIPS, unchanged.  Mildly decreased size of the biopsy-proven cancer in the right breast at the approximate 1:00 position middle depth measuring approximately 2.0 x 1.4 x 2.4 cm (previously 2.4 x 1.9 x 2.9 cm).   Extensive surrounding NME is largely unchanged changed, but the volume has minimally decreased.  Stable bilateral axillary lymph nodes, including the biopsy-proven right level I axillary node. Stable asymmetric small right internal mammary chain lymph nodes.  18.274 - FTV reduction of 57% from baseline     7/23/2024 (12-week MRI breast) Longest diameter of suspicious enhancement: 8.0 cm(previously 8.8 cm).   Irregular enhancing mass in the right breast at approximately 1:00 positionmeasures 1.8 x 1.1 x 1.6 cm (previously 2.0 x 1.4 x 2.4 cm).   NME continues to extend from anterior to posterior depth, with anterior NME extending to the base of the nipple.  Stable prominent bilateral axillary nodes, including the biopsied right level 1 axillary node. Small asymmetric right internal mammary chain lymph nodes are also similar to prior  7.585 ml - FTV reduction of 80% from baseline     7/24/2024 Study biopsy  Right breast 1:00 grade 2 residual IDC with probable treatment related changes.  Also grade 2 DCIS.  Right axillary lymph node with residual metastatic disease.  Tumor-infiltrating lymphocytes of 10%.  Some decreased cellularity suggestive of treatment response.    7/25/2024 Start ddAC    8/19/2024 W4 MRI (in block C) Longest diameter of suspicious enhancement: 8 cm (sharee has decreased volume of enhancement.  Right breast at 1:00 posterior depth measures 1.7  x 0.7 x 1.3 cm, previously 1.8 x 0.9 x 1.6 cm  The right MEÑO node just above the 3rd rib is slightly smaller than the prior exam  The right axillary lymph nodes have slightly decreased in size. No lymphadenopathy in the left axilla.    FTV 6.349 ml - 83% reduction from baseline        Hypotension, unspecified hypotension type 07/29/2024     Priority: Medium     Syncope, unspecified syncope type 07/29/2024     Priority: Medium     Inflammatory arthritis 06/01/2015     Priority: Medium     Seizure (H) 11/15/2013     Priority: Medium     Vitamin D deficiency 04/23/2013     Priority: Medium       Current Medications:  Current Outpatient Medications   Medication Sig Dispense Refill     aspirin-acetaminophen-caffeine (EXCEDRIN MIGRAINE) 250-250-65 MG tablet        dexAMETHasone (DECADRON) 4 MG tablet Take 2 tablets (8 mg) by mouth daily Take for 3 days, starting the day after chemo. Take with food. 6 tablet 3     FT ANTACID & ANTIGAS 200-200-20 MG/5ML SUSP suspension        ibuprofen (ADVIL/MOTRIN) 200 MG capsule        lidocaine-prilocaine (EMLA) 2.5-2.5 % external cream Apply topically as needed for moderate pain 30 g 1     LORazepam (ATIVAN) 0.5 MG tablet Take 1 tablet (0.5 mg) by mouth every 6 hours as needed for anxiety 30 tablet 0     OLANZapine (ZYPREXA) 5 MG tablet Take 1 tablet (5 mg) by mouth at bedtime 30 tablet 0     omeprazole (PRILOSEC) 40 MG DR capsule Take 1 capsule (40 mg) by mouth daily 30 capsule 1     ondansetron (ZOFRAN) 8 MG tablet Take 1 tablet (8 mg) by mouth every 8 hours as needed for nausea 30 tablet 3     oxyCODONE (ROXICODONE) 5 MG tablet Take 1-2 tablets (5-10 mg) by mouth every 4 hours as needed for moderate to severe pain. 10 tablet 0     polyethylene glycol (MIRALAX) 17 GM/Dose powder Take 17 g by mouth daily 510 g 1         ECOG Performance Status: 0    Physical Examination:  There were no vitals taken for this visit.  General: Fatigued appearing, well-nourished adult female in  NAD.  HEENT:  Normocephalic.  Sclera anicteric.  MMM.  No lesions of the oropharynx.  Lymph:  No cervical, supraclavicular, or axillary LAD.  Breast: In the right breast at 1:00, mass is no longer palpable (previously ~2cm)  I am not able to palpate axillary LN on my exam today   Chest:  CTA bilaterally.  No wheezes or crackles.  CV:  RRR.  Nl S1 and S2.  No m/r/g.  Abd:  Soft/NT/ND.  BSs normoactive.  No hepatosplenomegaly.  Ext:  No pitting edema of the bilateral lower extremities.  Pulses 2+ and symmetric.  Musculo:  Strength 5/5 throughout.  Neuro:  Cranial nerves grossly intact.  Psych:  Mood and affect appear normal.        Laboratory Data:  Lab Results   Component Value Date    WBC 6.0 09/26/2024    HGB 9.4 (L) 09/26/2024    HCT 29.3 (L) 09/26/2024    MCV 89 09/26/2024     09/26/2024     Lab Results   Component Value Date     09/26/2024    BUN 10.9 09/26/2024    ANIONGAP 11 09/26/2024     Lab Results   Component Value Date    ALT 26 09/26/2024    AST 33 09/26/2024    ALKPHOS 68 09/26/2024     Lab Results   Component Value Date    INR 1.01 07/29/2024       Radiology data:  I have personally reviewed the images of / or the following radiology data: As detailed in the oncology timeline      Pathology and other data:  I have personally reviewed the following data: As detailed in the oncology timeline    9/30/2024   Final Diagnosis   A. RIGHT breast, mastectomy:  - INVASIVE BREAST CARCINOMA OF NO SPECIAL TYPE (INVASIVE DUCTAL CARCINOMA), [place] Grade 2, size 16 mm x 12 mm, residual after neoadjuvant chemotherapy  - Ductal carcinoma in situ (DCIS), nuclear grade 2, solid and cribriform types  - Margins are uninvolved by invasive carcinoma and, >5 mm from all resection margins   - Margins are uninvolved by DCIS and, >5 mm from all resection margins   - Atypical ductal hyperplasia (ADH)  - Lobular carcinoma in-situ (LCIS), classic type  - Calcification associated with invasive carcinoma, DCIS, and  benign acini   - Treatment related changes   - Prior core biopsy markers and biopsy site changes  - Benign skin and nipple    - Other findings: fibroadenomatoid changes and benign intraductal papillomas  - Invasive carcinoma is estrogen receptor positive, progesterone receptor positive, and HER2 equivocal (score 2+) by immunohistochemistry  - HER2 by FISH is in process, and will be reported separately by [employer]   - See comment, 'Breast Biomarker Reporting Template', and tumor synoptic below     B. Lymph node, RIGHT axillary #1, sentinel, biopsy:  - METASTATIC BREAST DUCTAL CARCINOMA in one of two lymph nodes (1/2)  - Greatest metastatic focus 3 mm  - No extranodal extension identified  - Treatment related changes  - Prior biopsy site changes     C. Lymph node, RIGHT axillary #2, sentinel, biopsy:  - One lymph node, negative for carcinoma (0/1)     D. Lymph node, RIGHT axillary #3, sentinel, biopsy:  - One lymph node, negative for carcinoma (0/1)     E. LEFT breast, mastectomy:  - Atypical ductal hyperplasia (ADH)  - Fibroadenoma (1 cm)  - Benign skin and nipple  - Negative for malignancy    Electronically signed by Bora Florence MD on 10/10/2024 at 12:27 PM   Comment  [id number]   The invasive carcinoma is present predominately as a single cohesive mass, size 16 mm x12 mm. The invasive carcinoma shows morphology consistent with treatment-related changes. The total tumor cellularity is 25%. DCIS comprises 5% of tumor cellularity. Please see tumor synoptic for MD Reilly residual cancer burden score.    Synoptic Checklist   Breast Biomarker Reporting Template   Protocol posted: 12/13/2023BREAST BIOMARKER REPORTING TEMPLATE - A  Test(s) Performed     Estrogen Receptor (ER) Status  Positive (greater than 10% of cells demonstrate nuclear positivity)   Percentage of Cells with Nuclear Positivity  %   Average Intensity of Staining  Strong   Test Type  Food and Drug Administration (FDA) cleared (test /  vendor): [employer]   Primary Antibody  SP1   Scoring System  No separate scoring system used   Test(s) Performed     Progesterone Receptor (PgR) Status  Positive   Percentage of Cells with Nuclear Positivity  3 %   Average Intensity of Staining  Weak   Test Type  Food and Drug Administration (FDA) cleared (test / vendor): [employer]   Primary Antibody  1E2   Scoring System  No separate scoring system used   Test(s) Performed     HER2 by Immunohistochemistry  Equivocal (Score 2+)   Test Type  Food and Drug Administration (FDA) cleared (test / vendor): [employer]   Primary Antibody  4B5   Cold Ischemia and Fixation Times  Meet requirements specified in latest version of the ASCO / CAP Guidelines   Testing Performed on Block Number(s)  A14   METHODS   Fixative  Formalin   Image Analysis  Not performed   .   INVASIVE CARCINOMA OF THE BREAST: Resection   8th Edition - Protocol posted: 12/13/2023INVASIVE CARCINOMA OF THE BREAST: RESECTION - All Specimens  SPECIMEN   Procedure  Total mastectomy   Specimen Laterality  Right   TUMOR   Histologic Type  Invasive carcinoma of no special type (ductal)   Histologic Grade ([place] Histologic Score)     Glandular (Acinar) / Tubular Differentiation  Score 2   Nuclear Pleomorphism  Score 3   Mitotic Rate  Score 1   Overall Grade  Grade 2 (scores of 6 or 7)   Tumor Size  Greatest dimension of largest invasive focus (Millimeters): 16 mm   Additional Dimension (Millimeters)  12 mm     11 mm   Tumor Focality  Single focus of invasive carcinoma   Ductal Carcinoma In Situ (DCIS)  Present     Negative for extensive intraductal component (EIC)   Size (Extent) of DCIS  Estimated size (extent) of DCIS is at least (Millimeters): 16 mm   Architectural Patterns  Cribriform     Solid   Nuclear Grade  Grade II (intermediate)   Necrosis  Present, focal (small foci or single cell necrosis)   Lobular Carcinoma In Situ (LCIS)  Present   Lymphatic and / or Vascular Invasion  Not identified    Microcalcifications  Present in DCIS     Present in invasive carcinoma     Present in non-neoplastic tissue   Treatment Effect in the Breast  No definite response to presurgical therapy in the invasive carcinoma   Treatment Effect in the Lymph Nodes  No definite response to presurgical therapy in metastatic carcinoma   Residual Cancer [place] (RCB) Calculation     Primary Tumor Bed     Greatest Dimension of Primary Tumor Bed Area (Millimeters)  16 mm   Second Greatest Dimension of Primary Tumor Bed Area (Millimeters)  12 mm   Percentage of Overall Cancer Cellularity  25 %   Percentage of Cancer that is in situ Disease  5 %   Lymph Nodes     Number of Positive Lymph Nodes  1   Diameter of Largest [place] Metastasis (Millimeters)  3 mm   RCB Calculations     Residual Cancer [place]  2.92   Residual Cancer [place] Class  RCB-II   MARGINS   Margin Status for Invasive Carcinoma  All margins negative for invasive carcinoma   Distance from Invasive Carcinoma to Closest Margin  7 mm   Closest Margin(s) to Invasive Carcinoma  Anterior     Superior   Distance from Invasive Carcinoma to Anterior Margin  Greater than: 7 mm   Distance from Invasive Carcinoma to Posterior Margin  Greater than: 5 mm   Distance from Invasive Carcinoma to Superior Margin  7 mm   Distance from Invasive Carcinoma to Inferior Margin  Greater than: 5 mm   Distance from Invasive Carcinoma to Medial Margin  Greater than: 5 mm   Distance from Invasive Carcinoma to Lateral Margin  Greater than: 5 mm   Margin Status for DCIS  All margins negative for DCIS   Distance from DCIS to Closest Margin  Greater than: 5 mm   Distance from DCIS to Anterior Margin  Greater than: 5 mm   Distance from DCIS to Posterior Margin  Greater than: 5 mm   Distance from DCIS to Superior Margin  Greater than: 5 mm   Distance from DCIS to Inferior Margin  Greater than: 5 mm   Distance from DCIS to Medial Margin  Greater than: 5 mm   Distance from DCIS to Lateral Margin  Greater than: 5  mm   REGIONAL LYMPH NODES   Regional Lymph Node Status  Tumor present in regional lymph node(s)   Number of Lymph Nodes with Macrometastases  1   Number of Lymph Nodes with Micrometastases  0   Size of Largest [place] Metastatic Deposit  3 mm   Extranodal Extension  Not identified   Total Number of Lymph Nodes Examined (sentinel and non-sentinel)  4   Number of [place] Nodes Examined  4   pTNM CLASSIFICATION (AJCC 8th Edition)   Reporting of pT, pN, and (when applicable) pM categories is based on information available to the pathologist at the time the report is issued. As per the AJCC (Chapter 1, 8th Ed.) it is the managing physician s responsibility to establish the final pathologic stage based upon all pertinent information, including but potentially not limited to this pathology report.   Modified Classification  y   pT Category  pT1c   pN Category  pN1a   N Suffix  (sn)   Comment(s)  Best block for ancillary studies: A14. Alternate block: A15.   .            Assessment and Recommendations  Sharon Fong  is a 48 year old premenopausal woman with palp detected clinical stage II-III HR+/HER2 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  She underwent treatment on the I SPY 2.2 clinical trial and completed 12 weeks of Taxol/Abraxane and 4 cycles of ddAC.  She underwent a bilateral mastectomy with evidence of [id number] (RCB 2) disease.  She is here for discussions regarding adjuvant therapy.      # Right breast cancer  Given presence of residual lymph node involvement, I recommend a consultation with the radiation oncology team for consideration of postmastectomy radiation.  She prefers this at [place].  - We discussed use of Zoladex to ensure that she remains in menopause.  Chemotherapy likely has already pushed her into menopause, but will keep her on Zoladex every 3 months for the next year or so and reassess.  - Given her tumor is HR+, I recommend at least 5 years of adjuvant endocrine therapy with aromatase  inhibitors.  We discussed starting this 2 to 3 weeks after initiation of Zoladex.  - Since she has a clinical stage II (or stage III disease if we include the internal mammary nodes), she would be eligible for adjuvant Ribociclib per data from [place] trial.  I recommend starting this after she completes adjuvant radiation therapy.    # Bone health  - I recommend obtaining a baseline DEXA scan  - Encouraged her to start at 1000 mg of calcium and 1000 units of vitamin D daily  - We discussed the role of adjuvant bisphosphonates in both preventing bone density loss and also their impact on breast cancer outcomes. Adjuvant bisphosphonates reduces the rate of breast cancer recurrence in the bone and improve breast cancer survival, but there is definite benefit only in women who were postmenopausal when treatment began.. We discussed potential side effects including but not limited to infusion reactions and osteonecrosis of the jaw.  She will need dental clearance prior to initiation of this.      RTC in 2 months to initiate Ribociclib      50 minutes spent on the date of the encounter doing chart review, review of test results, interpretation of tests, patient visit, and documentation     Dame Dolores MD      Again, thank you for allowing me to participate in the care of your patient.        Sincerely,        Dame Dolores MD

## 2024-10-14 NOTE — NURSING NOTE
"Oncology Rooming Note    October 14, 2024 2:38 PM   Sharon Fong is a 48 year old female who presents for:    Chief Complaint   Patient presents with    Oncology Clinic Visit     Surgical Followup     Initial Vitals: /80 (BP Location: Right arm, Patient Position: Sitting, Cuff Size: Adult Regular)   Pulse 94   Temp 98.6  F (37  C) (Oral)   Resp 12   Wt 75.8 kg (167 lb 1.6 oz)   SpO2 100%   BMI 28.18 kg/m   Estimated body mass index is 28.18 kg/m  as calculated from the following:    Height as of 8/6/24: 1.64 m (5' 4.57\").    Weight as of this encounter: 75.8 kg (167 lb 1.6 oz). Body surface area is 1.86 meters squared.  Moderate Pain (5) Comment: Data Unavailable   No LMP recorded. Patient has had a hysterectomy.  Allergies reviewed: Yes  Medications reviewed: Yes    Medications: Medication refills not needed today.  Pharmacy name entered into [facility]: [facility] [address] [place] [address] - [address] [place] [address]    Frailty Screening:   Is the patient here for a new oncology consult visit in cancer care? 2. No      Clinical concerns: none       Torsten Mckeon"

## 2024-10-14 NOTE — LETTER
10/14/2024      Sharon Fong  [address] [address] [facility] [address]      Dear Colleague,    Thank you for referring your patient, Sharon Fong, to the [facility]. Please see a copy of my visit note below.    The patient is here for postoperative visit after undergoing bilateral mastectomies and a sentinel lymph node biopsy following neoadjuvant chemotherapy.  She has been doing well and her drain output has been less than 30 cc/day.  I removed both drains without any difficulty but she had some pain in the left side.  All of her incisions are healing well with no evidence of infection.  I reviewed her path report which demonstrated 16 mm of residual invasive disease and 1 positive sentinel lymph node.  She is scheduled to see oncology later today.  She understands that she will need radiation therapy.  I will see in the future if any problems arise.        Again, thank you for allowing me to participate in the care of your patient.        Sincerely,        Boom Collier MD

## 2024-10-15 ENCOUNTER — PATIENT OUTREACH (OUTPATIENT)
Dept: ONCOLOGY | Facility: CLINIC | Age: 49
End: 2024-10-15
Payer: COMMERCIAL

## 2024-10-15 DIAGNOSIS — Z17.0 MALIGNANT NEOPLASM OF UPPER-INNER QUADRANT OF RIGHT BREAST IN FEMALE, ESTROGEN RECEPTOR POSITIVE (H): Primary | Chronic | ICD-10-CM

## 2024-10-15 DIAGNOSIS — C50.211 MALIGNANT NEOPLASM OF UPPER-INNER QUADRANT OF RIGHT BREAST IN FEMALE, ESTROGEN RECEPTOR POSITIVE (H): Primary | Chronic | ICD-10-CM

## 2024-10-15 DIAGNOSIS — Z90.13 H/O BILATERAL MASTECTOMY: ICD-10-CM

## 2024-10-15 NOTE — PROGRESS NOTES
New Patient Oncology Nurse Navigator Note     Referring provider: Dr. Dame Bernal     Referring Clinic/Organization: [facility]     Referred to (specialty:) Radiation Oncology      Date Referral Received: October 15, 2024     Evaluation for:  C50.211, Z17.0 (ICD-10-CM) - Malignant neoplasm of upper-inner quadrant of right breast in female, estrogen receptor positive (H)      Clinical History (per Nurse review of records provided):      Sharon Fong is a 48 year old female with recent diagnosis of 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     She started screening for the ISPY 2.2 trial and was found to have a MammaPrint high risk/blueprint luminal B subtype.      She was started on treatment in block B due to lack of availability of block A agents. Her course was complicated by allergic reaction to Taxol requiring change to Abraxane.     Her 3-week MRI showed a tumor volume reduction of 28.9% (did not meet prespecified threshold) and therefore she received a 6-week MRI demonstrated a 57% reduction tumor volume from baseline (which again did not meet prespecified threshold) to continue on block B. The recommendations from I-SPY was to escalate early to AC.      In light of ongoing significant response to Abraxane, Dr. Bernal decided to continue to complete the full 12 weeks of treatment. Her 12-week breast MRI demonstrates a slightly smaller mass. Patient proceeded to ddAC on 7/24/2024.    Patient proceeded with bilateral mastectomy on 9/30/24 ([place]).    9/30/24 [id number]  A. RIGHT breast, mastectomy:  - INVASIVE BREAST CARCINOMA OF NO SPECIAL TYPE (INVASIVE DUCTAL CARCINOMA), Bill Grade 2, size 16 mm x 12 mm, residual after neoadjuvant chemotherapy  - Ductal carcinoma in situ (DCIS), nuclear grade 2, solid and cribriform types  - Margins are uninvolved by invasive carcinoma and, >5 mm from all resection margins  - Margins are uninvolved by DCIS  and, >5 mm from all resection margins  - Atypical ductal hyperplasia (ADH)  - Lobular carcinoma in-situ (LCIS), classic type  - Calcification associated with invasive carcinoma, DCIS, and benign acini  - Treatment related changes  - Prior core biopsy markers and biopsy site changes  - Benign skin and nipple  - Other findings: fibroadenomatoid changes and benign intraductal papillomas  - Invasive carcinoma is estrogen receptor positive, progesterone receptor positive, and HER2 equivocal (score 2+) by immunohistochemistry  - HER2 by FISH is in process, and will be reported separately by [employer]  - See comment, 'Breast Biomarker Reporting Template', and tumor synoptic below  B. Lymph node, RIGHT axillary #1, sentinel, biopsy:  - METASTATIC BREAST DUCTAL CARCINOMA in one of two lymph nodes (1/2)  - Greatest metastatic focus 3 mm  - No extranodal extension identified  - Treatment related changes  - Prior biopsy site changes  C. Lymph node, RIGHT axillary #2, sentinel, biopsy:  - One lymph node, negative for carcinoma (0/1)  D. Lymph node, RIGHT axillary #3, sentinel, biopsy:  - One lymph node, negative for carcinoma (0/1)  E. LEFT breast, mastectomy:  - Atypical ductal hyperplasia (ADH)  - Fibroadenoma (1 cm)  - Benign skin and nipple  - Negative for malignancy.  Comment  The invasive carcinoma is present predominately as a single cohesive mass, size 16 mm x12 mm. The invasive carcinoma shows morphology consistent with treatment-related changes. The total tumor cellularity is 25%. DCIS comprises 5% of tumor cellularity. Please see tumor synoptic for MD Tommie residual cancer burden score.    10/15 1234 - Writer received referral, reviewed for appropriate plan, and referral transferred to New Patient Scheduling for completion.    Patient resides in [place].     Records Location: See Bookmarked material

## 2024-10-16 NOTE — TELEPHONE ENCOUNTER
MEDICAL RECORDS REQUEST   Radiation Oncology  [address] [place] [address]  Fax: [phone]          FUTURE VISIT INFORMATION                                                   Sharon Fong, : 1975 scheduled for future visit at [facility] Radiation [facility]cology    RECORDS REQUESTED FOR VISIT                                                     BREAST     OFFICE NOTE from medical oncologist Epic 10/14/2024 - Dr. Dame Bernal    9/3/2024 - Dr. Dame Bernal   OFFICE NOTE from surgeon/plastic surgeon [facility] 10/14/2024 - Dr. Boom Collier     2024 - Dr. Boom Collier   CHEMOTHERAPY NOTES/LOG Epic 2024 - Cycle 16, day 1 Adriamycin and Cytoxan    OPERATIVE/BREAST BIOPSY REPORTS [facility] 2024 - BILATERAL SIMPLE MASTECTOMY (Bilateral: Breast)  RIGHT RADIO-LOCALIZED SENTINEL LYPMH NODE BIOPSY   MEDICATION LIST [facility]    LABS     PATHOLOGY REPORTS [facility] 2024 - [id number]   2024 - [id number]   2024 - [id number]     Path Consult:   2024 - [id number]    ANYTHING RELATED TO DIAGNOSIS Epic 10/14/2024   IMAGING (NEED IMAGES & REPORT)     MRI PACS MR Breast: 2024-2010   MAMMO/SURGICAL BREAST IMGS/SPECIMEN RADIOGRAPH PACS 2024-2015   ULTRASOUND PACS US Breast: 2024-2010  US Axillary: 2024   NM PACS NM Lymphoscintigraphy: 2024  NM Bone Scan: 2024

## 2024-10-21 ENCOUNTER — ANCILLARY PROCEDURE (OUTPATIENT)
Dept: BONE DENSITY | Facility: CLINIC | Age: 49
End: 2024-10-21
Attending: INTERNAL MEDICINE
Payer: COMMERCIAL

## 2024-10-21 DIAGNOSIS — Z79.811 LONG TERM (CURRENT) USE OF AROMATASE INHIBITORS: ICD-10-CM

## 2024-10-21 DIAGNOSIS — C50.211 MALIGNANT NEOPLASM OF UPPER-INNER QUADRANT OF RIGHT BREAST IN FEMALE, ESTROGEN RECEPTOR POSITIVE (H): Chronic | ICD-10-CM

## 2024-10-21 DIAGNOSIS — Z17.0 MALIGNANT NEOPLASM OF UPPER-INNER QUADRANT OF RIGHT BREAST IN FEMALE, ESTROGEN RECEPTOR POSITIVE (H): Chronic | ICD-10-CM

## 2024-10-21 PROCEDURE — 77080 DXA BONE DENSITY AXIAL: CPT | Performed by: RADIOLOGY

## 2024-10-23 ENCOUNTER — PRE VISIT (OUTPATIENT)
Dept: RADIATION ONCOLOGY | Facility: CLINIC | Age: 49
End: 2024-10-23

## 2024-10-23 ENCOUNTER — OFFICE VISIT (OUTPATIENT)
Dept: RADIATION ONCOLOGY | Facility: CLINIC | Age: 49
End: 2024-10-23
Attending: INTERNAL MEDICINE
Payer: COMMERCIAL

## 2024-10-23 ENCOUNTER — VIRTUAL VISIT (OUTPATIENT)
Dept: ONCOLOGY | Facility: CLINIC | Age: 49
End: 2024-10-23
Attending: INTERNAL MEDICINE
Payer: COMMERCIAL

## 2024-10-23 VITALS
OXYGEN SATURATION: 100 % | SYSTOLIC BLOOD PRESSURE: 113 MMHG | HEART RATE: 92 BPM | TEMPERATURE: 98.2 F | RESPIRATION RATE: 16 BRPM | DIASTOLIC BLOOD PRESSURE: 75 MMHG

## 2024-10-23 DIAGNOSIS — Z17.0 MALIGNANT NEOPLASM OF UPPER-INNER QUADRANT OF RIGHT BREAST IN FEMALE, ESTROGEN RECEPTOR POSITIVE (H): Chronic | ICD-10-CM

## 2024-10-23 DIAGNOSIS — Z17.0 MALIGNANT NEOPLASM OF UPPER-INNER QUADRANT OF RIGHT BREAST IN FEMALE, ESTROGEN RECEPTOR POSITIVE (H): ICD-10-CM

## 2024-10-23 DIAGNOSIS — C50.211 MALIGNANT NEOPLASM OF UPPER-INNER QUADRANT OF RIGHT BREAST IN FEMALE, ESTROGEN RECEPTOR POSITIVE (H): Chronic | ICD-10-CM

## 2024-10-23 DIAGNOSIS — C50.211 MALIGNANT NEOPLASM OF UPPER-INNER QUADRANT OF RIGHT BREAST IN FEMALE, ESTROGEN RECEPTOR POSITIVE (H): ICD-10-CM

## 2024-10-23 PROCEDURE — G2211 COMPLEX E/M VISIT ADD ON: HCPCS | Performed by: RADIOLOGY

## 2024-10-23 PROCEDURE — 99205 OFFICE O/P NEW HI 60 MIN: CPT | Performed by: RADIOLOGY

## 2024-10-23 PROCEDURE — 999N000069 HC STATISTIC GENETIC COUNSELING, < 16 MIN: Mod: GT,95 | Performed by: GENETIC COUNSELOR, MS

## 2024-10-23 PROCEDURE — 99417 PROLNG OP E/M EACH 15 MIN: CPT | Performed by: RADIOLOGY

## 2024-10-23 ASSESSMENT — PAIN SCALES - GENERAL: PAINLEVEL_OUTOF10: NO PAIN (0)

## 2024-10-23 NOTE — NURSING NOTE
Current patient location: [address] [facility] [address]      Is the patient currently in the state of [place]? YES    Visit mode:VIDEO    If the visit is dropped, the patient can be reconnected by: VIDEO VISIT: Text to cell phone:   Telephone Information:   Mobile [phone]       Will anyone else be joining the visit? , Antony, is present  (If patient encounters technical issues they should call [phone]    How would you like to obtain your AVS? MyChart    Are changes needed to the allergy or medication list? N/A    Reason for visit: Consult      Leonor RAMÍREZ

## 2024-10-23 NOTE — NURSING NOTE
"Oncology Rooming Note    October 23, 2024 11:45 AM   Sharon Fong is a 49 year old female who presents for:    Chief Complaint   Patient presents with    Radiation Therapy     New Radiation Oncology Consult with Dr. Chacko      Initial Vitals: /75   Pulse 92   Temp 98.2  F (36.8  C) (Oral)   Resp 16   SpO2 100%  Estimated body mass index is 28.18 kg/m  as calculated from the following:    Height as of 8/6/24: 1.64 m (5' 4.57\").    Weight as of 10/14/24: 75.8 kg (167 lb 1.6 oz). There is no height or weight on file to calculate BSA.  No Pain (0) Comment: Data Unavailable   No LMP recorded. Patient has had a hysterectomy.  Allergies reviewed: Yes  Medications reviewed: Yes    Medications: Medication refills not needed today.  Pharmacy name entered into [employer]: [facility] [address] [place] [address]    Frailty Screening:   Is the patient here for a new oncology consult visit in cancer care? 1. Yes. Over the past month, have you experienced difficulty or required a caregiver to assist with:   1. Balance, walking or general mobility (including any falls)? NO  2. Completion of self-care tasks such as bathing, dressing, toileting, grooming/hygiene?  NO  3. Concentration or memory that affects your daily life?  NO       Clinical concerns: New Radiation Oncology Consult.   Dr. Chacko was notified.    Considerations for radiation treatment   Pregnancy status: Female with hysterectomy   Implanted Cardiac Devices: No   Any previous radiation therapy: No    Radiation Therapy Patient Education    Person involved with teaching: Patient and     Patient educational needs for self management of treatment-related side effects assessment completed.  [facility] Patient Ed tab contains Patient Learning Assessment    Education Materials Given  Radiation Therapy and You, Skin Care During Radiation Treatment, and Coping with Fatigue    Educational Topics Discussed  Side effects expected and Skin care    Response " To Teaching  Verbalizes understanding    GYN Only  Vaginal Dilator-given and educated: N/A    Referrals sent: None    Chemotherapy?  No, completed neoadjuvant chemotherapy on 9/5/2024. Continues Zoladex with Dr. Bernal. Patient will be starting Letrozole on Monday, 10/28/24.     Jeanne Castaneda RN

## 2024-10-23 NOTE — LETTER
10/23/2024      Sharon Fong  [address] [facility] [address]      Dear Colleague,    Thank you for referring your patient, Sharon Fong, to the [facility] RADIATION [facility]COLOG[facility] [place]. Please see a copy of my visit note below.    Dear Colleagues,  Today Sharon Fong was seen in consultation.  IDENTIFICATION: This is a 49 year old premenopausal woman with right (UIQ) breast cancer, xR1N2D1, ER+, ND+, Her2- status post neoadjuvant chemotherapy followed by bilateral mastectomy and right SLNBx, revealing G2 IDCA, uQ0iE2mV2, ER+/ND+/H2N- disease referred for adjuvant radiation therapy. She enrolled in I SPY2 Trial.  She started chemotherapy on 4/30/24 through 9/5/24 and received Taxol/Abraxane x12 weeks and ddAC x4 cycles.  HISTORY OF PRESENT ILLNESS: Sharon Fong was in her usual state of health this past year when a right breast mass was noted. On March 21, 2024 bilateral diagnostic mammogram showed cluster of fine microcalcifications at the 12 o'clock position in the right breast.  No dominant masses or areas of architectural distortion.  Same-day right breast ultrasound showed a 3.7 cm hypoechoic mass at the 1 o'clock position, a solid hypoechoic 1 cm lesion at the 2 o'clock position an additional 0.7 cm lesion also at the 2 o'clock position.  Within the axilla there were 2 enlarged right axillary lymph nodes.  On March 28, 2023 right breast ultrasound-guided biopsy found invasive ductal carcinoma ER/ND positive HER2 negative at the 1 o'clock position and G2 DCIS at the 2 o'clock position.  The axillary lymph node biopsy was also positive.  On April 12, 2024 CAP CT showed the right breast masses and mildly abnormal right axillary lymph nodes. No evidence of distant metastatic disease in the chest, abdomen or pelvis.  On April 15, 2024 bilateral breast MRI showed in the right breast at the 1 o'clock position the biopsy proven mass measuring 3cm with surrounding nonmass  enhancement.  Within the left breast 9oclock position there was a oval mass consistent with a benign fibroadenoma with few small right internal mammary chain lymph nodes and multiple right level 1 axillary lymph nodes.  On April 16, 2024 ultrasound of the left axilla showing normal lymph nodes.  On April 18, 2024, she had MRI guided core biopsy of the left breast showed benign breast tissue.  She enrolled in I SPY2 Trial.  She received chemotherapy from 4/30/24 through 9/5/24; Taxol/Abraxane x12 weeks and ddAC x4 cycles.  On May 20, 2024 repeat MRI showed decrease in the right breast disease with minimal enlargement in the bilateral axillary lymph nodes.  On Ann-Marie 10, 2024 repeat breast MRI mildly decreased right breast disease with stable lymph nodes.   July 23, 2024 repeat breast MRI showed small asymmetric right internal mammary chain lymph nodes are also similar to prior.  On July 24, 2024 she had mid treatment ultrasound-guided right breast and axillary biopsies for the ISPY2 trial.  On August 19, 2024, repeat breast MRI showed slight decrease in the right breast mass and The right MEÑO node just above the 3rd rib is slightly smaller than the chemotherapy exam, axial image 71, sagittal image 67.  The right axillary lymph nodes have slightly decreased in size. No lymphadenopathy in the left axilla.   On September 18, 2024 bilateral breast MRI showed longest diameter of suspicious enhancement: 6.5 cm, measured on MIPs. Mass and mass enhancement in the right inner breast measures approximately 6.5 x 6.0 x 2.3 cm.  The volume of enhancement has decreased significantly since the original MRI of 4/15/2024 and has a similar appearance to most recent MRI of 8/19/2024. Axillary lymph nodes are similar to previous study of the biopsied node measures 1.1 cm. No suspicious internal mammary lymph nodes.  On September 30, 2024, Dr. Collier took her to the OR and she had a bilateral mastectomy and right sentinel lymph node  biopsy.  Pathology revealed a single focus of 1.6 cm grade 2 IDCa with grade 2 DCIS.  Widely negative invasive and noninvasive margins.  Negative LVSI.  1 out of of 4 involved lymph nodes.  The left breast was negative for malignancy.  This gave her a stage of ypT1 cN1a M0, ER/WV positive HER2 negative.  Path notes state there is no definite response to presurgical therapy in the invasive or metastatic carcinoma.  Her postoperative course has been unenventful.  Since her surgery, she has continued to heal well and denies any significant pain.  She has good ROM.  She denies any other new masses, skin changes, shortness of breath, chest or bony pain, or new neurologic symptoms. She is being seen here today for consideration of postoperative radiotherapy.  REVIEW OF SYSTEMS: As per HPI, a 14-point review of system is otherwise negative.  PAST RADIATION THERAPY:  Denies.  PAST CTD/PACEMAKER: Denies  BREAST RISK FACTORS:  Prior right breast abscess s/p lumpectomy in .  No family history of breast or ovarian cancer. She started her menses at age 14.   with her first delivery at age 26. She  for a total of 24 months.  Hysterectomy in . No history of HRT. OCP use x1 year.    Past Medical History:   Diagnosis Date     Breast cancer (H)      Hypermobility arthralgia      Migraine      Seizure disorder (H)     Once post surgery     Past Surgical History:   Procedure Laterality Date     AS KNEE SCOPE, DIAGNOSTIC       BIOPSY NODE SENTINEL Right 2024    Procedure: RIGHT RADIO-LOCALIZED SENTINEL LYPMH NODE BIOPSY;  Surgeon: Boom Collier MD;  Location: [facility] OR     COSMETIC SURGERY Bilateral     removal of axillary excessive tissue     HYSTERECTOMY  2022    fibroids     INSERT PORT VASCULAR ACCESS Left 2024    Procedure: Insert port vascular access chest;  Surgeon: Chapin Loza MD;  Location: [facility] OR     IR CHEST PORT PLACEMENT > 5 YRS OF AGE  2024     IR LYMPH NODE BIOPSY   03/28/2024     LUMPECTOMY BREAST Right 10/08/2010    for abscess     MASTECTOMY SIMPLE BILATERAL Bilateral 9/30/2024    Procedure: BILATERAL SIMPLE MASTECTOMY;  Surgeon: Boom Collier MD;  Location: [facility] OR     REMOVE PORT VASCULAR ACCESS Left 9/30/2024    Procedure: Remove port vascular access left;  Surgeon: Boom Collier MD;  Location: [facility] OR     Family History   Problem Relation Age of Onset     Hypertension Mother      Hypertension Father      Cerebrovascular Disease Father      Thyroid Disease Sister      Glaucoma No family hx of      Macular Degeneration No family hx of      Social History     Tobacco Use     Smoking status: Never     Passive exposure: Never     Smokeless tobacco: Never   Substance Use Topics     Alcohol use: Never     Current Outpatient Medications   Medication Sig Dispense Refill     aspirin-acetaminophen-caffeine (EXCEDRIN MIGRAINE) 250-250-65 MG tablet  (Patient not taking: Reported on 10/23/2024)       ibuprofen (ADVIL/MOTRIN) 200 MG capsule        letrozole (FEMARA) 2.5 MG tablet Take 1 tablet (2.5 mg) by mouth daily. 30 tablet 1     lidocaine-prilocaine (EMLA) 2.5-2.5 % external cream Apply topically as needed for moderate pain 30 g 1     LORazepam (ATIVAN) 0.5 MG tablet Take 1 tablet (0.5 mg) by mouth every 6 hours as needed for anxiety 30 tablet 0     OLANZapine (ZYPREXA) 5 MG tablet Take 1 tablet (5 mg) by mouth at bedtime 30 tablet 0     omeprazole (PRILOSEC) 40 MG DR capsule Take 1 capsule (40 mg) by mouth daily 30 capsule 1     oxyCODONE (ROXICODONE) 5 MG tablet Take 1-2 tablets (5-10 mg) by mouth every 4 hours as needed for moderate to severe pain. 10 tablet 0     polyethylene glycol (MIRALAX) 17 GM/Dose powder Take 17 g by mouth daily 510 g 1     No current facility-administered medications for this visit.     Facility-Administered Medications Ordered in Other Visits   Medication Dose Route Frequency Provider Last Rate Last Admin     lidocaine 1 % 9 mL  9 mL Intradermal  Once Valeria Martinez MD         lidocaine 1% with EPINEPHrine 1:100,000 injection 10 mL  10 mL Intradermal Once Valeria Martinez MD         Allergies   Allergen Reactions     Citrullus Vulgaris Unknown and Other (See Comments)     Rash, swollen lips     Corn-Containing Products Other (See Comments)     Paclitaxel Other (See Comments)     PAIN in shoulder and pelvis - see infusion notes from 4/30/24 and 5/7/24     Salicylic Acid Unknown and Other (See Comments)     PHYSICAL EXAMINATION:  /75   Pulse 92   Temp 98.2  F (36.8  C) (Oral)   Resp 16   SpO2 100%   GENERAL Well-appearing woman in no acute distress.  HEENT Normocephalic, atraumatic.  Sclerae anicteric.  CVR  Regular rate and rhythm.  No murmurs, rubs, or gallops.  LUNGS Clear to auscultation bilaterally.  BREASTS Breasts are surgically absent.  CHEST              Chest wall reveals well healed scars. No suspicious erythema, ulceration or nodularity within them  LYMPH No supraclavicular, infraclavicular, or axillary lymphadenopathy appreciated bilaterally.  ABDOMEN Soft.    EXT  No clubbing or cyanosis or edema.    NEURO No focal deficits.  MSK  Good ROM.   SKIN  Warm and well perfused.    PSYCH  Alert and oriented x 3    ECOG PERFORMANCE STATUS: 0    IMPRESSION/PLAN: Sharon Fong is a 49 year old premenopausal woman with right (UIQ) breast cancer, dP1L8G8, ER+, OR+, Her2- status post neoadjuvant chemotherapy followed by bilateral mastectomy and right SLNBx, revealing G2 IDCA, [id number], ER+/OR+/H2N- disease referred for adjuvant radiation therapy. She enrolled in I SPY2 Trial.  She started chemotherapy on 4/30/24 through 9/5/24 and received Taxol/Abraxane x12 weeks and ddAC x4 cycles.  I recommend adjuvant PMRT to improve local control and overall survival. Chest wall and regional nodes will be treated in order to improve disease free survival, breast cancer mortality and decrease the risk of local-regional recurrence based on EORTC 93691 (76% had  BCT), and EBCTCG meta-analysis (PMRT only)  The risks, benefits, treatment rationale and regimen of radiation therapy to the right chest wall were discussed in great detail today with the patient and her .    Risks include but are not limited to skin erythema, desquamation, hyperpigmentation, chest wall and arm lymphedema, fibrosis, telengectasia, pneumonitis, cardiac toxicity, rib fracture and secondary malignancy. The patient consented to radiation therapy and will be scheduled for a CT simulation next week.  Treatment will start 1-2 weeks afterwards.     Additional problem list to be addressed in the following manner:  Systemic/hormonal treatment : s/p neoadjuvant chemo and will start Letrozole on Monday. Dr. Bernal is her Med Onc.   Refer to lymphedema clinic for teaching/education.  There was ample time for questions and all were answered to the patient's satisfaction. Thank you for allowing me to participate in the care of this pleasant patient. If you have any questions, please do not hesitate to contact my office.    Sincerely,  Murray Chacko MD    I spent a total of 90 minutes for this encounter, which included some of the following:  -Preparing to see the patient, including reviewing testing results  -Obtaining and/or reviewing separately obtained history  -Performing the examination  -Counseling and educating the patient  -Ordering medications, tests, or procedures  -Referring and communicating with other health care professions, which is not separately reported  -Documenting clinical information in the electronic health record  -Independently interpreting results and communicating the results to the patient/family/caregiver  -Coordinating care, which is not separately reportable          Again, thank you for allowing me to participate in the care of your patient.        Sincerely,        FROYLAN Chacko MD

## 2024-10-23 NOTE — PROGRESS NOTES
Dear Colleagues,  Today Sharon Fong was seen in consultation.  IDENTIFICATION: This is a 49 year old premenopausal woman with right (UIQ) breast cancer, [id number], ER+, ME+, Her2- status post neoadjuvant chemotherapy followed by bilateral mastectomy and right SLNBx, revealing G2 IDCA, [id number], ER+/ME+/H2N- disease referred for adjuvant radiation therapy. She enrolled in I SPY2 Trial.  She started chemotherapy on 4/30/24 through 9/5/24 and received Taxol/Abraxane x12 weeks and ddAC x4 cycles.  HISTORY OF PRESENT ILLNESS: Sharon Fong was in her usual state of health this past year when a right breast mass was noted. On March 21, 2024 bilateral diagnostic mammogram showed cluster of fine microcalcifications at the 12 o'clock position in the right breast.  No dominant masses or areas of architectural distortion.  Same-day right breast ultrasound showed a 3.7 cm hypoechoic mass at the 1 o'clock position, a solid hypoechoic 1 cm lesion at the 2 o'clock position an additional 0.7 cm lesion also at the 2 o'clock position.  Within the axilla there were 2 enlarged right axillary lymph nodes.  On March 28, 2023 right breast ultrasound-guided biopsy found invasive ductal carcinoma ER/ME positive HER2 negative at the 1 o'clock position and G2 DCIS at the 2 o'clock position.  The axillary lymph node biopsy was also positive.  On April 12, 2024 CAP CT showed the right breast masses and mildly abnormal right axillary lymph nodes. No evidence of distant metastatic disease in the chest, abdomen or pelvis.  On April 15, 2024 bilateral breast MRI showed in the right breast at the 1 o'clock position the biopsy proven mass measuring 3cm with surrounding nonmass enhancement.  Within the left breast 9oclock position there was a oval mass consistent with a benign fibroadenoma with few small right internal mammary chain lymph nodes and multiple right level 1 axillary lymph nodes.  On April 16, 2024 ultrasound of the left  axilla showing normal lymph nodes.  On April 18, 2024, she had MRI guided core biopsy of the left breast showed benign breast tissue.  She enrolled in I SPY2 Trial.  She received chemotherapy from 4/30/24 through 9/5/24; Taxol/Abraxane x12 weeks and ddAC x4 cycles.  On May 20, 2024 repeat MRI showed decrease in the right breast disease with minimal enlargement in the bilateral axillary lymph nodes.  On Ann-Marie 10, 2024 repeat breast MRI mildly decreased right breast disease with stable lymph nodes.   July 23, 2024 repeat breast MRI showed small asymmetric right internal mammary chain lymph nodes are also similar to prior.  On July 24, 2024 she had mid treatment ultrasound-guided right breast and axillary biopsies for the ISPY2 trial.  On August 19, 2024, repeat breast MRI showed slight decrease in the right breast mass and The right MEÑO node just above the 3rd rib is slightly smaller than the chemotherapy exam, axial image 71, sagittal image 67.  The right axillary lymph nodes have slightly decreased in size. No lymphadenopathy in the left axilla.   On September 18, 2024 bilateral breast MRI showed longest diameter of suspicious enhancement: 6.5 cm, measured on MIPs. Mass and mass enhancement in the right inner breast measures approximately 6.5 x 6.0 x 2.3 cm.  The volume of enhancement has decreased significantly since the original MRI of 4/15/2024 and has a similar appearance to most recent MRI of 8/19/2024. Axillary lymph nodes are similar to previous study of the biopsied node measures 1.1 cm. No suspicious internal mammary lymph nodes.  On September 30, 2024, Dr. Collier took her to the OR and she had a bilateral mastectomy and right sentinel lymph node biopsy.  Pathology revealed a single focus of 1.6 cm grade 2 IDCa with grade 2 DCIS.  Widely negative invasive and noninvasive margins.  Negative LVSI.  1 out of of 4 involved lymph nodes.  The left breast was negative for malignancy.  This gave her a stage of ypT1  cN1a M0, ER/OR positive HER2 negative.  Path notes state there is no definite response to presurgical therapy in the invasive or metastatic carcinoma.  Her postoperative course has been unenventful.  Since her surgery, she has continued to heal well and denies any significant pain.  She has good ROM.  She denies any other new masses, skin changes, shortness of breath, chest or bony pain, or new neurologic symptoms. She is being seen here today for consideration of postoperative radiotherapy.  REVIEW OF SYSTEMS: As per HPI, a 14-point review of system is otherwise negative.  PAST RADIATION THERAPY:  Denies.  PAST CTD/PACEMAKER: Denies  BREAST RISK FACTORS:  Prior right breast abscess s/p lumpectomy in .  No family history of breast or ovarian cancer. She started her menses at age 14.   with her first delivery at age 26. She  for a total of 24 months.  Hysterectomy in . No history of HRT. OCP use x1 year.    Past Medical History:   Diagnosis Date    Breast cancer (H)     Hypermobility arthralgia     Migraine     Seizure disorder (H)     Once post surgery     Past Surgical History:   Procedure Laterality Date    AS KNEE SCOPE, DIAGNOSTIC      BIOPSY NODE SENTINEL Right 2024    Procedure: RIGHT RADIO-LOCALIZED SENTINEL LYPMH NODE BIOPSY;  Surgeon: Boom Collier MD;  Location: [facility] OR    COSMETIC SURGERY Bilateral     removal of axillary excessive tissue    HYSTERECTOMY  2022    fibroids    INSERT PORT VASCULAR ACCESS Left 2024    Procedure: Insert port vascular access chest;  Surgeon: Chapin Loza MD;  Location: [facility] OR    IR CHEST PORT PLACEMENT > 5 YRS OF AGE  2024    IR LYMPH NODE BIOPSY  2024    LUMPECTOMY BREAST Right 10/08/2010    for abscess    MASTECTOMY SIMPLE BILATERAL Bilateral 2024    Procedure: BILATERAL SIMPLE MASTECTOMY;  Surgeon: Boom Collier MD;  Location: [facility] OR    REMOVE PORT VASCULAR ACCESS Left 2024    Procedure: Remove port  vascular access left;  Surgeon: Boom Collier MD;  Location: [facility] OR     Family History   Problem Relation Age of Onset    Hypertension Mother     Hypertension Father     Cerebrovascular Disease Father     Thyroid Disease Sister     Glaucoma No family hx of     Macular Degeneration No family hx of      Social History     Tobacco Use    Smoking status: Never     Passive exposure: Never    Smokeless tobacco: Never   Substance Use Topics    Alcohol use: Never     Current Outpatient Medications   Medication Sig Dispense Refill    aspirin-acetaminophen-caffeine (EXCEDRIN MIGRAINE) 250-250-65 MG tablet  (Patient not taking: Reported on 10/23/2024)      ibuprofen (ADVIL/MOTRIN) 200 MG capsule       letrozole (FEMARA) 2.5 MG tablet Take 1 tablet (2.5 mg) by mouth daily. 30 tablet 1    lidocaine-prilocaine (EMLA) 2.5-2.5 % external cream Apply topically as needed for moderate pain 30 g 1    LORazepam (ATIVAN) 0.5 MG tablet Take 1 tablet (0.5 mg) by mouth every 6 hours as needed for anxiety 30 tablet 0    OLANZapine (ZYPREXA) 5 MG tablet Take 1 tablet (5 mg) by mouth at bedtime 30 tablet 0    omeprazole (PRILOSEC) 40 MG DR capsule Take 1 capsule (40 mg) by mouth daily 30 capsule 1    oxyCODONE (ROXICODONE) 5 MG tablet Take 1-2 tablets (5-10 mg) by mouth every 4 hours as needed for moderate to severe pain. 10 tablet 0    polyethylene glycol (MIRALAX) 17 GM/Dose powder Take 17 g by mouth daily 510 g 1     No current facility-administered medications for this visit.     Facility-Administered Medications Ordered in Other Visits   Medication Dose Route Frequency Provider Last Rate Last Admin    lidocaine 1 % 9 mL  9 mL Intradermal Once Valeria Martinez MD        lidocaine 1% with EPINEPHrine 1:100,000 injection 10 mL  10 mL Intradermal Once Valeria Martinez MD         Allergies   Allergen Reactions    Citrullus Vulgaris Unknown and Other (See Comments)     Rash, swollen lips    Corn-Containing Products Other (See Comments)    Paclitaxel  Other (See Comments)     PAIN in shoulder and pelvis - see infusion notes from 4/30/24 and 5/7/24    Salicylic Acid Unknown and Other (See Comments)     PHYSICAL EXAMINATION:  /75   Pulse 92   Temp 98.2  F (36.8  C) (Oral)   Resp 16   SpO2 100%   GENERAL Well-appearing woman in no acute distress.  HEENT Normocephalic, atraumatic.  Sclerae anicteric.  CVR  Regular rate and rhythm.  No murmurs, rubs, or gallops.  LUNGS Clear to auscultation bilaterally.  BREASTS Breasts are surgically absent.  CHEST              Chest wall reveals well healed scars. No suspicious erythema, ulceration or nodularity within them  LYMPH No supraclavicular, infraclavicular, or axillary lymphadenopathy appreciated bilaterally.  ABDOMEN Soft.    EXT  No clubbing or cyanosis or edema.    NEURO No focal deficits.  MSK  Good ROM.   SKIN  Warm and well perfused.    PSYCH  Alert and oriented x 3    ECOG PERFORMANCE STATUS: 0    IMPRESSION/PLAN: Sharon Fong is a 49 year old premenopausal woman with right (UIQ) breast cancer, [id number], ER+, NM+, Her2- status post neoadjuvant chemotherapy followed by bilateral mastectomy and right SLNBx, revealing G2 IDCA, [id number], ER+/NM+/H2N- disease referred for adjuvant radiation therapy. She enrolled in I SPY2 Trial.  She started chemotherapy on 4/30/24 through 9/5/24 and received Taxol/Abraxane x12 weeks and ddAC x4 cycles.  I recommend adjuvant PMRT to improve local control and overall survival. Chest wall and regional nodes will be treated in order to improve disease free survival, breast cancer mortality and decrease the risk of local-regional recurrence based on EORTC 73527 (76% had BCT), and EBCTCG meta-analysis (PMRT only)  The risks, benefits, treatment rationale and regimen of radiation therapy to the right chest wall were discussed in great detail today with the patient and her .    Risks include but are not limited to skin erythema, desquamation, hyperpigmentation, chest  wall and arm lymphedema, fibrosis, telengectasia, pneumonitis, cardiac toxicity, rib fracture and secondary malignancy. The patient consented to radiation therapy and will be scheduled for a CT simulation next week.  Treatment will start 1-2 weeks afterwards.     Additional problem list to be addressed in the following manner:  Systemic/hormonal treatment : s/p neoadjuvant chemo and will start Letrozole on Monday. Dr. Bernal is her Med Onc.   Refer to lymphedema clinic for teaching/education.  There was ample time for questions and all were answered to the patient's satisfaction. Thank you for allowing me to participate in the care of this pleasant patient. If you have any questions, please do not hesitate to contact my office.    Sincerely,  Murray Chacko MD    I spent a total of 90 minutes for this encounter, which included some of the following:  -Preparing to see the patient, including reviewing testing results  -Obtaining and/or reviewing separately obtained history  -Performing the examination  -Counseling and educating the patient  -Ordering medications, tests, or procedures  -Referring and communicating with other health care professions, which is not separately reported  -Documenting clinical information in the electronic health record  -Independently interpreting results and communicating the results to the patient/family/caregiver  -Coordinating care, which is not separately reportable

## 2024-10-23 NOTE — LETTER
10/23/2024      Sharon Fong  [address] [facility] [address] [facility] [address]      Dear Colleague,    Thank you for referring your patient, Sharon Fong, to the [facility]. Please see a copy of my visit note below.    Virtual Visit Details    Type of service:  Video Visit   Joined the call at 10/23/2024, 2:05:42 pm.  Left the call at 10/23/2024, 2:19:53 pm.    Originating Location (pt. Location): Home  Distant Location (provider location):  Off-site  Platform used for Video Visit: [facility]    10/23/2024    Referring Provider: Dame Doloers MD    Presenting Information:   I met with Sharon Fong today for genetic counseling as part of the Cancer Risk Management Program (virtual visit) to discuss her personal history of breast cancer.  She is here today to review this history, cancer screening recommendations, and available genetic testing options.    Personal History:  Sharon is a 49 year old female. She was diagnosed with invasive breast cancer when 48.  Germline genetic testing was completed as part of the ISPY trial.  These results were discussed today.      Family History: (Please see scanned pedigree for detailed family history information)  Sharon reports no family history of cancer.      Discussion:  The features of sporadic, familial, and hereditary cancers were discussed, as it pertains to Sharon's history of cancer. Sharon's personal history of early onset breast cancer may be suggestive of a hereditary cancer syndrome.  A detailed handout regarding hereditary cancer and the information we discussed can be found in the after visit summary. Topics included: inheritance pattern, cancer risks, cancer screening recommendations, as well as risks, benefits and limitations of testing.  Germline genetic testing was sent through [facility] as part of the ISPY trial.  Testing included the 70 gene Multi Cancer Panel.  Two variants of uncertain clinical significance were  identified.  As the clinical significance is unknown, genetic testing for her children and close relatives is currently not recommended.  These recommendations may change if additional information becomes available regarding these variants.   FLCN c.1513G>A  TSC2 c.2134A>G  Sharon's close female relatives remain at increased risk for breast cancer given their family history. Breast cancer screening is generally recommended to begin approximately 10 years younger than the earliest age of breast cancer diagnosis in the family, or at age 40, whichever comes first. In this family, screening may begin at age 38. Breast screening options should be discussed with an individual's primary care provider and a Genetic Counselor, to determine at what age to begin screening, what screening is appropriate, and if additional screening (such as breast MRI) is necessary based on personal/family history factors.    At this time, additional genetic testing is not indicated based on personal history, family history, and the testing previously completed.  Sharon plans to follow up with her oncology team for treatment.  She is encouraged to follow up with updates regarding her family history.             Kristen Sadler MS, [facility]  Licensed, Certified Genetic Counselor            Again, thank you for allowing me to participate in the care of your patient.        Sincerely,        Kristen Sadler GC

## 2024-10-23 NOTE — PROGRESS NOTES
Virtual Visit Details    Type of service:  Video Visit   Joined the call at 10/23/2024, 2:05:42 pm.  Left the call at 10/23/2024, 2:19:53 pm.    Originating Location (pt. Location): Home  Distant Location (provider location):  Off-site  Platform used for Video Visit: [facility]    10/23/2024    Referring Provider: Dame Dolores MD    Presenting Information:   I met with Sharon Fong today for genetic counseling as part of the Cancer Risk Management Program (virtual visit) to discuss her personal history of breast cancer.  She is here today to review this history, cancer screening recommendations, and available genetic testing options.    Personal History:  Sharon is a 49 year old female. She was diagnosed with invasive breast cancer when 48.  Germline genetic testing was completed as part of the ISPY trial.  These results were discussed today.      Family History: (Please see scanned pedigree for detailed family history information)  Sharon reports no family history of cancer.      Discussion:  The features of sporadic, familial, and hereditary cancers were discussed, as it pertains to Sharon's history of cancer. Sharon's personal history of early onset breast cancer may be suggestive of a hereditary cancer syndrome.  A detailed handout regarding hereditary cancer and the information we discussed can be found in the after visit summary. Topics included: inheritance pattern, cancer risks, cancer screening recommendations, as well as risks, benefits and limitations of testing.  Germline genetic testing was sent through [facility] as part of the ISPY trial.  Testing included the 70 gene Multi Cancer Panel.  Two variants of uncertain clinical significance were identified.  As the clinical significance is unknown, genetic testing for her children and close relatives is currently not recommended.  These recommendations may change if additional information becomes available regarding these variants.   FLCN c.1513G>A  TSC2  c.2134A>G  Sharon's close female relatives remain at increased risk for breast cancer given their family history. Breast cancer screening is generally recommended to begin approximately 10 years younger than the earliest age of breast cancer diagnosis in the family, or at age 40, whichever comes first. In this family, screening may begin at age 38. Breast screening options should be discussed with an individual's primary care provider and a Genetic Counselor, to determine at what age to begin screening, what screening is appropriate, and if additional screening (such as breast MRI) is necessary based on personal/family history factors.    At this time, additional genetic testing is not indicated based on personal history, family history, and the testing previously completed.  Sharon plans to follow up with her oncology team for treatment.  She is encouraged to follow up with updates regarding her family history.             Kristen Sadler MS, [facility] [place]  Licensed, Certified Genetic Counselor

## 2024-10-24 DIAGNOSIS — Z00.6 EXAMINATION OF PARTICIPANT OR CONTROL IN CLINICAL RESEARCH: Primary | ICD-10-CM

## 2024-10-24 NOTE — PATIENT INSTRUCTIONS
Genetic Testing  Genetic testing involved a blood or saliva test which looked at the genetic information in select genes for variants associated with cancer risk.  This testing may have included analysis of a single gene due to a known variant in the family, multiple genes most associated with the cancers in a family, or an expanded panel of genes related to many types of cancers.    Results  There are several possible genetic test results, including:   Positive--a harmful mutation (also known as a  pathogenic  or  likely pathogenic  variant) was identified in a gene associated with increased cancer risk.  These risks, as well as medical management options, depend on the specific genetic variant identified.    Negative--no variants were identified in the genes analyzed   Variant of unknown significance--a variant was identified in one or more genes, though it is currently unclear how this impacts cancer risk in the family.  Genetic testing labs are working to collect evidence about these uncertain variants and may provide updates in the future.    What is a Variant of Unknown Significance?  A variant of unknown significance (VUS) is a genetic change with unclear clinical significance.  Scientists currently do not know if this specific variant is associated with increased cancer risks,  or if it is a benign (harmless) change with no impact on health.       A variant may be of uncertain significance for several reasons.  It is possible that this specific variant has not been seen before by the laboratory, or only in a small number of families.  There is currently not enough information to know how this variant may impact your health.          Reclassification  Genetic testing laboratories and researchers are collecting evidence to determine the importance of variants of unknown significance.  Many variants of uncertain significance are later reclassified as benign findings, however some may be associated with  increased cancer risk.  Laboratories will often notify the genetic counselor/ordering provider when a patient s VUS has been reclassified.        Some families may be candidates for participation in the laboratory s variant research programs to help determine the importance of their VUS.  Participating in these programs does not guarantee that families will learn the significance of their VUS immediately.  It could be months or years before a VUS is reclassified.       Screening Recommendations  A combination of personal and family history factors may inform cancer risk and medical management recommendations.  Population cancer screening options, such as those recommended by the American Cancer Society and the National Comprehensive Cancer Network (NCCN) are appropriate for many families at average risk for cancer.  However, earlier and/or more frequent screening may be recommended based on personal factors (lifestyle, exposures, medications, screening results), family history of cancer, and sometimes genetic factors.  These cancer risk management options should be discussed in more detail with an individual's medical providers.      It is usually not recommended that other relatives have genetic testing for the VUS unless it is done as part of the laboratory s variant research program because an individual s test results should not influence their cancer screening until we determine the importance of the VUS.  Your genetic counselor can help you and your relatives understand the risks and benefits of all genetic testing and cancer screening options.    Please call us if you have any questions or concerns.   Cancer Risk Management Program (Appointments: [phone])  Fermin Melendrez, MS [facility] [place]  [phone]  Luisa Qureshi, MS, [facility] [phone]  Juliann Burch, MS, [facility] [place]  [phone]  Kristen Sadler, MS, [facility] [place]  [phone]  Teri Chaudhry, MS, [facility] [place]  [phone]  Danya Paredes, MS, [facility] [place] [phone]  Sia Siddiqui MS,  [facility] [place] [phone]

## 2024-10-28 ENCOUNTER — APPOINTMENT (OUTPATIENT)
Dept: RADIATION ONCOLOGY | Facility: CLINIC | Age: 49
End: 2024-10-28
Payer: COMMERCIAL

## 2024-10-28 PROCEDURE — 77332 RADIATION TREATMENT AID(S): CPT | Mod: 59 | Performed by: RADIOLOGY

## 2024-10-28 PROCEDURE — 77334 RADIATION TREATMENT AID(S): CPT | Performed by: RADIOLOGY

## 2024-10-28 PROCEDURE — 77290 THER RAD SIMULAJ FIELD CPLX: CPT | Performed by: RADIOLOGY

## 2024-10-28 PROCEDURE — 77263 THER RADIOLOGY TX PLNG CPLX: CPT | Performed by: RADIOLOGY

## 2024-10-31 DIAGNOSIS — Z00.6 EXAMINATION OF PARTICIPANT OR CONTROL IN CLINICAL RESEARCH: Primary | ICD-10-CM

## 2024-11-01 ENCOUNTER — LAB (OUTPATIENT)
Dept: LAB | Facility: CLINIC | Age: 49
End: 2024-11-01
Attending: INTERNAL MEDICINE
Payer: COMMERCIAL

## 2024-11-01 DIAGNOSIS — Z00.6 EXAMINATION OF PARTICIPANT OR CONTROL IN CLINICAL RESEARCH: ICD-10-CM

## 2024-11-01 PROCEDURE — 36415 COLL VENOUS BLD VENIPUNCTURE: CPT

## 2024-11-01 PROCEDURE — 300N000004 RESEARCH KIT COLLECTION

## 2024-11-04 ENCOUNTER — THERAPY VISIT (OUTPATIENT)
Dept: OCCUPATIONAL THERAPY | Facility: CLINIC | Age: 49
End: 2024-11-04
Attending: RADIOLOGY
Payer: COMMERCIAL

## 2024-11-04 DIAGNOSIS — Z17.0 MALIGNANT NEOPLASM OF UPPER-INNER QUADRANT OF RIGHT BREAST IN FEMALE, ESTROGEN RECEPTOR POSITIVE (H): Primary | Chronic | ICD-10-CM

## 2024-11-04 DIAGNOSIS — C50.211 MALIGNANT NEOPLASM OF UPPER-INNER QUADRANT OF RIGHT BREAST IN FEMALE, ESTROGEN RECEPTOR POSITIVE (H): Primary | Chronic | ICD-10-CM

## 2024-11-04 PROCEDURE — 97165 OT EVAL LOW COMPLEX 30 MIN: CPT | Mod: GO

## 2024-11-04 PROCEDURE — 97140 MANUAL THERAPY 1/> REGIONS: CPT | Mod: GO

## 2024-11-04 NOTE — PROGRESS NOTES
OCCUPATIONAL THERAPY EVALUATION  Type of Visit: Evaluation       Fall Risk Screen:  Fall screen completed by: OT  Have you fallen 2 or more times in the past year?: No  Have you fallen and had an injury in the past year?: No  Is patient a fall risk?: No    Subjective        Presenting condition or subjective complaint:    Date of onset: 10/24/24 (order date)    Relevant medical history:     Dates & types of surgery: (Patient-Rptd) Bilateral mastectomy    Prior diagnostic imaging/testing results:       Prior therapy history for the same diagnosis, illness or injury: (Patient-Rptd) No      Prior Level of Function  Transfers: Independent  Ambulation: Independent  ADL: Independent  IADL:  indep with all    Living Environment  Social support: (Patient-Rptd) With a significant other or spouse   Type of home: (Patient-Rptd) House; 2-story; Basement   Stairs to enter the home: (Patient-Rptd) Yes (Patient-Rptd) 5 Is there a railing: (Patient-Rptd) Yes     Ramp: (Patient-Rptd) No   Stairs inside the home: (Patient-Rptd) Yes (Patient-Rptd) 18 Is there a railing: (Patient-Rptd) Yes     Help at home: (Patient-Rptd) Home management tasks (cooking, cleaning)  Equipment owned:       Employment: (Patient-Rptd) Yes (Patient-Rptd) IT  Hobbies/Interests:      Patient goals for therapy:      Pain assessment:  no pain, just tightness with shoulder ROM , pain in knee joints since chemo     Objective         Cognitive Status Examination  Orientation: Oriented to person, place and time   Level of Consciousness: Alert  Follows Commands and Answers Questions: 100% of the time  Personal Safety and Judgement: Intact  Memory: Intact    EDEMA  Skin Condition:  B mastectomy incisions closed healing well, still pink and raised, no s/s lymphedema  Scar: Yes  Stemmer Sign: -  Ulceration: No    GIRTH MEASUREMENTS: Refer to separate girth measurement flowsheet for specific measurements.    VOLUME UE  Right UE Total Volume (mL): 1755.47  Left UE Total  Volume (mL): 1729.3  UE Limb Comparison:                RANGE OF MOTION: UE ROM WFL, does feel tightness across the pec/chest with overhead reaching  STRENGTH: UE Strength WNL  ACTIVITIES OF DAILY LIVING: indep/mod I with fatigue  BED MOBILITY: Independent  TRANSFERS: Independent  GAIT/LOCOMOTION: indep with no device  BALANCE: WNL  SENSATION: UE Sensation WNL      Assessment & Plan   CLINICAL IMPRESSIONS  Medical Diagnosis: C50.211, Z17.0 (ICD-10-CM) - Malignant neoplasm of upper-inner quadrant of right breast in female, estrogen receptor positive    Treatment Diagnosis: scar tissue, impaired ROM B shoulders due to double mastectomy    Impression/Assessment: Pt is a 49 year old female presenting to Occupational Therapy for breast cancer rehab following double mastectomy 9/30/2024. Pt had lymph nodes removed putting her at higher risk for developing lymphedema. Pt will begin 6 weeks of radiation next week which could exacerbate any edema or tissue tightness. Pt would benefit from skilled treatment for education on lymphedema prevention as well as education on any possible post BrCA surgery sequellae such as axillary web syndrome, scar adhesions, edema, worsened posture, scapular winging, pain and reduced ROM and function.    Clinical Decision Making (Complexity):  Assessment of Occupational Performance: 1-3 Performance Deficits  Occupational Performance Limitations: dressing, hygiene and grooming, and home establishment and management  Clinical Decision Making (Complexity): Low complexity    PLAN OF CARE  Treatment Interventions:  Interventions: Self-Care/Home Management, Therapeutic Activity, Therapeutic Exercise, Manual Therapy    Long Term Goals   OT Goal 1  Goal Identifier: lymphedema management/prevention  Rationale: In order to maximize safety and independence with performance of self-care activities;In order to maximize safety and independence with ADL/IADLs  Target Date: 02/04/25      Frequency of Treatment:  1x a month, may need to increase post-radiation  Duration of Treatment: 3 months     Recommended Referrals to Other Professionals: Occupational Therapy-traditional for fatigue management   Education Assessment: Learner/Method: Patient;Demonstration;No Barriers to Learning     Risks and benefits of evaluation/treatment have been explained.   Patient/Family/caregiver agrees with Plan of Care.     Evaluation Time:    OT Tay Low Complexity Minutes (20296): 15    Signing Clinician: Suma Rendon OT

## 2024-11-08 ENCOUNTER — APPOINTMENT (OUTPATIENT)
Dept: RADIATION ONCOLOGY | Facility: CLINIC | Age: 49
End: 2024-11-08
Payer: COMMERCIAL

## 2024-11-08 PROCEDURE — 77334 RADIATION TREATMENT AID(S): CPT | Performed by: RADIOLOGY

## 2024-11-08 PROCEDURE — 77300 RADIATION THERAPY DOSE PLAN: CPT | Performed by: RADIOLOGY

## 2024-11-08 PROCEDURE — 77293 RESPIRATOR MOTION MGMT SIMUL: CPT | Performed by: RADIOLOGY

## 2024-11-08 PROCEDURE — 77295 3-D RADIOTHERAPY PLAN: CPT | Performed by: RADIOLOGY

## 2024-11-12 ENCOUNTER — DOCUMENTATION ONLY (OUTPATIENT)
Dept: RADIATION ONCOLOGY | Facility: CLINIC | Age: 49
End: 2024-11-12

## 2024-11-12 NOTE — PROGRESS NOTES
Re: Prior Authorization Request  Patient: Sharon Fong   : 1975       To Whom it May Concern:    I am writing to formally request authorization of coverage for my patient, Sharon Fong, for treatment using external beam radiation therapy using daily image guidance.  I am requesting authorization for applicable provider professional and facility services associated with this therapy.    The therapy involves daily image guidance and deep inspiration breath hold technique for 6 weeks (30 treatments). The benefits of the therapy include decreasing radiation dose to lung and liver (Jocelyn T et al Am J Clin Oncol 2013;  Alessandro WHALEY et al Radiat Oncol 2018; Deedee ANDERS Et al Radiation Oncology 2019; Alexandria OCHOA et al Radiat Oncol J 2019 Dec) .    Shaorn Fong has been diagnosed with locally advanced breast cancer and is status post surgical resection. Studies show that adjuvant radiation treatment needs to be started shortly after completing surgery.  Therefore waiting to treat her could jeopardize her life. In addition, it is standard of care to treat her lymph nodes in this setting.  The aforementioned studies show that the treatment is medical necessary in order to reduce radiation exposure of organs at risk in right breast radiotherapy.  Without the IGRT technique it would put her at increased risk for radiation induced side effects, eg. liver injury and lung inflammation.    I firmly believe that this therapy is clinically appropriate and that Sharon Fong would benefit from improved clinical outcomes and quality of life if allowed the opportunity to receive this treatment.  Please contact me at Radiation Oncology Dept: [phone] if you require additional information to ensure the prompt approval for coverage.    Sincerely,  Murray Chacko MD

## 2024-11-13 ENCOUNTER — PATIENT OUTREACH (OUTPATIENT)
Dept: RADIATION ONCOLOGY | Facility: CLINIC | Age: 49
End: 2024-11-13

## 2024-11-13 NOTE — PROGRESS NOTES
Spoke to Sharon today to update her that Dr. Chacko has submitted a letter of medical necessity to her insurance as requested by insurance. We discussed that Dr. Chacko is waiting to hear back from her insurance. Sharon would like to finish radiation before the new year if possible, Dr. Chacko was updated with her request.    Jeanne Castaneda RN

## 2024-11-18 ENCOUNTER — APPOINTMENT (OUTPATIENT)
Dept: RADIATION ONCOLOGY | Facility: CLINIC | Age: 49
End: 2024-11-18
Payer: COMMERCIAL

## 2024-11-18 ENCOUNTER — OFFICE VISIT (OUTPATIENT)
Dept: RADIATION ONCOLOGY | Facility: CLINIC | Age: 49
End: 2024-11-18
Payer: COMMERCIAL

## 2024-11-18 VITALS
OXYGEN SATURATION: 100 % | BODY MASS INDEX: 27.66 KG/M2 | TEMPERATURE: 98.6 F | WEIGHT: 164 LBS | HEART RATE: 82 BPM | SYSTOLIC BLOOD PRESSURE: 103 MMHG | DIASTOLIC BLOOD PRESSURE: 72 MMHG | RESPIRATION RATE: 16 BRPM

## 2024-11-18 DIAGNOSIS — C50.211 MALIGNANT NEOPLASM OF UPPER-INNER QUADRANT OF RIGHT BREAST IN FEMALE, ESTROGEN RECEPTOR POSITIVE (H): Primary | ICD-10-CM

## 2024-11-18 DIAGNOSIS — Z17.0 MALIGNANT NEOPLASM OF UPPER-INNER QUADRANT OF RIGHT BREAST IN FEMALE, ESTROGEN RECEPTOR POSITIVE (H): Primary | ICD-10-CM

## 2024-11-18 PROCEDURE — G6017 INTRAFRACTION TRACK MOTION: HCPCS | Performed by: RADIOLOGY

## 2024-11-18 PROCEDURE — 99207 PR DROP WITH A PROCEDURE: CPT | Performed by: RADIOLOGY

## 2024-11-18 PROCEDURE — G6013 RADIATION TREATMENT DELIVERY: HCPCS | Performed by: RADIOLOGY

## 2024-11-18 RX ORDER — MOMETASONE FUROATE 1 MG/G
CREAM TOPICAL DAILY
Qty: 45 G | Refills: 1 | Status: SHIPPED | OUTPATIENT
Start: 2024-11-18

## 2024-11-18 ASSESSMENT — PAIN SCALES - GENERAL: PAINLEVEL_OUTOF10: NO PAIN (0)

## 2024-11-18 NOTE — LETTER
2024      Sharon Fong  [address] [facility] [address]      Dear Colleague,    Thank you for referring your patient, Sharon Fong, to the [facility] RADIATION ONCOLOGY [place]. Please see a copy of my visit note below.    [facility] PHYSICIANS  SPECIALIZING IN BREAKTHROUGHS  Radiation Oncology    On Treatment Visit Note      Sharon Fong      Date: 2024   MRN: [id number]   : 1975  Diagnosis: R breast IDC ER/[place]+ HER2-      Reason for Visit:  On Radiation Treatment Visit     Treatment Summary to Date  Treatment Site: R chestwall + bst, R SC Current Dose: 600/6000, 600/5000 cGy Fractions: 3/30, 3/25      Chemotherapy  Chemo concurrent with radx?: No (Dr. Bernal)    Subjective:     Early in treatment doing well with no complaints    Nursing ROS:   Nutrition Alteration  Diet Type: Patient's Preference  Nutrition Note: Reviewed hydration  Skin  Skin Reaction: 0 - No changes  Skin Intervention: Start Mometasone daily, Continue Aquaphor 2-4 x day.        Cardiovascular  Respiratory effort: 1 - Normal - without distress        Psychosocial  Psychosocial Note: Patient denies fatigue  Pain Assessment  0-10 Pain Scale: 0      Objective:   /72   Pulse 82   Temp 98.6  F (37  C) (Oral)   Resp 16   Wt 74.4 kg (164 lb)   SpO2 100%   BMI 27.66 kg/m    Gen: Appears well, in no acute distress  Skin: No erythema    Labs:  CBC RESULTS:   Recent Labs   Lab Test 24  0819   WBC 6.0   RBC 3.30*   HGB 9.4*   HCT 29.3*   MCV 89   MCH 28.5   MCHC 32.1   RDW 16.1*        ELECTROLYTES:  Recent Labs   Lab Test 24  0819      POTASSIUM 3.9   CHLORIDE 108*   JJ 9.7   CO2 23   BUN 10.9   CR 0.49*   *       Assessment:    Tolerating radiation therapy well.  All questions and concerns addressed.    Plan:   Continue current therapy.    Skin care per above. Script called into pharmacy today.      Jotkyiq chart and setup information  reviewed  Ports checked    Medication Review  Med list reviewed with patient?: Yes    Educational Topic Discussed  Education Instructions: Reviewed side effects of radiation therapy, fatigue, skin cares.        Murray Chacko MD    Please do not send letter to referring physician.          Again, thank you for allowing me to participate in the care of your patient.        Sincerely,        FROYLAN Chacko MD

## 2024-11-18 NOTE — PROGRESS NOTES
[facility] PHYSICIANS  SPECIALIZING IN BREAKTHROUGHS  Radiation Oncology    On Treatment Visit Note      Sharon Fong      Date: 2024   MRN: [id number]   : 1975  Diagnosis: R breast IDC ER/[place]+ HER2-      Reason for Visit:  On Radiation Treatment Visit     Treatment Summary to Date  Treatment Site: R chestwall + bst, R SC Current Dose: 600/6000, 600/5000 cGy Fractions: 3/30, 3/25      Chemotherapy  Chemo concurrent with radx?: No (Dr. Bernal)    Subjective:     Early in treatment doing well with no complaints    Nursing ROS:   Nutrition Alteration  Diet Type: Patient's Preference  Nutrition Note: Reviewed hydration  Skin  Skin Reaction: 0 - No changes  Skin Intervention: Start Mometasone daily, Continue Aquaphor 2-4 x day.        Cardiovascular  Respiratory effort: 1 - Normal - without distress        Psychosocial  Psychosocial Note: Patient denies fatigue  Pain Assessment  0-10 Pain Scale: 0      Objective:   /72   Pulse 82   Temp 98.6  F (37  C) (Oral)   Resp 16   Wt 74.4 kg (164 lb)   SpO2 100%   BMI 27.66 kg/m    Gen: Appears well, in no acute distress  Skin: No erythema    Labs:  CBC RESULTS:   Recent Labs   Lab Test 24  0819   WBC 6.0   RBC 3.30*   HGB 9.4*   HCT 29.3*   MCV 89   MCH 28.5   MCHC 32.1   RDW 16.1*        ELECTROLYTES:  Recent Labs   Lab Test 24  0819      POTASSIUM 3.9   CHLORIDE 108*   JJ 9.7   CO2 23   BUN 10.9   CR 0.49*   *       Assessment:    Tolerating radiation therapy well.  All questions and concerns addressed.    Plan:   Continue current therapy.    Skin care per above. Script called into pharmacy today.      Mosaiq chart and setup information reviewed  Ports checked    Medication Review  Med list reviewed with patient?: Yes    Educational Topic Discussed  Education Instructions: Reviewed side effects of radiation therapy, fatigue, skin cares.        Murray Chacko MD    Please do not send letter to referring  physician.

## 2024-11-19 ENCOUNTER — APPOINTMENT (OUTPATIENT)
Dept: RADIATION ONCOLOGY | Facility: CLINIC | Age: 49
End: 2024-11-19
Payer: COMMERCIAL

## 2024-11-19 PROCEDURE — G6013 RADIATION TREATMENT DELIVERY: HCPCS | Performed by: RADIOLOGY

## 2024-11-19 PROCEDURE — G6017 INTRAFRACTION TRACK MOTION: HCPCS | Performed by: RADIOLOGY

## 2024-11-20 ENCOUNTER — APPOINTMENT (OUTPATIENT)
Dept: RADIATION ONCOLOGY | Facility: CLINIC | Age: 49
End: 2024-11-20
Payer: COMMERCIAL

## 2024-11-20 PROCEDURE — 77427 RADIATION TX MANAGEMENT X5: CPT | Performed by: RADIOLOGY

## 2024-11-20 PROCEDURE — 77336 RADIATION PHYSICS CONSULT: CPT | Performed by: SURGERY

## 2024-11-20 PROCEDURE — G6017 INTRAFRACTION TRACK MOTION: HCPCS | Performed by: SURGERY

## 2024-11-20 PROCEDURE — G6013 RADIATION TREATMENT DELIVERY: HCPCS | Performed by: SURGERY

## 2024-11-21 ENCOUNTER — APPOINTMENT (OUTPATIENT)
Dept: RADIATION ONCOLOGY | Facility: CLINIC | Age: 49
End: 2024-11-21
Payer: COMMERCIAL

## 2024-11-21 PROCEDURE — G6017 INTRAFRACTION TRACK MOTION: HCPCS | Performed by: SURGERY

## 2024-11-21 PROCEDURE — G6013 RADIATION TREATMENT DELIVERY: HCPCS | Performed by: SURGERY

## 2024-11-22 ENCOUNTER — APPOINTMENT (OUTPATIENT)
Dept: RADIATION ONCOLOGY | Facility: CLINIC | Age: 49
End: 2024-11-22
Payer: COMMERCIAL

## 2024-11-22 PROCEDURE — G6013 RADIATION TREATMENT DELIVERY: HCPCS | Performed by: SURGERY

## 2024-11-26 ENCOUNTER — OFFICE VISIT (OUTPATIENT)
Dept: RADIATION ONCOLOGY | Facility: CLINIC | Age: 49
End: 2024-11-26
Payer: COMMERCIAL

## 2024-11-26 VITALS
RESPIRATION RATE: 16 BRPM | HEART RATE: 81 BPM | SYSTOLIC BLOOD PRESSURE: 110 MMHG | DIASTOLIC BLOOD PRESSURE: 74 MMHG | BODY MASS INDEX: 27.49 KG/M2 | OXYGEN SATURATION: 99 % | TEMPERATURE: 98 F | WEIGHT: 163 LBS

## 2024-11-26 DIAGNOSIS — Z17.0 MALIGNANT NEOPLASM OF UPPER-INNER QUADRANT OF RIGHT BREAST IN FEMALE, ESTROGEN RECEPTOR POSITIVE (H): Primary | ICD-10-CM

## 2024-11-26 DIAGNOSIS — C50.211 MALIGNANT NEOPLASM OF UPPER-INNER QUADRANT OF RIGHT BREAST IN FEMALE, ESTROGEN RECEPTOR POSITIVE (H): Primary | ICD-10-CM

## 2024-11-26 NOTE — LETTER
2024      Sharon Fong  [address] [address]      Dear Colleague,    Thank you for referring your patient, Sharon Fong, to the [facility] RADIATION ONCOLOGY [place]. Please see a copy of my visit note below.    [facility] PHYSICIANS  SPECIALIZING IN BREAKTHROUGHS  Radiation Oncology    On Treatment Visit Note      Sharon Fong      Date: 2024   MRN: [id number]   : 1975  Diagnosis: R breast IDC ER/[place]+ HER2-      Reason for Visit:  On Radiation Treatment Visit     Treatment Summary to Date  Treatment Site: R chestwall + bst, R SC Current Dose: 1800/6000, 1800/5000 cGy Fractions: ,       Chemotherapy  Chemo concurrent with radx?: No (Dr. Bernal)    Subjective:     Early in treatment doing well with no complaints    Nursing ROS:   Nutrition Alteration  Diet Type: Patient's Preference  Nutrition Note: Reviewed hydration  Skin  Skin Reaction: 0 - No changes  Skin Intervention: Start Mometasone daily, Continue Aquaphor 2-4 x day.        Cardiovascular  Respiratory effort: 1 - Normal - without distress        Psychosocial  Psychosocial Note: Patient denies fatigue  Pain Assessment  0-10 Pain Scale: 0      Objective:   There were no vitals taken for this visit.  Gen: Appears well, in no acute distress  Skin: minimal   erythema    Labs:  CBC RES      Assessment:    Tolerating radiation therapy well.  All questions and concerns addressed.    Plan:   Continue current therapy.    Skin care per above. Script called into pharmacy today.      Mosaiq chart and setup information reviewed  Ports checked    Medication Review  Med list reviewed with patient?: Yes    Educational Topic Discussed  Education Instructions: Reviewed side effects of radiation therapy, fatigue, skin cares.    JAMMIE Huntley MD     [phone]        Again, thank you for allowing me to participate in the care of your patient.        Sincerely,        Iliana CLAYTON  MD [place]

## 2024-11-26 NOTE — PROGRESS NOTES
[facility] PHYSICIANS  SPECIALIZING IN BREAKTHROUGHS  Radiation Oncology    On Treatment Visit Note      Sharon Fong      Date: 2024   MRN: [id number]   : 1975  Diagnosis: R breast IDC ER/[place]+ HER2-      Reason for Visit:  On Radiation Treatment Visit     Treatment Summary to Date  Treatment Site: R chestwall + bst, R SC Current Dose: 1800/6000, 1800/5000 cGy Fractions: ,       Chemotherapy  Chemo concurrent with radx?: No (Dr. Bernal)    Subjective:     Early in treatment doing well with no complaints    Nursing ROS:   Nutrition Alteration  Diet Type: Patient's Preference  Nutrition Note: Reviewed hydration  Skin  Skin Reaction: 0 - No changes  Skin Intervention: Start Mometasone daily, Continue Aquaphor 2-4 x day.        Cardiovascular  Respiratory effort: 1 - Normal - without distress        Psychosocial  Psychosocial Note: Patient denies fatigue  Pain Assessment  0-10 Pain Scale: 0      Objective:   There were no vitals taken for this visit.  Gen: Appears well, in no acute distress  Skin: minimal   erythema    Labs:  CBC RES      Assessment:    Tolerating radiation therapy well.  All questions and concerns addressed.    Plan:   Continue current therapy.    Skin care per above. Script called into pharmacy today.      Mosaiq chart and setup information reviewed  Ports checked    Medication Review  Med list reviewed with patient?: Yes    Educational Topic Discussed  Education Instructions: Reviewed side effects of radiation therapy, fatigue, skin cares.    JAMMIE Huntley MD     [phone]

## 2024-12-03 ENCOUNTER — OFFICE VISIT (OUTPATIENT)
Dept: RADIATION ONCOLOGY | Facility: CLINIC | Age: 49
End: 2024-12-03
Payer: COMMERCIAL

## 2024-12-03 VITALS
RESPIRATION RATE: 16 BRPM | OXYGEN SATURATION: 99 % | HEART RATE: 82 BPM | TEMPERATURE: 98.2 F | DIASTOLIC BLOOD PRESSURE: 70 MMHG | BODY MASS INDEX: 27.32 KG/M2 | WEIGHT: 162 LBS | SYSTOLIC BLOOD PRESSURE: 104 MMHG

## 2024-12-03 DIAGNOSIS — Z17.0 MALIGNANT NEOPLASM OF UPPER-INNER QUADRANT OF RIGHT BREAST IN FEMALE, ESTROGEN RECEPTOR POSITIVE (H): Primary | ICD-10-CM

## 2024-12-03 DIAGNOSIS — C50.211 MALIGNANT NEOPLASM OF UPPER-INNER QUADRANT OF RIGHT BREAST IN FEMALE, ESTROGEN RECEPTOR POSITIVE (H): Primary | ICD-10-CM

## 2024-12-03 PROCEDURE — 99207 PR DROP WITH A PROCEDURE: CPT | Performed by: RADIOLOGY

## 2024-12-03 ASSESSMENT — PAIN SCALES - GENERAL: PAINLEVEL_OUTOF10: MODERATE PAIN (4)

## 2024-12-03 NOTE — PROGRESS NOTES
[facility] PHYSICIANS  SPECIALIZING IN BREAKTHROUGHS  Radiation Oncology    On Treatment Visit Note      Sharon Fong      Date: 12/3/2024   MRN: [id number]   : 1975  Diagnosis: R breast IDC ER/[place]+ HER2-      Reason for Visit:  On Radiation Treatment Visit     Treatment Summary to Date  Treatment Site: R chestwall + bst, R SC Current Dose: 2600/6000, 2600/5000 cGy Fractions: ,       Chemotherapy  Chemo concurrent with radx?: No (Dr. Bernal)    Subjective:     Doing well with minimal skin changes    Nursing ROS:   Nutrition Alteration  Diet Type: Patient's Preference  Nutrition Note: Reviewed hydration  Skin  Skin Reaction: 1 - Faint erythema or dry desquamation (very faint erythema/hyperpigmentation.)  Skin Intervention: Continue Mometasone daily and Aquaphor 2-4 x day.        Cardiovascular  Respiratory effort: 1 - Normal - without distress        Psychosocial  Psychosocial Note: Patient reports increased fatigue.  Pain Assessment  0-10 Pain Scale: 4  Pain Note: Pain to R shoulder, reviewed warm pack to R shoulder/neck area, Tylenol before treatment as needed. Reviewed this is most likely from positioning of arms above head with daily radiation.      Objective:   /70   Pulse 82   Temp 98.2  F (36.8  C) (Oral)   Resp 16   Wt 73.5 kg (162 lb)   SpO2 99%   BMI 27.32 kg/m    Gen: Appears well, in no acute distress  Skin: Minimal diffuse erythema over treatment field    Labs:  CBC RESULTS:   Recent Labs   Lab Test 24  0819   WBC 6.0   RBC 3.30*   HGB 9.4*   HCT 29.3*   MCV 89   MCH 28.5   MCHC 32.1   RDW 16.1*        ELECTROLYTES:  Recent Labs   Lab Test 24  0819      POTASSIUM 3.9   CHLORIDE 108*   JJ 9.7   CO2 23   BUN 10.9   CR 0.49*   *       Assessment:    Tolerating radiation therapy well.  All questions and concerns addressed.    Toxicities:  Dermatitis: Grade 1: Faint erythema or dry desquamation    Plan:   Continue current  therapy.    Skin care per above.  Boost to include scar and IMN field      Mosaiq chart and setup information reviewed  Ports checked    Medication Review  Med list reviewed with patient?: Yes    Educational Topic Discussed  Education Instructions: Reviewed side effects of radiation therapy, fatigue, skin cares.        Murray Chacko MD    Please do not send letter to referring physician.

## 2024-12-03 NOTE — LETTER
12/3/2024      Sharon Fong  [address] [address]      Dear Colleague,    Thank you for referring your patient, Sharon Fong, to the [facility] RADIATION ONCOLOGY [place]. Please see a copy of my visit note below.    [facility] PHYSICIANS  SPECIALIZING IN BREAKTHROUGHS  Radiation Oncology    On Treatment Visit Note      Sharon Fong      Date: 12/3/2024   MRN: [id number]   : 1975  Diagnosis: R breast IDC ER/[place]+ HER2-      Reason for Visit:  On Radiation Treatment Visit     Treatment Summary to Date  Treatment Site: R chestwall + bst, R SC Current Dose: 2600/6000, 2600/5000 cGy Fractions: ,       Chemotherapy  Chemo concurrent with radx?: No (Dr. Bernal)    Subjective:     Doing well with minimal skin changes    Nursing ROS:   Nutrition Alteration  Diet Type: Patient's Preference  Nutrition Note: Reviewed hydration  Skin  Skin Reaction: 1 - Faint erythema or dry desquamation (very faint erythema/hyperpigmentation.)  Skin Intervention: Continue Mometasone daily and Aquaphor 2-4 x day.        Cardiovascular  Respiratory effort: 1 - Normal - without distress        Psychosocial  Psychosocial Note: Patient reports increased fatigue.  Pain Assessment  0-10 Pain Scale: 4  Pain Note: Pain to R shoulder, reviewed warm pack to R shoulder/neck area, Tylenol before treatment as needed. Reviewed this is most likely from positioning of arms above head with daily radiation.      Objective:   /70   Pulse 82   Temp 98.2  F (36.8  C) (Oral)   Resp 16   Wt 73.5 kg (162 lb)   SpO2 99%   BMI 27.32 kg/m    Gen: Appears well, in no acute distress  Skin: Minimal diffuse erythema over treatment field    Labs:  CBC RESULTS:   Recent Labs   Lab Test 24  08   WBC 6.0   RBC 3.30*   HGB 9.4*   HCT 29.3*   MCV 89   MCH 28.5   MCHC 32.1   RDW 16.1*        ELECTROLYTES:  Recent Labs   Lab Test 24      POTASSIUM 3.9    CHLORIDE 108*   JJ 9.7   CO2 23   BUN 10.9   CR 0.49*   *       Assessment:    Tolerating radiation therapy well.  All questions and concerns addressed.    Toxicities:  Dermatitis: Grade 1: Faint erythema or dry desquamation    Plan:   Continue current therapy.    Skin care per above.  Boost to include scar and IMN field      Mosaiq chart and setup information reviewed  Ports checked    Medication Review  Med list reviewed with patient?: Yes    Educational Topic Discussed  Education Instructions: Reviewed side effects of radiation therapy, fatigue, skin cares.        Murray Chacko MD    Please do not send letter to referring physician.        Again, thank you for allowing me to participate in the care of your patient.        Sincerely,        FORYLAN Chacko MD

## 2024-12-10 ENCOUNTER — OFFICE VISIT (OUTPATIENT)
Dept: RADIATION ONCOLOGY | Facility: CLINIC | Age: 49
End: 2024-12-10
Payer: COMMERCIAL

## 2024-12-10 VITALS
WEIGHT: 161 LBS | OXYGEN SATURATION: 99 % | HEART RATE: 81 BPM | RESPIRATION RATE: 16 BRPM | TEMPERATURE: 98 F | DIASTOLIC BLOOD PRESSURE: 78 MMHG | BODY MASS INDEX: 27.15 KG/M2 | SYSTOLIC BLOOD PRESSURE: 105 MMHG

## 2024-12-10 DIAGNOSIS — Z17.0 MALIGNANT NEOPLASM OF UPPER-INNER QUADRANT OF RIGHT BREAST IN FEMALE, ESTROGEN RECEPTOR POSITIVE (H): Primary | ICD-10-CM

## 2024-12-10 DIAGNOSIS — C50.211 MALIGNANT NEOPLASM OF UPPER-INNER QUADRANT OF RIGHT BREAST IN FEMALE, ESTROGEN RECEPTOR POSITIVE (H): Primary | ICD-10-CM

## 2024-12-10 PROCEDURE — 99207 PR DROP WITH A PROCEDURE: CPT | Performed by: RADIOLOGY

## 2024-12-10 ASSESSMENT — PAIN SCALES - GENERAL: PAINLEVEL_OUTOF10: MODERATE PAIN (4)

## 2024-12-10 NOTE — PATIENT INSTRUCTIONS
Please contact [facility] RN with questions or concerns following today's appointment.    If you are a patient of Dr. Chavira call: [phone]   If you are a patient of Dr. Chacko call: [phone]    Please feel free to leave a detailed message if your call is not answered.    If your call is not received before 3:00 PM, it may not be returned until the following business day.    If you are receiving radiation treatment and need assistance after 3:00 PM or on the weekends, please call [phone] and ask to speak to the radiation oncologist on-call.    Thank you!    [facility] Radiation Oncology Nursing    
General

## 2024-12-10 NOTE — LETTER
12/10/2024      Sharon Fong  [address] [facility] [address]      Dear Colleague,    Thank you for referring your patient, Sharon Fong, to the [facility] RADIATION ONCOLOGY [place]. Please see a copy of my visit note below.    [facility] PHYSICIANS  SPECIALIZING IN BREAKTHROUGHS  Radiation Oncology    On Treatment Visit Note      Sharon Fong      Date: 12/10/2024   MRN: [id number]   : 1975  Diagnosis: R breast IDC ER/[place]+ HER2-      Reason for Visit:  On Radiation Treatment Visit     Treatment Summary to Date  Treatment Site: R chestwall + bst, R SC Current Dose: 3600/6000, 3600/5000 cGy Fractions: ,       Chemotherapy  Chemo concurrent with radx?: No (Dr. Bernal)    Subjective:     Having more discomfort with right arm extension    Nursing ROS:   Nutrition Alteration  Diet Type: Patient's Preference  Skin  Skin Reaction: 1  Skin Intervention: Continue Mometasone daily and Aquaphor 2-4 x day.        Cardiovascular  Respiratory effort: 1 - Normal - without distress        Psychosocial  Psychosocial Note: Patient reports increased fatigue.  Pain Assessment  0-10 Pain Scale: 4  Pain Descriptors: Aching  Pain Duration: Intermittent  Pain Treatment: Tylenol PRN  Pain Note: Pain to R shoulder, reviewed warm pack to R shoulder/neck area, Tylenol before treatment as needed. Pt reported pulling sensation when reach R arm up above head, denies pain.      Objective:   /78 (BP Location: Left arm, Patient Position: Chair, Cuff Size: Adult Regular)   Pulse 81   Temp 98  F (36.7  C) (Oral)   Resp 16   Wt 73 kg (161 lb)   SpO2 99%   BMI 27.15 kg/m    Gen: Appears well, in no acute distress  Skin: Mild diffuse erythema over treatment field    Labs:  CBC RESULTS:   Recent Labs   Lab Test 24  0819   WBC 6.0   RBC 3.30*   HGB 9.4*   HCT 29.3*   MCV 89   MCH 28.5   MCHC 32.1   RDW 16.1*        ELECTROLYTES:  Recent Labs   Lab Test  09/26/24  0819      POTASSIUM 3.9   CHLORIDE 108*   JJ 9.7   CO2 23   BUN 10.9   CR 0.49*   *       Assessment:    Tolerating radiation therapy well.  All questions and concerns addressed.    Toxicities:  Fatigue: Grade 1: Fatigue relieved by rest  Pain: Grade 1: Mild pain  Dermatitis: Grade 1: Faint erythema or dry desquamation    Plan:   Continue current therapy.    Skin care and pain management per above      Mosaiq chart and setup information reviewed  Ports checked    Medication Review  Med list reviewed with patient?: Yes    Educational Topic Discussed  Education Instructions: Reviewed side effects of radiation therapy, fatigue, skin cares.        Murray Chacko MD    Please do not send letter to referring physician.        Again, thank you for allowing me to participate in the care of your patient.        Sincerely,        FROYLAN Chacko MD

## 2024-12-10 NOTE — PROGRESS NOTES
[facility] PHYSICIANS  SPECIALIZING IN BREAKTHROUGHS  Radiation Oncology    On Treatment Visit Note      Sharon Fong      Date: 12/10/2024   MRN: [id number]   : 1975  Diagnosis: R breast IDC ER/NC+ HER2-      Reason for Visit:  On Radiation Treatment Visit     Treatment Summary to Date  Treatment Site: R chestwall + bst, R SC Current Dose: 3600/6000, 3600/5000 cGy Fractions: ,       Chemotherapy  Chemo concurrent with radx?: No (Dr. Bernal)    Subjective:     Having more discomfort with right arm extension    Nursing ROS:   Nutrition Alteration  Diet Type: Patient's Preference  Skin  Skin Reaction: 1  Skin Intervention: Continue Mometasone daily and Aquaphor 2-4 x day.        Cardiovascular  Respiratory effort: 1 - Normal - without distress        Psychosocial  Psychosocial Note: Patient reports increased fatigue.  Pain Assessment  0-10 Pain Scale: 4  Pain Descriptors: Aching  Pain Duration: Intermittent  Pain Treatment: Tylenol PRN  Pain Note: Pain to R shoulder, reviewed warm pack to R shoulder/neck area, Tylenol before treatment as needed. Pt reported pulling sensation when reach R arm up above head, denies pain.      Objective:   /78 (BP Location: Left arm, Patient Position: Chair, Cuff Size: Adult Regular)   Pulse 81   Temp 98  F (36.7  C) (Oral)   Resp 16   Wt 73 kg (161 lb)   SpO2 99%   BMI 27.15 kg/m    Gen: Appears well, in no acute distress  Skin: Mild diffuse erythema over treatment field    Labs:  CBC RESULTS:   Recent Labs   Lab Test 24  0819   WBC 6.0   RBC 3.30*   HGB 9.4*   HCT 29.3*   MCV 89   MCH 28.5   MCHC 32.1   RDW 16.1*        ELECTROLYTES:  Recent Labs   Lab Test 24  0819      POTASSIUM 3.9   CHLORIDE 108*   JJ 9.7   CO2 23   BUN 10.9   CR 0.49*   *       Assessment:    Tolerating radiation therapy well.  All questions and concerns addressed.    Toxicities:  Fatigue: Grade 1: Fatigue relieved by rest  Pain:  Grade 1: Mild pain  Dermatitis: Grade 1: Faint erythema or dry desquamation    Plan:   Continue current therapy.    Skin care and pain management per above      Mosaiq chart and setup information reviewed  Ports checked    Medication Review  Med list reviewed with patient?: Yes    Educational Topic Discussed  Education Instructions: Reviewed side effects of radiation therapy, fatigue, skin cares.        Murray Chacko MD    Please do not send letter to referring physician.

## 2024-12-12 DIAGNOSIS — Z17.0 MALIGNANT NEOPLASM OF UPPER-INNER QUADRANT OF RIGHT BREAST IN FEMALE, ESTROGEN RECEPTOR POSITIVE (H): Chronic | ICD-10-CM

## 2024-12-12 DIAGNOSIS — C50.211 MALIGNANT NEOPLASM OF UPPER-INNER QUADRANT OF RIGHT BREAST IN FEMALE, ESTROGEN RECEPTOR POSITIVE (H): Chronic | ICD-10-CM

## 2024-12-12 RX ORDER — LETROZOLE 2.5 MG/1
1 TABLET, FILM COATED ORAL DAILY
Qty: 30 TABLET | Refills: 0 | Status: SHIPPED | OUTPATIENT
Start: 2024-12-12

## 2024-12-12 NOTE — TELEPHONE ENCOUNTER
Letrozole 2.5mg tab  Last prescribing provider: Dr. Dame Bernal     Last clinic visit date: 10/14/24    Recommendations for requested medication (if none, N/A): NA    Any other pertinent information (if none, N/A): NA    Refilled: Y/N, if NO, why?

## 2024-12-15 NOTE — PROGRESS NOTES
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 She was treated on ISPY 2.2 trial and was found to have a MammaPrint high risk/blueprint luminal B subtype.  She was started on treatment in block B due to lack of availability of block A agents at the time of enrollment.  She completed weekly Taxol -> Abraxane/ddAC and went on to have a bilateral mastectomy with surgical pathology showing evidence of [id number] HR+/HER2 negative IDC (RCB II) in the right breast.  She is started OFS on 11/14/2024 and letrozole on 11/28/2024.  She is currently undergoing adjuvant radiation therapy.      Interval History  Sharon is overall doing well since her last visit with me.  She is currently undergoing adjuvant radiation therapy since 11/18/2024.  She continues to tolerate this relatively well, though she has had progressive fatigue and mild skin toxicity.    Since starting letrozole, she has noted more arthralgias, especially when she is sedentary for any period of time.  She has not had any excessive skin, hair, or vaginal dryness.    She has noticed some brain fog, though this is not impacting her work or QOL much    She largely feels that her mood has been stable.    No other concerns today.      ROS: 10 point ROS neg other than the symptoms noted above in the HPI.    Her full oncologic history is as follows:   Oncologic History  Patient Active Problem List    Diagnosis Date Noted    Malignant neoplasm of upper-inner quadrant of right female breast (H) 04/02/2024     Priority: High     Right breast cancer  Has had longstanding history of multiple biopsies, infection, and partial mastectomy for a right breast multifocal abscess dating back to at least 2010. 2024 patient presented with palpable lump at 1:00 in the right  breast    3/21/2024 diagnostic mammo showed clustered fine indeterminate calcs at 12:00.  No mammographic abnormality identified to correspond to the palpable area of fullness at 1:00.  On US, at 1:00, 10 cm FN there was a 3.7 x 2.3 x 1.5 cm solid hypoechoic irregularly marginated mass.  Additional hypoechoic lesion located at 2:00, 3 cm FN measuring 1.0 x 0.5 cm.  Another hypoechoic lesion at 2:00, 12 cm FN measuring 0.7 cm.  At least 2 enlarged right axillary lymph nodes -largest measuring up to 0.6 cm.    3/28/2024 ultrasound-guided biopsy of the followin:00 lesion at least DCIS  1:00 lesion grade 2 IDC.  No LVI.  ER (3+, 80 to 90%), ND (3+, 90 to 100%), HER2 2+ and FISH negative   Right axillary LN: metastatic carcinoma    2024 CT CAP demonstrated multifocal enhancement in the right breast with mildly abnormal right axillary lymph nodes.  Otherwise no evidence of distant metastatic disease.    2024 NM bone scan negative for osteoblastic metastatic disease.    4/15/2024 Baseline MRI breast demonstrated the following  Right breast at 1:00 mass measuring 2.6 x 2.0 by 3.0 cm with extensive surrounding clumped NME occupying most of the right breast and measuring 8.9 x 4.7 x 8.7   Left breast showed a fibroadenoma at 8-9:00 position.  In addition, 7 mm of linear branching NME at 3-4:00 position.  This was biopsied and found to be benign breast tissue.  Few asymmetric right internal mammary chain LN. multiple enlarged right level 1 axillary nodes.  Additionally multiple LEFT level 1 axillary nodes that are indeterminant -which were normal on left axillary US.  FTV 38.222 cc    2024 MammaPrint -0.192 (high risk) and blueprint luminal B    2024 - 2024 weekly Taxol - changed to Abraxane starting week 2 due to allergic reaction    2024 W3 MRI  Minimally decreased size of the postoperative right breast cancer at the approximate 1:00 position measuring approximately 2.4 x 1.9 x 2.9 cm  (previously 2.6 x 2.0 x 3.0 cm).  There is overall similar extent although mildly decreased volume of abnormal surrounding NME  Minimal interval enlargement of multiple bilateral level I lymph nodes, including the biopsy-proven right axillary node with indwelling biopsy marker. No significant change in the multiple small asymmetric right internal mammary chain lymph nodes.  27.167 ml - Tumor volume reduction of 28.9% from baseline     6/10/2024 (6-week MRI breast) 8.8 cm anteroposterior on axial MIPS, unchanged.  Mildly decreased size of the biopsy-proven cancer in the right breast at the approximate 1:00 position middle depth measuring approximately 2.0 x 1.4 x 2.4 cm (previously 2.4 x 1.9 x 2.9 cm).   Extensive surrounding NME is largely unchanged changed, but the volume has minimally decreased.  Stable bilateral axillary lymph nodes, including the biopsy-proven right level I axillary node. Stable asymmetric small right internal mammary chain lymph nodes.  18.274 - FTV reduction of 57% from baseline     7/23/2024 (12-week MRI breast) Longest diameter of suspicious enhancement: 8.0 cm(previously 8.8 cm).   Irregular enhancing mass in the right breast at approximately 1:00 positionmeasures 1.8 x 1.1 x 1.6 cm (previously 2.0 x 1.4 x 2.4 cm).   NME continues to extend from anterior to posterior depth, with anterior NME extending to the base of the nipple.  Stable prominent bilateral axillary nodes, including the biopsied right level 1 axillary node. Small asymmetric right internal mammary chain lymph nodes are also similar to prior  7.585 ml - FTV reduction of 80% from baseline     7/24/2024 Study biopsy  Right breast 1:00 grade 2 residual IDC with probable treatment related changes.  Also grade 2 DCIS.  Right axillary lymph node with residual metastatic disease.  Tumor-infiltrating lymphocytes of 10%.  Some decreased cellularity suggestive of treatment response.    7/25/2024 - 9/5/2024 ddAC x 4     8/19/2024 W4 MRI  (in block C) Longest diameter of suspicious enhancement: 8 cm (sharee has decreased volume of enhancement.  Right breast at 1:00 posterior depth measures 1.7 x 0.7 x 1.3 cm, previously 1.8 x 0.9 x 1.6 cm  The right MEÑO node just above the 3rd rib is slightly smaller than the prior exam  The right axillary lymph nodes have slightly decreased in size. No lymphadenopathy in the left axilla.    FTV 6.349 ml - 83% reduction from baseline     9/30/2024 Right mastectomy showing 1.6cm of residual grade II IDC w/ evidence of treatment related changes. 25% cellularity. No LVI.  Also 1.6 cm of grade 2 DCIS. DCIS is 5% of tumor. Negative margins. ADH, classic type LCIS, calcs associated w/ invasive carcinoma, DCIS, and benign acini. ER (3+, %), CT (1+, 3%), HER2 2+ and FISH 2.8/2.4 = 1.2 (negative). 1/4 LN w./ metastatic carcinoma w/ evidence of treatment related changes. No EVA.  [id number].  Left breast mastectomy - ADH. No malignancy.     10/14/2024 Zoladex    10/21/2024 DEXA scan showing osteopenia: Total hip T-score: -1.7, Left femoral neck T-score = -2.1, Right femoral neck T-score= -2.1     10/28/2024 Letrozole      Hypotension, unspecified hypotension type 07/29/2024     Priority: Medium    Syncope, unspecified syncope type 07/29/2024     Priority: Medium    Seizure (H) 11/15/2013     Priority: Medium    Vitamin D deficiency 04/23/2013     Priority: Medium     April 2013 July 2013      Inflammatory disease of breast 08/17/2010     Priority: Medium     S/p  Partial R mastectomy secondary to recurrent abscesses  Dr Sweet followed  Final dx after surgery -histoplasmosis  Was treated         Current Medications:  Current Outpatient Medications   Medication Sig Dispense Refill    aspirin-acetaminophen-caffeine (EXCEDRIN MIGRAINE) 250-250-65 MG tablet  (Patient not taking: Reported on 12/3/2024)      ibuprofen (ADVIL/MOTRIN) 200 MG capsule  (Patient not taking: Reported on 12/3/2024)      letrozole (FEMARA) 2.5 MG tablet  TAKE ONE TABLET BY MOUTH ONE TIME DAILY 30 tablet 0    mometasone (ELOCON) 0.1 % external cream Apply topically daily. to radiation treatment field.  Stop application 1 week after completing treatment. 45 g 1         ECOG Performance Status: 0    Physical Examination:  /80 (BP Location: Right arm, Patient Position: Sitting, Cuff Size: Adult Regular)   Pulse 88   Temp 98.4  F (36.9  C) (Oral)   Resp 20   Wt 73.8 kg (162 lb 12.8 oz)   SpO2 98%   BMI 27.46 kg/m    General: Fatigued appearing, well-nourished adult female in NAD.  HEENT:  Normocephalic.  Sclera anicteric.  MMM.  No lesions of the oropharynx.  Lymph:  No cervical, supraclavicular, or axillary LAD.  Breast: s/p bilateral mastectomy without reconstruction.  Right breast with radiation dermatitis.  Chest:  CTA bilaterally.  No wheezes or crackles.  CV:  RRR.  Nl S1 and S2.  No m/r/g.  Abd:  Soft/NT/ND.  BSs normoactive.  No hepatosplenomegaly.  Ext:  No pitting edema of the bilateral lower extremities.  Pulses 2+ and symmetric.  Musculo:  Strength 5/5 throughout.  Neuro:  Cranial nerves grossly intact.  Psych:  Mood and affect appear normal.        Laboratory Data:  Lab Results   Component Value Date    WBC 6.0 09/26/2024    HGB 9.4 (L) 09/26/2024    HCT 29.3 (L) 09/26/2024    MCV 89 09/26/2024     09/26/2024     Lab Results   Component Value Date     09/26/2024    BUN 10.9 09/26/2024    ANIONGAP 11 09/26/2024     Lab Results   Component Value Date    ALT 26 09/26/2024    AST 33 09/26/2024    ALKPHOS 68 09/26/2024     Lab Results   Component Value Date    INR 1.01 07/29/2024       Radiology data:  I have personally reviewed the images of / or the following radiology data: As detailed in the oncology timeline      Pathology and other data:  I have personally reviewed the following data: As detailed in the oncology timeline      Assessment and Recommendations  Sharon Fong  is a 49 year old premenopausal woman with palp detected  clinical stage II-III HR+/HER2 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  She underwent treatment on the I SPY 2.2 clinical trial and completed 12 weeks of Taxol/Abraxane and 4 cycles of ddAC.  She underwent a bilateral mastectomy with evidence of [id number] (RCB 2) disease.  She is here for discussions regarding adjuvant therapy.      # Right breast cancer  - Continue Zoladex to ensure that she remains in menopause -therapy has likely pushed her into menopause, should be reassessed in about a year  - Continue letrozole for a minimum of 5 years  - Consider adjuvant Ribociclib [facility], given anatomic stage III disease  - Dscussed she does not need routine imaging in light of bilateral mastectomy, but imaging would be guided by history and physical exam    # Arthralgias  Related to adjuvant endocrine therapy  - Encouraged regular exercise  - We also discussed potentially taking a short treatment break versus changing to an alternative AI if symptoms become intolerable    # Bone health  # Osteopenia  - Baseline DEXA scan from October 2024 showing evidence of osteopenia  - Continue calcium and vitamin D  - Adjuvant bisphosphonate for 3 years - will start on 12/18/2024      RTC in 1 month to initiate Ribociclib.  Baseline EKG ordered today.      The longitudinal plan of care for the diagnosis(es)/condition(s) as documented were addressed during this visit. Due to the added complexity in care, I will continue to support Sharon in the subsequent management and with ongoing continuity of care.        40 minutes spent on the date of the encounter doing chart review, review of test results, interpretation of tests, patient visit, and documentation     Dame Dolores MD

## 2024-12-16 ENCOUNTER — ONCOLOGY VISIT (OUTPATIENT)
Dept: ONCOLOGY | Facility: CLINIC | Age: 49
End: 2024-12-16
Attending: INTERNAL MEDICINE
Payer: COMMERCIAL

## 2024-12-16 ENCOUNTER — APPOINTMENT (OUTPATIENT)
Dept: RADIATION ONCOLOGY | Facility: CLINIC | Age: 49
End: 2024-12-16
Payer: COMMERCIAL

## 2024-12-16 VITALS
RESPIRATION RATE: 20 BRPM | TEMPERATURE: 98.4 F | DIASTOLIC BLOOD PRESSURE: 80 MMHG | BODY MASS INDEX: 27.46 KG/M2 | OXYGEN SATURATION: 98 % | HEART RATE: 88 BPM | SYSTOLIC BLOOD PRESSURE: 119 MMHG | WEIGHT: 162.8 LBS

## 2024-12-16 DIAGNOSIS — M25.50 ARTHRALGIA, UNSPECIFIED JOINT: ICD-10-CM

## 2024-12-16 DIAGNOSIS — C50.211 MALIGNANT NEOPLASM OF UPPER-INNER QUADRANT OF RIGHT BREAST IN FEMALE, ESTROGEN RECEPTOR POSITIVE (H): Primary | Chronic | ICD-10-CM

## 2024-12-16 DIAGNOSIS — M85.89 OSTEOPENIA OF MULTIPLE SITES: ICD-10-CM

## 2024-12-16 DIAGNOSIS — Z17.0 MALIGNANT NEOPLASM OF UPPER-INNER QUADRANT OF RIGHT BREAST IN FEMALE, ESTROGEN RECEPTOR POSITIVE (H): Primary | Chronic | ICD-10-CM

## 2024-12-16 PROCEDURE — 99213 OFFICE O/P EST LOW 20 MIN: CPT | Performed by: INTERNAL MEDICINE

## 2024-12-16 RX ORDER — LETROZOLE 2.5 MG/1
1 TABLET, FILM COATED ORAL DAILY
Qty: 90 TABLET | Refills: 3 | Status: SHIPPED | OUTPATIENT
Start: 2024-12-16

## 2024-12-16 ASSESSMENT — PAIN SCALES - GENERAL: PAINLEVEL_OUTOF10: MODERATE PAIN (4)

## 2024-12-16 NOTE — NURSING NOTE
"Oncology Rooming Note    December 16, 2024 2:50 PM   Sharon Fong is a 49 year old female who presents for:    Chief Complaint   Patient presents with    Oncology Clinic Visit     Malignant neoplasm of upper-inner quadrant of right breast     Initial Vitals: /80 (BP Location: Right arm, Patient Position: Sitting, Cuff Size: Adult Regular)   Pulse 88   Temp 98.4  F (36.9  C) (Oral)   Resp 20   Wt 73.8 kg (162 lb 12.8 oz)   SpO2 98%   BMI 27.46 kg/m   Estimated body mass index is 27.46 kg/m  as calculated from the following:    Height as of 8/6/24: 1.64 m (5' 4.57\").    Weight as of this encounter: 73.8 kg (162 lb 12.8 oz). Body surface area is 1.83 meters squared.  Moderate Pain (4) Comment: Data Unavailable   No LMP recorded. Patient has had a hysterectomy.  Allergies reviewed: Yes  Medications reviewed: Yes    Medications: Medication refills not needed today.  Pharmacy name entered into [facility] - [place], [place] - [address] [place] [address]    Frailty Screening:   Is the patient here for a new oncology consult visit in cancer care? 2. No      Clinical concerns: none      Sanam Scott"

## 2024-12-16 NOTE — LETTER
12/16/2024      Sharon Fong  [address] [facility] [address] [address]      Dear Colleague,    Thank you for referring your patient, Sharon Fong, to the [facility]. Please see a copy of my visit note below.    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 She was treated on ISPY 2.2 trial and was found to have a MammaPrint high risk/blueprint luminal B subtype.  She was started on treatment in block B due to lack of availability of block A agents at the time of enrollment.  She completed weekly Taxol -> Abraxane/ddAC and went on to have a bilateral mastectomy with surgical pathology showing evidence of [id number] HR+/HER2 negative IDC (RCB II) in the right breast.  She is started OFS on 11/14/2024 and letrozole on 11/28/2024.  She is currently undergoing adjuvant radiation therapy.      Interval History  Sharon is overall doing well since her last visit with me.  She is currently undergoing adjuvant radiation therapy since 11/18/2024.  She continues to tolerate this relatively well, though she has had progressive fatigue and mild skin toxicity.    Since starting letrozole, she has noted more arthralgias, especially when she is sedentary for any period of time.  She has not had any excessive skin, hair, or vaginal dryness.    She has noticed some brain fog, though this is not impacting her work or QOL much    She largely feels that her mood has been stable.    No other concerns today.      ROS: 10 point ROS neg other than the symptoms noted above in the HPI.    Her full oncologic history is as follows:   Oncologic History  Patient Active Problem List    Diagnosis Date Noted     Malignant neoplasm of upper-inner quadrant of right female breast (H) 04/02/2024     Priority:  High     Right breast cancer  Has had longstanding history of multiple biopsies, infection, and partial mastectomy for a right breast multifocal abscess dating back to at least .     patient presented with palpable lump at 1:00 in the right breast    3/21/2024 diagnostic mammo showed clustered fine indeterminate calcs at 12:00.  No mammographic abnormality identified to correspond to the palpable area of fullness at 1:00.  On US, at 1:00, 10 cm FN there was a 3.7 x 2.3 x 1.5 cm solid hypoechoic irregularly marginated mass.  Additional hypoechoic lesion located at 2:00, 3 cm FN measuring 1.0 x 0.5 cm.  Another hypoechoic lesion at 2:00, 12 cm FN measuring 0.7 cm.  At least 2 enlarged right axillary lymph nodes -largest measuring up to 0.6 cm.    3/28/2024 ultrasound-guided biopsy of the followin:00 lesion at least DCIS  1:00 lesion grade 2 IDC.  No LVI.  ER (3+, 80 to 90%), NH (3+, 90 to 100%), HER2 2+ and FISH negative   Right axillary LN: metastatic carcinoma    2024 CT CAP demonstrated multifocal enhancement in the right breast with mildly abnormal right axillary lymph nodes.  Otherwise no evidence of distant metastatic disease.    2024 NM bone scan negative for osteoblastic metastatic disease.    4/15/2024 Baseline MRI breast demonstrated the following  Right breast at 1:00 mass measuring 2.6 x 2.0 by 3.0 cm with extensive surrounding clumped NME occupying most of the right breast and measuring 8.9 x 4.7 x 8.7   Left breast showed a fibroadenoma at 8-9:00 position.  In addition, 7 mm of linear branching NME at 3-4:00 position.  This was biopsied and found to be benign breast tissue.  Few asymmetric right internal mammary chain LN. multiple enlarged right level 1 axillary nodes.  Additionally multiple LEFT level 1 axillary nodes that are indeterminant -which were normal on left axillary US.  FTV 38.222 cc    2024 MammaPrint -0.192 (high risk) and blueprint luminal B    2024 -  7/17/2024 weekly Taxol - changed to Abraxane starting week 2 due to allergic reaction    5/17/2024 W3 MRI  Minimally decreased size of the postoperative right breast cancer at the approximate 1:00 position measuring approximately 2.4 x 1.9 x 2.9 cm (previously 2.6 x 2.0 x 3.0 cm).  There is overall similar extent although mildly decreased volume of abnormal surrounding NME  Minimal interval enlargement of multiple bilateral level I lymph nodes, including the biopsy-proven right axillary node with indwelling biopsy marker. No significant change in the multiple small asymmetric right internal mammary chain lymph nodes.  27.167 ml - Tumor volume reduction of 28.9% from baseline     6/10/2024 (6-week MRI breast) 8.8 cm anteroposterior on axial MIPS, unchanged.  Mildly decreased size of the biopsy-proven cancer in the right breast at the approximate 1:00 position middle depth measuring approximately 2.0 x 1.4 x 2.4 cm (previously 2.4 x 1.9 x 2.9 cm).   Extensive surrounding NME is largely unchanged changed, but the volume has minimally decreased.  Stable bilateral axillary lymph nodes, including the biopsy-proven right level I axillary node. Stable asymmetric small right internal mammary chain lymph nodes.  18.274 - FTV reduction of 57% from baseline     7/23/2024 (12-week MRI breast) Longest diameter of suspicious enhancement: 8.0 cm(previously 8.8 cm).   Irregular enhancing mass in the right breast at approximately 1:00 positionmeasures 1.8 x 1.1 x 1.6 cm (previously 2.0 x 1.4 x 2.4 cm).   NME continues to extend from anterior to posterior depth, with anterior NME extending to the base of the nipple.  Stable prominent bilateral axillary nodes, including the biopsied right level 1 axillary node. Small asymmetric right internal mammary chain lymph nodes are also similar to prior  7.585 ml - FTV reduction of 80% from baseline     7/24/2024 Study biopsy  Right breast 1:00 grade 2 residual IDC with probable treatment  related changes.  Also grade 2 DCIS.  Right axillary lymph node with residual metastatic disease.  Tumor-infiltrating lymphocytes of 10%.  Some decreased cellularity suggestive of treatment response.    7/25/2024 - 9/5/2024 ddAC x 4     8/19/2024 W4 MRI (in block C) Longest diameter of suspicious enhancement: 8 cm (sharee has decreased volume of enhancement.  Right breast at 1:00 posterior depth measures 1.7 x 0.7 x 1.3 cm, previously 1.8 x 0.9 x 1.6 cm  The right MEÑO node just above the 3rd rib is slightly smaller than the prior exam  The right axillary lymph nodes have slightly decreased in size. No lymphadenopathy in the left axilla.    FTV 6.349 ml - 83% reduction from baseline     9/30/2024 Right mastectomy showing 1.6cm of residual grade II IDC w/ evidence of treatment related changes. 25% cellularity. No LVI.  Also 1.6 cm of grade 2 DCIS. DCIS is 5% of tumor. Negative margins. ADH, classic type LCIS, calcs associated w/ invasive carcinoma, DCIS, and benign acini. ER (3+, %), IA (1+, 3%), HER2 2+ and FISH 2.8/2.4 = 1.2 (negative). 1/4 LN w./ metastatic carcinoma w/ evidence of treatment related changes. No EVA.  pM2bD0p.  Left breast mastectomy - ADH. No malignancy.     10/14/2024 Zoladex    10/21/2024 DEXA scan showing osteopenia: Total hip T-score: -1.7, Left femoral neck T-score = -2.1, Right femoral neck T-score= -2.1     10/28/2024 Letrozole       Hypotension, unspecified hypotension type 07/29/2024     Priority: Medium     Syncope, unspecified syncope type 07/29/2024     Priority: Medium     Seizure (H) 11/15/2013     Priority: Medium     Vitamin D deficiency 04/23/2013     Priority: Medium     April 2013 July 2013       Inflammatory disease of breast 08/17/2010     Priority: Medium     S/p  Partial R mastectomy secondary to recurrent abscesses  Dr Sweet followed  Final dx after surgery -histoplasmosis  Was treated         Current Medications:  Current Outpatient Medications   Medication Sig  Dispense Refill     aspirin-acetaminophen-caffeine (EXCEDRIN MIGRAINE) 250-250-65 MG tablet  (Patient not taking: Reported on 12/3/2024)       ibuprofen (ADVIL/MOTRIN) 200 MG capsule  (Patient not taking: Reported on 12/3/2024)       letrozole (FEMARA) 2.5 MG tablet TAKE ONE TABLET BY MOUTH ONE TIME DAILY 30 tablet 0     mometasone (ELOCON) 0.1 % external cream Apply topically daily. to radiation treatment field.  Stop application 1 week after completing treatment. 45 g 1         ECOG Performance Status: 0    Physical Examination:  /80 (BP Location: Right arm, Patient Position: Sitting, Cuff Size: Adult Regular)   Pulse 88   Temp 98.4  F (36.9  C) (Oral)   Resp 20   Wt 73.8 kg (162 lb 12.8 oz)   SpO2 98%   BMI 27.46 kg/m    General: Fatigued appearing, well-nourished adult female in NAD.  HEENT:  Normocephalic.  Sclera anicteric.  MMM.  No lesions of the oropharynx.  Lymph:  No cervical, supraclavicular, or axillary LAD.  Breast: s/p bilateral mastectomy without reconstruction.  Right breast with radiation dermatitis.  Chest:  CTA bilaterally.  No wheezes or crackles.  CV:  RRR.  Nl S1 and S2.  No m/r/g.  Abd:  Soft/NT/ND.  BSs normoactive.  No hepatosplenomegaly.  Ext:  No pitting edema of the bilateral lower extremities.  Pulses 2+ and symmetric.  Musculo:  Strength 5/5 throughout.  Neuro:  Cranial nerves grossly intact.  Psych:  Mood and affect appear normal.        Laboratory Data:  Lab Results   Component Value Date    WBC 6.0 09/26/2024    HGB 9.4 (L) 09/26/2024    HCT 29.3 (L) 09/26/2024    MCV 89 09/26/2024     09/26/2024     Lab Results   Component Value Date     09/26/2024    BUN 10.9 09/26/2024    ANIONGAP 11 09/26/2024     Lab Results   Component Value Date    ALT 26 09/26/2024    AST 33 09/26/2024    ALKPHOS 68 09/26/2024     Lab Results   Component Value Date    INR 1.01 07/29/2024       Radiology data:  I have personally reviewed the images of / or the following radiology  data: As detailed in the oncology timeline      Pathology and other data:  I have personally reviewed the following data: As detailed in the oncology timeline      Assessment and Recommendations  Sharon Fong  is a 49 year old premenopausal woman with palp detected clinical stage II-III HR+/HER2 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  She underwent treatment on the I SPY 2.2 clinical trial and completed 12 weeks of Taxol/Abraxane and 4 cycles of ddAC.  She underwent a bilateral mastectomy with evidence of eJ1aD2l (RCB 2) disease.  She is here for discussions regarding adjuvant therapy.      # Right breast cancer  - Continue Zoladex to ensure that she remains in menopause -therapy has likely pushed her into menopause, should be reassessed in about a year  - Continue letrozole for a minimum of 5 years  - Consider adjuvant Ribociclib per data from [facility] trial, given anatomic stage III disease  - Dscussed she does not need routine imaging in light of bilateral mastectomy, but imaging would be guided by history and physical exam    # Arthralgias  Related to adjuvant endocrine therapy  - Encouraged regular exercise  - We also discussed potentially taking a short treatment break versus changing to an alternative AI if symptoms become intolerable    # Bone health  # Osteopenia  - Baseline DEXA scan from October 2024 showing evidence of osteopenia  - Continue calcium and vitamin D  - Adjuvant bisphosphonate for 3 years - will start on 12/18/2024      RTC in 1 month to initiate Ribociclib.  Baseline EKG ordered today.      The longitudinal plan of care for the diagnosis(es)/condition(s) as documented were addressed during this visit. Due to the added complexity in care, I will continue to support Sharon in the subsequent management and with ongoing continuity of care.        40 minutes spent on the date of the encounter doing chart review, review of test results, interpretation of tests, patient visit, and documentation      Dame Dolores MD      Again, thank you for allowing me to participate in the care of your patient.        Sincerely,        Dame Dolores MD

## 2024-12-17 ENCOUNTER — OFFICE VISIT (OUTPATIENT)
Dept: RADIATION ONCOLOGY | Facility: CLINIC | Age: 49
End: 2024-12-17
Payer: COMMERCIAL

## 2024-12-17 VITALS
TEMPERATURE: 97.8 F | RESPIRATION RATE: 16 BRPM | DIASTOLIC BLOOD PRESSURE: 71 MMHG | BODY MASS INDEX: 27.29 KG/M2 | SYSTOLIC BLOOD PRESSURE: 106 MMHG | HEART RATE: 85 BPM | OXYGEN SATURATION: 99 % | WEIGHT: 161.8 LBS

## 2024-12-17 DIAGNOSIS — C50.211 MALIGNANT NEOPLASM OF UPPER-INNER QUADRANT OF RIGHT BREAST IN FEMALE, ESTROGEN RECEPTOR POSITIVE (H): Primary | ICD-10-CM

## 2024-12-17 DIAGNOSIS — Z17.0 MALIGNANT NEOPLASM OF UPPER-INNER QUADRANT OF RIGHT BREAST IN FEMALE, ESTROGEN RECEPTOR POSITIVE (H): Primary | ICD-10-CM

## 2024-12-17 LAB
ATRIAL RATE - MUSE: 90 BPM
DIASTOLIC BLOOD PRESSURE - MUSE: NORMAL MMHG
INTERPRETATION ECG - MUSE: NORMAL
P AXIS - MUSE: 56 DEGREES
PR INTERVAL - MUSE: 140 MS
QRS DURATION - MUSE: 76 MS
QT - MUSE: 376 MS
QTC - MUSE: 459 MS
R AXIS - MUSE: 55 DEGREES
SYSTOLIC BLOOD PRESSURE - MUSE: NORMAL MMHG
T AXIS - MUSE: 40 DEGREES
VENTRICULAR RATE- MUSE: 90 BPM

## 2024-12-17 PROCEDURE — 99207 PR DROP WITH A PROCEDURE: CPT | Performed by: RADIOLOGY

## 2024-12-17 ASSESSMENT — PAIN SCALES - GENERAL: PAINLEVEL_OUTOF10: MODERATE PAIN (4)

## 2024-12-17 NOTE — LETTER
2024      Sharon Fong  [address] [address]      Dear Colleague,    Thank you for referring your patient, Sharon Fong, to the [facility] RADIATION [facility]COL[facility] [place]. Please see a copy of my visit note below.    [facility] PHYSICIANS  SPECIALIZING IN BREAKTHROUGHS  Radiation Oncology    On Treatment Visit Note      Sharon Fong      Date: 2024   MRN: [id number]   : 1975  Diagnosis: R breast IDC ER/IA+ HER2-      Reason for Visit:  On Radiation Treatment Visit     Treatment Summary to Date  Treatment Site: R chestwall + bst, R SC Current Dose: 4600/6000, 4600/5000 cGy Fractions: ,       Chemotherapy  Chemo concurrent with radx?: No (Dr. Bernal)    Subjective:     Having expected skin changes.  Experiencing discomfort in right shoulder, elbow and wrist.  Also having sore throat.    Nursing ROS:   Nutrition Alteration  Diet Type: Patient's Preference  Skin  Skin Reaction: 2 - Moderate to brisk erythema, patchy moist desquamation, mostly confined to skin folds and creases, moderate edema (increased hyperpigmentation, no desquamation)  Skin Intervention: Continue Mometasone BID x 1 week after radiation then stop, continue Aquaphor 2-4 x day until skin has healed completely. If skin peels, do not apply mometasone to open areas, may apply neosporin.     ENT and Mouth Exam  ENT/Mouth Note: Mild sore throat- reviewed throat lozenges, Tylenol/Ibuprofen.  Cardiovascular  Respiratory effort: 1 - Normal - without distress        Psychosocial  Psychosocial Note: Patient reports increased fatigue.  Pain Assessment  0-10 Pain Scale: 4  Pain Descriptors: Aching  Pain Treatment: Tylenol PRN  Pain Note: Mild pain to R arm/shoulder, Tylenol/Ibuprofen PRN. May use cool packs to R upper chestwall.      Objective:   /71   Pulse 85   Temp 97.8  F (36.6  C) (Oral)   Resp 16   Wt 73.4 kg (161 lb 12.8 oz)   SpO2 99%   BMI 27.29 kg/m     Gen: Appears well, in no acute distress  Skin: moderate diffuse erythema with no desquamation over treatment field    Labs:  CBC RESULTS:   Recent Labs   Lab Test 09/26/24  0819   WBC 6.0   RBC 3.30*   HGB 9.4*   HCT 29.3*   MCV 89   MCH 28.5   MCHC 32.1   RDW 16.1*        ELECTROLYTES:  Recent Labs   Lab Test 09/26/24  0819      POTASSIUM 3.9   CHLORIDE 108*   JJ 9.7   CO2 23   BUN 10.9   CR 0.49*   *       Assessment:    Tolerating radiation therapy well.  All questions and concerns addressed.  Esophagitis is due to scv field which is an expected side effect.    Toxicities:  Fatigue: Grade 1: Fatigue relieved by rest  Pain: Grade 1: Mild pain  Esophagitis: Grade 2: Symptomatic; altered eating/swallowing; oral supplements indicated  Dermatitis: Grade 2: Moderate to brisk erythema; patchy moist desquamation, mostly confined to skin folds and creases; moderate erythema    Plan:   Continue current therapy.    Pain medication and lozenges per above.    Will not modify field to come off of esophagus as pt only has 2 more SCV treatments.  Skin care per above.      Mosaiq chart and setup information reviewed  Ports checked    Medication Review  Med list reviewed with patient?: Yes  Med Note: on letrozole    Educational Topic Discussed  Additional Instructions: PT/OT after radiation is complete and skin has healed. Follow up with Trisha Garza NP 4 weeks after radiation. Follow up with Dr. Caballero 1/13/25 as Dr. Bernal is leaving.  Education Instructions: Reviewed side effects of radiation therapy, fatigue, skin cares.        Murray Chacko MD    Please do not send letter to referring physician.        Again, thank you for allowing me to participate in the care of your patient.        Sincerely,        FROYLAN Chacko MD

## 2024-12-17 NOTE — PROGRESS NOTES
[facility] PHYSICIANS  SPECIALIZING IN BREAKTHROUGHS  Radiation Oncology    On Treatment Visit Note      Sharon Fong      Date: 2024   MRN: [id number]   : 1975  Diagnosis: R breast IDC ER/IN+ HER2-      Reason for Visit:  On Radiation Treatment Visit     Treatment Summary to Date  Treatment Site: R chestwall + bst, R SC Current Dose: 4600/6000, 4600/5000 cGy Fractions: ,       Chemotherapy  Chemo concurrent with radx?: No (Dr. Bernal)    Subjective:     Having expected skin changes.  Experiencing discomfort in right shoulder, elbow and wrist.  Also having sore throat.    Nursing ROS:   Nutrition Alteration  Diet Type: Patient's Preference  Skin  Skin Reaction: 2 - Moderate to brisk erythema, patchy moist desquamation, mostly confined to skin folds and creases, moderate edema (increased hyperpigmentation, no desquamation)  Skin Intervention: Continue Mometasone BID x 1 week after radiation then stop, continue Aquaphor 2-4 x day until skin has healed completely. If skin peels, do not apply mometasone to open areas, may apply neosporin.     ENT and Mouth Exam  ENT/Mouth Note: Mild sore throat- reviewed throat lozenges, Tylenol/Ibuprofen.  Cardiovascular  Respiratory effort: 1 - Normal - without distress        Psychosocial  Psychosocial Note: Patient reports increased fatigue.  Pain Assessment  0-10 Pain Scale: 4  Pain Descriptors: Aching  Pain Treatment: Tylenol PRN  Pain Note: Mild pain to R arm/shoulder, Tylenol/Ibuprofen PRN. May use cool packs to R upper chestwall.      Objective:   /71   Pulse 85   Temp 97.8  F (36.6  C) (Oral)   Resp 16   Wt 73.4 kg (161 lb 12.8 oz)   SpO2 99%   BMI 27.29 kg/m    Gen: Appears well, in no acute distress  Skin: moderate diffuse erythema with no desquamation over treatment field    Labs:  CBC RESULTS:   Recent Labs   Lab Test 24  0819   WBC 6.0   RBC 3.30*   HGB 9.4*   HCT 29.3*   MCV 89   MCH 28.5   MCHC 32.1   RDW  16.1*        ELECTROLYTES:  Recent Labs   Lab Test 09/26/24  0819      POTASSIUM 3.9   CHLORIDE 108*   JJ 9.7   CO2 23   BUN 10.9   CR 0.49*   *       Assessment:    Tolerating radiation therapy well.  All questions and concerns addressed.  Esophagitis is due to scv field which is an expected side effect.    Toxicities:  Fatigue: Grade 1: Fatigue relieved by rest  Pain: Grade 1: Mild pain  Esophagitis: Grade 2: Symptomatic; altered eating/swallowing; oral supplements indicated  Dermatitis: Grade 2: Moderate to brisk erythema; patchy moist desquamation, mostly confined to skin folds and creases; moderate erythema    Plan:   Continue current therapy.    Pain medication and lozenges per above.    Will not modify field to come off of esophagus as pt only has 2 more SCV treatments.  Skin care per above.      Mosaiq chart and setup information reviewed  Ports checked    Medication Review  Med list reviewed with patient?: Yes  Med Note: on letrozole    Educational Topic Discussed  Additional Instructions: PT/OT after radiation is complete and skin has healed. Follow up with Trisha Garza NP 4 weeks after radiation. Follow up with Dr. Caballero 1/13/25 as Dr. Bernal is leaving.  Education Instructions: Reviewed side effects of radiation therapy, fatigue, skin cares.        Murray Chacko MD    Please do not send letter to referring physician.

## 2024-12-18 ENCOUNTER — LAB (OUTPATIENT)
Dept: INFUSION THERAPY | Facility: CLINIC | Age: 49
End: 2024-12-18
Attending: INTERNAL MEDICINE
Payer: COMMERCIAL

## 2024-12-18 VITALS
OXYGEN SATURATION: 98 % | SYSTOLIC BLOOD PRESSURE: 108 MMHG | TEMPERATURE: 98.1 F | BODY MASS INDEX: 26.07 KG/M2 | DIASTOLIC BLOOD PRESSURE: 75 MMHG | RESPIRATION RATE: 16 BRPM | HEIGHT: 66 IN | WEIGHT: 162.2 LBS | HEART RATE: 79 BPM

## 2024-12-18 DIAGNOSIS — C50.211 MALIGNANT NEOPLASM OF UPPER-INNER QUADRANT OF RIGHT BREAST IN FEMALE, ESTROGEN RECEPTOR POSITIVE (H): Primary | ICD-10-CM

## 2024-12-18 DIAGNOSIS — Z17.0 MALIGNANT NEOPLASM OF UPPER-INNER QUADRANT OF RIGHT BREAST IN FEMALE, ESTROGEN RECEPTOR POSITIVE (H): Primary | ICD-10-CM

## 2024-12-18 LAB
CALCIUM SERPL-MCNC: 10.4 MG/DL (ref 8.8–10.4)
CREAT SERPL-MCNC: 0.65 MG/DL (ref 0.51–0.95)
EGFRCR SERPLBLD CKD-EPI 2021: >90 ML/MIN/1.73M2
HOLD SPECIMEN: NORMAL
HOLD SPECIMEN: NORMAL

## 2024-12-18 PROCEDURE — 96365 THER/PROPH/DIAG IV INF INIT: CPT

## 2024-12-18 PROCEDURE — 82310 ASSAY OF CALCIUM: CPT | Performed by: INTERNAL MEDICINE

## 2024-12-18 PROCEDURE — 250N000011 HC RX IP 250 OP 636: Performed by: INTERNAL MEDICINE

## 2024-12-18 PROCEDURE — 99207 PR NO CHARGE LOS: CPT

## 2024-12-18 PROCEDURE — 36415 COLL VENOUS BLD VENIPUNCTURE: CPT | Performed by: INTERNAL MEDICINE

## 2024-12-18 PROCEDURE — 258N000003 HC RX IP 258 OP 636: Performed by: INTERNAL MEDICINE

## 2024-12-18 PROCEDURE — 82565 ASSAY OF CREATININE: CPT | Performed by: INTERNAL MEDICINE

## 2024-12-18 RX ORDER — DIPHENHYDRAMINE HYDROCHLORIDE 50 MG/ML
50 INJECTION INTRAMUSCULAR; INTRAVENOUS
Status: CANCELLED
Start: 2025-06-16

## 2024-12-18 RX ORDER — ALBUTEROL SULFATE 0.83 MG/ML
2.5 SOLUTION RESPIRATORY (INHALATION)
Status: CANCELLED | OUTPATIENT
Start: 2025-06-16

## 2024-12-18 RX ORDER — DIPHENHYDRAMINE HYDROCHLORIDE 50 MG/ML
50 INJECTION INTRAMUSCULAR; INTRAVENOUS
Start: 2025-06-16

## 2024-12-18 RX ORDER — HEPARIN SODIUM,PORCINE 10 UNIT/ML
5-20 VIAL (ML) INTRAVENOUS DAILY PRN
OUTPATIENT
Start: 2025-06-16

## 2024-12-18 RX ORDER — DIPHENHYDRAMINE HYDROCHLORIDE 50 MG/ML
25 INJECTION INTRAMUSCULAR; INTRAVENOUS
Start: 2025-06-16

## 2024-12-18 RX ORDER — HEPARIN SODIUM (PORCINE) LOCK FLUSH IV SOLN 100 UNIT/ML 100 UNIT/ML
5 SOLUTION INTRAVENOUS
Status: CANCELLED | OUTPATIENT
Start: 2025-06-16

## 2024-12-18 RX ORDER — ALBUTEROL SULFATE 90 UG/1
1-2 INHALANT RESPIRATORY (INHALATION)
Start: 2025-06-16

## 2024-12-18 RX ORDER — HEPARIN SODIUM (PORCINE) LOCK FLUSH IV SOLN 100 UNIT/ML 100 UNIT/ML
5 SOLUTION INTRAVENOUS
OUTPATIENT
Start: 2025-06-16

## 2024-12-18 RX ORDER — METHYLPREDNISOLONE SODIUM SUCCINATE 40 MG/ML
40 INJECTION INTRAMUSCULAR; INTRAVENOUS
Start: 2025-06-16

## 2024-12-18 RX ORDER — EPINEPHRINE 1 MG/ML
0.3 INJECTION, SOLUTION INTRAMUSCULAR; SUBCUTANEOUS EVERY 5 MIN PRN
OUTPATIENT
Start: 2025-06-16

## 2024-12-18 RX ORDER — ZOLEDRONIC ACID 0.04 MG/ML
4 INJECTION, SOLUTION INTRAVENOUS ONCE
Status: CANCELLED | OUTPATIENT
Start: 2025-06-16 | End: 2025-06-16

## 2024-12-18 RX ORDER — ALBUTEROL SULFATE 0.83 MG/ML
2.5 SOLUTION RESPIRATORY (INHALATION)
OUTPATIENT
Start: 2025-06-16

## 2024-12-18 RX ORDER — HEPARIN SODIUM,PORCINE 10 UNIT/ML
5-20 VIAL (ML) INTRAVENOUS DAILY PRN
Status: CANCELLED | OUTPATIENT
Start: 2025-06-16

## 2024-12-18 RX ORDER — METHYLPREDNISOLONE SODIUM SUCCINATE 40 MG/ML
40 INJECTION INTRAMUSCULAR; INTRAVENOUS
Status: CANCELLED
Start: 2025-06-16

## 2024-12-18 RX ORDER — MEPERIDINE HYDROCHLORIDE 25 MG/ML
25 INJECTION INTRAMUSCULAR; INTRAVENOUS; SUBCUTANEOUS
OUTPATIENT
Start: 2025-06-16

## 2024-12-18 RX ORDER — MEPERIDINE HYDROCHLORIDE 25 MG/ML
25 INJECTION INTRAMUSCULAR; INTRAVENOUS; SUBCUTANEOUS
Status: CANCELLED | OUTPATIENT
Start: 2025-06-16

## 2024-12-18 RX ORDER — ZOLEDRONIC ACID 0.04 MG/ML
4 INJECTION, SOLUTION INTRAVENOUS ONCE
Status: COMPLETED | OUTPATIENT
Start: 2024-12-18 | End: 2024-12-18

## 2024-12-18 RX ORDER — ZOLEDRONIC ACID 0.04 MG/ML
4 INJECTION, SOLUTION INTRAVENOUS ONCE
OUTPATIENT
Start: 2025-06-16 | End: 2025-06-16

## 2024-12-18 RX ORDER — ALBUTEROL SULFATE 90 UG/1
1-2 INHALANT RESPIRATORY (INHALATION)
Status: CANCELLED
Start: 2025-06-16

## 2024-12-18 RX ORDER — EPINEPHRINE 1 MG/ML
0.3 INJECTION, SOLUTION INTRAMUSCULAR; SUBCUTANEOUS EVERY 5 MIN PRN
Status: CANCELLED | OUTPATIENT
Start: 2025-06-16

## 2024-12-18 RX ORDER — DIPHENHYDRAMINE HYDROCHLORIDE 50 MG/ML
25 INJECTION INTRAMUSCULAR; INTRAVENOUS
Status: CANCELLED
Start: 2025-06-16

## 2024-12-18 RX ADMIN — ZOLEDRONIC ACID 4 MG: 0.04 INJECTION, SOLUTION INTRAVENOUS at 15:32

## 2024-12-18 RX ADMIN — SODIUM CHLORIDE 100 ML: 9 INJECTION, SOLUTION INTRAVENOUS at 15:32

## 2024-12-18 NOTE — PROGRESS NOTES
Infusion Nursing Note:  Sharon STONE Rubishelly presents today for NEW Zometa.    Patient seen by provider today: No   present during visit today: Not Applicable.    Note: Patient new to [facility], oriented to facility including call light use. Written information on Zometa provided and reviewed with patient and spouse. Questions answered to their satisfaction. Patient reports taking calcium and vitamin D. Dental clearance noted in 10/28/24 [employer] communication. Advised patient to drink extra water today and tomorrow to protect kidneys.    Intravenous Access:  Peripheral IV placed.    Treatment Conditions:  Calcium: 10.4  Creatinine: 0.65.    Post Infusion Assessment:  Patient tolerated infusion without incident.  Site patent and intact, free from redness, edema or discomfort.  No evidence of extravasations.  Access discontinued per protocol.     Discharge Plan:   Future appts have been reviewed and crosschecked with appt note and plan.  AVS to patient via [employer].  Patient will return in 6 months for next Zometa appointment.   Patient discharged in stable condition accompanied by: .  Departure Mode: Ambulatory.      Jocelyn Garrido RN BSN OCN

## 2024-12-19 ENCOUNTER — ALLIED HEALTH/NURSE VISIT (OUTPATIENT)
Dept: RADIATION ONCOLOGY | Facility: CLINIC | Age: 49
End: 2024-12-19

## 2024-12-19 ENCOUNTER — TELEPHONE (OUTPATIENT)
Dept: ONCOLOGY | Facility: CLINIC | Age: 49
End: 2024-12-19

## 2024-12-19 VITALS
TEMPERATURE: 99.9 F | DIASTOLIC BLOOD PRESSURE: 78 MMHG | OXYGEN SATURATION: 99 % | HEART RATE: 115 BPM | SYSTOLIC BLOOD PRESSURE: 111 MMHG | RESPIRATION RATE: 18 BRPM

## 2024-12-19 DIAGNOSIS — C50.211 MALIGNANT NEOPLASM OF UPPER-INNER QUADRANT OF RIGHT BREAST IN FEMALE, ESTROGEN RECEPTOR POSITIVE (H): Primary | ICD-10-CM

## 2024-12-19 DIAGNOSIS — Z17.0 MALIGNANT NEOPLASM OF UPPER-INNER QUADRANT OF RIGHT BREAST IN FEMALE, ESTROGEN RECEPTOR POSITIVE (H): Primary | ICD-10-CM

## 2024-12-19 DIAGNOSIS — Z17.0 MALIGNANT NEOPLASM OF UPPER-INNER QUADRANT OF RIGHT BREAST IN FEMALE, ESTROGEN RECEPTOR POSITIVE (H): Primary | Chronic | ICD-10-CM

## 2024-12-19 DIAGNOSIS — C50.211 MALIGNANT NEOPLASM OF UPPER-INNER QUADRANT OF RIGHT BREAST IN FEMALE, ESTROGEN RECEPTOR POSITIVE (H): Primary | Chronic | ICD-10-CM

## 2024-12-19 ASSESSMENT — PAIN SCALES - GENERAL: PAINLEVEL_OUTOF10: MODERATE PAIN (5)

## 2024-12-19 NOTE — NURSING NOTE
This RN was notified by radiation therapists while patient here for daily radiation treatment that patient is requesting nurse visit as is not feeling well.  Patient seen by this RN, patient lying on husbands shoulder and patient reports she received Zometa for the first time yesterday, 12/18/2024 and reports since that time she is experiencing generalized feeling of being unwell, fatigue and body and joint aches.  Patient reports she has not had a fever at home and reports she has been taking Tylenol and Advil po as needed.  Patient and  report they are unsure what to do.      Vitals evaluated:    /78    SpO2 99% on room air  Oral Temperature 99.9    Chart communication sent to Bette Watters RN with Dr. Bernal information of above information and request for recommendations per Dr. Bernal.  Reviewed hydration, dietary intake and rest while waiting for return recommendations.  Patient reports she did not want to stay in clinic and preferred to leave clinic to be able to go home and lay down and requests call from Dr. Bernal's team with recommendations.  Patient declined wheelchair escort and reports she is able to walk out of clinic, patient ambulated out of clinic with .  Update provided to Bette Watters RN with request to contact patient and return confirmation received.    Nyasia Morris, RN BSN OCN CBCN

## 2024-12-19 NOTE — TELEPHONE ENCOUNTER
PA Initiation    Medication: KISQALI (400 MG DOSE) 200 MG PO TBPK  Insurance Company: [employer] ([facility]) - Phone [phone] Fax [phone]    Start Date: 12/19/2024        Tiffany Bedolla CPhT  [facility]  Oncology Pharmacy Liaison II  [email or web address]  [phone] (phone  [phone] (fax

## 2024-12-23 NOTE — TELEPHONE ENCOUNTER
Free Trial OBTAINED    Medication: KISQALI (400 MG DOSE) 200 MG PO TBPK  Sponsor: [employer]  Member ID: [id number]  BIN: [id number]  PCN: [id number]  Group: [id number]  Expected Copay: $ 0  Copay card Monthly Max Amount: $    Copay card Annual Amount: $ 06946.93    COPAY CARD OBTAINED    Medication: KISQALI (400 MG DOSE) 200 MG PO TBPK  Sponsor: [employer]  Member ID: [id number]  BIN: [id number]  PCN: [id number]  Group: [id number]  Expected Copay: $ 0  Copay card Monthly Max Amount: $    Copay card Annual Amount: $ 96081

## 2024-12-23 NOTE — TELEPHONE ENCOUNTER
Prior Authorization Approval    Medication: KISQALI (400 MG DOSE) 200 MG PO TBPK  Authorization Effective Date: 12/19/2024  Authorization Expiration Date: 12/19/2025  Approved Dose/Quantity: 42 for 28 days  Reference #: [id number] [place]  Case # [id number]   Insurance Company: [employer] ([facility]) - Phone [phone] Fax [phone]  Expected CoPay: $ 0  CoPay Card Available:      Financial Assistance Needed: no  Which Pharmacy is filling the prescription:    Pharmacy Notified: n/a  Patient Notified: yes, will notify pt and care team  Waiting on official approval letter to add in this encounter

## 2024-12-24 ENCOUNTER — OFFICE VISIT (OUTPATIENT)
Dept: RADIATION ONCOLOGY | Facility: CLINIC | Age: 49
End: 2024-12-24
Payer: COMMERCIAL

## 2024-12-24 VITALS
RESPIRATION RATE: 18 BRPM | DIASTOLIC BLOOD PRESSURE: 75 MMHG | SYSTOLIC BLOOD PRESSURE: 106 MMHG | WEIGHT: 156 LBS | TEMPERATURE: 97.8 F | BODY MASS INDEX: 25.53 KG/M2 | HEART RATE: 78 BPM | OXYGEN SATURATION: 99 %

## 2024-12-24 DIAGNOSIS — C50.211 MALIGNANT NEOPLASM OF UPPER-INNER QUADRANT OF RIGHT BREAST IN FEMALE, ESTROGEN RECEPTOR POSITIVE (H): Primary | ICD-10-CM

## 2024-12-24 DIAGNOSIS — Z17.0 MALIGNANT NEOPLASM OF UPPER-INNER QUADRANT OF RIGHT BREAST IN FEMALE, ESTROGEN RECEPTOR POSITIVE (H): Primary | ICD-10-CM

## 2024-12-24 ASSESSMENT — PAIN SCALES - GENERAL: PAINLEVEL_OUTOF10: MODERATE PAIN (5)

## 2024-12-24 NOTE — LETTER
"2024      Sharon Fong  [address] [address]      Dear Colleague,    Thank you for referring your patient, Sharon Fong, to the [facility] RADIATION ONCOLOGY [place]. Please see a copy of my visit note below.    [facility] PHYSICIANS  SPECIALIZING IN BREAKTHROUGHS  Radiation Oncology    On Treatment Visit Note      Sharon Fong      Date: 2024   MRN: [id number]   : 1975  Diagnosis: R breast IDC ER/ID+ HER2-      Reason for Visit:  On Radiation Treatment Visit     Treatment Summary to Date  Treatment Site: R chestwall + bst, R SC Current Dose: 5600/6000, 5000/5000 cGy Fractions: ,       Chemotherapy  Chemo concurrent with radx?: No (Dr. Bernal)    Subjective:     Having expected skin changes.  Rates esophagitis as 5 out of 10.    Nursing ROS:   Nutrition Alteration  Diet Type: Patient's Preference  Skin  Skin Reaction: 2 - Moderate to brisk erythema, patchy moist desquamation, mostly confined to skin folds and creases, moderate edema (increased hyperpigmentation, no desquamation)  Skin Intervention: Continue Mometasone BID x 1 week after radiation then stop, continue Aquaphor 2-4 x day for 4-6 weeks. If skin peels, do not apply mometasone to open areas, may apply neosporin.     ENT and Mouth Exam  ENT/Mouth Note: patient reports throat pain \"5/10\", reviewed Tylenol po as needed, reviewed soft diet, cold and room temperature foods, reviewed avoiding sharp edged foods, reviewed sauces with meats and bread products  Cardiovascular  Respiratory effort: 1 - Normal - without distress        Psychosocial  Mood - Anxiety: 0 - Normal  Mood - Depression: 0 - Normal  Psychosocial Note: Patient reports increased fatigue.  Pain Assessment  0-10 Pain Scale: 5  Pain Descriptors: Sore (throat)  Pain Treatment: Tylenol PRN  Pain Note: Mild pain to R arm/shoulder, Tylenol/Ibuprofen PRN. May use cool packs to R upper " chestwall.      Objective:   /75 (BP Location: Left arm, Patient Position: Chair, Cuff Size: Adult Regular)   Pulse 78   Temp 97.8  F (36.6  C) (Oral)   Resp 18   Wt 70.8 kg (156 lb)   SpO2 99%   BMI 25.53 kg/m    Gen: Appears well, in no acute distress  Skin: Moderate diffuse erythema and hyperpigmentation over treatment field.  Skin is intact.    Labs:  CBC RESULTS:   Recent Labs   Lab Test 09/26/24  0819   WBC 6.0   RBC 3.30*   HGB 9.4*   HCT 29.3*   MCV 89   MCH 28.5   MCHC 32.1   RDW 16.1*        ELECTROLYTES:  Recent Labs   Lab Test 12/18/24  1440 09/26/24  0819   NA  --  142   POTASSIUM  --  3.9   CHLORIDE  --  108*   JJ 10.4 9.7   CO2  --  23   BUN  --  10.9   CR 0.65 0.49*   GLC  --  113*       Assessment:    Tolerating radiation therapy well.  All questions and concerns addressed. Recently received Zometa    Toxicities:  Fatigue: Grade 1: Fatigue relieved by rest  Pain: Grade 1: Mild pain  Esophagitis: Grade 2: Symptomatic; altered eating/swallowing; oral supplements indicated  Dermatitis: Grade 2: Moderate to brisk erythema; patchy moist desquamation, mostly confined to skin folds and creases; moderate erythema     Plan:   Continue current therapy.    Pain medication and lozenges per above.    Skin care and pain management per above.  Follow up with RN/Jeanne sometime next week for skin check      Mosaiq chart and setup information reviewed  Ports checked    Medication Review  Med list reviewed with patient?: Yes  Med Note: on letrozole    Educational Topic Discussed  Additional Instructions: PT/OT after radiation is complete and skin has healed. Follow up with Trisha Garza NP 4 weeks after radiation, scheduled 1/31/2025. Follow up with Dr. Caballero 1/13/25 as Dr. Bernal is leaving.  Education Instructions: Reviewed side effects of radiation therapy, fatigue, skin cares.        Murray Chacko MD    Please do not send letter to referring physician.        Again, thank you for allowing  me to participate in the care of your patient.        Sincerely,        FROYLAN Chacko MD    Electronically signed

## 2024-12-24 NOTE — PATIENT INSTRUCTIONS
Please contact [facility] RN with questions or concerns following today's appointment.    If you are a patient of Dr. Chavira call: [phone]   If you are a patient of Dr. Chacko call: [phone]    Please feel free to leave a detailed message if your call is not answered.    If your call is not received before 3:00 PM, it may not be returned until the following business day.    If you are receiving radiation treatment and need assistance after 3:00 PM or on the weekends, please call [phone] and ask to speak to the radiation oncologist on-call.    Thank you!    [facility] Radiation Oncology Nursing

## 2024-12-24 NOTE — PROGRESS NOTES
"[facility] PHYSICIANS  SPECIALIZING IN BREAKTHROUGHS  Radiation Oncology    On Treatment Visit Note      Sharon Fong      Date: 2024   MRN: [id number]   : 1975  Diagnosis: R breast IDC ER/CA+ HER2-      Reason for Visit:  On Radiation Treatment Visit     Treatment Summary to Date  Treatment Site: R chestwall + bst, R SC Current Dose: 5600/6000, 5000/5000 cGy Fractions: ,       Chemotherapy  Chemo concurrent with radx?: No (Dr. Bernal)    Subjective:     Having expected skin changes.  Rates esophagitis as 5 out of 10.    Nursing ROS:   Nutrition Alteration  Diet Type: Patient's Preference  Skin  Skin Reaction: 2 - Moderate to brisk erythema, patchy moist desquamation, mostly confined to skin folds and creases, moderate edema (increased hyperpigmentation, no desquamation)  Skin Intervention: Continue Mometasone BID x 1 week after radiation then stop, continue Aquaphor 2-4 x day for 4-6 weeks. If skin peels, do not apply mometasone to open areas, may apply neosporin.     ENT and Mouth Exam  ENT/Mouth Note: patient reports throat pain \"5/10\", reviewed Tylenol po as needed, reviewed soft diet, cold and room temperature foods, reviewed avoiding sharp edged foods, reviewed sauces with meats and bread products  Cardiovascular  Respiratory effort: 1 - Normal - without distress        Psychosocial  Mood - Anxiety: 0 - Normal  Mood - Depression: 0 - Normal  Psychosocial Note: Patient reports increased fatigue.  Pain Assessment  0-10 Pain Scale: 5  Pain Descriptors: Sore (throat)  Pain Treatment: Tylenol PRN  Pain Note: Mild pain to R arm/shoulder, Tylenol/Ibuprofen PRN. May use cool packs to R upper chestwall.      Objective:   /75 (BP Location: Left arm, Patient Position: Chair, Cuff Size: Adult Regular)   Pulse 78   Temp 97.8  F (36.6  C) (Oral)   Resp 18   Wt 70.8 kg (156 lb)   SpO2 99%   BMI 25.53 kg/m    Gen: Appears well, in no acute distress  Skin: Moderate " diffuse erythema and hyperpigmentation over treatment field.  Skin is intact.    Labs:  CBC RESULTS:   Recent Labs   Lab Test 09/26/24  0819   WBC 6.0   RBC 3.30*   HGB 9.4*   HCT 29.3*   MCV 89   MCH 28.5   MCHC 32.1   RDW 16.1*        ELECTROLYTES:  Recent Labs   Lab Test 12/18/24  1440 09/26/24  0819   NA  --  142   POTASSIUM  --  3.9   CHLORIDE  --  108*   JJ 10.4 9.7   CO2  --  23   BUN  --  10.9   CR 0.65 0.49*   GLC  --  113*       Assessment:    Tolerating radiation therapy well.  All questions and concerns addressed. Recently received Zometa    Toxicities:  Fatigue: Grade 1: Fatigue relieved by rest  Pain: Grade 1: Mild pain  Esophagitis: Grade 2: Symptomatic; altered eating/swallowing; oral supplements indicated  Dermatitis: Grade 2: Moderate to brisk erythema; patchy moist desquamation, mostly confined to skin folds and creases; moderate erythema     Plan:   Continue current therapy.    Pain medication and lozenges per above.    Skin care and pain management per above.  Follow up with RN/Jeanne sometime next week for skin check      Mosaiq chart and setup information reviewed  Ports checked    Medication Review  Med list reviewed with patient?: Yes  Med Note: on letrozole    Educational Topic Discussed  Additional Instructions: PT/OT after radiation is complete and skin has healed. Follow up with Trisha Garza NP 4 weeks after radiation, scheduled 1/31/2025. Follow up with Dr. Caballero 1/13/25 as Dr. Bernal is leaving.  Education Instructions: Reviewed side effects of radiation therapy, fatigue, skin cares.        Murray Chacko MD    Please do not send letter to referring physician.

## 2024-12-30 ENCOUNTER — ALLIED HEALTH/NURSE VISIT (OUTPATIENT)
Dept: RADIATION ONCOLOGY | Facility: CLINIC | Age: 49
End: 2024-12-30
Payer: COMMERCIAL

## 2024-12-30 DIAGNOSIS — C50.211 MALIGNANT NEOPLASM OF UPPER-INNER QUADRANT OF RIGHT BREAST IN FEMALE, ESTROGEN RECEPTOR POSITIVE (H): Primary | ICD-10-CM

## 2024-12-30 DIAGNOSIS — Z17.0 MALIGNANT NEOPLASM OF UPPER-INNER QUADRANT OF RIGHT BREAST IN FEMALE, ESTROGEN RECEPTOR POSITIVE (H): Primary | ICD-10-CM

## 2024-12-30 NOTE — NURSING NOTE
Patient here today for RN skin assessment since completing radiation therapy to her R chest wall+bst and R supraclav on Friday, 12/27/24. Increased grade 2 hyperpigmentation and erythema noted to R chest wall and small area of dry desquamation noted to R axilla. Patient instructed to stop Mometasone and start neosporin + pain relief to areas that have peeled under R axilla. Discussed R chestwall will most likely peel in the next couple of days and to stop mometasone to chest wall as well. Patient to continue Aquaphor 4-5 x day and neosporin to any areas that peel. Nonadherant telfa and mepilex dressing provided to patient to cover radiation site and R axilla to prevent rubbing on clothes. Patient reports moderate pain to radiation site, reviewed cool packs and Tylenol/Ibuprofen PRN pain. Patient prefers to not take medications, but will consider for pain. RN skin assessment scheduled for next Tuesday, 1/7/25. Patient to call if she has any questions or concerns before scheduled RN visit.    Jeanne Castaneda RN

## 2024-12-31 DIAGNOSIS — C50.211 MALIGNANT NEOPLASM OF UPPER-INNER QUADRANT OF RIGHT BREAST IN FEMALE, ESTROGEN RECEPTOR POSITIVE (H): Primary | ICD-10-CM

## 2024-12-31 DIAGNOSIS — Z17.0 MALIGNANT NEOPLASM OF UPPER-INNER QUADRANT OF RIGHT BREAST IN FEMALE, ESTROGEN RECEPTOR POSITIVE (H): Primary | ICD-10-CM

## 2025-01-02 ENCOUNTER — TELEPHONE (OUTPATIENT)
Dept: ONCOLOGY | Facility: CLINIC | Age: 50
End: 2025-01-02
Payer: COMMERCIAL

## 2025-01-02 DIAGNOSIS — C50.211 MALIGNANT NEOPLASM OF UPPER-INNER QUADRANT OF RIGHT BREAST IN FEMALE, ESTROGEN RECEPTOR POSITIVE (H): Primary | ICD-10-CM

## 2025-01-02 DIAGNOSIS — Z17.0 MALIGNANT NEOPLASM OF UPPER-INNER QUADRANT OF RIGHT BREAST IN FEMALE, ESTROGEN RECEPTOR POSITIVE (H): Primary | ICD-10-CM

## 2025-01-02 NOTE — ORAL ONC MGMT
"Oral Chemotherapy Monitoring Program    Lab Monitoring Plan  CMP CBC Mag Phos every 2 weeks x 2 months then monthly  Subjective/Objective:  Sharon Fong is a 49 year old female contacted by phone for an initial visit for oral chemotherapy education.          12/19/2024     2:00 PM   ORAL CHEMOTHERAPY   Assessment Type Initial Work up   Diagnosis Code Breast Cancer   Providers Dr Caballero   Clinic Name/Location [facility]   Drug Name Kisqali (ribociclib)   Dose 400 mg   Current Schedule Daily   Cycle Details 3 weeks on, 1 week off   Planned next cycle start date 1/13/2025       Last PHQ-2 Score on record:        No data to display                Vitals:  BP:   BP Readings from Last 1 Encounters:   12/24/24 106/75     Wt Readings from Last 1 Encounters:   12/24/24 70.8 kg (156 lb)     Estimated body surface area is 1.81 meters squared as calculated from the following:    Height as of 12/18/24: 1.665 m (5' 5.55\").    Weight as of 12/24/24: 70.8 kg (156 lb).    Labs:  No results found for NA within last 30 days.     No results found for K within last 30 days.     _  Result Component Current Result Ref Range   Calcium 10.4 (12/18/2024) 8.8 - 10.4 mg/dL     No results found for Mag within last 30 days.     No results found for Phos within last 30 days.     No results found for ALBUMIN within last 30 days.     No results found for BUN within last 30 days.     _  Result Component Current Result Ref Range   Creatinine 0.65 (12/18/2024) 0.51 - 0.95 mg/dL     No results found for AST within last 30 days.     No results found for ALT within last 30 days.     No results found for BILITOTAL within last 30 days.     No results found for WBC within last 30 days.     No results found for HGB within last 30 days.     No results found for PLT within last 30 days.     No results found for ANC within last 30 days.     No results found for ANC within last 30 days.        Assessment:  Patient is appropriate to start therapy - after " 1/13 visit with Dr Caballero    Plan:  Basic chemotherapy teaching was reviewed with the patient including indication, start date of therapy, dose, administration, adverse effects, missed doses, food and drug interactions, monitoring, side effect management, office contact information, and safe handling. Written materials were provided and all questions answered.    Follow-Up:  1/13 Labs and visit with Dr Caballero to determine start date     Valery VizcainoD, [facility]  Oral Chemotherapy Monitoring Program  [facility]  [phone]

## 2025-01-07 ENCOUNTER — ALLIED HEALTH/NURSE VISIT (OUTPATIENT)
Dept: RADIATION ONCOLOGY | Facility: CLINIC | Age: 50
End: 2025-01-07
Payer: COMMERCIAL

## 2025-01-07 DIAGNOSIS — C50.211 MALIGNANT NEOPLASM OF UPPER-INNER QUADRANT OF RIGHT BREAST IN FEMALE, ESTROGEN RECEPTOR POSITIVE (H): Primary | ICD-10-CM

## 2025-01-07 DIAGNOSIS — Z17.0 MALIGNANT NEOPLASM OF UPPER-INNER QUADRANT OF RIGHT BREAST IN FEMALE, ESTROGEN RECEPTOR POSITIVE (H): Primary | ICD-10-CM

## 2025-01-07 NOTE — NURSING NOTE
Patient here today for RN skin assessment since completing radiation therapy to her R chest wall+bst and R supraclav on Friday, 12/27/24. Hyperpigmentation to radiation site noted, areas of dry desquamation to R chestwall and axilla. Patient to continue Aquaphor 4-5 x day and neosporin to any areas that peel until skin has healed completely. Patient reports very mild pain to site, reports tolerable. Patient to call if she has any questions or concerns. Follow up with Trisha Garza NP on 1/31/25.      Jeanne Castaneda RN

## 2025-01-11 DIAGNOSIS — C50.211 MALIGNANT NEOPLASM OF UPPER-INNER QUADRANT OF RIGHT BREAST IN FEMALE, ESTROGEN RECEPTOR POSITIVE (H): Chronic | ICD-10-CM

## 2025-01-11 DIAGNOSIS — Z17.0 MALIGNANT NEOPLASM OF UPPER-INNER QUADRANT OF RIGHT BREAST IN FEMALE, ESTROGEN RECEPTOR POSITIVE (H): Chronic | ICD-10-CM

## 2025-01-13 ENCOUNTER — VIRTUAL VISIT (OUTPATIENT)
Dept: ONCOLOGY | Facility: CLINIC | Age: 50
End: 2025-01-13
Attending: INTERNAL MEDICINE
Payer: COMMERCIAL

## 2025-01-13 VITALS — HEIGHT: 65 IN | WEIGHT: 154 LBS | BODY MASS INDEX: 25.66 KG/M2

## 2025-01-13 DIAGNOSIS — R94.31 NONSPECIFIC ABNORMAL ELECTROCARDIOGRAM (ECG) (EKG): ICD-10-CM

## 2025-01-13 DIAGNOSIS — C50.211 MALIGNANT NEOPLASM OF UPPER-INNER QUADRANT OF RIGHT BREAST IN FEMALE, ESTROGEN RECEPTOR POSITIVE (H): Chronic | ICD-10-CM

## 2025-01-13 DIAGNOSIS — D50.9 MICROCYTIC ANEMIA: ICD-10-CM

## 2025-01-13 DIAGNOSIS — C50.211 MALIGNANT NEOPLASM OF UPPER-INNER QUADRANT OF RIGHT BREAST IN FEMALE, ESTROGEN RECEPTOR POSITIVE (H): Primary | Chronic | ICD-10-CM

## 2025-01-13 DIAGNOSIS — Z17.0 MALIGNANT NEOPLASM OF UPPER-INNER QUADRANT OF RIGHT BREAST IN FEMALE, ESTROGEN RECEPTOR POSITIVE (H): Primary | Chronic | ICD-10-CM

## 2025-01-13 DIAGNOSIS — Z17.0 MALIGNANT NEOPLASM OF UPPER-INNER QUADRANT OF RIGHT BREAST IN FEMALE, ESTROGEN RECEPTOR POSITIVE (H): Chronic | ICD-10-CM

## 2025-01-13 DIAGNOSIS — Z17.0 MALIGNANT NEOPLASM OF UPPER-INNER QUADRANT OF RIGHT BREAST IN FEMALE, ESTROGEN RECEPTOR POSITIVE (H): Primary | ICD-10-CM

## 2025-01-13 DIAGNOSIS — E28.39 SUPPRESSION OF OVARIAN SECRETION: ICD-10-CM

## 2025-01-13 DIAGNOSIS — E61.1 IRON DEFICIENCY: Primary | ICD-10-CM

## 2025-01-13 DIAGNOSIS — C50.211 MALIGNANT NEOPLASM OF UPPER-INNER QUADRANT OF RIGHT BREAST IN FEMALE, ESTROGEN RECEPTOR POSITIVE (H): Primary | ICD-10-CM

## 2025-01-13 PROCEDURE — 98006 SYNCH AUDIO-VIDEO EST MOD 30: CPT | Performed by: INTERNAL MEDICINE

## 2025-01-13 RX ORDER — LETROZOLE 2.5 MG/1
1 TABLET, FILM COATED ORAL DAILY
Qty: 90 TABLET | Refills: 3 | Status: SHIPPED | OUTPATIENT
Start: 2025-01-13

## 2025-01-13 ASSESSMENT — PAIN SCALES - GENERAL: PAINLEVEL_OUTOF10: NO PAIN (0)

## 2025-01-13 NOTE — TELEPHONE ENCOUNTER
Letrozole Refill   Last prescribing provider: DR Bernal      Last clinic visit date: 1/13/24 DR Caballero     Recommendations for requested medication (if none, N/A): Copied from chart note   . Breast Cancer: she continues on OFS and letrozole. Will try changing time of day she takes letrozole to see if that helps with hot flashes and joint pain. Also advised trial of voltaren gel. She is over due for Zoladex and this will be scheduled. I agree with prior recommendations for adjuvant ribo. We reviewed schedule as well as side effects including but not limited to cytopenias, hepatotoxicity and QTC prolongation. She has discussed with pharmacy and is ready to start. She understands this will be planned for 3 years. Call if new symptoms develop.     Any other pertinent information (if none, N/A): N/A    Refilled: Y/N, if NO, why?

## 2025-01-13 NOTE — PROGRESS NOTES
Oncology Progress Note    Name: Sharon Fong  : 1975  MRN: [id number]    CC: Breast Cancer    HPI: Sharon Fong is a 49 year old female with a right clinical T2-3N3 hormone receptor positive, HER2 negative breast cancer with high risk features treated on block B and C on ISPY2.2 with weekly Taxol --> Abraxane/AC s/p bilateral mastectomies with [id number] disease (RCBII) s/p radiation, currently on OFS/letrozole here for transfer of care and to start adjuvant ribociclib.    She notes more knee and finger pain since starting letrozole. Takes letrozole in the morning. She has hot flashes mainly at night. Some fatigue since finishing radiation end of Dec.  Taking fish oil. Received Zometa mid Dec for osteopenia. She had EKG in Dec and asks about possible LAE noted on read.    History  Negative genetics  2024 MammaPrint -0.192 (high risk) and blueprint luminal B  2024 - 2024 ISPY2.2 weekly Taxol - changed to Abraxane starting week 2 due to allergic reaction  2024 Study biopsy  Right breast 1:00 grade 2 residual IDC with probable treatment related changes.  Also grade 2 DCIS.  Right axillary lymph node with residual metastatic disease.  Tumor-infiltrating lymphocytes of 10%.  Some decreased cellularity suggestive of treatment response.   2024 - 2024 ddAC x 4   2024 Right mastectomy showing 1.6cm of residual grade II IDC w/ evidence of treatment related changes. 25% cellularity. No LVI.  Also 1.6 cm of grade 2 DCIS. DCIS is 5% of tumor. Negative margins. ADH, classic type LCIS, calcs associated w/ invasive carcinoma, DCIS, and benign acini. ER (3+, %), NM (1+, 3%), HER2 2+ and FISH 2.8/2.4 = 1.2 (negative). 1/ LN w/ metastatic carcinoma w/ evidence of treatment related changes. No EVA.  [id number].  Left breast mastectomy - ADH. No malignancy.    10/14/2024 Zoladex (12 week injection)   10/21/2024 DEXA scan showing osteopenia: Total hip T-score: -1.7, Left femoral  neck T-score = -2.1, Right femoral neck T-score= -2.1    10/28/2024 Letrozole  -24: radiation  24: Zometa  24: adjuvant ribo    Review of systems: All other systems reviewed and are negative except for what is described in the history of present illness.      PMH:   Patient Active Problem List    Diagnosis Date Noted    Hypotension, unspecified hypotension type 2024     Priority: Medium    Syncope, unspecified syncope type 2024     Priority: Medium    Malignant neoplasm of upper-inner quadrant of right female breast (H) 2024     Priority: Medium     Right breast cancer  Has had longstanding history of multiple biopsies, infection, and partial mastectomy for a right breast multifocal abscess dating back to at least .     patient presented with palpable lump at 1:00 in the right breast    3/21/2024 diagnostic mammo showed clustered fine indeterminate calcs at 12:00.  No mammographic abnormality identified to correspond to the palpable area of fullness at 1:00.  On US, at 1:00, 10 cm FN there was a 3.7 x 2.3 x 1.5 cm solid hypoechoic irregularly marginated mass.  Additional hypoechoic lesion located at 2:00, 3 cm FN measuring 1.0 x 0.5 cm.  Another hypoechoic lesion at 2:00, 12 cm FN measuring 0.7 cm.  At least 2 enlarged right axillary lymph nodes -largest measuring up to 0.6 cm.    3/28/2024 ultrasound-guided biopsy of the followin:00 lesion at least DCIS  1:00 lesion grade 2 IDC.  No LVI.  ER (3+, 80 to 90%), MA (3+, 90 to 100%), HER2 2+ and FISH negative   Right axillary LN: metastatic carcinoma    2024 CT CAP demonstrated multifocal enhancement in the right breast with mildly abnormal right axillary lymph nodes.  Otherwise no evidence of distant metastatic disease.    2024 NM bone scan negative for osteoblastic metastatic disease.    4/15/2024 Baseline MRI breast demonstrated the following  Right breast at 1:00 mass measuring 2.6 x 2.0 by 3.0 cm with  extensive surrounding clumped NME occupying most of the right breast and measuring 8.9 x 4.7 x 8.7   Left breast showed a fibroadenoma at 8-9:00 position.  In addition, 7 mm of linear branching NME at 3-4:00 position.  This was biopsied and found to be benign breast tissue.  Few asymmetric right internal mammary chain LN. multiple enlarged right level 1 axillary nodes.  Additionally multiple LEFT level 1 axillary nodes that are indeterminant -which were normal on left axillary US.  FTV 38.222 cc    4/26/2024 MammaPrint -0.192 (high risk) and blueprint luminal B    4/30/2024 - 7/17/2024 weekly Taxol - changed to Abraxane starting week 2 due to allergic reaction    5/17/2024 W3 MRI  Minimally decreased size of the postoperative right breast cancer at the approximate 1:00 position measuring approximately 2.4 x 1.9 x 2.9 cm (previously 2.6 x 2.0 x 3.0 cm).  There is overall similar extent although mildly decreased volume of abnormal surrounding NME  Minimal interval enlargement of multiple bilateral level I lymph nodes, including the biopsy-proven right axillary node with indwelling biopsy marker. No significant change in the multiple small asymmetric right internal mammary chain lymph nodes.  27.167 ml - Tumor volume reduction of 28.9% from baseline     6/10/2024 (6-week MRI breast) 8.8 cm anteroposterior on axial MIPS, unchanged.  Mildly decreased size of the biopsy-proven cancer in the right breast at the approximate 1:00 position middle depth measuring approximately 2.0 x 1.4 x 2.4 cm (previously 2.4 x 1.9 x 2.9 cm).   Extensive surrounding NME is largely unchanged changed, but the volume has minimally decreased.  Stable bilateral axillary lymph nodes, including the biopsy-proven right level I axillary node. Stable asymmetric small right internal mammary chain lymph nodes.  18.274 - FTV reduction of 57% from baseline     7/23/2024 (12-week MRI breast) Longest diameter of suspicious enhancement: 8.0 cm(previously 8.8  cm).   Irregular enhancing mass in the right breast at approximately 1:00 positionmeasures 1.8 x 1.1 x 1.6 cm (previously 2.0 x 1.4 x 2.4 cm).   NME continues to extend from anterior to posterior depth, with anterior NME extending to the base of the nipple.  Stable prominent bilateral axillary nodes, including the biopsied right level 1 axillary node. Small asymmetric right internal mammary chain lymph nodes are also similar to prior  7.585 ml - FTV reduction of 80% from baseline     7/24/2024 Study biopsy  Right breast 1:00 grade 2 residual IDC with probable treatment related changes.  Also grade 2 DCIS.  Right axillary lymph node with residual metastatic disease.  Tumor-infiltrating lymphocytes of 10%.  Some decreased cellularity suggestive of treatment response.    7/25/2024 - 9/5/2024 ddAC x 4     8/19/2024 W4 MRI (in block C) Longest diameter of suspicious enhancement: 8 cm (sharee has decreased volume of enhancement.  Right breast at 1:00 posterior depth measures 1.7 x 0.7 x 1.3 cm, previously 1.8 x 0.9 x 1.6 cm  The right MEÑO node just above the 3rd rib is slightly smaller than the prior exam  The right axillary lymph nodes have slightly decreased in size. No lymphadenopathy in the left axilla.    FTV 6.349 ml - 83% reduction from baseline     9/30/2024 Right mastectomy showing 1.6cm of residual grade II IDC w/ evidence of treatment related changes. 25% cellularity. No LVI.  Also 1.6 cm of grade 2 DCIS. DCIS is 5% of tumor. Negative margins. ADH, classic type LCIS, calcs associated w/ invasive carcinoma, DCIS, and benign acini. ER (3+, %), SC (1+, 3%), HER2 2+ and FISH 2.8/2.4 = 1.2 (negative). 1/4 LN w./ metastatic carcinoma w/ evidence of treatment related changes. No EVA.  [id number].  Left breast mastectomy - ADH. No malignancy.     10/14/2024 Zoladex    10/21/2024 DEXA scan showing osteopenia: Total hip T-score: -1.7, Left femoral neck T-score = -2.1, Right femoral neck T-score= -2.1     10/28/2024  Letrozole      Seizure (H) 11/15/2013     Priority: Medium    Vitamin D deficiency 04/23/2013     Priority: Medium     April 2013 July 2013      Inflammatory disease of breast 08/17/2010     Priority: Medium     S/p  Partial R mastectomy secondary to recurrent abscesses  Dr Sweet followed  Final dx after surgery -histoplasmosis  Was treated       Past Medical History:   Diagnosis Date    Breast cancer (H)     Hypermobility arthralgia     Hypotension, unspecified hypotension type 07/29/2024    Malignant neoplasm of upper-inner quadrant of right female breast (H) 04/02/2024    Right breast cancer  Has had longstanding history of multiple biopsies, infection, and partial mastectomy for a right breast multifocal abscess dating back to at least 2010.     2024 patient presented with palpable lump at 1:00 in the right breast     3/21/2024 diagnostic mammo showed clustered fine indeterminate calcs at 12:00.  No mammographic abnormality identified to correspond to the palpable    Migraine     S/P radiation therapy     6,000 cGy to right CW + Boost and 5,000 cGy to right SCV completed on 12/27/2024 - [facility]    Seizure (H) 11/15/2013    Seizure disorder (H) 2014    Once post surgery    Vitamin D deficiency 04/23/2013 April 2013 July 2013         Medications:   Current Outpatient Medications:     ibuprofen (ADVIL/MOTRIN) 200 MG capsule, as needed., Disp: , Rfl:     letrozole (FEMARA) 2.5 MG tablet, Take 1 tablet (2.5 mg) by mouth daily., Disp: 90 tablet, Rfl: 3    aspirin-acetaminophen-caffeine (EXCEDRIN MIGRAINE) 250-250-65 MG tablet, , Disp: , Rfl:     mometasone (ELOCON) 0.1 % external cream, Apply topically daily. to radiation treatment field.  Stop application 1 week after completing treatment. (Patient not taking: Reported on 1/13/2025), Disp: 45 g, Rfl: 1    ribociclib (KISQALI) (400 MG DOSE) 200 MG tablet therapy pack, Take 2 tablets (400 mg) by mouth every morning for 21 days. Then off for  7 days. (Patient not taking: Reported on 2025), Disp: 42 tablet, Rfl: 0  No current facility-administered medications for this visit.    Facility-Administered Medications Ordered in Other Visits:     lidocaine 1 % 9 mL, 9 mL, Intradermal, Once, Valeria Martinez MD    lidocaine 1% with EPINEPHrine 1:100,000 injection 10 mL, 10 mL, Intradermal, Once, Valeria Martinez MD      Allergies:   Allergies   Allergen Reactions    Citrullus Vulgaris Unknown and Other (See Comments)     Rash, swollen lips    Corn-Containing Products Other (See Comments)    Paclitaxel Other (See Comments)     PAIN in shoulder and pelvis - see infusion notes from 24 and 24    Salicylic Acid Unknown and Other (See Comments)         Social history:   Social History     Socioeconomic History    Marital status:      Spouse name: Not on file    Number of children: Not on file    Years of education: Not on file    Highest education level: Not on file   Occupational History    Not on file   Tobacco Use    Smoking status: Never     Passive exposure: Never    Smokeless tobacco: Never   Substance and Sexual Activity    Alcohol use: Never    Drug use: Never    Sexual activity: Not on file   Other Topics Concern    Not on file   Social History Narrative    Menarche 14. No periods since hysterectomy. No postmenopausal symptoms      first at age 26    Youngest child at diagnosis - 16    Working full time as       Social Drivers of Health     Financial Resource Strain: Not on file   Food Insecurity: No Food Insecurity (2024)    Received from [facility]    Hunger Vital Sign     Worried About Running Out of Food in the Last Year: Never true     Ran Out of Food in the Last Year: Never true   Transportation Needs: No Transportation Needs (2024)    Received from [facility]    PRAPARE - Transportation     Lack of Transportation (Medical): No     Lack of Transportation (Non-Medical): No   Physical  "Activity: Sufficiently Active (4/6/2024)    Received from [facility]    Exercise Vital Sign     Days of Exercise per Week: 5 days     Minutes of Exercise per Session: 30 min   Stress: Not on file   Social Connections: Unknown (7/16/2021)    Received from [facility]    Social Connection and Isolation Panel [NHANES]     Frequency of Communication with Friends and Family: Once a week     Frequency of Social Gatherings with Friends and Family: Once a week     Attends Hinduism Services: Not on file     Active Member of Clubs or Organizations: No     Attends Club or Organization Meetings: Not on file     Marital Status: Not on file   Interpersonal Safety: Low Risk  (11/4/2024)    Interpersonal Safety     Do you feel physically and emotionally safe where you currently live?: Yes     Within the past 12 months, have you been hit, slapped, kicked or otherwise physically hurt by someone?: No     Within the past 12 months, have you been humiliated or emotionally abused in other ways by your partner or ex-partner?: No   Housing Stability: Low Risk  (4/6/2024)    Received from [facility]    Housing Stability     What is your living situation today?: I have a steady place to live         Family history:I have reviewed this patient's family history and updated it with pertinent information if needed.  Family History   Problem Relation Age of Onset    Hypertension Mother     Hypertension Father     Cerebrovascular Disease Father     Thyroid Disease Sister     Glaucoma No family hx of     Macular Degeneration No family hx of      Physical exam:  Vitals:Ht 1.651 m (5' 5\")   Wt 69.9 kg (154 lb)   BMI 25.63 kg/m    Gen: NAD, pleasant, interactive and answering questions appropriately, PS 0  HEENT: Normocephalic atraumatic, sclera anicteric  Neck: Full range of motion  Lungs: No respiratory distress, speaking in full sentences, no coughing  Skin: no facial rash noted  Psych: normal " affect    Labs:  Lab Results   Component Value Date    WBC 4.5 01/10/2025    HGB 10.9 (L) 01/10/2025    HCT 34.2 (L) 01/10/2025     01/10/2025     01/10/2025    POTASSIUM 4.3 01/10/2025    CHLORIDE 104 01/10/2025    CO2 23 01/10/2025    BUN 12.6 01/10/2025    CR 0.53 01/10/2025     (H) 01/10/2025    AST 24 01/10/2025    ALT 16 01/10/2025    ALKPHOS 93 01/10/2025    BILITOTAL 0.4 01/10/2025    INR 1.01 07/29/2024       Radiology:   DEXA 10/24  T score -2.1    Assessment/plan:   Sharon Fong is a 49 year old female with a right clinical T2-3N3 hormone receptor positive, HER2 negative breast cancer with high risk features treated on block B and C on ISPY2.2 with weekly Taxol --> Abraxane/AC s/p bilateral mastectomies with [id number] disease (RCBII) s/p radiation, currently on OFS/letrozole here for transfer of care and to start adjuvant ribociclib.    1.  Breast Cancer: she continues on OFS and letrozole. Will try changing time of day she takes letrozole to see if that helps with hot flashes and joint pain.  Also advised trial of voltaren gel.  She is over due for Zoladex and this will be scheduled.  I agree with prior recommendations for adjuvant ribo. We reviewed schedule as well as side effects including but not limited to cytopenias, hepatotoxicity and QTC prolongation. She has discussed with pharmacy and is ready to start. She understands this will be planned for 3 years. Call if new symptoms develop.    2. Osteopenia:  Zometa due April 2024.  DEXA due October 2026. Check vitamin D    3. OFS:  Zoladex as above. Check estradiol    4. Possible LAE noted on EKG:  will check echo.    5. Microcytosis: new but hemoglobin improving, check iron studies. May need iron supplementation.    All of the patient's questions were answered.    Return to the office in 1 month or sooner as needed    Thank you for allowing me to participate in the care of this patient.  Please call with any questions.    I  personally reviewed the recent studies and I explained the rationale of the tests ordered today to the patient.     Orders Placed This Encounter   Procedures    Comprehensive metabolic panel    Magnesium    Phosphorus    Comprehensive metabolic panel    Magnesium    Phosphorus    Ferritin    Iron & Iron Binding Capacity    COMPREHENSIVE METABOLIC PANEL    Vitamin D Deficiency    Estradiol    Echocardiogram Complete    CBC with platelets differential    CBC with platelets differential    CBC with Platelets & Differential

## 2025-01-13 NOTE — NURSING NOTE
Current patient location: [address] [facility] [address]    Is the patient currently in the state of [place]? YES    Visit mode: VIDEO    If the visit is dropped, the patient can be reconnected by:VIDEO VISIT: Text to cell phone:   Telephone Information:   Mobile [phone]       Will anyone else be joining the visit? YES: How would they like to receive their invitation? Text to cell phone: [phone]  (If patient encounters technical issues they should call [phone])    Are changes needed to the allergy or medication list? Yes Pt states taking Tylenol as needed.    Are refills needed on medications prescribed by this physician? NO    Rooming Documentation:  Questionnaire(s) completed    Reason for visit: NERIS RAMÍREZ

## 2025-01-13 NOTE — PROGRESS NOTES
Virtual Visit Details    Type of service:  Video Visit     Originating Location (pt. Location): Home  Distant Location (provider location):  On-site  Platform used for Video Visit: Nicole

## 2025-01-13 NOTE — LETTER
2025      Sharon Fong  [address] [address]      Dear Colleague,    Thank you for referring your patient, Sharon Fong, to the [facility]. Please see a copy of my visit note below.    Virtual Visit Details    Type of service:  Video Visit     Originating Location (pt. Location): Home  Distant Location (provider location):  On-site  Platform used for Video Visit: [facility]    Oncology Progress Note    Name: Sharon Fong  : 1975  MRN: [id number]    CC: Breast Cancer    HPI: Sharon Fong is a 49 year old female with a right clinical T2-3N3 hormone receptor positive, HER2 negative breast cancer with high risk features treated on block B and C on ISPY2.2 with weekly Taxol --> Abraxane/AC s/p bilateral mastectomies with [id number] disease (RCBII) s/p radiation, currently on OFS/letrozole here for transfer of care and to start adjuvant ribociclib.    She notes more knee and finger pain since starting letrozole. Takes letrozole in the morning. She has hot flashes mainly at night. Some fatigue since finishing radiation end of Dec.  Taking fish oil. Received Zometa mid Dec for osteopenia. She had EKG in Dec and asks about possible LAE noted on read.    History  Negative genetics  2024 MammaPrint -0.192 (high risk) and blueprint luminal B  2024 - 2024 ISPY2.2 weekly Taxol - changed to Abraxane starting week 2 due to allergic reaction  2024 Study biopsy  Right breast 1:00 grade 2 residual IDC with probable treatment related changes.  Also grade 2 DCIS.  Right axillary lymph node with residual metastatic disease.  Tumor-infiltrating lymphocytes of 10%.  Some decreased cellularity suggestive of treatment response.   2024 - 2024 ddAC x 4   2024 Right mastectomy showing 1.6cm of residual grade II IDC w/ evidence of treatment related changes. 25% cellularity. No LVI.  Also 1.6 cm of grade 2 DCIS. DCIS is 5% of  tumor. Negative margins. ADH, classic type LCIS, calcs associated w/ invasive carcinoma, DCIS, and benign acini. ER (3+, %), GA (1+, 3%), HER2 2+ and FISH 2.8/2.4 = 1.2 (negative). 1 LN w/ metastatic carcinoma w/ evidence of treatment related changes. No EVA.  [id number].  Left breast mastectomy - ADH. No malignancy.    10/14/2024 Zoladex (12 week injection)   10/21/2024 DEXA scan showing osteopenia: Total hip T-score: -1.7, Left femoral neck T-score = -2.1, Right femoral neck T-score= -2.1    10/28/2024 Letrozole  -24: radiation  24: Zometa  24: adjuvant ribo    Review of systems: All other systems reviewed and are negative except for what is described in the history of present illness.      PMH:   Patient Active Problem List    Diagnosis Date Noted     Hypotension, unspecified hypotension type 2024     Priority: Medium     Syncope, unspecified syncope type 2024     Priority: Medium     Malignant neoplasm of upper-inner quadrant of right female breast (H) 2024     Priority: Medium     Right breast cancer  Has had longstanding history of multiple biopsies, infection, and partial mastectomy for a right breast multifocal abscess dating back to at least .     patient presented with palpable lump at 1:00 in the right breast    3/21/2024 diagnostic mammo showed clustered fine indeterminate calcs at 12:00.  No mammographic abnormality identified to correspond to the palpable area of fullness at 1:00.  On US, at 1:00, 10 cm FN there was a 3.7 x 2.3 x 1.5 cm solid hypoechoic irregularly marginated mass.  Additional hypoechoic lesion located at 2:00, 3 cm FN measuring 1.0 x 0.5 cm.  Another hypoechoic lesion at 2:00, 12 cm FN measuring 0.7 cm.  At least 2 enlarged right axillary lymph nodes -largest measuring up to 0.6 cm.    3/28/2024 ultrasound-guided biopsy of the followin:00 lesion at least DCIS  1:00 lesion grade 2 IDC.  No LVI.  ER (3+, 80 to 90%), GA (3+, 90 to  100%), HER2 2+ and FISH negative   Right axillary LN: metastatic carcinoma    4/12/2024 CT CAP demonstrated multifocal enhancement in the right breast with mildly abnormal right axillary lymph nodes.  Otherwise no evidence of distant metastatic disease.    4/12/2024 NM bone scan negative for osteoblastic metastatic disease.    4/15/2024 Baseline MRI breast demonstrated the following  Right breast at 1:00 mass measuring 2.6 x 2.0 by 3.0 cm with extensive surrounding clumped NME occupying most of the right breast and measuring 8.9 x 4.7 x 8.7   Left breast showed a fibroadenoma at 8-9:00 position.  In addition, 7 mm of linear branching NME at 3-4:00 position.  This was biopsied and found to be benign breast tissue.  Few asymmetric right internal mammary chain LN. multiple enlarged right level 1 axillary nodes.  Additionally multiple LEFT level 1 axillary nodes that are indeterminant -which were normal on left axillary US.  FTV 38.222 cc    4/26/2024 MammaPrint -0.192 (high risk) and blueprint luminal B    4/30/2024 - 7/17/2024 weekly Taxol - changed to Abraxane starting week 2 due to allergic reaction    5/17/2024 W3 MRI  Minimally decreased size of the postoperative right breast cancer at the approximate 1:00 position measuring approximately 2.4 x 1.9 x 2.9 cm (previously 2.6 x 2.0 x 3.0 cm).  There is overall similar extent although mildly decreased volume of abnormal surrounding NME  Minimal interval enlargement of multiple bilateral level I lymph nodes, including the biopsy-proven right axillary node with indwelling biopsy marker. No significant change in the multiple small asymmetric right internal mammary chain lymph nodes.  27.167 ml - Tumor volume reduction of 28.9% from baseline     6/10/2024 (6-week MRI breast) 8.8 cm anteroposterior on axial MIPS, unchanged.  Mildly decreased size of the biopsy-proven cancer in the right breast at the approximate 1:00 position middle depth measuring approximately 2.0 x 1.4  x 2.4 cm (previously 2.4 x 1.9 x 2.9 cm).   Extensive surrounding NME is largely unchanged changed, but the volume has minimally decreased.  Stable bilateral axillary lymph nodes, including the biopsy-proven right level I axillary node. Stable asymmetric small right internal mammary chain lymph nodes.  18.274 - FTV reduction of 57% from baseline     7/23/2024 (12-week MRI breast) Longest diameter of suspicious enhancement: 8.0 cm(previously 8.8 cm).   Irregular enhancing mass in the right breast at approximately 1:00 positionmeasures 1.8 x 1.1 x 1.6 cm (previously 2.0 x 1.4 x 2.4 cm).   NME continues to extend from anterior to posterior depth, with anterior NME extending to the base of the nipple.  Stable prominent bilateral axillary nodes, including the biopsied right level 1 axillary node. Small asymmetric right internal mammary chain lymph nodes are also similar to prior  7.585 ml - FTV reduction of 80% from baseline     7/24/2024 Study biopsy  Right breast 1:00 grade 2 residual IDC with probable treatment related changes.  Also grade 2 DCIS.  Right axillary lymph node with residual metastatic disease.  Tumor-infiltrating lymphocytes of 10%.  Some decreased cellularity suggestive of treatment response.    7/25/2024 - 9/5/2024 ddAC x 4     8/19/2024 W4 MRI (in block C) Longest diameter of suspicious enhancement: 8 cm (sharee has decreased volume of enhancement.  Right breast at 1:00 posterior depth measures 1.7 x 0.7 x 1.3 cm, previously 1.8 x 0.9 x 1.6 cm  The right MEÑO node just above the 3rd rib is slightly smaller than the prior exam  The right axillary lymph nodes have slightly decreased in size. No lymphadenopathy in the left axilla.    FTV 6.349 ml - 83% reduction from baseline     9/30/2024 Right mastectomy showing 1.6cm of residual grade II IDC w/ evidence of treatment related changes. 25% cellularity. No LVI.  Also 1.6 cm of grade 2 DCIS. DCIS is 5% of tumor. Negative margins. ADH, classic type LCIS,  calcs associated w/ invasive carcinoma, DCIS, and benign acini. ER (3+, %), KS (1+, 3%), HER2 2+ and FISH 2.8/2.4 = 1.2 (negative). 1/4 LN w./ metastatic carcinoma w/ evidence of treatment related changes. No EVA.  [id number].  Left breast mastectomy - ADH. No malignancy.     10/14/2024 Zoladex    10/21/2024 DEXA scan showing osteopenia: Total hip T-score: -1.7, Left femoral neck T-score = -2.1, Right femoral neck T-score= -2.1     10/28/2024 Letrozole       Seizure (H) 11/15/2013     Priority: Medium     Vitamin D deficiency 04/23/2013     Priority: Medium     April 2013 July 2013       Inflammatory disease of breast 08/17/2010     Priority: Medium     S/p  Partial R mastectomy secondary to recurrent abscesses  Dr Sweet followed  Final dx after surgery -histoplasmosis  Was treated       Past Medical History:   Diagnosis Date     Breast cancer (H)      Hypermobility arthralgia      Hypotension, unspecified hypotension type 07/29/2024     Malignant neoplasm of upper-inner quadrant of right female breast (H) 04/02/2024    Right breast cancer  Has had longstanding history of multiple biopsies, infection, and partial mastectomy for a right breast multifocal abscess dating back to at least 2010.     2024 patient presented with palpable lump at 1:00 in the right breast     3/21/2024 diagnostic mammo showed clustered fine indeterminate calcs at 12:00.  No mammographic abnormality identified to correspond to the palpable     Migraine      S/P radiation therapy     6,000 cGy to right CW + Boost and 5,000 cGy to right SCV completed on 12/27/2024 - [facility]     Seizure (H) 11/15/2013     Seizure disorder (H) 2014    Once post surgery     Vitamin D deficiency 04/23/2013 April 2013 July 2013         Medications:   Current Outpatient Medications:      ibuprofen (ADVIL/MOTRIN) 200 MG capsule, as needed., Disp: , Rfl:      letrozole (FEMARA) 2.5 MG tablet, Take 1 tablet (2.5 mg) by mouth daily., Disp:  90 tablet, Rfl: 3     aspirin-acetaminophen-caffeine (EXCEDRIN MIGRAINE) 250-250-65 MG tablet, , Disp: , Rfl:      mometasone (ELOCON) 0.1 % external cream, Apply topically daily. to radiation treatment field.  Stop application 1 week after completing treatment. (Patient not taking: Reported on 2025), Disp: 45 g, Rfl: 1     ribociclib (KISQALI) (400 MG DOSE) 200 MG tablet therapy pack, Take 2 tablets (400 mg) by mouth every morning for 21 days. Then off for 7 days. (Patient not taking: Reported on 2025), Disp: 42 tablet, Rfl: 0  No current facility-administered medications for this visit.    Facility-Administered Medications Ordered in Other Visits:      lidocaine 1 % 9 mL, 9 mL, Intradermal, Once, Valeria Martinez MD     lidocaine 1% with EPINEPHrine 1:100,000 injection 10 mL, 10 mL, Intradermal, Once, Valeria Martinez MD      Allergies:   Allergies   Allergen Reactions     Citrullus Vulgaris Unknown and Other (See Comments)     Rash, swollen lips     Corn-Containing Products Other (See Comments)     Paclitaxel Other (See Comments)     PAIN in shoulder and pelvis - see infusion notes from 24 and 24     Salicylic Acid Unknown and Other (See Comments)         Social history:   Social History     Socioeconomic History     Marital status:      Spouse name: Not on file     Number of children: Not on file     Years of education: Not on file     Highest education level: Not on file   Occupational History     Not on file   Tobacco Use     Smoking status: Never     Passive exposure: Never     Smokeless tobacco: Never   Substance and Sexual Activity     Alcohol use: Never     Drug use: Never     Sexual activity: Not on file   Other Topics Concern     Not on file   Social History Narrative    Menarche 14. No periods since hysterectomy. No postmenopausal symptoms      first at age 26    Youngest child at diagnosis - 16    Working full time as       Social Drivers of Health     Financial  Resource Strain: Not on file   Food Insecurity: No Food Insecurity (4/6/2024)    Received from [facility]    Hunger Vital Sign      Worried About Running Out of Food in the Last Year: Never true      Ran Out of Food in the Last Year: Never true   Transportation Needs: No Transportation Needs (4/6/2024)    Received from [facility]    PRAPARE - Transportation      Lack of Transportation (Medical): No      Lack of Transportation (Non-Medical): No   Physical Activity: Sufficiently Active (4/6/2024)    Received from [facility]    Exercise Vital Sign      Days of Exercise per Week: 5 days      Minutes of Exercise per Session: 30 min   Stress: Not on file   Social Connections: Unknown (7/16/2021)    Received from [facility]    Social Connection and Isolation Panel [NHANES]      Frequency of Communication with Friends and Family: Once a week      Frequency of Social Gatherings with Friends and Family: Once a week      Attends Yazidism Services: Not on file      Active Member of Clubs or Organizations: No      Attends Club or Organization Meetings: Not on file      Marital Status: Not on file   Interpersonal Safety: Low Risk  (11/4/2024)    Interpersonal Safety      Do you feel physically and emotionally safe where you currently live?: Yes      Within the past 12 months, have you been hit, slapped, kicked or otherwise physically hurt by someone?: No      Within the past 12 months, have you been humiliated or emotionally abused in other ways by your partner or ex-partner?: No   Housing Stability: Low Risk  (4/6/2024)    Received from [facility]    Housing Stability      What is your living situation today?: I have a steady place to live         Family history:I have reviewed this patient's family history and updated it with pertinent information if needed.  Family History   Problem Relation Age of Onset     Hypertension Mother      Hypertension Father       "Cerebrovascular Disease Father      Thyroid Disease Sister      Glaucoma No family hx of      Macular Degeneration No family hx of      Physical exam:  Vitals:Ht 1.651 m (5' 5\")   Wt 69.9 kg (154 lb)   BMI 25.63 kg/m    Gen: NAD, pleasant, interactive and answering questions appropriately, PS 0  HEENT: Normocephalic atraumatic, sclera anicteric  Neck: Full range of motion  Lungs: No respiratory distress, speaking in full sentences, no coughing  Skin: no facial rash noted  Psych: normal affect    Labs:  Lab Results   Component Value Date    WBC 4.5 01/10/2025    HGB 10.9 (L) 01/10/2025    HCT 34.2 (L) 01/10/2025     01/10/2025     01/10/2025    POTASSIUM 4.3 01/10/2025    CHLORIDE 104 01/10/2025    CO2 23 01/10/2025    BUN 12.6 01/10/2025    CR 0.53 01/10/2025     (H) 01/10/2025    AST 24 01/10/2025    ALT 16 01/10/2025    ALKPHOS 93 01/10/2025    BILITOTAL 0.4 01/10/2025    INR 1.01 07/29/2024       Radiology:   DEXA 10/24  T score -2.1    Assessment/plan:   Sharon Fong is a 49 year old female with a right clinical T2-3N3 hormone receptor positive, HER2 negative breast cancer with high risk features treated on block B and C on ISPY2.2 with weekly Taxol --> Abraxane/AC s/p bilateral mastectomies with [id number] disease (RCBII) s/p radiation, currently on OFS/letrozole here for transfer of care and to start adjuvant ribociclib.    1.  Breast Cancer: she continues on OFS and letrozole. Will try changing time of day she takes letrozole to see if that helps with hot flashes and joint pain.  Also advised trial of voltaren gel.  She is over due for Zoladex and this will be scheduled.  I agree with prior recommendations for adjuvant ribo. We reviewed schedule as well as side effects including but not limited to cytopenias, hepatotoxicity and QTC prolongation. She has discussed with pharmacy and is ready to start. She understands this will be planned for 3 years. Call if new symptoms " develop.    2. Osteopenia:  Zometa due April 2024.  DEXA due October 2026. Check vitamin D    3. OFS:  Zoladex as above. Check estradiol    4. Possible LAE noted on EKG:  will check echo.    5. Microcytosis: new but hemoglobin improving, check iron studies. May need iron supplementation.    All of the patient's questions were answered.    Return to the office in 1 month or sooner as needed    Thank you for allowing me to participate in the care of this patient.  Please call with any questions.    I personally reviewed the recent studies and I explained the rationale of the tests ordered today to the patient.     Orders Placed This Encounter   Procedures     Comprehensive metabolic panel     Magnesium     Phosphorus     Comprehensive metabolic panel     Magnesium     Phosphorus     Ferritin     Iron & Iron Binding Capacity     COMPREHENSIVE METABOLIC PANEL     Vitamin D Deficiency     Estradiol     Echocardiogram Complete     CBC with platelets differential     CBC with platelets differential     CBC with Platelets & Differential         Again, thank you for allowing me to participate in the care of your patient.        Sincerely,        Gena Caballero MD    Electronically signed

## 2025-01-14 ENCOUNTER — HOSPITAL ENCOUNTER (OUTPATIENT)
Facility: CLINIC | Age: 50
End: 2025-01-14
Attending: SURGERY
Payer: COMMERCIAL

## 2025-01-14 ENCOUNTER — TELEPHONE (OUTPATIENT)
Dept: GASTROENTEROLOGY | Facility: CLINIC | Age: 50
End: 2025-01-14
Payer: COMMERCIAL

## 2025-01-15 ENCOUNTER — TELEPHONE (OUTPATIENT)
Dept: GASTROENTEROLOGY | Facility: CLINIC | Age: 50
End: 2025-01-15
Payer: COMMERCIAL

## 2025-01-15 NOTE — TELEPHONE ENCOUNTER
"Endoscopy Scheduling Screen    Have you had any respiratory illness or flu-like symptoms in the last 10 days?  No    What is your communication preference for Instructions and/or Bowel Prep?   MyChart    What insurance is in the chart?  Other:  [facility]     Ordering/Referring Provider:     SHERI BALES       (If ordering provider performs procedure, schedule with ordering provider unless otherwise instructed. )    BMI: Estimated body mass index is 25.63 kg/m  as calculated from the following:    Height as of 1/13/25: 1.651 m (5' 5\").    Weight as of 1/13/25: 69.9 kg (154 lb).     Sedation Ordered  moderate sedation.   If patient BMI > 50 do not schedule in [facility].    If patient BMI > 45 do not schedule at [facility].    Are you taking methadone or Suboxone?  NO, No RN review required.    Have you been diagnosed and are being treated for severe PTSD or severe anxiety?  NO, No RN review required.    Are you taking any prescription medications for pain 3 or more times per week?   NO, No RN review required.    Do you have a history of malignant hyperthermia?  No    (Females) Are you currently pregnant?   No     Have you been diagnosed or told you have pulmonary hypertension?   No    Do you have an LVAD?  No    Have you been told you have moderate to severe sleep apnea?  No.    Have you been told you have COPD, asthma, or any other lung disease?  No    Do you have any heart conditions?  No     Have you ever had or are you waiting for an organ transplant?  No. Continue scheduling, no site restrictions.    Have you had a stroke or transient ischemic attack (TIA aka \"mini stroke\" in the last 6 months?   No    Have you been diagnosed with or been told you have cirrhosis of the liver?   No.    Are you currently on dialysis?   No    Do you need assistance transferring?   No    BMI: Estimated body mass index is 25.63 kg/m  as calculated from the following:    Height as of 1/13/25: 1.651 m (5' 5\").    Weight as of 1/13/25: 69.9 kg (154 " lb).     Is patients BMI > 40 and scheduling location [facility]?  No    Do you take an injectable or oral medication for weight loss or diabetes (excluding insulin)?  No    Do you take the medication Naltrexone?  No    Do you take blood thinners?  No       Prep   Are you currently on dialysis or do you have chronic kidney disease?  No    Do you have a diagnosis of diabetes?  No    Do you have a diagnosis of cystic fibrosis (CF)?  No    On a regular basis do you go 3 -5 days between bowel movements?  No    BMI > 40?  No    Preferred Pharmacy:    [facility] - [place], [place] - [address]  [address] [address]  Phone: [phone] Fax: [phone] Alternate Fax: [phone]        Final Scheduling Details     Procedure scheduled  Colonoscopy / Upper endoscopy (EGD)    Surgeon:  Farideh      Date of procedure:  01/21/2025      Pre-OP / PAC:   No - Not required for this site.    Location  [facility] - [facility] - Per order.    Sedation   Moderate Sedation - Per order.      Patient Reminders:   You will receive a call from a Nurse to review instructions and health history.  This assessment must be completed prior to your procedure.  Failure to complete the Nurse assessment may result in the procedure being cancelled.      On the day of your procedure, please designate an adult(s) who can drive you home stay with you for the next 24 hours. The medicines used in the exam will make you sleepy. You will not be able to drive.      You cannot take public transportation, ride share services, or non-medical taxi service without a responsible caregiver.  Medical transport services are allowed with the requirement that a responsible caregiver will receive you at your destination.  We require that drivers and caregivers are confirmed prior to your procedure.

## 2025-01-15 NOTE — TELEPHONE ENCOUNTER
"Response from Gena Caballero MD     \"Yes I believe she is ok to proceed with endoscopy\"    Message sent to scheduling as per [facility] exclusion, this needs to be completed prior to procedure or will need a hospital.       Shani Wilder RN  Endoscopy Procedure Pre Assessment RN   [phone] option 2    "

## 2025-01-15 NOTE — TELEPHONE ENCOUNTER
[facility], [place], Emergency Department    [address]    [facility] [address]    Phone:  [phone]    Fax:  [phone]                                       Keira Montoya   MRN: [id number]    Department:  [facility], Emergency Department   Date of Visit:  12/5/2017           After Visit Summary Signature Page     I have received my discharge instructions, and my questions have been answered. I have discussed any challenges I see with this plan with the nurse or doctor.    ..........................................................................................................................................  Patient/Patient Representative Signature      ..........................................................................................................................................  Patient Representative Print Name and Relationship to Patient    ..................................................               ................................................  Date                                            Time    ..........................................................................................................................................  Reviewed by Signature/Title    ...................................................              ..............................................  Date                                                            Time           Pt has an order for an Echocardiogram due to abnormal EKG. Message sent to ordering provider to confirm this doesn't need to be completed prior to endoscopy procedures.     Shani Wilder RN  Endoscopy Procedure Pre Assessment RN   [phone] option 2

## 2025-01-15 NOTE — TELEPHONE ENCOUNTER
**Pt needs Hospital Per RN review: Echocardiogram given an abnormal ECG       REMINDER: We do not accept [employer] insurance.      Procedure Scheduled: Upper and Lower Endoscopy [EGD and Colonoscopy]  Procedure Date: 01/28  Site:[facility]; [address], [place] [address]  Endoscopist: Nubia   Ordering Provider: SHERI BALES   Scheduled by (per the direction of): Per RN review

## 2025-01-16 ENCOUNTER — TRANSFERRED RECORDS (OUTPATIENT)
Dept: HEALTH INFORMATION MANAGEMENT | Facility: CLINIC | Age: 50
End: 2025-01-16
Payer: COMMERCIAL

## 2025-01-20 ENCOUNTER — TELEPHONE (OUTPATIENT)
Dept: ONCOLOGY | Facility: CLINIC | Age: 50
End: 2025-01-20
Payer: COMMERCIAL

## 2025-01-20 NOTE — ORAL ONC MGMT
"Oral Chemotherapy Monitoring Program    Subjective/Objective:  Sharon Fong is a 49 year old female contacted by phone for a follow-up visit for oral chemotherapy. Call placed to Sharon for an initial assessment of her ribociclib (Kisqali) therapy. She reports starting the medication as scheduled on 1/13/25 and has not missed any doses since starting. Sharon reports some minor arthralgias that are manageable which she attributes to her endocrine therapy. Otherwise she does not report any significant side effects, denies nausea/vomiting, diarrhea, rash.        12/19/2024     2:00 PM 1/20/2025     3:00 PM   ORAL CHEMOTHERAPY   Assessment Type Initial Work up Initial Follow up   Diagnosis Code Breast Cancer Breast Cancer   Providers Dr Ada Caballero   [facility]   Drug Name Kisqali (ribociclib) Kisqali (ribociclib)   Dose 400 mg 400 mg   Current Schedule Daily    Cycle Details 3 weeks on, 1 week off 3 weeks on, 1 week off   Start Date of Last Cycle  1/13/2025   Planned next cycle start date 1/13/2025 2/10/2025       Last PHQ-2 Score on record:        No data to display                Vitals:  BP:   BP Readings from Last 1 Encounters:   12/24/24 106/75     Wt Readings from Last 1 Encounters:   01/13/25 69.9 kg (154 lb)     Estimated body surface area is 1.79 meters squared as calculated from the following:    Height as of 1/13/25: 1.651 m (5' 5\").    Weight as of 1/13/25: 69.9 kg (154 lb).    Labs:  _  Result Component Current Result Ref Range   Sodium 140 (1/10/2025) 135 - 145 mmol/L     _  Result Component Current Result Ref Range   Potassium 4.3 (1/10/2025) 3.4 - 5.3 mmol/L     _  Result Component Current Result Ref Range   Calcium 9.6 (1/10/2025) 8.8 - 10.4 mg/dL    9.6 (1/10/2025) 8.8 - 10.4 mg/dL     _  Result Component Current Result Ref Range   Magnesium 2.3 (1/10/2025) 1.7 - 2.3 mg/dL     _  Result Component Current Result Ref Range   Phosphorus 3.5 (1/10/2025) 2.5 " - 4.5 mg/dL     _  Result Component Current Result Ref Range   Albumin 4.4 (1/10/2025) 3.5 - 5.2 g/dL     _  Result Component Current Result Ref Range   Urea Nitrogen 12.6 (1/10/2025) 6.0 - 20.0 mg/dL     _  Result Component Current Result Ref Range   Creatinine 0.53 (1/10/2025) 0.51 - 0.95 mg/dL     _  Result Component Current Result Ref Range   AST 24 (1/10/2025) 0 - 45 U/L     _  Result Component Current Result Ref Range   ALT 16 (1/10/2025) 0 - 50 U/L     _  Result Component Current Result Ref Range   Bilirubin Total 0.4 (1/10/2025) <=1.2 mg/dL     _  Result Component Current Result Ref Range   WBC Count 4.5 (1/10/2025) 4.0 - 11.0 10e3/uL     _  Result Component Current Result Ref Range   Hemoglobin 10.9 (L) (1/10/2025) 11.7 - 15.7 g/dL     _  Result Component Current Result Ref Range   Platelet Count 259 (1/10/2025) 150 - 450 10e3/uL     No results found for ANC within last 30 days.     _  Result Component Current Result Ref Range   Absolute Neutrophils 3.0 (1/10/2025) 1.6 - 8.3 10e3/uL          Assessment/Plan:  Sharon is toleratin ribociclib (Kisqali) well at this point, okay to continue taking as prescribed.    Follow-Up:  1/27/25 labs    Refill Due:  2/10/25    Anatoly Salgado, PharmD, BCOP  Oral Chemotherapy Monitoring Program  [facility]  [phone]

## 2025-01-23 ENCOUNTER — ANCILLARY PROCEDURE (OUTPATIENT)
Dept: CARDIOLOGY | Facility: CLINIC | Age: 50
End: 2025-01-23
Attending: INTERNAL MEDICINE
Payer: COMMERCIAL

## 2025-01-23 ENCOUNTER — INFUSION THERAPY VISIT (OUTPATIENT)
Dept: INFUSION THERAPY | Facility: CLINIC | Age: 50
End: 2025-01-23
Attending: INTERNAL MEDICINE
Payer: COMMERCIAL

## 2025-01-23 VITALS — SYSTOLIC BLOOD PRESSURE: 106 MMHG | HEART RATE: 80 BPM | DIASTOLIC BLOOD PRESSURE: 71 MMHG | OXYGEN SATURATION: 97 %

## 2025-01-23 DIAGNOSIS — Z17.0 MALIGNANT NEOPLASM OF UPPER-INNER QUADRANT OF RIGHT BREAST IN FEMALE, ESTROGEN RECEPTOR POSITIVE (H): Chronic | ICD-10-CM

## 2025-01-23 DIAGNOSIS — Z17.0 MALIGNANT NEOPLASM OF UPPER-INNER QUADRANT OF RIGHT BREAST IN FEMALE, ESTROGEN RECEPTOR POSITIVE (H): Primary | Chronic | ICD-10-CM

## 2025-01-23 DIAGNOSIS — I95.9 HYPOTENSION, UNSPECIFIED HYPOTENSION TYPE: ICD-10-CM

## 2025-01-23 DIAGNOSIS — C50.211 MALIGNANT NEOPLASM OF UPPER-INNER QUADRANT OF RIGHT BREAST IN FEMALE, ESTROGEN RECEPTOR POSITIVE (H): Chronic | ICD-10-CM

## 2025-01-23 DIAGNOSIS — C50.211 MALIGNANT NEOPLASM OF UPPER-INNER QUADRANT OF RIGHT BREAST IN FEMALE, ESTROGEN RECEPTOR POSITIVE (H): Primary | Chronic | ICD-10-CM

## 2025-01-23 LAB
ATRIAL RATE - MUSE: 79 BPM
DIASTOLIC BLOOD PRESSURE - MUSE: NORMAL MMHG
INTERPRETATION ECG - MUSE: NORMAL
LVEF ECHO: NORMAL
P AXIS - MUSE: 55 DEGREES
PR INTERVAL - MUSE: 144 MS
QRS DURATION - MUSE: 76 MS
QT - MUSE: 406 MS
QTC - MUSE: 465 MS
R AXIS - MUSE: 43 DEGREES
SYSTOLIC BLOOD PRESSURE - MUSE: NORMAL MMHG
T AXIS - MUSE: 32 DEGREES
VENTRICULAR RATE- MUSE: 79 BPM

## 2025-01-23 PROCEDURE — 93000 ELECTROCARDIOGRAM COMPLETE: CPT | Performed by: INTERNAL MEDICINE

## 2025-01-23 PROCEDURE — 93306 TTE W/DOPPLER COMPLETE: CPT | Mod: GC | Performed by: INTERNAL MEDICINE

## 2025-01-23 PROCEDURE — 93356 MYOCRD STRAIN IMG SPCKL TRCK: CPT | Mod: GC | Performed by: INTERNAL MEDICINE

## 2025-01-23 ASSESSMENT — PAIN SCALES - GENERAL: PAINLEVEL_OUTOF10: NO PAIN (0)

## 2025-01-23 NOTE — PROGRESS NOTES
Department of Anesthesiology  Postprocedure Note    Patient: Maris Gage  MRN: [id number]  YOB: 1990  Date of evaluation: 2024    Procedure Summary       Date: 24 Room / Location: [facility]  [facility]    Anesthesia Start: 1349 Anesthesia Stop: 1505    Procedure:  SECTION (Abdomen) Diagnosis:       S/P primary low transverse       (S/P primary low transverse  [Z98.891])    Surgeons: Kareem Barnard MD Responsible Provider: Ama Nevarez MD    Anesthesia Type: spinal ASA Status: 2            Anesthesia Type: No value filed.    Milad Phase I:      Milad Phase II:      Anesthesia Post Evaluation    Patient location during evaluation: PACU  Patient participation: complete - patient participated  Level of consciousness: awake and alert  Airway patency: patent  Nausea & Vomiting: no nausea  Cardiovascular status: hemodynamically stable  Respiratory status: acceptable  Hydration status: euvolemic  Comments: Blood pressure 100/62, pulse (!) 103, temperature 98.1 °F (36.7 °C), resp. rate 18, height 1.626 m (5' 4\"), SpO2 99 %, unknown if currently breastfeeding.   Pain management: adequate and satisfactory to patient        No notable events documented.   Infusion Nursing Note:  Sharon CHASE Hetal presents today for Zoladex.    Patient seen by provider today: No   present during visit today: Not Applicable.    Note: Assessment performed by Gely SULTANA RN prior to injection today. .      Intravenous Access:No Intravenous access/labs at this visit.    Treatment Conditions:  Not Applicable.      Post Infusion Assessment:  Patient tolerated injection without incident.  Site patent and intact, free from redness, edema or discomfort.       Discharge Plan:   Patient discharged in stable condition accompanied by: self.  Departure Mode: Ambulatory.      Maria Luisa Reilly MA

## 2025-01-27 ENCOUNTER — LAB (OUTPATIENT)
Dept: INFUSION THERAPY | Facility: CLINIC | Age: 50
End: 2025-01-27
Attending: INTERNAL MEDICINE
Payer: COMMERCIAL

## 2025-01-27 ENCOUNTER — DOCUMENTATION ONLY (OUTPATIENT)
Dept: ONCOLOGY | Facility: CLINIC | Age: 50
End: 2025-01-27

## 2025-01-27 DIAGNOSIS — Z17.0 MALIGNANT NEOPLASM OF UPPER-INNER QUADRANT OF RIGHT BREAST IN FEMALE, ESTROGEN RECEPTOR POSITIVE (H): ICD-10-CM

## 2025-01-27 DIAGNOSIS — C50.211 MALIGNANT NEOPLASM OF UPPER-INNER QUADRANT OF RIGHT BREAST IN FEMALE, ESTROGEN RECEPTOR POSITIVE (H): ICD-10-CM

## 2025-01-27 LAB
ALBUMIN SERPL BCG-MCNC: 4.3 G/DL (ref 3.5–5.2)
ALP SERPL-CCNC: 74 U/L (ref 40–150)
ALT SERPL W P-5'-P-CCNC: 12 U/L (ref 0–50)
ANION GAP SERPL CALCULATED.3IONS-SCNC: 14 MMOL/L (ref 7–15)
AST SERPL W P-5'-P-CCNC: 18 U/L (ref 0–45)
BASOPHILS # BLD AUTO: 0 10E3/UL (ref 0–0.2)
BASOPHILS NFR BLD AUTO: 1 %
BILIRUB SERPL-MCNC: 0.6 MG/DL
BUN SERPL-MCNC: 9.5 MG/DL (ref 6–20)
CALCIUM SERPL-MCNC: 9.5 MG/DL (ref 8.8–10.4)
CHLORIDE SERPL-SCNC: 104 MMOL/L (ref 98–107)
CREAT SERPL-MCNC: 0.73 MG/DL (ref 0.51–0.95)
EGFRCR SERPLBLD CKD-EPI 2021: >90 ML/MIN/1.73M2
EOSINOPHIL # BLD AUTO: 0.1 10E3/UL (ref 0–0.7)
EOSINOPHIL NFR BLD AUTO: 4 %
ERYTHROCYTE [DISTWIDTH] IN BLOOD BY AUTOMATED COUNT: 18.3 % (ref 10–15)
GLUCOSE SERPL-MCNC: 122 MG/DL (ref 70–99)
HCO3 SERPL-SCNC: 23 MMOL/L (ref 22–29)
HCT VFR BLD AUTO: 31.8 % (ref 35–47)
HGB BLD-MCNC: 10.4 G/DL (ref 11.7–15.7)
HOLD SPECIMEN: NORMAL
IMM GRANULOCYTES # BLD: 0 10E3/UL
IMM GRANULOCYTES NFR BLD: 0 %
LYMPHOCYTES # BLD AUTO: 0.7 10E3/UL (ref 0.8–5.3)
LYMPHOCYTES NFR BLD AUTO: 27 %
MAGNESIUM SERPL-MCNC: 2.1 MG/DL (ref 1.7–2.3)
MCH RBC QN AUTO: 25.2 PG (ref 26.5–33)
MCHC RBC AUTO-ENTMCNC: 32.7 G/DL (ref 31.5–36.5)
MCV RBC AUTO: 77 FL (ref 78–100)
MONOCYTES # BLD AUTO: 0.2 10E3/UL (ref 0–1.3)
MONOCYTES NFR BLD AUTO: 7 %
NEUTROPHILS # BLD AUTO: 1.6 10E3/UL (ref 1.6–8.3)
NEUTROPHILS NFR BLD AUTO: 61 %
NRBC # BLD AUTO: 0 10E3/UL
NRBC BLD AUTO-RTO: 0 /100
PHOSPHATE SERPL-MCNC: 4 MG/DL (ref 2.5–4.5)
PLATELET # BLD AUTO: 223 10E3/UL (ref 150–450)
POTASSIUM SERPL-SCNC: 4.1 MMOL/L (ref 3.4–5.3)
PROT SERPL-MCNC: 7.2 G/DL (ref 6.4–8.3)
RBC # BLD AUTO: 4.13 10E6/UL (ref 3.8–5.2)
SODIUM SERPL-SCNC: 141 MMOL/L (ref 135–145)
WBC # BLD AUTO: 2.6 10E3/UL (ref 4–11)

## 2025-01-27 PROCEDURE — 80048 BASIC METABOLIC PNL TOTAL CA: CPT

## 2025-01-27 PROCEDURE — 85048 AUTOMATED LEUKOCYTE COUNT: CPT

## 2025-01-27 PROCEDURE — 36415 COLL VENOUS BLD VENIPUNCTURE: CPT

## 2025-01-27 PROCEDURE — 85004 AUTOMATED DIFF WBC COUNT: CPT

## 2025-01-27 PROCEDURE — 83735 ASSAY OF MAGNESIUM: CPT

## 2025-01-27 PROCEDURE — 82310 ASSAY OF CALCIUM: CPT

## 2025-01-27 PROCEDURE — 82040 ASSAY OF SERUM ALBUMIN: CPT

## 2025-01-27 PROCEDURE — 84100 ASSAY OF PHOSPHORUS: CPT

## 2025-01-27 NOTE — PROGRESS NOTES
Oral Chemotherapy Monitoring Program  Lab Follow Up    Reviewed lab results from 1/27.    Assessment & Plan:  CMP, CBC, phos, mg reviewed. 1/23 EKG ok, shows Qtc<480. Results are stable with no concerning abnormalities. Plan to continue Kisqali as prescribed. Mychart msg sent to patient.    Follow-Up:  2/10 labs    Alma Regan PharmD  Hematology/Oncology Clinical Pharmacist  [facility]  [phone]        12/19/2024     2:00 PM 1/20/2025     3:00 PM 1/27/2025     3:00 PM   ORAL CHEMOTHERAPY   Assessment Type Initial Work up Initial Follow up Lab Monitoring   Diagnosis Code Breast Cancer Breast Cancer Breast Cancer   Providers Dr Ada Caballero   Clinic Name/Location [facility]   Drug Name Kisqali (ribociclib) Kisqali (ribociclib) Kisqali (ribociclib)   Dose 400 mg 400 mg 400 mg   Current Schedule Daily     Cycle Details 3 weeks on, 1 week off 3 weeks on, 1 week off 3 weeks on, 1 week off   Start Date of Last Cycle  1/13/2025 1/13/2025   Planned next cycle start date 1/13/2025 2/10/2025 2/10/2025       Labs:  _  Result Component Current Result Ref Range   Sodium 141 (1/27/2025) 135 - 145 mmol/L     _  Result Component Current Result Ref Range   Potassium 4.1 (1/27/2025) 3.4 - 5.3 mmol/L     _  Result Component Current Result Ref Range   Calcium 9.5 (1/27/2025) 8.8 - 10.4 mg/dL          _  Result Component Current Result Ref Range   Magnesium 2.1 (1/27/2025) 1.7 - 2.3 mg/dL     _  Result Component Current Result Ref Range   Phosphorus 4.0 (1/27/2025) 2.5 - 4.5 mg/dL     _  Result Component Current Result Ref Range   Albumin 4.3 (1/27/2025) 3.5 - 5.2 g/dL     _  Result Component Current Result Ref Range   Urea Nitrogen 9.5 (1/27/2025) 6.0 - 20.0 mg/dL     _  Result Component Current Result Ref Range   Creatinine 0.73 (1/27/2025) 0.51 - 0.95 mg/dL     _  Result Component Current Result Ref Range   AST 18 (1/27/2025) 0 - 45 U/L     _  Result Component  Current Result Ref Range   ALT 12 (1/27/2025) 0 - 50 U/L     _  Result Component Current Result Ref Range   Bilirubin Total 0.6 (1/27/2025) <=1.2 mg/dL     _  Result Component Current Result Ref Range   WBC Count 2.6 (L) (1/27/2025) 4.0 - 11.0 10e3/uL     _  Result Component Current Result Ref Range   Hemoglobin 10.4 (L) (1/27/2025) 11.7 - 15.7 g/dL     _  Result Component Current Result Ref Range   Platelet Count 223 (1/27/2025) 150 - 450 10e3/uL     _  Result Component Current Result Ref Range   Absolute Neutrophils 1.6 (1/27/2025) 1.6 - 8.3 10e3/uL

## 2025-01-29 ENCOUNTER — HOSPITAL ENCOUNTER (OUTPATIENT)
Facility: CLINIC | Age: 50
Discharge: HOME | End: 2025-01-29
Attending: INTERNAL MEDICINE | Admitting: INTERNAL MEDICINE
Payer: COMMERCIAL

## 2025-01-29 VITALS
RESPIRATION RATE: 21 BRPM | OXYGEN SATURATION: 99 % | HEART RATE: 81 BPM | SYSTOLIC BLOOD PRESSURE: 94 MMHG | DIASTOLIC BLOOD PRESSURE: 71 MMHG

## 2025-01-29 DIAGNOSIS — K64.8 INTERNAL HEMORRHOIDS: ICD-10-CM

## 2025-01-29 DIAGNOSIS — K64.4 EXTERNAL HEMORRHOIDS: Primary | ICD-10-CM

## 2025-01-29 LAB
COLONOSCOPY: NORMAL
UPPER GI ENDOSCOPY: NORMAL

## 2025-01-29 PROCEDURE — 88305 TISSUE EXAM BY PATHOLOGIST: CPT | Mod: TC | Performed by: INTERNAL MEDICINE

## 2025-01-29 PROCEDURE — 250N000009 HC RX 250: Performed by: INTERNAL MEDICINE

## 2025-01-29 PROCEDURE — 250N000011 HC RX IP 250 OP 636: Performed by: INTERNAL MEDICINE

## 2025-01-29 PROCEDURE — 88305 TISSUE EXAM BY PATHOLOGIST: CPT | Mod: 26 | Performed by: PATHOLOGY

## 2025-01-29 PROCEDURE — 258N000003 HC RX IP 258 OP 636: Performed by: INTERNAL MEDICINE

## 2025-01-29 PROCEDURE — G0500 MOD SEDAT ENDO SERVICE >5YRS: HCPCS | Performed by: INTERNAL MEDICINE

## 2025-01-29 PROCEDURE — 99153 MOD SED SAME PHYS/QHP EA: CPT | Performed by: INTERNAL MEDICINE

## 2025-01-29 PROCEDURE — 43239 EGD BIOPSY SINGLE/MULTIPLE: CPT | Performed by: INTERNAL MEDICINE

## 2025-01-29 PROCEDURE — 45378 DIAGNOSTIC COLONOSCOPY: CPT | Performed by: INTERNAL MEDICINE

## 2025-01-29 RX ORDER — NALOXONE HYDROCHLORIDE 0.4 MG/ML
0.2 INJECTION, SOLUTION INTRAMUSCULAR; INTRAVENOUS; SUBCUTANEOUS
Status: DISCONTINUED | OUTPATIENT
Start: 2025-01-29 | End: 2025-01-29 | Stop reason: HOSPADM

## 2025-01-29 RX ORDER — ONDANSETRON 4 MG/1
4 TABLET, ORALLY DISINTEGRATING ORAL EVERY 6 HOURS PRN
Status: DISCONTINUED | OUTPATIENT
Start: 2025-01-29 | End: 2025-01-29 | Stop reason: HOSPADM

## 2025-01-29 RX ORDER — PROCHLORPERAZINE MALEATE 5 MG/1
10 TABLET ORAL EVERY 6 HOURS PRN
Status: DISCONTINUED | OUTPATIENT
Start: 2025-01-29 | End: 2025-01-29 | Stop reason: HOSPADM

## 2025-01-29 RX ORDER — ONDANSETRON 2 MG/ML
4 INJECTION INTRAMUSCULAR; INTRAVENOUS EVERY 6 HOURS PRN
Status: DISCONTINUED | OUTPATIENT
Start: 2025-01-29 | End: 2025-01-29 | Stop reason: HOSPADM

## 2025-01-29 RX ORDER — FLUMAZENIL 0.1 MG/ML
0.2 INJECTION, SOLUTION INTRAVENOUS
Status: DISCONTINUED | OUTPATIENT
Start: 2025-01-29 | End: 2025-01-29 | Stop reason: HOSPADM

## 2025-01-29 RX ORDER — FENTANYL CITRATE 50 UG/ML
INJECTION, SOLUTION INTRAMUSCULAR; INTRAVENOUS PRN
Status: DISCONTINUED | OUTPATIENT
Start: 2025-01-29 | End: 2025-01-29 | Stop reason: HOSPADM

## 2025-01-29 RX ORDER — NALOXONE HYDROCHLORIDE 0.4 MG/ML
0.4 INJECTION, SOLUTION INTRAMUSCULAR; INTRAVENOUS; SUBCUTANEOUS
Status: DISCONTINUED | OUTPATIENT
Start: 2025-01-29 | End: 2025-01-29 | Stop reason: HOSPADM

## 2025-01-29 RX ADMIN — SODIUM CHLORIDE, POTASSIUM CHLORIDE, SODIUM LACTATE AND CALCIUM CHLORIDE 500 ML: 600; 310; 30; 20 INJECTION, SOLUTION INTRAVENOUS at 13:32

## 2025-01-29 ASSESSMENT — ACTIVITIES OF DAILY LIVING (ADL)
ADLS_ACUITY_SCORE: 51

## 2025-01-29 NOTE — OR NURSING
Patient had EGD with biopsies and colonoscopy NO interventions. Patient tolerated procedures under conscious sedation and 2liters nasal cannula.

## 2025-01-29 NOTE — H&P
Gastroenterology Pre-op History and Physical    Sharon Fong MRN# [id number]   Age: 49 year old YOB: 1975      Date of Surgery: 01/29/25  [facility]      Date of Exam 1/29/2025 Facility Same Day       Primary care provider: No Ref-Primary, Physician         Chief Complaint and/or Reason for Procedure:   50 yo female with newly identified iron deficiency anemia.  Hx of breast cancer now completed treatment.  Report hemorrhoids.  No prior exams.          Past Medical and Surgical History:     Past Medical History:   Diagnosis Date    Breast cancer (H)     Hypermobility arthralgia     Hypotension, unspecified hypotension type 07/29/2024    Malignant neoplasm of upper-inner quadrant of right female breast (H) 04/02/2024    Right breast cancer  Has had longstanding history of multiple biopsies, infection, and partial mastectomy for a right breast multifocal abscess dating back to at least 2010.     2024 patient presented with palpable lump at 1:00 in the right breast     3/21/2024 diagnostic mammo showed clustered fine indeterminate calcs at 12:00.  No mammographic abnormality identified to correspond to the palpable    Migraine     S/P radiation therapy     6,000 cGy to right CW + Boost and 5,000 cGy to right SCV completed on 12/27/2024 - [facility]    Seizure (H) 11/15/2013    Seizure disorder (H) 2014    Once post surgery    Vitamin D deficiency 04/23/2013 April 2013 July 2013       Past Surgical History:   Procedure Laterality Date    AS KNEE SCOPE, DIAGNOSTIC      BIOPSY NODE SENTINEL Right 9/30/2024    Procedure: RIGHT RADIO-LOCALIZED SENTINEL LYPMH NODE BIOPSY;  Surgeon: Boom Collier MD;  Location: [facility] OR    COSMETIC SURGERY Bilateral     removal of axillary excessive tissue    HYSTERECTOMY  12/2022    fibroids    INSERT PORT VASCULAR ACCESS Left 4/17/2024    Procedure: Insert port vascular access chest;  Surgeon: Chapin Loza  MD Darshan;  Location: [facility] OR    IR CHEST PORT PLACEMENT > 5 YRS OF AGE  4/17/2024    IR LYMPH NODE BIOPSY  03/28/2024    LUMPECTOMY BREAST Right 10/08/2010    for abscess    MASTECTOMY SIMPLE BILATERAL Bilateral 9/30/2024    Procedure: BILATERAL SIMPLE MASTECTOMY;  Surgeon: Boom Collier MD;  Location: [facility] OR    REMOVE PORT VASCULAR ACCESS Left 9/30/2024    Procedure: Remove port vascular access left;  Surgeon: Boom Collier MD;  Location: [facility] OR            Medications (include herbals and vitamins):        Medications Prior to Admission   Medication Sig Dispense Refill Last Dose/Taking    letrozole (FEMARA) 2.5 MG tablet TAKE ONE TABLET BY MOUTH ONE TIME DAILY 90 tablet 3 1/28/2025    ribociclib (KISQALI) (400 MG DOSE) 200 MG tablet therapy pack Take 2 tablets (400 mg) by mouth every morning for 21 days. Then off for 7 days. 42 tablet 0 1/28/2025    aspirin-acetaminophen-caffeine (EXCEDRIN MIGRAINE) 250-250-65 MG tablet        ibuprofen (ADVIL/MOTRIN) 200 MG capsule as needed.       mometasone (ELOCON) 0.1 % external cream Apply topically daily. to radiation treatment field.  Stop application 1 week after completing treatment. 45 g 1              Allergies:      Allergies   Allergen Reactions    Citrullus Vulgaris Unknown and Other (See Comments)     Rash, swollen lips    Corn-Containing Products Other (See Comments)    Paclitaxel Other (See Comments)     PAIN in shoulder and pelvis - see infusion notes from 4/30/24 and 5/7/24    Salicylic Acid Unknown and Other (See Comments)               Physical Exam:   All vitals have been reviewed  Patient Vitals for the past 8 hrs:   BP Pulse Resp SpO2   01/29/25 1420 90/72 80 21 97 %   01/29/25 1415 93/70 77 16 97 %   01/29/25 1410 93/70 74 18 98 %   01/29/25 1405 94/71 76 17 98 %   01/29/25 1400 -- 79 (!) 8 92 %   01/29/25 1355 94/71 83 17 100 %   01/29/25 1350 101/76 79 17 100 %   01/29/25 1225 96/72 93 18 100 %     No intake/output data recorded.  Airway  assessment:   Patient is able to open mouth wide  Patient is able to stick out tongue  Mallampatti classification: Class I (visualization of the soft palate, fauces, uvula, anterior and posterior pillars)}      Lungs:   No increased work of breathing, good air exchange, clear to auscultation bilaterally, no crackles or wheezing     Cardiovascular:   regular rate and rhythm and normal S1 and S2                 Anesthetic risk and/or ASA classification:   ASA 2    Roldan Delacruz MD

## 2025-01-30 LAB
PATH REPORT.COMMENTS IMP SPEC: NORMAL
PATH REPORT.COMMENTS IMP SPEC: NORMAL
PATH REPORT.FINAL DX SPEC: NORMAL
PATH REPORT.GROSS SPEC: NORMAL
PATH REPORT.MICROSCOPIC SPEC OTHER STN: NORMAL
PATH REPORT.RELEVANT HX SPEC: NORMAL
PHOTO IMAGE: NORMAL

## 2025-02-03 DIAGNOSIS — Z17.0 MALIGNANT NEOPLASM OF UPPER-INNER QUADRANT OF RIGHT BREAST IN FEMALE, ESTROGEN RECEPTOR POSITIVE (H): Primary | ICD-10-CM

## 2025-02-03 DIAGNOSIS — C50.211 MALIGNANT NEOPLASM OF UPPER-INNER QUADRANT OF RIGHT BREAST IN FEMALE, ESTROGEN RECEPTOR POSITIVE (H): Primary | ICD-10-CM

## 2025-02-08 ENCOUNTER — THERAPY VISIT (OUTPATIENT)
Dept: OCCUPATIONAL THERAPY | Facility: CLINIC | Age: 50
End: 2025-02-08
Attending: RADIOLOGY
Payer: COMMERCIAL

## 2025-02-08 DIAGNOSIS — Z17.0 MALIGNANT NEOPLASM OF UPPER-INNER QUADRANT OF RIGHT BREAST IN FEMALE, ESTROGEN RECEPTOR POSITIVE (H): Primary | ICD-10-CM

## 2025-02-08 DIAGNOSIS — C50.211 MALIGNANT NEOPLASM OF UPPER-INNER QUADRANT OF RIGHT BREAST IN FEMALE, ESTROGEN RECEPTOR POSITIVE (H): Primary | ICD-10-CM

## 2025-02-08 PROCEDURE — 97164 PT RE-EVAL EST PLAN CARE: CPT | Mod: GP

## 2025-02-08 PROCEDURE — 97139 UNLISTED THERAPEUTIC PX: CPT | Mod: GP

## 2025-02-08 PROCEDURE — 97140 MANUAL THERAPY 1/> REGIONS: CPT | Mod: GP

## 2025-02-10 ENCOUNTER — TELEPHONE (OUTPATIENT)
Dept: ONCOLOGY | Facility: CLINIC | Age: 50
End: 2025-02-10

## 2025-02-10 ENCOUNTER — LAB (OUTPATIENT)
Dept: INFUSION THERAPY | Facility: CLINIC | Age: 50
End: 2025-02-10
Attending: INTERNAL MEDICINE
Payer: COMMERCIAL

## 2025-02-10 DIAGNOSIS — D50.9 MICROCYTIC ANEMIA: ICD-10-CM

## 2025-02-10 DIAGNOSIS — Z17.0 MALIGNANT NEOPLASM OF UPPER-INNER QUADRANT OF RIGHT BREAST IN FEMALE, ESTROGEN RECEPTOR POSITIVE (H): ICD-10-CM

## 2025-02-10 DIAGNOSIS — C50.211 MALIGNANT NEOPLASM OF UPPER-INNER QUADRANT OF RIGHT BREAST IN FEMALE, ESTROGEN RECEPTOR POSITIVE (H): ICD-10-CM

## 2025-02-10 LAB
ALBUMIN SERPL BCG-MCNC: 4.7 G/DL (ref 3.5–5.2)
ALP SERPL-CCNC: 77 U/L (ref 40–150)
ALT SERPL W P-5'-P-CCNC: 16 U/L (ref 0–50)
ANION GAP SERPL CALCULATED.3IONS-SCNC: 9 MMOL/L (ref 7–15)
AST SERPL W P-5'-P-CCNC: 25 U/L (ref 0–45)
BASOPHILS # BLD AUTO: 0.1 10E3/UL (ref 0–0.2)
BASOPHILS NFR BLD AUTO: 3 %
BILIRUB SERPL-MCNC: 0.5 MG/DL
BUN SERPL-MCNC: 13 MG/DL (ref 6–20)
CALCIUM SERPL-MCNC: 10.6 MG/DL (ref 8.8–10.4)
CHLORIDE SERPL-SCNC: 107 MMOL/L (ref 98–107)
CREAT SERPL-MCNC: 0.66 MG/DL (ref 0.51–0.95)
EGFRCR SERPLBLD CKD-EPI 2021: >90 ML/MIN/1.73M2
EOSINOPHIL # BLD AUTO: 0.1 10E3/UL (ref 0–0.7)
EOSINOPHIL NFR BLD AUTO: 3 %
ERYTHROCYTE [DISTWIDTH] IN BLOOD BY AUTOMATED COUNT: 21.5 % (ref 10–15)
GLUCOSE SERPL-MCNC: 97 MG/DL (ref 70–99)
HCO3 SERPL-SCNC: 26 MMOL/L (ref 22–29)
HCT VFR BLD AUTO: 34.7 % (ref 35–47)
HGB BLD-MCNC: 11.2 G/DL (ref 11.7–15.7)
HOLD SPECIMEN: NORMAL
IMM GRANULOCYTES # BLD: 0 10E3/UL
IMM GRANULOCYTES NFR BLD: 0 %
LYMPHOCYTES # BLD AUTO: 1 10E3/UL (ref 0.8–5.3)
LYMPHOCYTES NFR BLD AUTO: 32 %
MAGNESIUM SERPL-MCNC: 2.3 MG/DL (ref 1.7–2.3)
MCH RBC QN AUTO: 26.4 PG (ref 26.5–33)
MCHC RBC AUTO-ENTMCNC: 32.3 G/DL (ref 31.5–36.5)
MCV RBC AUTO: 82 FL (ref 78–100)
MONOCYTES # BLD AUTO: 0.5 10E3/UL (ref 0–1.3)
MONOCYTES NFR BLD AUTO: 16 %
NEUTROPHILS # BLD AUTO: 1.4 10E3/UL (ref 1.6–8.3)
NEUTROPHILS NFR BLD AUTO: 46 %
NRBC # BLD AUTO: 0 10E3/UL
NRBC BLD AUTO-RTO: 0 /100
PHOSPHATE SERPL-MCNC: 4.6 MG/DL (ref 2.5–4.5)
PLATELET # BLD AUTO: 286 10E3/UL (ref 150–450)
POTASSIUM SERPL-SCNC: 3.8 MMOL/L (ref 3.4–5.3)
PROT SERPL-MCNC: 8.1 G/DL (ref 6.4–8.3)
RBC # BLD AUTO: 4.25 10E6/UL (ref 3.8–5.2)
SODIUM SERPL-SCNC: 142 MMOL/L (ref 135–145)
WBC # BLD AUTO: 3 10E3/UL (ref 4–11)

## 2025-02-10 PROCEDURE — 84100 ASSAY OF PHOSPHORUS: CPT

## 2025-02-10 PROCEDURE — 85018 HEMOGLOBIN: CPT

## 2025-02-10 PROCEDURE — 80069 RENAL FUNCTION PANEL: CPT

## 2025-02-10 PROCEDURE — 85048 AUTOMATED LEUKOCYTE COUNT: CPT

## 2025-02-10 PROCEDURE — 36415 COLL VENOUS BLD VENIPUNCTURE: CPT

## 2025-02-10 PROCEDURE — 82947 ASSAY GLUCOSE BLOOD QUANT: CPT

## 2025-02-10 PROCEDURE — 82040 ASSAY OF SERUM ALBUMIN: CPT

## 2025-02-10 PROCEDURE — 85004 AUTOMATED DIFF WBC COUNT: CPT

## 2025-02-10 PROCEDURE — 80053 COMPREHEN METABOLIC PANEL: CPT

## 2025-02-10 PROCEDURE — 83735 ASSAY OF MAGNESIUM: CPT

## 2025-02-10 NOTE — ORAL ONC MGMT
Oral Chemotherapy Monitoring Program  Lab Follow Up    Patient is on ribociclib (Kisqali)  Reviewed lab results from 2/10/25.    Assessment & Plan:  CBC and CMP reviewed. Results show grade 2 neutropenia, no need for dose adjustment or cycle delay at this time.  Plan to start next cycle of Kisqali today as prescribed. Questions answered to patient's satisfaction.    Follow-Up:  2/17: Dr Caballero visit  ~2/24: due for q2 week labs (requested appt today)      Holly Mitchell, ValeryD  Hematology/Oncology Clinical Pharmacist  Oral Chemotherapy Monitoring Program  [facility]  [phone]  February 10, 2025            12/19/2024     2:00 PM 1/20/2025     3:00 PM 1/27/2025     3:00 PM 2/3/2025    10:00 AM 2/10/2025     4:00 PM   ORAL CHEMOTHERAPY   Assessment Type Initial Work up Initial Follow up Lab Monitoring Refill Lab Monitoring   Diagnosis Code Breast Cancer Breast Cancer Breast Cancer Breast Cancer Breast Cancer   Providers Dr Ada Caballero   Clinic Name/Location [facility]   Drug Name Kisqali (ribociclib) Kisqali (ribociclib) Kisqali (ribociclib) Kisqali (ribociclib) Kisqali (ribociclib)   Dose 400 mg 400 mg 400 mg 400 mg 400 mg   Current Schedule Daily       Cycle Details 3 weeks on, 1 week off 3 weeks on, 1 week off 3 weeks on, 1 week off 3 weeks on, 1 week off 3 weeks on, 1 week off   Start Date of Last Cycle  1/13/2025 1/13/2025     Planned next cycle start date 1/13/2025 2/10/2025 2/10/2025  2/10/2025   Adverse Effects     Neutropenia   Neutropenia     Grade 2   Pharmacist Intervention(neutropenia)     No   Is the dose as ordered appropriate for the patient?     Yes       Labs:  _  Result Component Current Result Ref Range   Sodium 142 (2/10/2025) 135 - 145 mmol/L     _  Result Component Current Result Ref Range   Potassium 3.8 (2/10/2025) 3.4 - 5.3 mmol/L     _  Result Component Current Result Ref Range   Calcium  10.6 (H) (2/10/2025) 8.8 - 10.4 mg/dL     _  Result Component Current Result Ref Range   Magnesium 2.3 (2/10/2025) 1.7 - 2.3 mg/dL     _  Result Component Current Result Ref Range   Phosphorus 4.6 (H) (2/10/2025) 2.5 - 4.5 mg/dL     _  Result Component Current Result Ref Range   Albumin 4.7 (2/10/2025) 3.5 - 5.2 g/dL     _  Result Component Current Result Ref Range   Urea Nitrogen 13.0 (2/10/2025) 6.0 - 20.0 mg/dL     _  Result Component Current Result Ref Range   Creatinine 0.66 (2/10/2025) 0.51 - 0.95 mg/dL     _  Result Component Current Result Ref Range   AST 25 (2/10/2025) 0 - 45 U/L     _  Result Component Current Result Ref Range   ALT 16 (2/10/2025) 0 - 50 U/L     _  Result Component Current Result Ref Range   Bilirubin Total 0.5 (2/10/2025) <=1.2 mg/dL     _  Result Component Current Result Ref Range   WBC Count 3.0 (L) (2/10/2025) 4.0 - 11.0 10e3/uL     _  Result Component Current Result Ref Range   Hemoglobin 11.2 (L) (2/10/2025) 11.7 - 15.7 g/dL     _  Result Component Current Result Ref Range   Platelet Count 286 (2/10/2025) 150 - 450 10e3/uL     No results found for ANC within last 30 days.

## 2025-02-17 ENCOUNTER — VIRTUAL VISIT (OUTPATIENT)
Dept: ONCOLOGY | Facility: CLINIC | Age: 50
End: 2025-02-17
Attending: INTERNAL MEDICINE
Payer: COMMERCIAL

## 2025-02-17 VITALS — BODY MASS INDEX: 25.33 KG/M2 | WEIGHT: 152 LBS | HEIGHT: 65 IN

## 2025-02-17 DIAGNOSIS — Z17.0 MALIGNANT NEOPLASM OF UPPER-INNER QUADRANT OF RIGHT BREAST IN FEMALE, ESTROGEN RECEPTOR POSITIVE (H): Primary | Chronic | ICD-10-CM

## 2025-02-17 DIAGNOSIS — E61.1 IRON DEFICIENCY: ICD-10-CM

## 2025-02-17 DIAGNOSIS — C50.211 MALIGNANT NEOPLASM OF UPPER-INNER QUADRANT OF RIGHT BREAST IN FEMALE, ESTROGEN RECEPTOR POSITIVE (H): Primary | Chronic | ICD-10-CM

## 2025-02-17 PROCEDURE — G2211 COMPLEX E/M VISIT ADD ON: HCPCS | Mod: 95 | Performed by: INTERNAL MEDICINE

## 2025-02-17 PROCEDURE — 98006 SYNCH AUDIO-VIDEO EST MOD 30: CPT | Performed by: INTERNAL MEDICINE

## 2025-02-17 RX ORDER — ALBUTEROL SULFATE 90 UG/1
1-2 INHALANT RESPIRATORY (INHALATION)
Start: 2025-07-09

## 2025-02-17 RX ORDER — ALBUTEROL SULFATE 0.83 MG/ML
2.5 SOLUTION RESPIRATORY (INHALATION)
OUTPATIENT
Start: 2025-07-09

## 2025-02-17 RX ORDER — DIPHENHYDRAMINE HYDROCHLORIDE 50 MG/ML
50 INJECTION INTRAMUSCULAR; INTRAVENOUS
Start: 2025-07-09

## 2025-02-17 RX ORDER — HEPARIN SODIUM,PORCINE 10 UNIT/ML
5-20 VIAL (ML) INTRAVENOUS DAILY PRN
OUTPATIENT
Start: 2025-07-09

## 2025-02-17 RX ORDER — MEPERIDINE HYDROCHLORIDE 25 MG/ML
25 INJECTION INTRAMUSCULAR; INTRAVENOUS; SUBCUTANEOUS
OUTPATIENT
Start: 2025-07-09

## 2025-02-17 RX ORDER — DIPHENHYDRAMINE HYDROCHLORIDE 50 MG/ML
25 INJECTION INTRAMUSCULAR; INTRAVENOUS
Start: 2025-07-09

## 2025-02-17 RX ORDER — ZOLEDRONIC ACID 0.04 MG/ML
4 INJECTION, SOLUTION INTRAVENOUS ONCE
OUTPATIENT
Start: 2025-07-09 | End: 2025-07-09

## 2025-02-17 RX ORDER — HEPARIN SODIUM (PORCINE) LOCK FLUSH IV SOLN 100 UNIT/ML 100 UNIT/ML
5 SOLUTION INTRAVENOUS
OUTPATIENT
Start: 2025-07-09

## 2025-02-17 RX ORDER — EPINEPHRINE 1 MG/ML
0.3 INJECTION, SOLUTION INTRAMUSCULAR; SUBCUTANEOUS EVERY 5 MIN PRN
OUTPATIENT
Start: 2025-07-09

## 2025-02-17 RX ORDER — METHYLPREDNISOLONE SODIUM SUCCINATE 40 MG/ML
40 INJECTION INTRAMUSCULAR; INTRAVENOUS
Start: 2025-07-09

## 2025-02-17 ASSESSMENT — PAIN SCALES - GENERAL: PAINLEVEL_OUTOF10: NO PAIN (0)

## 2025-02-17 NOTE — PROGRESS NOTES
Oncology Progress Note    Name: Sharon Fong  : 1975  MRN: [id number]    CC: Breast Cancer    HPI: Sharon Fong is a 49 year old female with a right clinical T2-3N3 hormone receptor positive, HER2 negative breast cancer with high risk features treated on block B and C on ISPY2.2 with weekly Taxol --> Abraxane/AC s/p bilateral mastectomies with [id number] disease (RCBII) s/p radiation, currently on adjuvant OFS/letrozole/ribociclib.    She continues to have joint pain. Hasn't tried voltaren. No new issues with ribo. She is on week 2 of her cycle.  She had EGD/colonoscopy with negative biopsies and hiatal hernia noted. Continues on iron. Asks about fish oil in the spring.    History  Negative genetics  2024 MammaPrint -0.192 (high risk) and blueprint luminal B  2024 - 2024 ISPY2.2 weekly Taxol - changed to Abraxane starting week 2 due to allergic reaction  2024 Study biopsy  Right breast 1:00 grade 2 residual IDC with probable treatment related changes.  Also grade 2 DCIS.  Right axillary lymph node with residual metastatic disease.  Tumor-infiltrating lymphocytes of 10%.  Some decreased cellularity suggestive of treatment response.   2024 - 2024 ddAC x 4   2024 Right mastectomy showing 1.6cm of residual grade II IDC w/ evidence of treatment related changes. 25% cellularity. No LVI.  Also 1.6 cm of grade 2 DCIS. DCIS is 5% of tumor. Negative margins. ADH, classic type LCIS, calcs associated w/ invasive carcinoma, DCIS, and benign acini. ER (3+, %), OH (1+, 3%), HER2 2+ and FISH 2.8/2.4 = 1.2 (negative). 1/4 LN w/ metastatic carcinoma w/ evidence of treatment related changes. No EVA.  [id number].  Left breast mastectomy - ADH. No malignancy.    10/14/2024 Zoladex (12 week injection)   10/21/2024 DEXA scan showing osteopenia: Total hip T-score: -1.7, Left femoral neck T-score = -2.1, Right femoral neck T-score= -2.1    10/28/2024 Letrozole  -24:  radiation  24: Zometa  24: adjuvant ribo    Review of systems: All other systems reviewed and are negative except for what is described in the history of present illness.      PMH:   Patient Active Problem List    Diagnosis Date Noted    Hypotension, unspecified hypotension type 2024     Priority: Medium    Syncope, unspecified syncope type 2024     Priority: Medium    Malignant neoplasm of upper-inner quadrant of right female breast (H) 2024     Priority: Medium     Right breast cancer  Has had longstanding history of multiple biopsies, infection, and partial mastectomy for a right breast multifocal abscess dating back to at least .     patient presented with palpable lump at 1:00 in the right breast    3/21/2024 diagnostic mammo showed clustered fine indeterminate calcs at 12:00.  No mammographic abnormality identified to correspond to the palpable area of fullness at 1:00.  On US, at 1:00, 10 cm FN there was a 3.7 x 2.3 x 1.5 cm solid hypoechoic irregularly marginated mass.  Additional hypoechoic lesion located at 2:00, 3 cm FN measuring 1.0 x 0.5 cm.  Another hypoechoic lesion at 2:00, 12 cm FN measuring 0.7 cm.  At least 2 enlarged right axillary lymph nodes -largest measuring up to 0.6 cm.    3/28/2024 ultrasound-guided biopsy of the followin:00 lesion at least DCIS  1:00 lesion grade 2 IDC.  No LVI.  ER (3+, 80 to 90%), MS (3+, 90 to 100%), HER2 2+ and FISH negative   Right axillary LN: metastatic carcinoma    2024 CT CAP demonstrated multifocal enhancement in the right breast with mildly abnormal right axillary lymph nodes.  Otherwise no evidence of distant metastatic disease.    2024 NM bone scan negative for osteoblastic metastatic disease.    4/15/2024 Baseline MRI breast demonstrated the following  Right breast at 1:00 mass measuring 2.6 x 2.0 by 3.0 cm with extensive surrounding clumped NME occupying most of the right breast and measuring 8.9 x 4.7 x  8.7   Left breast showed a fibroadenoma at 8-9:00 position.  In addition, 7 mm of linear branching NME at 3-4:00 position.  This was biopsied and found to be benign breast tissue.  Few asymmetric right internal mammary chain LN. multiple enlarged right level 1 axillary nodes.  Additionally multiple LEFT level 1 axillary nodes that are indeterminant -which were normal on left axillary US.  FTV 38.222 cc    4/26/2024 MammaPrint -0.192 (high risk) and blueprint luminal B    4/30/2024 - 7/17/2024 weekly Taxol - changed to Abraxane starting week 2 due to allergic reaction    5/17/2024 W3 MRI  Minimally decreased size of the postoperative right breast cancer at the approximate 1:00 position measuring approximately 2.4 x 1.9 x 2.9 cm (previously 2.6 x 2.0 x 3.0 cm).  There is overall similar extent although mildly decreased volume of abnormal surrounding NME  Minimal interval enlargement of multiple bilateral level I lymph nodes, including the biopsy-proven right axillary node with indwelling biopsy marker. No significant change in the multiple small asymmetric right internal mammary chain lymph nodes.  27.167 ml - Tumor volume reduction of 28.9% from baseline     6/10/2024 (6-week MRI breast) 8.8 cm anteroposterior on axial MIPS, unchanged.  Mildly decreased size of the biopsy-proven cancer in the right breast at the approximate 1:00 position middle depth measuring approximately 2.0 x 1.4 x 2.4 cm (previously 2.4 x 1.9 x 2.9 cm).   Extensive surrounding NME is largely unchanged changed, but the volume has minimally decreased.  Stable bilateral axillary lymph nodes, including the biopsy-proven right level I axillary node. Stable asymmetric small right internal mammary chain lymph nodes.  18.274 - FTV reduction of 57% from baseline     7/23/2024 (12-week MRI breast) Longest diameter of suspicious enhancement: 8.0 cm(previously 8.8 cm).   Irregular enhancing mass in the right breast at approximately 1:00 positionmeasures 1.8  x 1.1 x 1.6 cm (previously 2.0 x 1.4 x 2.4 cm).   NME continues to extend from anterior to posterior depth, with anterior NME extending to the base of the nipple.  Stable prominent bilateral axillary nodes, including the biopsied right level 1 axillary node. Small asymmetric right internal mammary chain lymph nodes are also similar to prior  7.585 ml - FTV reduction of 80% from baseline     7/24/2024 Study biopsy  Right breast 1:00 grade 2 residual IDC with probable treatment related changes.  Also grade 2 DCIS.  Right axillary lymph node with residual metastatic disease.  Tumor-infiltrating lymphocytes of 10%.  Some decreased cellularity suggestive of treatment response.    7/25/2024 - 9/5/2024 ddAC x 4     8/19/2024 W4 MRI (in block C) Longest diameter of suspicious enhancement: 8 cm (sharee has decreased volume of enhancement.  Right breast at 1:00 posterior depth measures 1.7 x 0.7 x 1.3 cm, previously 1.8 x 0.9 x 1.6 cm  The right MEÑO node just above the 3rd rib is slightly smaller than the prior exam  The right axillary lymph nodes have slightly decreased in size. No lymphadenopathy in the left axilla.    FTV 6.349 ml - 83% reduction from baseline     9/30/2024 Right mastectomy showing 1.6cm of residual grade II IDC w/ evidence of treatment related changes. 25% cellularity. No LVI.  Also 1.6 cm of grade 2 DCIS. DCIS is 5% of tumor. Negative margins. ADH, classic type LCIS, calcs associated w/ invasive carcinoma, DCIS, and benign acini. ER (3+, %), AK (1+, 3%), HER2 2+ and FISH 2.8/2.4 = 1.2 (negative). 1/4 LN w./ metastatic carcinoma w/ evidence of treatment related changes. No EVA.  [id number].  Left breast mastectomy - ADH. No malignancy.     10/14/2024 Zoladex    10/21/2024 DEXA scan showing osteopenia: Total hip T-score: -1.7, Left femoral neck T-score = -2.1, Right femoral neck T-score= -2.1     10/28/2024 Letrozole      Seizure (H) 11/15/2013     Priority: Medium    Vitamin D deficiency 04/23/2013      Priority: Medium     April 2013 July 2013      Inflammatory disease of breast 08/17/2010     Priority: Medium     S/p  Partial R mastectomy secondary to recurrent abscesses  Dr Sweet followed  Final dx after surgery -histoplasmosis  Was treated       Past Medical History:   Diagnosis Date    Breast cancer (H)     Hypermobility arthralgia     Hypotension, unspecified hypotension type 07/29/2024    Malignant neoplasm of upper-inner quadrant of right female breast (H) 04/02/2024    Right breast cancer  Has had longstanding history of multiple biopsies, infection, and partial mastectomy for a right breast multifocal abscess dating back to at least 2010.     2024 patient presented with palpable lump at 1:00 in the right breast     3/21/2024 diagnostic mammo showed clustered fine indeterminate calcs at 12:00.  No mammographic abnormality identified to correspond to the palpable    Migraine     S/P radiation therapy     6,000 cGy to right CW + Boost and 5,000 cGy to right SCV completed on 12/27/2024 [facility] [place]    Seizure (H) 11/15/2013    Seizure disorder (H) 2014    Once post surgery    Vitamin D deficiency 04/23/2013 April 2013 July 2013         Medications:   Current Outpatient Medications:     aspirin-acetaminophen-caffeine (EXCEDRIN MIGRAINE) 250-250-65 MG tablet, , Disp: , Rfl:     ibuprofen (ADVIL/MOTRIN) 200 MG capsule, as needed., Disp: , Rfl:     letrozole (FEMARA) 2.5 MG tablet, TAKE ONE TABLET BY MOUTH ONE TIME DAILY, Disp: 90 tablet, Rfl: 3    mometasone (ELOCON) 0.1 % external cream, Apply topically daily. to radiation treatment field.  Stop application 1 week after completing treatment., Disp: 45 g, Rfl: 1    ribociclib (KISQALI) (400 MG DOSE) 200 MG tablet therapy pack, Take 2 tablets (400 mg) by mouth every morning for 21 days. Then off for 7 days., Disp: 42 tablet, Rfl: 0  No current facility-administered medications for this visit.    Facility-Administered Medications Ordered  in Other Visits:     lidocaine 1 % 9 mL, 9 mL, Intradermal, Once, Valeria Martinez MD    lidocaine 1% with EPINEPHrine 1:100,000 injection 10 mL, 10 mL, Intradermal, Once, Valeria Martinez MD      Allergies:   Allergies   Allergen Reactions    Citrullus Vulgaris Unknown and Other (See Comments)     Rash, swollen lips    Corn-Containing Products Other (See Comments)    Paclitaxel Other (See Comments)     PAIN in shoulder and pelvis - see infusion notes from 24 and 24    Salicylic Acid Unknown and Other (See Comments)         Social history:   Social History     Socioeconomic History    Marital status:      Spouse name: Not on file    Number of children: Not on file    Years of education: Not on file    Highest education level: Not on file   Occupational History    Not on file   Tobacco Use    Smoking status: Never     Passive exposure: Never    Smokeless tobacco: Never   Substance and Sexual Activity    Alcohol use: Never    Drug use: Never    Sexual activity: Not on file   Other Topics Concern    Not on file   Social History Narrative    Menarche 14. No periods since hysterectomy. No postmenopausal symptoms      first at age 26    Youngest child at diagnosis - 16    Working full time as       Social Drivers of Health     Financial Resource Strain: Not on file   Food Insecurity: No Food Insecurity (2024)    Received from [facility]    Hunger Vital Sign     Worried About Running Out of Food in the Last Year: Never true     Ran Out of Food in the Last Year: Never true   Transportation Needs: No Transportation Needs (2024)    Received from [facility]    PRAPARE - Transportation     Lack of Transportation (Medical): No     Lack of Transportation (Non-Medical): No   Physical Activity: Sufficiently Active (2024)    Received from [facility]    Exercise Vital Sign     Days of Exercise per Week: 5 days     Minutes of Exercise per Session: 30 min  "  Stress: Not on file   Social Connections: Unknown (7/16/2021)    [facility]    Social Connection and Isolation Panel [NHANES]     Frequency of Communication with Friends and Family: Once a week     Frequency of Social Gatherings with Friends and Family: Once a week     Attends Jainism Services: Not on file     Active Member of Clubs or Organizations: No     Attends Club or Organization Meetings: Not on file     Marital Status: Not on file   Interpersonal Safety: Unknown (1/29/2025)    Interpersonal Safety     Do you feel physically and emotionally safe where you currently live?: Patient unable to answer     Within the past 12 months, have you been hit, slapped, kicked or otherwise physically hurt by someone?: Patient unable to answer     Within the past 12 months, have you been humiliated or emotionally abused in other ways by your partner or ex-partner?: Patient unable to answer   Housing Stability: Low Risk  (4/6/2024)    [facility]    Housing Stability     What is your living situation today?: I have a steady place to live       Family history:I have reviewed this patient's family history and updated it with pertinent information if needed.  Family History   Problem Relation Age of Onset    Hypertension Mother     Hypertension Father     Cerebrovascular Disease Father     Thyroid Disease Sister     Glaucoma No family hx of     Macular Degeneration No family hx of      Physical exam:  Vitals:Ht 1.651 m (5' 5\")   Wt 68.9 kg (152 lb)   BMI 25.29 kg/m    Gen: NAD, pleasant, interactive and answering questions appropriately, PS 0  HEENT: Normocephalic atraumatic, sclera anicteric  Neck: Full range of motion  Lungs: No respiratory distress, speaking in full sentences, no coughing  Skin: no facial rash noted  Psych: normal affect    Labs:  Lab Results   Component Value Date    WBC 3.0 (L) 02/10/2025    HGB 11.2 (L) 02/10/2025    HCT 34.7 (L) 02/10/2025    PLT " 286 02/10/2025     02/10/2025    POTASSIUM 3.8 02/10/2025    CHLORIDE 107 02/10/2025    CO2 26 02/10/2025    BUN 13.0 02/10/2025    CR 0.66 02/10/2025    GLC 97 02/10/2025    AST 25 02/10/2025    ALT 16 02/10/2025    ALKPHOS 77 02/10/2025    BILITOTAL 0.5 02/10/2025    INR 1.01 07/29/2024       Radiology:   DEXA 10/24  T score -2.1    Assessment/plan:   Sharon Fong is a  49 year old female with a right clinical T2-3N3 hormone receptor positive, HER2 negative breast cancer with high risk features treated on block B and C on ISPY2.2 with weekly Taxol --> Abraxane/AC s/p bilateral mastectomies with [id number] disease (RCBII) s/p radiation, currently on adjuvant OFS/letrozole/ribociclib.    1.  Breast Cancer: she continues on adjuvant OFS letrozole/ribo. I advised trial of voltaren gel.  Labs and EKG per protocol/pharmacy.  Will work to time up her appts/labs/injections/Zometa in the future. Labs reviewed and discussed. She prefers to get treatment in [place]. Call if new symptoms develop.    2. Osteopenia:  Zometa due June 2025 but will push back to mid July to time up with Zoladex.. DEXA due October 2026.     3. OFS:  Zoladex as above. Next due mid April. Estradiol undetectable mid Jan    4. Possible LAE noted on EKG:  echo reviewed and stable.    5. Iron deficiency anemia:  EGD/colonoscopy reviewed. Labs reviewed and discussed. Continue on iron and repeat iron studies this spring.  If drops again, may need capsule endoscopy.    All of the patient's questions were answered.    Return to the office in 2 months or sooner as needed    Thank you for allowing me to participate in the care of this patient.  Please call with any questions.    I personally reviewed the recent studies and I explained the rationale of the tests ordered today to the patient.     The longitudinal plan of care for the diagnosis(es)/condition(s) as documented were addressed during this visit. Due to the added complexity in care, I  will continue to support Sharon in the subsequent management and with ongoing continuity of care.      Orders Placed This Encounter   Procedures    Estradiol    Ferritin    Iron & Iron Binding Capacity    Comprehensive metabolic panel    Magnesium    Phosphorus    CBC with platelets differential

## 2025-02-17 NOTE — LETTER
2025      Sharon Fong  [address] [facility] [address]      Dear Colleague,    Thank you for referring your patient, Sharon Fong, to the [facility]. Please see a copy of my visit note below.    Virtual Visit Details    Type of service:  Video Visit     Originating Location (pt. Location): Home  Distant Location (provider location):  On-site  Platform used for Video Visit: [facility]    Oncology Progress Note    Name: Sharon Fong  : 1975  MRN: [id number]    CC: Breast Cancer    HPI: Sharon Fong is a 49 year old female with a right clinical T2-3N3 hormone receptor positive, HER2 negative breast cancer with high risk features treated on block B and C on ISPY2.2 with weekly Taxol --> Abraxane/AC s/p bilateral mastectomies with [id number] disease (RCBII) s/p radiation, currently on adjuvant OFS/letrozole/ribociclib.    She continues to have joint pain. Hasn't tried voltaren. No new issues with ribo. She is on week 2 of her cycle.  She had EGD/colonoscopy with negative biopsies and hiatal hernia noted. Continues on iron. Asks about fish oil in the spring.    History  Negative genetics  2024 MammaPrint -0.192 (high risk) and blueprint luminal B  2024 - 2024 ISPY2.2 weekly Taxol - changed to Abraxane starting week 2 due to allergic reaction  2024 Study biopsy  Right breast 1:00 grade 2 residual IDC with probable treatment related changes.  Also grade 2 DCIS.  Right axillary lymph node with residual metastatic disease.  Tumor-infiltrating lymphocytes of 10%.  Some decreased cellularity suggestive of treatment response.   2024 - 2024 ddAC x 4   2024 Right mastectomy showing 1.6cm of residual grade II IDC w/ evidence of treatment related changes. 25% cellularity. No LVI.  Also 1.6 cm of grade 2 DCIS. DCIS is 5% of tumor. Negative margins. ADH, classic type LCIS, calcs associated w/ invasive carcinoma, DCIS, and  benign acini. ER (3+, %), VT (1+, 3%), HER2 2+ and FISH 2.8/2.4 = 1.2 (negative).  LN w/ metastatic carcinoma w/ evidence of treatment related changes. No EVA.  [id number].  Left breast mastectomy - ADH. No malignancy.    10/14/2024 Zoladex (12 week injection)   10/21/2024 DEXA scan showing osteopenia: Total hip T-score: -1.7, Left femoral neck T-score = -2.1, Right femoral neck T-score= -2.1    10/28/2024 Letrozole  -24: radiation  24: Zometa  24: adjuvant ribo    Review of systems: All other systems reviewed and are negative except for what is described in the history of present illness.      PMH:   Patient Active Problem List    Diagnosis Date Noted     Hypotension, unspecified hypotension type 2024     Priority: Medium     Syncope, unspecified syncope type 2024     Priority: Medium     Malignant neoplasm of upper-inner quadrant of right female breast (H) 2024     Priority: Medium     Right breast cancer  Has had longstanding history of multiple biopsies, infection, and partial mastectomy for a right breast multifocal abscess dating back to at least .     patient presented with palpable lump at 1:00 in the right breast    3/21/2024 diagnostic mammo showed clustered fine indeterminate calcs at 12:00.  No mammographic abnormality identified to correspond to the palpable area of fullness at 1:00.  On US, at 1:00, 10 cm FN there was a 3.7 x 2.3 x 1.5 cm solid hypoechoic irregularly marginated mass.  Additional hypoechoic lesion located at 2:00, 3 cm FN measuring 1.0 x 0.5 cm.  Another hypoechoic lesion at 2:00, 12 cm FN measuring 0.7 cm.  At least 2 enlarged right axillary lymph nodes -largest measuring up to 0.6 cm.    3/28/2024 ultrasound-guided biopsy of the followin:00 lesion at least DCIS  1:00 lesion grade 2 IDC.  No LVI.  ER (3+, 80 to 90%), VT (3+, 90 to 100%), HER2 2+ and FISH negative   Right axillary LN: metastatic carcinoma    2024 CT CAP  demonstrated multifocal enhancement in the right breast with mildly abnormal right axillary lymph nodes.  Otherwise no evidence of distant metastatic disease.    4/12/2024 NM bone scan negative for osteoblastic metastatic disease.    4/15/2024 Baseline MRI breast demonstrated the following  Right breast at 1:00 mass measuring 2.6 x 2.0 by 3.0 cm with extensive surrounding clumped NME occupying most of the right breast and measuring 8.9 x 4.7 x 8.7   Left breast showed a fibroadenoma at 8-9:00 position.  In addition, 7 mm of linear branching NME at 3-4:00 position.  This was biopsied and found to be benign breast tissue.  Few asymmetric right internal mammary chain LN. multiple enlarged right level 1 axillary nodes.  Additionally multiple LEFT level 1 axillary nodes that are indeterminant -which were normal on left axillary US.  FTV 38.222 cc    4/26/2024 MammaPrint -0.192 (high risk) and blueprint luminal B    4/30/2024 - 7/17/2024 weekly Taxol - changed to Abraxane starting week 2 due to allergic reaction    5/17/2024 W3 MRI  Minimally decreased size of the postoperative right breast cancer at the approximate 1:00 position measuring approximately 2.4 x 1.9 x 2.9 cm (previously 2.6 x 2.0 x 3.0 cm).  There is overall similar extent although mildly decreased volume of abnormal surrounding NME  Minimal interval enlargement of multiple bilateral level I lymph nodes, including the biopsy-proven right axillary node with indwelling biopsy marker. No significant change in the multiple small asymmetric right internal mammary chain lymph nodes.  27.167 ml - Tumor volume reduction of 28.9% from baseline     6/10/2024 (6-week MRI breast) 8.8 cm anteroposterior on axial MIPS, unchanged.  Mildly decreased size of the biopsy-proven cancer in the right breast at the approximate 1:00 position middle depth measuring approximately 2.0 x 1.4 x 2.4 cm (previously 2.4 x 1.9 x 2.9 cm).   Extensive surrounding NME is largely unchanged  changed, but the volume has minimally decreased.  Stable bilateral axillary lymph nodes, including the biopsy-proven right level I axillary node. Stable asymmetric small right internal mammary chain lymph nodes.  18.274 - FTV reduction of 57% from baseline     7/23/2024 (12-week MRI breast) Longest diameter of suspicious enhancement: 8.0 cm(previously 8.8 cm).   Irregular enhancing mass in the right breast at approximately 1:00 positionmeasures 1.8 x 1.1 x 1.6 cm (previously 2.0 x 1.4 x 2.4 cm).   NME continues to extend from anterior to posterior depth, with anterior NME extending to the base of the nipple.  Stable prominent bilateral axillary nodes, including the biopsied right level 1 axillary node. Small asymmetric right internal mammary chain lymph nodes are also similar to prior  7.585 ml - FTV reduction of 80% from baseline     7/24/2024 Study biopsy  Right breast 1:00 grade 2 residual IDC with probable treatment related changes.  Also grade 2 DCIS.  Right axillary lymph node with residual metastatic disease.  Tumor-infiltrating lymphocytes of 10%.  Some decreased cellularity suggestive of treatment response.    7/25/2024 - 9/5/2024 ddAC x 4     8/19/2024 W4 MRI (in block C) Longest diameter of suspicious enhancement: 8 cm (sharee has decreased volume of enhancement.  Right breast at 1:00 posterior depth measures 1.7 x 0.7 x 1.3 cm, previously 1.8 x 0.9 x 1.6 cm  The right MEÑO node just above the 3rd rib is slightly smaller than the prior exam  The right axillary lymph nodes have slightly decreased in size. No lymphadenopathy in the left axilla.    FTV 6.349 ml - 83% reduction from baseline     9/30/2024 Right mastectomy showing 1.6cm of residual grade II IDC w/ evidence of treatment related changes. 25% cellularity. No LVI.  Also 1.6 cm of grade 2 DCIS. DCIS is 5% of tumor. Negative margins. ADH, classic type LCIS, calcs associated w/ invasive carcinoma, DCIS, and benign acini. ER (3+, %), SD (1+, 3%),  HER2 2+ and FISH 2.8/2.4 = 1.2 (negative). 1/4 LN w./ metastatic carcinoma w/ evidence of treatment related changes. No EVA.  [id number].  Left breast mastectomy - ADH. No malignancy.     10/14/2024 Zoladex    10/21/2024 DEXA scan showing osteopenia: Total hip T-score: -1.7, Left femoral neck T-score = -2.1, Right femoral neck T-score= -2.1     10/28/2024 Letrozole       Seizure (H) 11/15/2013     Priority: Medium     Vitamin D deficiency 04/23/2013     Priority: Medium     April 2013 July 2013       Inflammatory disease of breast 08/17/2010     Priority: Medium     S/p  Partial R mastectomy secondary to recurrent abscesses  Dr Sweet followed  Final dx after surgery -histoplasmosis  Was treated       Past Medical History:   Diagnosis Date     Breast cancer (H)      Hypermobility arthralgia      Hypotension, unspecified hypotension type 07/29/2024     Malignant neoplasm of upper-inner quadrant of right female breast (H) 04/02/2024    Right breast cancer  Has had longstanding history of multiple biopsies, infection, and partial mastectomy for a right breast multifocal abscess dating back to at least 2010.     2024 patient presented with palpable lump at 1:00 in the right breast     3/21/2024 diagnostic mammo showed clustered fine indeterminate calcs at 12:00.  No mammographic abnormality identified to correspond to the palpable     Migraine      S/P radiation therapy     6,000 cGy to right CW + Boost and 5,000 cGy to right SCV completed on 12/27/2024 - [facility]     Seizure (H) 11/15/2013     Seizure disorder (H) 2014    Once post surgery     Vitamin D deficiency 04/23/2013 April 2013 July 2013         Medications:   Current Outpatient Medications:      aspirin-acetaminophen-caffeine (EXCEDRIN MIGRAINE) 250-250-65 MG tablet, , Disp: , Rfl:      ibuprofen (ADVIL/MOTRIN) 200 MG capsule, as needed., Disp: , Rfl:      letrozole (FEMARA) 2.5 MG tablet, TAKE ONE TABLET BY MOUTH ONE TIME DAILY, Disp:  90 tablet, Rfl: 3     mometasone (ELOCON) 0.1 % external cream, Apply topically daily. to radiation treatment field.  Stop application 1 week after completing treatment., Disp: 45 g, Rfl: 1     ribociclib (KISQALI) (400 MG DOSE) 200 MG tablet therapy pack, Take 2 tablets (400 mg) by mouth every morning for 21 days. Then off for 7 days., Disp: 42 tablet, Rfl: 0  No current facility-administered medications for this visit.    Facility-Administered Medications Ordered in Other Visits:      lidocaine 1 % 9 mL, 9 mL, Intradermal, Once, Valeria Martinez MD     lidocaine 1% with EPINEPHrine 1:100,000 injection 10 mL, 10 mL, Intradermal, Once, Valeria Martinez MD      Allergies:   Allergies   Allergen Reactions     Citrullus Vulgaris Unknown and Other (See Comments)     Rash, swollen lips     Corn-Containing Products Other (See Comments)     Paclitaxel Other (See Comments)     PAIN in shoulder and pelvis - see infusion notes from 24 and 24     Salicylic Acid Unknown and Other (See Comments)         Social history:   Social History     Socioeconomic History     Marital status:      Spouse name: Not on file     Number of children: Not on file     Years of education: Not on file     Highest education level: Not on file   Occupational History     Not on file   Tobacco Use     Smoking status: Never     Passive exposure: Never     Smokeless tobacco: Never   Substance and Sexual Activity     Alcohol use: Never     Drug use: Never     Sexual activity: Not on file   Other Topics Concern     Not on file   Social History Narrative    Menarche 14. No periods since hysterectomy. No postmenopausal symptoms      first at age 26    Youngest child at diagnosis - 16    Working full time as       Social Drivers of Health     Financial Resource Strain: Not on file   Food Insecurity: No Food Insecurity (2024)    [facility]    Hunger Vital Sign      Worried About Running Out of Food in  the Last Year: Never true      Ran Out of Food in the Last Year: Never true   Transportation Needs: No Transportation Needs (4/6/2024)    Received from [facility]    PRAPARE - Transportation      Lack of Transportation (Medical): No      Lack of Transportation (Non-Medical): No   Physical Activity: Sufficiently Active (4/6/2024)    Received from [facility]    Exercise Vital Sign      Days of Exercise per Week: 5 days      Minutes of Exercise per Session: 30 min   Stress: Not on file   Social Connections: Unknown (7/16/2021)    Received from [facility]    Social Connection and Isolation Panel [NHANES]      Frequency of Communication with Friends and Family: Once a week      Frequency of Social Gatherings with Friends and Family: Once a week      Attends Latter day Services: Not on file      Active Member of Clubs or Organizations: No      Attends Club or Organization Meetings: Not on file      Marital Status: Not on file   Interpersonal Safety: Unknown (1/29/2025)    Interpersonal Safety      Do you feel physically and emotionally safe where you currently live?: Patient unable to answer      Within the past 12 months, have you been hit, slapped, kicked or otherwise physically hurt by someone?: Patient unable to answer      Within the past 12 months, have you been humiliated or emotionally abused in other ways by your partner or ex-partner?: Patient unable to answer   Housing Stability: Low Risk  (4/6/2024)    Received from [facility]    Housing Stability      What is your living situation today?: I have a steady place to live       Family history:I have reviewed this patient's family history and updated it with pertinent information if needed.  Family History   Problem Relation Age of Onset     Hypertension Mother      Hypertension Father      Cerebrovascular Disease Father      Thyroid Disease Sister      Glaucoma No family hx of      Macular Degeneration No  "family hx of      Physical exam:  Vitals:Ht 1.651 m (5' 5\")   Wt 68.9 kg (152 lb)   BMI 25.29 kg/m    Gen: NAD, pleasant, interactive and answering questions appropriately, PS 0  HEENT: Normocephalic atraumatic, sclera anicteric  Neck: Full range of motion  Lungs: No respiratory distress, speaking in full sentences, no coughing  Skin: no facial rash noted  Psych: normal affect    Labs:  Lab Results   Component Value Date    WBC 3.0 (L) 02/10/2025    HGB 11.2 (L) 02/10/2025    HCT 34.7 (L) 02/10/2025     02/10/2025     02/10/2025    POTASSIUM 3.8 02/10/2025    CHLORIDE 107 02/10/2025    CO2 26 02/10/2025    BUN 13.0 02/10/2025    CR 0.66 02/10/2025    GLC 97 02/10/2025    AST 25 02/10/2025    ALT 16 02/10/2025    ALKPHOS 77 02/10/2025    BILITOTAL 0.5 02/10/2025    INR 1.01 07/29/2024       Radiology:   DEXA 10/24  T score -2.1    Assessment/plan:   Sharon Fong is a  49 year old female with a right clinical T2-3N3 hormone receptor positive, HER2 negative breast cancer with high risk features treated on block B and C on ISPY2.2 with weekly Taxol --> Abraxane/AC s/p bilateral mastectomies with [id number] disease (RCBII) s/p radiation, currently on adjuvant OFS/letrozole/ribociclib.    1.  Breast Cancer: she continues on adjuvant OFS letrozole/ribo. I advised trial of voltaren gel.  Labs and EKG per protocol/pharmacy.  Will work to time up her appts/labs/injections/Zometa in the future. Labs reviewed and discussed. She prefers to get treatment in [place]. Call if new symptoms develop.    2. Osteopenia:  Zometa due June 2025 but will push back to mid July to time up with Zoladex.. DEXA due October 2026.     3. OFS:  Zoladex as above. Next due mid April. Estradiol undetectable mid Jan    4. Possible LAE noted on EKG:  echo reviewed and stable.    5. Iron deficiency anemia:  EGD/colonoscopy reviewed. Labs reviewed and discussed. Continue on iron and repeat iron studies this spring.  If drops again, " may need capsule endoscopy.    All of the patient's questions were answered.    Return to the office in 2 months or sooner as needed    Thank you for allowing me to participate in the care of this patient.  Please call with any questions.    I personally reviewed the recent studies and I explained the rationale of the tests ordered today to the patient.     The longitudinal plan of care for the diagnosis(es)/condition(s) as documented were addressed during this visit. Due to the added complexity in care, I will continue to support Sharon in the subsequent management and with ongoing continuity of care.      Orders Placed This Encounter   Procedures     Estradiol     Ferritin     Iron & Iron Binding Capacity     Comprehensive metabolic panel     Magnesium     Phosphorus     CBC with platelets differential         Again, thank you for allowing me to participate in the care of your patient.        Sincerely,        Gena Caballero MD    Electronically signed

## 2025-02-17 NOTE — NURSING NOTE
Current patient location: [address] [facility] [address]    Is the patient currently in the state of [place]? YES    Visit mode: VIDEO    If the visit is dropped, the patient can be reconnected by:VIDEO VISIT: Text to cell phone:   Telephone Information:   Mobile [phone]       Will anyone else be joining the visit? NO  (If patient encounters technical issues they should call [phone]    Are changes needed to the allergy or medication list? Pt stated no changes to allergies and Pt stated no med changes    Are refills needed on medications prescribed by this physician? NO    Rooming Documentation:  Not applicable    Reason for visit: NERIS RAMÍREZ

## 2025-02-24 ENCOUNTER — THERAPY VISIT (OUTPATIENT)
Dept: OCCUPATIONAL THERAPY | Facility: CLINIC | Age: 50
End: 2025-02-24
Attending: RADIOLOGY
Payer: COMMERCIAL

## 2025-02-24 ENCOUNTER — LAB (OUTPATIENT)
Dept: LAB | Facility: CLINIC | Age: 50
End: 2025-02-24
Payer: COMMERCIAL

## 2025-02-24 DIAGNOSIS — Z17.0 MALIGNANT NEOPLASM OF UPPER-INNER QUADRANT OF RIGHT BREAST IN FEMALE, ESTROGEN RECEPTOR POSITIVE (H): Primary | ICD-10-CM

## 2025-02-24 DIAGNOSIS — Z17.0 MALIGNANT NEOPLASM OF UPPER-INNER QUADRANT OF RIGHT BREAST IN FEMALE, ESTROGEN RECEPTOR POSITIVE (H): ICD-10-CM

## 2025-02-24 DIAGNOSIS — C50.211 MALIGNANT NEOPLASM OF UPPER-INNER QUADRANT OF RIGHT BREAST IN FEMALE, ESTROGEN RECEPTOR POSITIVE (H): ICD-10-CM

## 2025-02-24 DIAGNOSIS — C50.211 MALIGNANT NEOPLASM OF UPPER-INNER QUADRANT OF RIGHT BREAST IN FEMALE, ESTROGEN RECEPTOR POSITIVE (H): Primary | ICD-10-CM

## 2025-02-24 LAB
ALBUMIN SERPL BCG-MCNC: 4.6 G/DL (ref 3.5–5.2)
ALP SERPL-CCNC: 73 U/L (ref 40–150)
ALT SERPL W P-5'-P-CCNC: 12 U/L (ref 0–50)
ANION GAP SERPL CALCULATED.3IONS-SCNC: 12 MMOL/L (ref 7–15)
AST SERPL W P-5'-P-CCNC: 18 U/L (ref 0–45)
BASOPHILS # BLD AUTO: 0.1 10E3/UL (ref 0–0.2)
BASOPHILS NFR BLD AUTO: 3 %
BILIRUB SERPL-MCNC: 0.5 MG/DL
BUN SERPL-MCNC: 10.4 MG/DL (ref 6–20)
CALCIUM SERPL-MCNC: 10 MG/DL (ref 8.8–10.4)
CHLORIDE SERPL-SCNC: 104 MMOL/L (ref 98–107)
CREAT SERPL-MCNC: 0.74 MG/DL (ref 0.51–0.95)
EGFRCR SERPLBLD CKD-EPI 2021: >90 ML/MIN/1.73M2
EOSINOPHIL # BLD AUTO: 0.1 10E3/UL (ref 0–0.7)
EOSINOPHIL NFR BLD AUTO: 4 %
ERYTHROCYTE [DISTWIDTH] IN BLOOD BY AUTOMATED COUNT: 20.4 % (ref 10–15)
GLUCOSE SERPL-MCNC: 101 MG/DL (ref 70–99)
HCO3 SERPL-SCNC: 23 MMOL/L (ref 22–29)
HCT VFR BLD AUTO: 33.3 % (ref 35–47)
HGB BLD-MCNC: 11.1 G/DL (ref 11.7–15.7)
IMM GRANULOCYTES # BLD: 0 10E3/UL
IMM GRANULOCYTES NFR BLD: 0 %
LYMPHOCYTES # BLD AUTO: 0.9 10E3/UL (ref 0.8–5.3)
LYMPHOCYTES NFR BLD AUTO: 31 %
MAGNESIUM SERPL-MCNC: 2.2 MG/DL (ref 1.7–2.3)
MCH RBC QN AUTO: 27.8 PG (ref 26.5–33)
MCHC RBC AUTO-ENTMCNC: 33.3 G/DL (ref 31.5–36.5)
MCV RBC AUTO: 83 FL (ref 78–100)
MONOCYTES # BLD AUTO: 0.2 10E3/UL (ref 0–1.3)
MONOCYTES NFR BLD AUTO: 8 %
NEUTROPHILS # BLD AUTO: 1.6 10E3/UL (ref 1.6–8.3)
NEUTROPHILS NFR BLD AUTO: 54 %
NRBC # BLD AUTO: 0 10E3/UL
NRBC BLD AUTO-RTO: 0 /100
PHOSPHATE SERPL-MCNC: 4.5 MG/DL (ref 2.5–4.5)
PLATELET # BLD AUTO: 307 10E3/UL (ref 150–450)
POTASSIUM SERPL-SCNC: 4.3 MMOL/L (ref 3.4–5.3)
PROT SERPL-MCNC: 7.7 G/DL (ref 6.4–8.3)
RBC # BLD AUTO: 4 10E6/UL (ref 3.8–5.2)
SODIUM SERPL-SCNC: 139 MMOL/L (ref 135–145)
WBC # BLD AUTO: 2.9 10E3/UL (ref 4–11)

## 2025-02-24 PROCEDURE — 97140 MANUAL THERAPY 1/> REGIONS: CPT | Mod: GO

## 2025-02-24 PROCEDURE — 36415 COLL VENOUS BLD VENIPUNCTURE: CPT

## 2025-02-24 PROCEDURE — 83735 ASSAY OF MAGNESIUM: CPT

## 2025-02-24 PROCEDURE — 84100 ASSAY OF PHOSPHORUS: CPT

## 2025-02-24 PROCEDURE — 80053 COMPREHEN METABOLIC PANEL: CPT

## 2025-02-24 PROCEDURE — 85025 COMPLETE CBC W/AUTO DIFF WBC: CPT

## 2025-02-24 PROCEDURE — 97139 UNLISTED THERAPEUTIC PX: CPT | Mod: GO

## 2025-02-25 ENCOUNTER — TELEPHONE (OUTPATIENT)
Dept: ONCOLOGY | Facility: CLINIC | Age: 50
End: 2025-02-25
Payer: COMMERCIAL

## 2025-02-25 NOTE — ORAL ONC MGMT
Oral Chemotherapy Monitoring Program    Subjective/Objective:  Sharon Fong is a 49 year old female contacted by phone for a follow-up visit for oral chemotherapy.  Sharon indicated that she is having some joint pain but she finds it to be manageable. She will continue to monitor and let us know if it becomes more than a nuisance. She denied any nausea or skin rash. She said she is drinking 4-5 x 8 ounce glasses of water per day. She was advised to increase her water intake to 8 x 8 ounce glasses for proper hydration each day. Lab results reviewed. She denied any questions with CBC and CMP and Magnesium and phosphorus safety monitoring labs. Plan to recheck them on 3/10/25.        12/19/2024     2:00 PM 1/20/2025     3:00 PM 1/27/2025     3:00 PM 2/3/2025    10:00 AM 2/10/2025     4:00 PM 2/25/2025    11:00 AM   ORAL CHEMOTHERAPY   Assessment Type Initial Work up Initial Follow up Lab Monitoring Refill Lab Monitoring Lab Monitoring;Monthly Follow up   Diagnosis Code Breast Cancer Breast Cancer Breast Cancer Breast Cancer Breast Cancer Breast Cancer   Providers Dr Ada Caballero   [facility]   Drug Name Kisqali (ribociclib) Kisqali (ribociclib) Kisqali (ribociclib) Kisqali (ribociclib) Kisqali (ribociclib) Kisqali (ribociclib)   Dose 400 mg 400 mg 400 mg 400 mg 400 mg 400 mg   Current Schedule Daily     Daily   Cycle Details 3 weeks on, 1 week off 3 weeks on, 1 week off 3 weeks on, 1 week off 3 weeks on, 1 week off 3 weeks on, 1 week off 3 weeks on, 1 week off   Start Date of Last Cycle  1/13/2025 1/13/2025   2/10/2025   Planned next cycle start date 1/13/2025 2/10/2025 2/10/2025  2/10/2025 3/10/2025   Doses missed in last 2 weeks      0   Adherence Assessment      Adherent   Adverse Effects     Neutropenia Myalgias/Arthralgias   Myalgias/Arthralgias      Grade 1   Pharmacist  "Intervention(myalgias/arthralgias)      Yes   Intervention(s)      Patient education   Neutropenia     Grade 2 Resolved due to intervention   Pharmacist Intervention(neutropenia)     No    Is the dose as ordered appropriate for the patient?     Yes        Last PHQ-2 Score on record:        No data to display                Vitals:  BP:   BP Readings from Last 1 Encounters:   01/29/25 94/71     Wt Readings from Last 1 Encounters:   02/17/25 68.9 kg (152 lb)     Estimated body surface area is 1.78 meters squared as calculated from the following:    Height as of 2/17/25: 1.651 m (5' 5\").    Weight as of 2/17/25: 68.9 kg (152 lb).    Labs:  _  Result Component Current Result Ref Range   Sodium 139 (2/24/2025) 135 - 145 mmol/L     _  Result Component Current Result Ref Range   Potassium 4.3 (2/24/2025) 3.4 - 5.3 mmol/L     _  Result Component Current Result Ref Range   Calcium 10.0 (2/24/2025) 8.8 - 10.4 mg/dL     _  Result Component Current Result Ref Range   Magnesium 2.2 (2/24/2025) 1.7 - 2.3 mg/dL     _  Result Component Current Result Ref Range   Phosphorus 4.5 (2/24/2025) 2.5 - 4.5 mg/dL     _  Result Component Current Result Ref Range   Albumin 4.6 (2/24/2025) 3.5 - 5.2 g/dL     _  Result Component Current Result Ref Range   Urea Nitrogen 10.4 (2/24/2025) 6.0 - 20.0 mg/dL     _  Result Component Current Result Ref Range   Creatinine 0.74 (2/24/2025) 0.51 - 0.95 mg/dL     _  Result Component Current Result Ref Range   AST 18 (2/24/2025) 0 - 45 U/L     _  Result Component Current Result Ref Range   ALT 12 (2/24/2025) 0 - 50 U/L     _  Result Component Current Result Ref Range   Bilirubin Total 0.5 (2/24/2025) <=1.2 mg/dL     _  Result Component Current Result Ref Range   WBC Count 2.9 (L) (2/24/2025) 4.0 - 11.0 10e3/uL     _  Result Component Current Result Ref Range   Hemoglobin 11.1 (L) (2/24/2025) 11.7 - 15.7 g/dL     _  Result Component Current Result Ref Range   Platelet Count 307 (2/24/2025) 150 - 450 10e3/uL "     No results found for ANC within last 30 days.     _  Result Component Current Result Ref Range   Absolute Neutrophils 1.6 (2/24/2025) 1.6 - 8.3 10e3/uL          Assessment/Plan:  Sharon appears to be doing OK with the second month of ribociclib therapy. She will continue to monitor joint pain and let us know if it becomes unmanageable. She was advised to increase her water intake to 64 ounces per day. She expressed understanding and agreement with this plan and thanked me for the call and care.    Follow-Up:      Refill Due:  By 3/10/2025    Abdi RasmussenD  [facility]  [phone]  February 25, 2025

## 2025-03-08 ENCOUNTER — THERAPY VISIT (OUTPATIENT)
Dept: OCCUPATIONAL THERAPY | Facility: CLINIC | Age: 50
End: 2025-03-08
Attending: RADIOLOGY
Payer: COMMERCIAL

## 2025-03-08 DIAGNOSIS — C50.211 MALIGNANT NEOPLASM OF UPPER-INNER QUADRANT OF RIGHT BREAST IN FEMALE, ESTROGEN RECEPTOR POSITIVE (H): Primary | ICD-10-CM

## 2025-03-08 DIAGNOSIS — Z17.0 MALIGNANT NEOPLASM OF UPPER-INNER QUADRANT OF RIGHT BREAST IN FEMALE, ESTROGEN RECEPTOR POSITIVE (H): Primary | ICD-10-CM

## 2025-03-08 PROCEDURE — 97140 MANUAL THERAPY 1/> REGIONS: CPT | Mod: GO

## 2025-03-08 PROCEDURE — 97139 UNLISTED THERAPEUTIC PX: CPT | Mod: GO

## 2025-03-10 ENCOUNTER — LAB (OUTPATIENT)
Dept: LAB | Facility: CLINIC | Age: 50
End: 2025-03-10
Payer: COMMERCIAL

## 2025-03-10 DIAGNOSIS — C50.211 MALIGNANT NEOPLASM OF UPPER-INNER QUADRANT OF RIGHT BREAST IN FEMALE, ESTROGEN RECEPTOR POSITIVE (H): ICD-10-CM

## 2025-03-10 DIAGNOSIS — Z17.0 MALIGNANT NEOPLASM OF UPPER-INNER QUADRANT OF RIGHT BREAST IN FEMALE, ESTROGEN RECEPTOR POSITIVE (H): ICD-10-CM

## 2025-03-10 LAB
ALBUMIN SERPL BCG-MCNC: 4.5 G/DL (ref 3.5–5.2)
ALP SERPL-CCNC: 72 U/L (ref 40–150)
ALT SERPL W P-5'-P-CCNC: 13 U/L (ref 0–50)
ANION GAP SERPL CALCULATED.3IONS-SCNC: 11 MMOL/L (ref 7–15)
AST SERPL W P-5'-P-CCNC: 24 U/L (ref 0–45)
BASOPHILS # BLD AUTO: 0.1 10E3/UL (ref 0–0.2)
BASOPHILS NFR BLD AUTO: 3 %
BILIRUB SERPL-MCNC: 0.4 MG/DL
BUN SERPL-MCNC: 11.9 MG/DL (ref 6–20)
CALCIUM SERPL-MCNC: 9.9 MG/DL (ref 8.8–10.4)
CHLORIDE SERPL-SCNC: 104 MMOL/L (ref 98–107)
CREAT SERPL-MCNC: 0.83 MG/DL (ref 0.51–0.95)
EGFRCR SERPLBLD CKD-EPI 2021: 86 ML/MIN/1.73M2
EOSINOPHIL # BLD AUTO: 0.1 10E3/UL (ref 0–0.7)
EOSINOPHIL NFR BLD AUTO: 2 %
ERYTHROCYTE [DISTWIDTH] IN BLOOD BY AUTOMATED COUNT: 20.2 % (ref 10–15)
GLUCOSE SERPL-MCNC: 139 MG/DL (ref 70–99)
HCO3 SERPL-SCNC: 26 MMOL/L (ref 22–29)
HCT VFR BLD AUTO: 33.1 % (ref 35–47)
HGB BLD-MCNC: 11.3 G/DL (ref 11.7–15.7)
IMM GRANULOCYTES # BLD: 0 10E3/UL
IMM GRANULOCYTES NFR BLD: 0 %
LYMPHOCYTES # BLD AUTO: 0.9 10E3/UL (ref 0.8–5.3)
LYMPHOCYTES NFR BLD AUTO: 29 %
MAGNESIUM SERPL-MCNC: 1.9 MG/DL (ref 1.7–2.3)
MCH RBC QN AUTO: 28.8 PG (ref 26.5–33)
MCHC RBC AUTO-ENTMCNC: 34.1 G/DL (ref 31.5–36.5)
MCV RBC AUTO: 84 FL (ref 78–100)
MONOCYTES # BLD AUTO: 0.3 10E3/UL (ref 0–1.3)
MONOCYTES NFR BLD AUTO: 11 %
NEUTROPHILS # BLD AUTO: 1.7 10E3/UL (ref 1.6–8.3)
NEUTROPHILS NFR BLD AUTO: 55 %
NRBC # BLD AUTO: 0 10E3/UL
NRBC BLD AUTO-RTO: 0 /100
PHOSPHATE SERPL-MCNC: 4.5 MG/DL (ref 2.5–4.5)
PLATELET # BLD AUTO: 263 10E3/UL (ref 150–450)
POTASSIUM SERPL-SCNC: 4 MMOL/L (ref 3.4–5.3)
PROT SERPL-MCNC: 7.4 G/DL (ref 6.4–8.3)
RBC # BLD AUTO: 3.92 10E6/UL (ref 3.8–5.2)
SODIUM SERPL-SCNC: 141 MMOL/L (ref 135–145)
WBC # BLD AUTO: 3.1 10E3/UL (ref 4–11)

## 2025-03-10 PROCEDURE — 85025 COMPLETE CBC W/AUTO DIFF WBC: CPT

## 2025-03-10 PROCEDURE — 84100 ASSAY OF PHOSPHORUS: CPT

## 2025-03-10 PROCEDURE — 83735 ASSAY OF MAGNESIUM: CPT

## 2025-03-10 PROCEDURE — 36415 COLL VENOUS BLD VENIPUNCTURE: CPT

## 2025-03-10 PROCEDURE — 80053 COMPREHEN METABOLIC PANEL: CPT

## 2025-03-11 ENCOUNTER — TELEPHONE (OUTPATIENT)
Dept: ONCOLOGY | Facility: CLINIC | Age: 50
End: 2025-03-11
Payer: COMMERCIAL

## 2025-03-11 NOTE — TELEPHONE ENCOUNTER
Oral Chemotherapy Monitoring Program  Lab & Monthly Follow-up    Reviewed lab results from 3/10/25.          1/20/2025     3:00 PM 1/27/2025     3:00 PM 2/3/2025    10:00 AM 2/10/2025     4:00 PM 2/25/2025    11:00 AM 2/28/2025    11:00 AM 3/11/2025    11:00 AM   ORAL CHEMOTHERAPY   Assessment Type Initial Follow up Lab Monitoring Refill Lab Monitoring Lab Monitoring;Monthly Follow up Refill Lab Monitoring;Monthly Follow up   Diagnosis Code Breast Cancer Breast Cancer Breast Cancer Breast Cancer Breast Cancer Breast Cancer Breast Cancer   Providers Dr Ada Caballero   [facility]   Drug Name Kisqali (ribociclib) Kisqali (ribociclib) Kisqali (ribociclib) Kisqali (ribociclib) Kisqali (ribociclib) Kisqali (ribociclib) Kisqali (ribociclib)   Dose 400 mg 400 mg 400 mg 400 mg 400 mg 400 mg 400 mg   Current Schedule     Daily Daily Daily   Cycle Details 3 weeks on, 1 week off 3 weeks on, 1 week off 3 weeks on, 1 week off 3 weeks on, 1 week off 3 weeks on, 1 week off 3 weeks on, 1 week off 3 weeks on, 1 week off   Start Date of Last Cycle 1/13/2025 1/13/2025   2/10/2025     Planned next cycle start date 2/10/2025 2/10/2025  2/10/2025 3/10/2025 3/10/2025 3/10/2025   Doses missed in last 2 weeks     0  0   Adherence Assessment     Adherent  Adherent   Adverse Effects    Neutropenia Myalgias/Arthralgias  Myalgias/Arthralgias   Myalgias/Arthralgias     Grade 1  Grade 1   Pharmacist Intervention(myalgias/arthralgias)     Yes  No   Intervention(s)     Patient education     Neutropenia    Grade 2 Resolved due to intervention     Pharmacist Intervention(neutropenia)    No      Is the dose as ordered appropriate for the patient?    Yes          Labs:  _  Result Component Current Result Ref Range   Sodium 141 (3/10/2025) 135 - 145 mmol/L     _  Result Component Current Result Ref Range    Potassium 4.0 (3/10/2025) 3.4 - 5.3 mmol/L     _  Result Component Current Result Ref Range   Calcium 9.9 (3/10/2025) 8.8 - 10.4 mg/dL     _  Result Component Current Result Ref Range   Magnesium 1.9 (3/10/2025) 1.7 - 2.3 mg/dL     _  Result Component Current Result Ref Range   Phosphorus 4.5 (3/10/2025) 2.5 - 4.5 mg/dL     _  Result Component Current Result Ref Range   Albumin 4.5 (3/10/2025) 3.5 - 5.2 g/dL     _  Result Component Current Result Ref Range   Urea Nitrogen 11.9 (3/10/2025) 6.0 - 20.0 mg/dL     _  Result Component Current Result Ref Range   Creatinine 0.83 (3/10/2025) 0.51 - 0.95 mg/dL     _  Result Component Current Result Ref Range   AST 24 (3/10/2025) 0 - 45 U/L     _  Result Component Current Result Ref Range   ALT 13 (3/10/2025) 0 - 50 U/L     _  Result Component Current Result Ref Range   Bilirubin Total 0.4 (3/10/2025) <=1.2 mg/dL     _  Result Component Current Result Ref Range   WBC Count 3.1 (L) (3/10/2025) 4.0 - 11.0 10e3/uL     _  Result Component Current Result Ref Range   Hemoglobin 11.3 (L) (3/10/2025) 11.7 - 15.7 g/dL     _  Result Component Current Result Ref Range   Platelet Count 263 (3/10/2025) 150 - 450 10e3/uL     No results found for ANC within last 30 days.     _  Result Component Current Result Ref Range   Absolute Neutrophils 1.7 (3/10/2025) 1.6 - 8.3 10e3/uL       Subjective, Assessment & Plan:  Placed call to patient to follow-up on arthralgia. Sharon says the joint pain is manageable is only present when doing more strenuous activities.The pain has gotten better, but has not fully resolved. Her oncologist reccommended an arthritis cream to apply on her joints, Sharon says that helps relieve the pain. She denies any missed doses. Plan to continue treatment as prescribed. Sharon says she will follow-up if pain worsens.    No concerning abnormalities. Continue ribociclib    Questions answered to patient's satisfaction.    Follow-Up:  4/14 Dr. Caballero appointment      Alma Delia Cardona  Pharmacy Intern  Oral Chemotherapy Monitoring Program  [facility]  [phone].

## 2025-03-17 ENCOUNTER — THERAPY VISIT (OUTPATIENT)
Dept: OCCUPATIONAL THERAPY | Facility: CLINIC | Age: 50
End: 2025-03-17
Attending: RADIOLOGY
Payer: COMMERCIAL

## 2025-03-17 DIAGNOSIS — Z17.0 MALIGNANT NEOPLASM OF UPPER-INNER QUADRANT OF RIGHT BREAST IN FEMALE, ESTROGEN RECEPTOR POSITIVE (H): Primary | ICD-10-CM

## 2025-03-17 DIAGNOSIS — C50.211 MALIGNANT NEOPLASM OF UPPER-INNER QUADRANT OF RIGHT BREAST IN FEMALE, ESTROGEN RECEPTOR POSITIVE (H): Primary | ICD-10-CM

## 2025-03-17 PROCEDURE — 97110 THERAPEUTIC EXERCISES: CPT | Mod: GO

## 2025-03-17 PROCEDURE — 97535 SELF CARE MNGMENT TRAINING: CPT | Mod: GO

## 2025-03-17 PROCEDURE — 97140 MANUAL THERAPY 1/> REGIONS: CPT | Mod: GO

## 2025-03-24 DIAGNOSIS — C50.211 MALIGNANT NEOPLASM OF UPPER-INNER QUADRANT OF RIGHT BREAST IN FEMALE, ESTROGEN RECEPTOR POSITIVE (H): Primary | ICD-10-CM

## 2025-03-24 DIAGNOSIS — Z17.0 MALIGNANT NEOPLASM OF UPPER-INNER QUADRANT OF RIGHT BREAST IN FEMALE, ESTROGEN RECEPTOR POSITIVE (H): Primary | ICD-10-CM

## 2025-03-31 DIAGNOSIS — C50.211 MALIGNANT NEOPLASM OF UPPER-INNER QUADRANT OF RIGHT BREAST IN FEMALE, ESTROGEN RECEPTOR POSITIVE (H): Primary | ICD-10-CM

## 2025-03-31 DIAGNOSIS — Z17.0 MALIGNANT NEOPLASM OF UPPER-INNER QUADRANT OF RIGHT BREAST IN FEMALE, ESTROGEN RECEPTOR POSITIVE (H): Primary | ICD-10-CM

## 2025-04-14 ENCOUNTER — LAB (OUTPATIENT)
Dept: LAB | Facility: CLINIC | Age: 50
End: 2025-04-14
Payer: COMMERCIAL

## 2025-04-14 ENCOUNTER — ONCOLOGY VISIT (OUTPATIENT)
Dept: ONCOLOGY | Facility: CLINIC | Age: 50
End: 2025-04-14
Attending: INTERNAL MEDICINE
Payer: COMMERCIAL

## 2025-04-14 VITALS
WEIGHT: 157.7 LBS | RESPIRATION RATE: 16 BRPM | HEART RATE: 98 BPM | DIASTOLIC BLOOD PRESSURE: 82 MMHG | OXYGEN SATURATION: 96 % | BODY MASS INDEX: 26.24 KG/M2 | SYSTOLIC BLOOD PRESSURE: 128 MMHG

## 2025-04-14 DIAGNOSIS — C50.211 MALIGNANT NEOPLASM OF UPPER-INNER QUADRANT OF RIGHT BREAST IN FEMALE, ESTROGEN RECEPTOR POSITIVE (H): ICD-10-CM

## 2025-04-14 DIAGNOSIS — E28.39 SUPPRESSION OF OVARIAN SECRETION: ICD-10-CM

## 2025-04-14 DIAGNOSIS — Z17.0 MALIGNANT NEOPLASM OF UPPER-INNER QUADRANT OF RIGHT BREAST IN FEMALE, ESTROGEN RECEPTOR POSITIVE (H): ICD-10-CM

## 2025-04-14 DIAGNOSIS — E61.1 IRON DEFICIENCY: ICD-10-CM

## 2025-04-14 DIAGNOSIS — Z17.0 MALIGNANT NEOPLASM OF UPPER-INNER QUADRANT OF RIGHT BREAST IN FEMALE, ESTROGEN RECEPTOR POSITIVE (H): Primary | Chronic | ICD-10-CM

## 2025-04-14 DIAGNOSIS — C50.211 MALIGNANT NEOPLASM OF UPPER-INNER QUADRANT OF RIGHT BREAST IN FEMALE, ESTROGEN RECEPTOR POSITIVE (H): Primary | Chronic | ICD-10-CM

## 2025-04-14 DIAGNOSIS — M54.2 NECK PAIN: ICD-10-CM

## 2025-04-14 LAB
ALBUMIN SERPL BCG-MCNC: 4.4 G/DL (ref 3.5–5.2)
ALP SERPL-CCNC: 79 U/L (ref 40–150)
ALT SERPL W P-5'-P-CCNC: 11 U/L (ref 0–50)
ANION GAP SERPL CALCULATED.3IONS-SCNC: 9 MMOL/L (ref 7–15)
AST SERPL W P-5'-P-CCNC: 19 U/L (ref 0–45)
BASOPHILS # BLD AUTO: 0.1 10E3/UL (ref 0–0.2)
BASOPHILS NFR BLD AUTO: 2 %
BILIRUB SERPL-MCNC: 0.8 MG/DL
BUN SERPL-MCNC: 8.9 MG/DL (ref 6–20)
CALCIUM SERPL-MCNC: 10.2 MG/DL (ref 8.8–10.4)
CHLORIDE SERPL-SCNC: 104 MMOL/L (ref 98–107)
CREAT SERPL-MCNC: 0.76 MG/DL (ref 0.51–0.95)
EGFRCR SERPLBLD CKD-EPI 2021: >90 ML/MIN/1.73M2
EOSINOPHIL # BLD AUTO: 0 10E3/UL (ref 0–0.7)
EOSINOPHIL NFR BLD AUTO: 1 %
ERYTHROCYTE [DISTWIDTH] IN BLOOD BY AUTOMATED COUNT: 15.9 % (ref 10–15)
ESTRADIOL SERPL-MCNC: <5 PG/ML
FERRITIN SERPL-MCNC: 29 NG/ML (ref 6–175)
GLUCOSE SERPL-MCNC: 110 MG/DL (ref 70–99)
HCO3 SERPL-SCNC: 28 MMOL/L (ref 22–29)
HCT VFR BLD AUTO: 33.5 % (ref 35–47)
HGB BLD-MCNC: 11.4 G/DL (ref 11.7–15.7)
IMM GRANULOCYTES # BLD: 0 10E3/UL
IMM GRANULOCYTES NFR BLD: 0 %
IRON BINDING CAPACITY (ROCHE): 365 UG/DL (ref 240–430)
IRON SATN MFR SERPL: 41 % (ref 15–46)
IRON SERPL-MCNC: 148 UG/DL (ref 37–145)
LYMPHOCYTES # BLD AUTO: 0.9 10E3/UL (ref 0.8–5.3)
LYMPHOCYTES NFR BLD AUTO: 23 %
MAGNESIUM SERPL-MCNC: 2 MG/DL (ref 1.7–2.3)
MCH RBC QN AUTO: 29.8 PG (ref 26.5–33)
MCHC RBC AUTO-ENTMCNC: 34 G/DL (ref 31.5–36.5)
MCV RBC AUTO: 88 FL (ref 78–100)
MONOCYTES # BLD AUTO: 0.2 10E3/UL (ref 0–1.3)
MONOCYTES NFR BLD AUTO: 5 %
NEUTROPHILS # BLD AUTO: 2.8 10E3/UL (ref 1.6–8.3)
NEUTROPHILS NFR BLD AUTO: 69 %
NRBC # BLD AUTO: 0 10E3/UL
NRBC BLD AUTO-RTO: 0 /100
PHOSPHATE SERPL-MCNC: 4.1 MG/DL (ref 2.5–4.5)
PLAT MORPH BLD: NORMAL
PLATELET # BLD AUTO: 315 10E3/UL (ref 150–450)
POTASSIUM SERPL-SCNC: 4.6 MMOL/L (ref 3.4–5.3)
PROT SERPL-MCNC: 7.5 G/DL (ref 6.4–8.3)
RBC # BLD AUTO: 3.82 10E6/UL (ref 3.8–5.2)
RBC MORPH BLD: NORMAL
SODIUM SERPL-SCNC: 141 MMOL/L (ref 135–145)
WBC # BLD AUTO: 4.1 10E3/UL (ref 4–11)

## 2025-04-14 PROCEDURE — 250N000011 HC RX IP 250 OP 636: Mod: JZ | Performed by: INTERNAL MEDICINE

## 2025-04-14 PROCEDURE — 96402 CHEMO HORMON ANTINEOPL SQ/IM: CPT

## 2025-04-14 PROCEDURE — 99214 OFFICE O/P EST MOD 30 MIN: CPT | Performed by: INTERNAL MEDICINE

## 2025-04-14 PROCEDURE — 84100 ASSAY OF PHOSPHORUS: CPT | Performed by: PATHOLOGY

## 2025-04-14 PROCEDURE — 80053 COMPREHEN METABOLIC PANEL: CPT | Performed by: PATHOLOGY

## 2025-04-14 PROCEDURE — 82728 ASSAY OF FERRITIN: CPT | Performed by: PATHOLOGY

## 2025-04-14 PROCEDURE — 83550 IRON BINDING TEST: CPT | Performed by: PATHOLOGY

## 2025-04-14 PROCEDURE — 85025 COMPLETE CBC W/AUTO DIFF WBC: CPT | Performed by: PATHOLOGY

## 2025-04-14 PROCEDURE — 99213 OFFICE O/P EST LOW 20 MIN: CPT | Performed by: INTERNAL MEDICINE

## 2025-04-14 PROCEDURE — 36415 COLL VENOUS BLD VENIPUNCTURE: CPT | Performed by: PATHOLOGY

## 2025-04-14 PROCEDURE — G2211 COMPLEX E/M VISIT ADD ON: HCPCS | Performed by: INTERNAL MEDICINE

## 2025-04-14 PROCEDURE — 83540 ASSAY OF IRON: CPT | Performed by: PATHOLOGY

## 2025-04-14 PROCEDURE — 99000 SPECIMEN HANDLING OFFICE-LAB: CPT | Performed by: PATHOLOGY

## 2025-04-14 PROCEDURE — 83735 ASSAY OF MAGNESIUM: CPT | Performed by: PATHOLOGY

## 2025-04-14 PROCEDURE — 82670 ASSAY OF TOTAL ESTRADIOL: CPT | Performed by: INTERNAL MEDICINE

## 2025-04-14 RX ADMIN — GOSERELIN ACETATE 10.8 MG: 10.8 IMPLANT SUBCUTANEOUS at 12:36

## 2025-04-14 ASSESSMENT — PAIN SCALES - GENERAL: PAINLEVEL_OUTOF10: NO PAIN (0)

## 2025-04-14 NOTE — LETTER
2025      Sharon Fong  [address] [address]      Dear Colleague,    Thank you for referring your patient, Sharon Fong, to the [facility]. Please see a copy of my visit note below.    Oncology Progress Note    Name: Sharon Fong  : 1975  MRN: [id number]    CC: Breast Cancer    HPI: Sharon Fong is a 49 year old female with a right clinical T2-3N3 hormone receptor positive, HER2 negative breast cancer with high risk features treated on block B and C on ISPY2.2 with weekly Taxol --> Abraxane/AC s/p bilateral mastectomies with [id number] disease (RCBII) s/p radiation, currently on adjuvant OFS/letrozole/ribociclib.    She continues to have joint pain, she did try voltaren which helps.  She notes more shoulder/upper neck tightness which can lead to headaches.  Tylenol helps with headaches.   She does have a history of headaches (migraines, hormonally related). She is on week 2 of her ribo.  Some skin itchiness noted. No new lotions/soaps/detergents.    History  Negative genetics  2024 MammaPrint -0.192 (high risk) and blueprint luminal B  2024 - 2024 ISPY2.2 weekly Taxol - changed to Abraxane starting week 2 due to allergic reaction  2024 Study biopsy  Right breast 1:00 grade 2 residual IDC with probable treatment related changes.  Also grade 2 DCIS.  Right axillary lymph node with residual metastatic disease.  Tumor-infiltrating lymphocytes of 10%.  Some decreased cellularity suggestive of treatment response.   2024 - 2024 ddAC x 4   2024 Right mastectomy showing 1.6cm of residual grade II IDC w/ evidence of treatment related changes. 25% cellularity. No LVI.  Also 1.6 cm of grade 2 DCIS. DCIS is 5% of tumor. Negative margins. ADH, classic type LCIS, calcs associated w/ invasive carcinoma, DCIS, and benign acini. ER (3+, %), NV (1+, 3%), HER2 2+ and FISH 2.8/2.4 = 1.2 (negative). 1/4 LN w/  metastatic carcinoma w/ evidence of treatment related changes. No EVA.  [id number].  Left breast mastectomy - ADH. No malignancy.    10/14/2024 Zoladex (12 week injection)   10/21/2024 DEXA scan showing osteopenia: Total hip T-score: -1.7, Left femoral neck T-score = -2.1, Right femoral neck T-score= -2.1    10/28/2024 Letrozole  -24: radiation  24: Zometa  24: adjuvant ribo    Review of systems: All other systems reviewed and are negative except for what is described in the history of present illness.      PMH:   Patient Active Problem List    Diagnosis Date Noted     Hypotension, unspecified hypotension type 2024     Priority: Medium     Syncope, unspecified syncope type 2024     Priority: Medium     Malignant neoplasm of upper-inner quadrant of right female breast (H) 2024     Priority: Medium     Right breast cancer  Has had longstanding history of multiple biopsies, infection, and partial mastectomy for a right breast multifocal abscess dating back to at least .     patient presented with palpable lump at 1:00 in the right breast    3/21/2024 diagnostic mammo showed clustered fine indeterminate calcs at 12:00.  No mammographic abnormality identified to correspond to the palpable area of fullness at 1:00.  On US, at 1:00, 10 cm FN there was a 3.7 x 2.3 x 1.5 cm solid hypoechoic irregularly marginated mass.  Additional hypoechoic lesion located at 2:00, 3 cm FN measuring 1.0 x 0.5 cm.  Another hypoechoic lesion at 2:00, 12 cm FN measuring 0.7 cm.  At least 2 enlarged right axillary lymph nodes -largest measuring up to 0.6 cm.    3/28/2024 ultrasound-guided biopsy of the followin:00 lesion at least DCIS  1:00 lesion grade 2 IDC.  No LVI.  ER (3+, 80 to 90%), IN (3+, 90 to 100%), HER2 2+ and FISH negative   Right axillary LN: metastatic carcinoma    2024 CT CAP demonstrated multifocal enhancement in the right breast with mildly abnormal right axillary lymph  nodes.  Otherwise no evidence of distant metastatic disease.    4/12/2024 NM bone scan negative for osteoblastic metastatic disease.    4/15/2024 Baseline MRI breast demonstrated the following  Right breast at 1:00 mass measuring 2.6 x 2.0 by 3.0 cm with extensive surrounding clumped NME occupying most of the right breast and measuring 8.9 x 4.7 x 8.7   Left breast showed a fibroadenoma at 8-9:00 position.  In addition, 7 mm of linear branching NME at 3-4:00 position.  This was biopsied and found to be benign breast tissue.  Few asymmetric right internal mammary chain LN. multiple enlarged right level 1 axillary nodes.  Additionally multiple LEFT level 1 axillary nodes that are indeterminant -which were normal on left axillary US.  FTV 38.222 cc    4/26/2024 MammaPrint -0.192 (high risk) and blueprint luminal B    4/30/2024 - 7/17/2024 weekly Taxol - changed to Abraxane starting week 2 due to allergic reaction    5/17/2024 W3 MRI  Minimally decreased size of the postoperative right breast cancer at the approximate 1:00 position measuring approximately 2.4 x 1.9 x 2.9 cm (previously 2.6 x 2.0 x 3.0 cm).  There is overall similar extent although mildly decreased volume of abnormal surrounding NME  Minimal interval enlargement of multiple bilateral level I lymph nodes, including the biopsy-proven right axillary node with indwelling biopsy marker. No significant change in the multiple small asymmetric right internal mammary chain lymph nodes.  27.167 ml - Tumor volume reduction of 28.9% from baseline     6/10/2024 (6-week MRI breast) 8.8 cm anteroposterior on axial MIPS, unchanged.  Mildly decreased size of the biopsy-proven cancer in the right breast at the approximate 1:00 position middle depth measuring approximately 2.0 x 1.4 x 2.4 cm (previously 2.4 x 1.9 x 2.9 cm).   Extensive surrounding NME is largely unchanged changed, but the volume has minimally decreased.  Stable bilateral axillary lymph nodes, including the  biopsy-proven right level I axillary node. Stable asymmetric small right internal mammary chain lymph nodes.  18.274 - FTV reduction of 57% from baseline     7/23/2024 (12-week MRI breast) Longest diameter of suspicious enhancement: 8.0 cm(previously 8.8 cm).   Irregular enhancing mass in the right breast at approximately 1:00 positionmeasures 1.8 x 1.1 x 1.6 cm (previously 2.0 x 1.4 x 2.4 cm).   NME continues to extend from anterior to posterior depth, with anterior NME extending to the base of the nipple.  Stable prominent bilateral axillary nodes, including the biopsied right level 1 axillary node. Small asymmetric right internal mammary chain lymph nodes are also similar to prior  7.585 ml - FTV reduction of 80% from baseline     7/24/2024 Study biopsy  Right breast 1:00 grade 2 residual IDC with probable treatment related changes.  Also grade 2 DCIS.  Right axillary lymph node with residual metastatic disease.  Tumor-infiltrating lymphocytes of 10%.  Some decreased cellularity suggestive of treatment response.    7/25/2024 - 9/5/2024 ddAC x 4     8/19/2024 W4 MRI (in block C) Longest diameter of suspicious enhancement: 8 cm (sharee has decreased volume of enhancement.  Right breast at 1:00 posterior depth measures 1.7 x 0.7 x 1.3 cm, previously 1.8 x 0.9 x 1.6 cm  The right MEÑO node just above the 3rd rib is slightly smaller than the prior exam  The right axillary lymph nodes have slightly decreased in size. No lymphadenopathy in the left axilla.    FTV 6.349 ml - 83% reduction from baseline     9/30/2024 Right mastectomy showing 1.6cm of residual grade II IDC w/ evidence of treatment related changes. 25% cellularity. No LVI.  Also 1.6 cm of grade 2 DCIS. DCIS is 5% of tumor. Negative margins. ADH, classic type LCIS, calcs associated w/ invasive carcinoma, DCIS, and benign acini. ER (3+, %), HI (1+, 3%), HER2 2+ and FISH 2.8/2.4 = 1.2 (negative). 1/4 LN w./ metastatic carcinoma w/ evidence of treatment  related changes. No EVA.  [id number].  Left breast mastectomy - ADH. No malignancy.     10/14/2024 Zoladex    10/21/2024 DEXA scan showing osteopenia: Total hip T-score: -1.7, Left femoral neck T-score = -2.1, Right femoral neck T-score= -2.1     10/28/2024 Letrozole       Seizure (H) 11/15/2013     Priority: Medium     Vitamin D deficiency 04/23/2013     Priority: Medium     April 2013 July 2013       Inflammatory disease of breast 08/17/2010     Priority: Medium     S/p  Partial R mastectomy secondary to recurrent abscesses  Dr Sweet followed  Final dx after surgery -histoplasmosis  Was treated       Past Medical History:   Diagnosis Date     Breast cancer (H)      Hypermobility arthralgia      Hypotension, unspecified hypotension type 07/29/2024     Malignant neoplasm of upper-inner quadrant of right female breast (H) 04/02/2024    Right breast cancer  Has had longstanding history of multiple biopsies, infection, and partial mastectomy for a right breast multifocal abscess dating back to at least 2010.     2024 patient presented with palpable lump at 1:00 in the right breast     3/21/2024 diagnostic mammo showed clustered fine indeterminate calcs at 12:00.  No mammographic abnormality identified to correspond to the palpable     Migraine      S/P radiation therapy     6,000 cGy to right CW + Boost and 5,000 cGy to right SCV completed on 12/27/2024 - [facility]     Seizure (H) 11/15/2013     Seizure disorder (H) 2014    Once post surgery     Vitamin D deficiency 04/23/2013 April 2013 July 2013         Medications:   Current Outpatient Medications:      ibuprofen (ADVIL/MOTRIN) 200 MG capsule, as needed., Disp: , Rfl:      letrozole (FEMARA) 2.5 MG tablet, TAKE ONE TABLET BY MOUTH ONE TIME DAILY, Disp: 90 tablet, Rfl: 3     mometasone (ELOCON) 0.1 % external cream, Apply topically daily. to radiation treatment field.  Stop application 1 week after completing treatment., Disp: 45 g, Rfl: 1      ribociclib (KISQALI) (400 MG DOSE) 200 MG tablet therapy pack, Take 2 tablets (400 mg) by mouth every morning for 21 days. Then off for 7 days., Disp: 42 tablet, Rfl: 0     aspirin-acetaminophen-caffeine (EXCEDRIN MIGRAINE) 250-250-65 MG tablet, , Disp: , Rfl:   No current facility-administered medications for this visit.    Facility-Administered Medications Ordered in Other Visits:      lidocaine 1 % 9 mL, 9 mL, Intradermal, Once, Valeria Martinez MD     lidocaine 1% with EPINEPHrine 1:100,000 injection 10 mL, 10 mL, Intradermal, Once, Valeria Martinez MD      Allergies:   Allergies   Allergen Reactions     Citrullus Vulgaris Unknown and Other (See Comments)     Rash, swollen lips     Corn-Containing Products Other (See Comments)     Paclitaxel Other (See Comments)     PAIN in shoulder and pelvis - see infusion notes from 24 and 24     Salicylic Acid Unknown and Other (See Comments)         Social history:   Social History     Socioeconomic History     Marital status:      Spouse name: Not on file     Number of children: Not on file     Years of education: Not on file     Highest education level: Not on file   Occupational History     Not on file   Tobacco Use     Smoking status: Never     Passive exposure: Never     Smokeless tobacco: Never   Substance and Sexual Activity     Alcohol use: Never     Drug use: Never     Sexual activity: Not on file   Other Topics Concern     Not on file   Social History Narrative    Menarche 14. No periods since hysterectomy. No postmenopausal symptoms      first at age 26    Youngest child at diagnosis - 16    Working full time as       Social Drivers of Health     Financial Resource Strain: Not on file   Food Insecurity: No Food Insecurity (2024)    Received [facility], [facility]    Hunger Vital Sign      Worried About Running Out of Food in the Last Year: Never true      Ran Out of Food in the Last Year: Never true   Transportation Needs: No  Transportation Needs (4/6/2024)    Received from [facility]    PRAPARE - Transportation      Lack of Transportation (Medical): No      Lack of Transportation (Non-Medical): No   Physical Activity: Sufficiently Active (4/6/2024)    Received from [facility]    Exercise Vital Sign      Days of Exercise per Week: 5 days      Minutes of Exercise per Session: 30 min   Stress: Not on file   Social Connections: Unknown (7/16/2021)    Received from [facility]    Social Connection and Isolation Panel [NHANES]      Frequency of Communication with Friends and Family: Once a week      Frequency of Social Gatherings with Friends and Family: Once a week      Attends Rastafarian Services: Not on file      Active Member of Clubs or Organizations: No      Attends Club or Organization Meetings: Not on file      Marital Status: Not on file   Interpersonal Safety: Unknown (1/29/2025)    Interpersonal Safety      Do you feel physically and emotionally safe where you currently live?: Patient unable to answer      Within the past 12 months, have you been hit, slapped, kicked or otherwise physically hurt by someone?: Patient unable to answer      Within the past 12 months, have you been humiliated or emotionally abused in other ways by your partner or ex-partner?: Patient unable to answer   Housing Stability: Low Risk  (4/6/2024)    Received from [facility]    Housing Stability      What is your living situation today?: I have a steady place to live       Family history:I have reviewed this patient's family history and updated it with pertinent information if needed.  Family History   Problem Relation Age of Onset     Hypertension Mother      Hypertension Father      Cerebrovascular Disease Father      Thyroid Disease Sister      Glaucoma No family hx of      Macular Degeneration No family hx of      Physical exam:  Vitals:/82 (BP Location: Right arm, Patient Position: Sitting,  Cuff Size: Adult Regular)   Pulse 98   Resp 16   Wt 71.5 kg (157 lb 11.2 oz)   SpO2 96%   BMI 26.24 kg/m    GENERAL: No acute distress, pleasant, PS 0  EYES: Pupils equal round reactive to light, sclera anicteric  NECK: Supple  LYMPH NODES: No cervical, supraclavicular, axillary lymphadenopathy  CARDIOVASCULAR: Regular rate and rhythm  LUNGS: Clear to auscultation bilaterally  GASTROINTESTINAL: Soft, nontender, nondistended.  No palpable hepatosplenomegaly  EXTREMITIES: No edema  SKIN: No rashes or petechiae  BACK: No midline tenderness, tense paraspinous muscles cervical spine  NEURO: Normal gait  BREASTS: s/p bilateral mastectomies No dominant chest wall masses palpated bilaterally.  No axillary lymphadenopathy bilaterally.      Labs:  Lab Results   Component Value Date    WBC 3.1 (L) 03/10/2025    HGB 11.3 (L) 03/10/2025    HCT 33.1 (L) 03/10/2025     03/10/2025     03/10/2025    POTASSIUM 4.0 03/10/2025    CHLORIDE 104 03/10/2025    CO2 26 03/10/2025    BUN 11.9 03/10/2025    CR 0.83 03/10/2025     (H) 03/10/2025    AST 24 03/10/2025    ALT 13 03/10/2025    ALKPHOS 72 03/10/2025    BILITOTAL 0.4 03/10/2025    INR 1.01 07/29/2024       Radiology:   DEXA 10/24  T score -2.1    Bone scan from April 2024 reviewed showing mild uptake in bilateral shoulders, elbows, sternoclavicular joints,  knees and spine, likely related to degenerative changes.    Assessment/plan:   Sharon Fong is a  49 year old female with a right clinical T2-3N3 hormone receptor positive, HER2 negative breast cancer with high risk features treated on block B and C on ISPY2.2 with weekly Taxol --> Abraxane/AC s/p bilateral mastectomies with [id number] disease (RCBII) s/p radiation, currently on adjuvant OFS/letrozole/ribociclib.    1.  Breast Cancer: She continues on adjuvant OFS letrozole/ribo. Labs today and if stable, will transition to q2 month labs. We reviewed plans for monitoring.  Call if new symptoms  develop.    2. Osteopenia:  Zometa due July 2025. DEXA due October 2026.     3. OFS:  Zoladex q12 weeks. Estradiol undetectable earlier this year. We did discuss consideration of BSO (her relatives have gone through menopause in their mid 50s).  We discuss the concern of detectable estradiol and ineffectiveness of AI/ribo if that does occur. She will consider and we will discuss more at next visit.    4.  Iron deficiency anemia:  Continue on iron and repeat iron studies today.  If drops again, may need capsule endoscopy.    5. Neck pain:  reviewed bone scan with degenerative changes.  Her symptoms do sound more MSK.  Will check MRI spine to rule out mets and place PT evaluation.  Advised heat and stretches.    All of the patient's questions were answered.    Return to the office in 2 months or sooner as needed.    Thank you for allowing me to participate in the care of this patient.  Please call with any questions.    I personally reviewed the recent studies and I explained the rationale of the tests ordered today to the patient.      Cancer Staging   Malignant neoplasm of upper-inner quadrant of right female breast (H)  Staging form: Breast, AJCC 8th Edition  - Clinical: Stage IIIB (cT3, cN3, cM0, G2, ER+, SC+, HER2-)  - Pathologic: ypT1c, pN1a, cM0, G2, ER+, SC+, HER2-      Current Therapy: OP ONC Breast Cancer - Goserelin (Zoladex) every 3 months  Oral ONC Breast Cancer - Ribociclib / Aromatase Inhibitor followed by Aromatase Inhibitor    Current Disease Status : No evidence of disease (YJOTI)  ECOG Performance Status : 0 - Asymptomatic  Intent of Therapy: [Could not find a matching plan. This SmartLink may be configured incorrectly. Contact a  for help.]   Intent to Change : No      The longitudinal plan of care for the diagnosis(es)/condition(s) as documented were addressed during this visit. Due to the added complexity in care, I will continue to support [employer] in the subsequent management  and with ongoing continuity of care.      Orders Placed This Encounter   Procedures     MRI Cervical spine w & w/o contrast     COMPREHENSIVE METABOLIC PANEL     Ferritin     Iron & Iron Binding Capacity     Magnesium     Phosphorus     Estradiol     Physical Therapy  Referral     CBC with Platelets & Differential         Again, thank you for allowing me to participate in the care of your patient.        Sincerely,        Gena Caballero MD    Electronically signed

## 2025-04-14 NOTE — PROGRESS NOTES
Oncology Progress Note    Name: Sharon Fong  : 1975  MRN: [id number]    CC: Breast Cancer    HPI: Sharon Fong is a 49 year old female with a right clinical T2-3N3 hormone receptor positive, HER2 negative breast cancer with high risk features treated on block B and C on ISPY2.2 with weekly Taxol --> Abraxane/AC s/p bilateral mastectomies with [id number] disease (RCBII) s/p radiation, currently on adjuvant OFS/letrozole/ribociclib.    She continues to have joint pain, she did try voltaren which helps.  She notes more shoulder/upper neck tightness which can lead to headaches.  Tylenol helps with headaches.   She does have a history of headaches (migraines, hormonally related). She is on week 2 of her ribo.  Some skin itchiness noted. No new lotions/soaps/detergents.    History  Negative genetics  2024 MammaPrint -0.192 (high risk) and blueprint luminal B  2024 - 2024 ISPY2.2 weekly Taxol - changed to Abraxane starting week 2 due to allergic reaction  2024 Study biopsy  Right breast 1:00 grade 2 residual IDC with probable treatment related changes.  Also grade 2 DCIS.  Right axillary lymph node with residual metastatic disease.  Tumor-infiltrating lymphocytes of 10%.  Some decreased cellularity suggestive of treatment response.   2024 - 2024 ddAC x 4   2024 Right mastectomy showing 1.6cm of residual grade II IDC w/ evidence of treatment related changes. 25% cellularity. No LVI.  Also 1.6 cm of grade 2 DCIS. DCIS is 5% of tumor. Negative margins. ADH, classic type LCIS, calcs associated w/ invasive carcinoma, DCIS, and benign acini. ER (3+, %), WA (1+, 3%), HER2 2+ and FISH 2.8/2.4 = 1.2 (negative). 1/ LN w/ metastatic carcinoma w/ evidence of treatment related changes. No EVA.  [id number].  Left breast mastectomy - ADH. No malignancy.    10/14/2024 Zoladex (12 week injection)   10/21/2024 DEXA scan showing osteopenia: Total hip T-score: -1.7, Left femoral neck  T-score = -2.1, Right femoral neck T-score= -2.1    10/28/2024 Letrozole  -24: radiation  24: Zometa  24: adjuvant ribo    Review of systems: All other systems reviewed and are negative except for what is described in the history of present illness.      PMH:   Patient Active Problem List    Diagnosis Date Noted    Hypotension, unspecified hypotension type 2024     Priority: Medium    Syncope, unspecified syncope type 2024     Priority: Medium    Malignant neoplasm of upper-inner quadrant of right female breast (H) 2024     Priority: Medium     Right breast cancer  Has had longstanding history of multiple biopsies, infection, and partial mastectomy for a right breast multifocal abscess dating back to at least .     patient presented with palpable lump at 1:00 in the right breast    3/21/2024 diagnostic mammo showed clustered fine indeterminate calcs at 12:00.  No mammographic abnormality identified to correspond to the palpable area of fullness at 1:00.  On US, at 1:00, 10 cm FN there was a 3.7 x 2.3 x 1.5 cm solid hypoechoic irregularly marginated mass.  Additional hypoechoic lesion located at 2:00, 3 cm FN measuring 1.0 x 0.5 cm.  Another hypoechoic lesion at 2:00, 12 cm FN measuring 0.7 cm.  At least 2 enlarged right axillary lymph nodes -largest measuring up to 0.6 cm.    3/28/2024 ultrasound-guided biopsy of the followin:00 lesion at least DCIS  1:00 lesion grade 2 IDC.  No LVI.  ER (3+, 80 to 90%), ND (3+, 90 to 100%), HER2 2+ and FISH negative   Right axillary LN: metastatic carcinoma    2024 CT CAP demonstrated multifocal enhancement in the right breast with mildly abnormal right axillary lymph nodes.  Otherwise no evidence of distant metastatic disease.    2024 NM bone scan negative for osteoblastic metastatic disease.    4/15/2024 Baseline MRI breast demonstrated the following  Right breast at 1:00 mass measuring 2.6 x 2.0 by 3.0 cm with  extensive surrounding clumped NME occupying most of the right breast and measuring 8.9 x 4.7 x 8.7   Left breast showed a fibroadenoma at 8-9:00 position.  In addition, 7 mm of linear branching NME at 3-4:00 position.  This was biopsied and found to be benign breast tissue.  Few asymmetric right internal mammary chain LN. multiple enlarged right level 1 axillary nodes.  Additionally multiple LEFT level 1 axillary nodes that are indeterminant -which were normal on left axillary US.  FTV 38.222 cc    4/26/2024 MammaPrint -0.192 (high risk) and blueprint luminal B    4/30/2024 - 7/17/2024 weekly Taxol - changed to Abraxane starting week 2 due to allergic reaction    5/17/2024 W3 MRI  Minimally decreased size of the postoperative right breast cancer at the approximate 1:00 position measuring approximately 2.4 x 1.9 x 2.9 cm (previously 2.6 x 2.0 x 3.0 cm).  There is overall similar extent although mildly decreased volume of abnormal surrounding NME  Minimal interval enlargement of multiple bilateral level I lymph nodes, including the biopsy-proven right axillary node with indwelling biopsy marker. No significant change in the multiple small asymmetric right internal mammary chain lymph nodes.  27.167 ml - Tumor volume reduction of 28.9% from baseline     6/10/2024 (6-week MRI breast) 8.8 cm anteroposterior on axial MIPS, unchanged.  Mildly decreased size of the biopsy-proven cancer in the right breast at the approximate 1:00 position middle depth measuring approximately 2.0 x 1.4 x 2.4 cm (previously 2.4 x 1.9 x 2.9 cm).   Extensive surrounding NME is largely unchanged changed, but the volume has minimally decreased.  Stable bilateral axillary lymph nodes, including the biopsy-proven right level I axillary node. Stable asymmetric small right internal mammary chain lymph nodes.  18.274 - FTV reduction of 57% from baseline     7/23/2024 (12-week MRI breast) Longest diameter of suspicious enhancement: 8.0 cm(previously 8.8  cm).   Irregular enhancing mass in the right breast at approximately 1:00 positionmeasures 1.8 x 1.1 x 1.6 cm (previously 2.0 x 1.4 x 2.4 cm).   NME continues to extend from anterior to posterior depth, with anterior NME extending to the base of the nipple.  Stable prominent bilateral axillary nodes, including the biopsied right level 1 axillary node. Small asymmetric right internal mammary chain lymph nodes are also similar to prior  7.585 ml - FTV reduction of 80% from baseline     7/24/2024 Study biopsy  Right breast 1:00 grade 2 residual IDC with probable treatment related changes.  Also grade 2 DCIS.  Right axillary lymph node with residual metastatic disease.  Tumor-infiltrating lymphocytes of 10%.  Some decreased cellularity suggestive of treatment response.    7/25/2024 - 9/5/2024 ddAC x 4     8/19/2024 W4 MRI (in block C) Longest diameter of suspicious enhancement: 8 cm (sharee has decreased volume of enhancement.  Right breast at 1:00 posterior depth measures 1.7 x 0.7 x 1.3 cm, previously 1.8 x 0.9 x 1.6 cm  The right MEÑO node just above the 3rd rib is slightly smaller than the prior exam  The right axillary lymph nodes have slightly decreased in size. No lymphadenopathy in the left axilla.    FTV 6.349 ml - 83% reduction from baseline     9/30/2024 Right mastectomy showing 1.6cm of residual grade II IDC w/ evidence of treatment related changes. 25% cellularity. No LVI.  Also 1.6 cm of grade 2 DCIS. DCIS is 5% of tumor. Negative margins. ADH, classic type LCIS, calcs associated w/ invasive carcinoma, DCIS, and benign acini. ER (3+, %), OH (1+, 3%), HER2 2+ and FISH 2.8/2.4 = 1.2 (negative). 1/4 LN w./ metastatic carcinoma w/ evidence of treatment related changes. No EVA.  [id number].  Left breast mastectomy - ADH. No malignancy.     10/14/2024 Zoladex    10/21/2024 DEXA scan showing osteopenia: Total hip T-score: -1.7, Left femoral neck T-score = -2.1, Right femoral neck T-score= -2.1     10/28/2024  Letrozole      Seizure (H) 11/15/2013     Priority: Medium    Vitamin D deficiency 04/23/2013     Priority: Medium     April 2013 July 2013      Inflammatory disease of breast 08/17/2010     Priority: Medium     S/p  Partial R mastectomy secondary to recurrent abscesses  Dr Sweet followed  Final dx after surgery -histoplasmosis  Was treated       Past Medical History:   Diagnosis Date    Breast cancer (H)     Hypermobility arthralgia     Hypotension, unspecified hypotension type 07/29/2024    Malignant neoplasm of upper-inner quadrant of right female breast (H) 04/02/2024    Right breast cancer  Has had longstanding history of multiple biopsies, infection, and partial mastectomy for a right breast multifocal abscess dating back to at least 2010.     2024 patient presented with palpable lump at 1:00 in the right breast     3/21/2024 diagnostic mammo showed clustered fine indeterminate calcs at 12:00.  No mammographic abnormality identified to correspond to the palpable    Migraine     S/P radiation therapy     6,000 cGy to right CW + Boost and 5,000 cGy to right SCV completed on 12/27/2024 - [facility]    Seizure (H) 11/15/2013    Seizure disorder (H) 2014    Once post surgery    Vitamin D deficiency 04/23/2013 April 2013 July 2013         Medications:   Current Outpatient Medications:     ibuprofen (ADVIL/MOTRIN) 200 MG capsule, as needed., Disp: , Rfl:     letrozole (FEMARA) 2.5 MG tablet, TAKE ONE TABLET BY MOUTH ONE TIME DAILY, Disp: 90 tablet, Rfl: 3    mometasone (ELOCON) 0.1 % external cream, Apply topically daily. to radiation treatment field.  Stop application 1 week after completing treatment., Disp: 45 g, Rfl: 1    ribociclib (KISQALI) (400 MG DOSE) 200 MG tablet therapy pack, Take 2 tablets (400 mg) by mouth every morning for 21 days. Then off for 7 days., Disp: 42 tablet, Rfl: 0    aspirin-acetaminophen-caffeine (EXCEDRIN MIGRAINE) 250-250-65 MG tablet, , Disp: , Rfl:   No  current facility-administered medications for this visit.    Facility-Administered Medications Ordered in Other Visits:     lidocaine 1 % 9 mL, 9 mL, Intradermal, Once, Valeria Martinez MD    lidocaine 1% with EPINEPHrine 1:100,000 injection 10 mL, 10 mL, Intradermal, Once, Valeria Martinez MD      Allergies:   Allergies   Allergen Reactions    Citrullus Vulgaris Unknown and Other (See Comments)     Rash, swollen lips    Corn-Containing Products Other (See Comments)    Paclitaxel Other (See Comments)     PAIN in shoulder and pelvis - see infusion notes from 24 and 24    Salicylic Acid Unknown and Other (See Comments)         Social history:   Social History     Socioeconomic History    Marital status:      Spouse name: Not on file    Number of children: Not on file    Years of education: Not on file    Highest education level: Not on file   Occupational History    Not on file   Tobacco Use    Smoking status: Never     Passive exposure: Never    Smokeless tobacco: Never   Substance and Sexual Activity    Alcohol use: Never    Drug use: Never    Sexual activity: Not on file   Other Topics Concern    Not on file   Social History Narrative    Menarche 14. No periods since hysterectomy. No postmenopausal symptoms      first at age 26    Youngest child at diagnosis - 16    Working full time as       Social Drivers of Health     Financial Resource Strain: Not on file   Food Insecurity: No Food Insecurity (2024)    Received from [facility]    Hunger Vital Sign     Worried About Running Out of Food in the Last Year: Never true     Ran Out of Food in the Last Year: Never true   Transportation Needs: No Transportation Needs (2024)    Received from [facility]    PRAPARE - Transportation     Lack of Transportation (Medical): No     Lack of Transportation (Non-Medical): No   Physical Activity: Sufficiently Active (2024)    Received from [facility]     Exercise Vital Sign     Days of Exercise per Week: 5 days     Minutes of Exercise per Session: 30 min   Stress: Not on file   Social Connections: Unknown (7/16/2021)    [facility]    Social Connection and Isolation Panel [NHANES]     Frequency of Communication with Friends and Family: Once a week     Frequency of Social Gatherings with Friends and Family: Once a week     Attends Religion Services: Not on file     Active Member of Clubs or Organizations: No     Attends Club or Organization Meetings: Not on file     Marital Status: Not on file   Interpersonal Safety: Unknown (1/29/2025)    Interpersonal Safety     Do you feel physically and emotionally safe where you currently live?: Patient unable to answer     Within the past 12 months, have you been hit, slapped, kicked or otherwise physically hurt by someone?: Patient unable to answer     Within the past 12 months, have you been humiliated or emotionally abused in other ways by your partner or ex-partner?: Patient unable to answer   Housing Stability: Low Risk  (4/6/2024)    [facility]    Housing Stability     What is your living situation today?: I have a steady place to live       Family history:I have reviewed this patient's family history and updated it with pertinent information if needed.  Family History   Problem Relation Age of Onset    Hypertension Mother     Hypertension Father     Cerebrovascular Disease Father     Thyroid Disease Sister     Glaucoma No family hx of     Macular Degeneration No family hx of      Physical exam:  Vitals:/82 (BP Location: Right arm, Patient Position: Sitting, Cuff Size: Adult Regular)   Pulse 98   Resp 16   Wt 71.5 kg (157 lb 11.2 oz)   SpO2 96%   BMI 26.24 kg/m    GENERAL: No acute distress, pleasant, PS 0  EYES: Pupils equal round reactive to light, sclera anicteric  NECK: Supple  LYMPH NODES: No cervical, supraclavicular, axillary  lymphadenopathy  CARDIOVASCULAR: Regular rate and rhythm  LUNGS: Clear to auscultation bilaterally  GASTROINTESTINAL: Soft, nontender, nondistended.  No palpable hepatosplenomegaly  EXTREMITIES: No edema  SKIN: No rashes or petechiae  BACK: No midline tenderness, tense paraspinous muscles cervical spine  NEURO: Normal gait  BREASTS: s/p bilateral mastectomies No dominant chest wall masses palpated bilaterally.  No axillary lymphadenopathy bilaterally.      Labs:  Lab Results   Component Value Date    WBC 3.1 (L) 03/10/2025    HGB 11.3 (L) 03/10/2025    HCT 33.1 (L) 03/10/2025     03/10/2025     03/10/2025    POTASSIUM 4.0 03/10/2025    CHLORIDE 104 03/10/2025    CO2 26 03/10/2025    BUN 11.9 03/10/2025    CR 0.83 03/10/2025     (H) 03/10/2025    AST 24 03/10/2025    ALT 13 03/10/2025    ALKPHOS 72 03/10/2025    BILITOTAL 0.4 03/10/2025    INR 1.01 07/29/2024       Radiology:   DEXA 10/24  T score -2.1    Bone scan from April 2024 reviewed showing mild uptake in bilateral shoulders, elbows, sternoclavicular joints,  knees and spine, likely related to degenerative changes.    Assessment/plan:   Sharon Fong is a  49 year old female with a right clinical T2-3N3 hormone receptor positive, HER2 negative breast cancer with high risk features treated on block B and C on ISPY2.2 with weekly Taxol --> Abraxane/AC s/p bilateral mastectomies with [id number] disease (RCBII) s/p radiation, currently on adjuvant OFS/letrozole/ribociclib.    1.  Breast Cancer: She continues on adjuvant OFS letrozole/ribo. Labs today and if stable, will transition to q2 month labs. We reviewed plans for monitoring.  Call if new symptoms develop.    2. Osteopenia:  Zometa due July 2025. DEXA due October 2026.     3. OFS:  Zoladex q12 weeks. Estradiol undetectable earlier this year. We did discuss consideration of BSO (her relatives have gone through menopause in their mid 50s).  We discuss the concern of detectable estradiol  and ineffectiveness of AI/ribo if that does occur. She will consider and we will discuss more at next visit.    4.  Iron deficiency anemia:  Continue on iron and repeat iron studies today.  If drops again, may need capsule endoscopy.    5. Neck pain:  reviewed bone scan with degenerative changes.  Her symptoms do sound more MSK.  Will check MRI spine to rule out mets and place PT evaluation.  Advised heat and stretches.    All of the patient's questions were answered.    Return to the office in 2 months or sooner as needed.    Thank you for allowing me to participate in the care of this patient.  Please call with any questions.    I personally reviewed the recent studies and I explained the rationale of the tests ordered today to the patient.      Cancer Staging   Malignant neoplasm of upper-inner quadrant of right female breast (H)  Staging form: Breast, AJCC 8th Edition  - Clinical: Stage IIIB (cT3, cN3, cM0, G2, ER+, RI+, HER2-)  - Pathologic: ypT1c, pN1a, cM0, G2, ER+, RI+, HER2-      Current Therapy: OP ONC Breast Cancer - Goserelin (Zoladex) every 3 months  Oral ONC Breast Cancer - Ribociclib / Aromatase Inhibitor followed by Aromatase Inhibitor    Current Disease Status : No evidence of disease (JYOTI)  ECOG Performance Status : 0 - Asymptomatic  Intent of Therapy: [Could not find a matching plan. This SmartLink may be configured incorrectly. Contact a  for help.]   Intent to Change : No      The longitudinal plan of care for the diagnosis(es)/condition(s) as documented were addressed during this visit. Due to the added complexity in care, I will continue to support Sharon in the subsequent management and with ongoing continuity of care.      Orders Placed This Encounter   Procedures    MRI Cervical spine w & w/o contrast    COMPREHENSIVE METABOLIC PANEL    Ferritin    Iron & Iron Binding Capacity    Magnesium    Phosphorus    Estradiol    Physical Therapy  Referral    CBC with  Platelets & Differential

## 2025-04-14 NOTE — NURSING NOTE
Zoladex given in RLQ adbomen without incident.   Administrations This Visit       goserelin (ZOLADEX) injection 10.8 mg       Admin Date  04/14/2025 Action  $Given Dose  10.8 mg Route  Subcutaneous Documented By  Sia Green, LINDA                  -Sia Green, RN

## 2025-04-14 NOTE — NURSING NOTE
"Oncology Rooming Note    April 14, 2025 11:39 AM   Sharon Fong is a 49 year old female who presents for:    Chief Complaint   Patient presents with    Oncology Clinic Visit     Malignant neoplasm of upper-inner quadrant of right female breast (H)     Initial Vitals: /82 (BP Location: Right arm, Patient Position: Sitting, Cuff Size: Adult Regular)   Pulse 98   Resp 16   Wt 71.5 kg (157 lb 11.2 oz)   SpO2 96%   BMI 26.24 kg/m   Estimated body mass index is 26.24 kg/m  as calculated from the following:    Height as of 2/17/25: 1.651 m (5' 5\").    Weight as of this encounter: 71.5 kg (157 lb 11.2 oz). Body surface area is 1.81 meters squared.  No Pain (0) Comment: Data Unavailable   No LMP recorded. Patient has had a hysterectomy.  Allergies reviewed: Yes  Medications reviewed: Yes    Medications: Medication refills not needed today.  Pharmacy name entered into [employer]:    [facility] - [place], [place] - [address]  [facility] - [place], [place] - [address] [place] [address] [facility] - [place], [place] - [address]  [facility] - [place], [place] - [address]    Frailty Screening:   Is the patient here for a new oncology consult visit in cancer care? 2. No    PHQ9:  Did this patient require a PHQ9?: No      Clinical concerns: none      Marilee Herman            "

## 2025-04-15 ENCOUNTER — OFFICE VISIT (OUTPATIENT)
Dept: SURGERY | Facility: CLINIC | Age: 50
End: 2025-04-15
Attending: INTERNAL MEDICINE
Payer: COMMERCIAL

## 2025-04-15 VITALS
BODY MASS INDEX: 25.94 KG/M2 | DIASTOLIC BLOOD PRESSURE: 83 MMHG | HEART RATE: 76 BPM | OXYGEN SATURATION: 98 % | SYSTOLIC BLOOD PRESSURE: 136 MMHG | HEIGHT: 65 IN | WEIGHT: 155.7 LBS

## 2025-04-15 DIAGNOSIS — K64.8 HEMORRHOID PROLAPSE: Primary | ICD-10-CM

## 2025-04-15 DIAGNOSIS — K64.8 INTERNAL HEMORRHOIDS: ICD-10-CM

## 2025-04-15 DIAGNOSIS — K64.4 EXTERNAL HEMORRHOIDS: ICD-10-CM

## 2025-04-15 DIAGNOSIS — K64.4 ANAL SKIN TAG: ICD-10-CM

## 2025-04-15 PROCEDURE — 3075F SYST BP GE 130 - 139MM HG: CPT | Performed by: NURSE PRACTITIONER

## 2025-04-15 PROCEDURE — 46600 DIAGNOSTIC ANOSCOPY SPX: CPT | Performed by: NURSE PRACTITIONER

## 2025-04-15 PROCEDURE — 99213 OFFICE O/P EST LOW 20 MIN: CPT | Mod: 25 | Performed by: NURSE PRACTITIONER

## 2025-04-15 PROCEDURE — 3079F DIAST BP 80-89 MM HG: CPT | Performed by: NURSE PRACTITIONER

## 2025-04-15 PROCEDURE — 1126F AMNT PAIN NOTED NONE PRSNT: CPT | Performed by: NURSE PRACTITIONER

## 2025-04-15 ASSESSMENT — PAIN SCALES - GENERAL: PAINLEVEL_OUTOF10: NO PAIN (0)

## 2025-04-15 NOTE — NURSING NOTE
"Chief Complaint   Patient presents with    Hemorrhoids       Vitals:    04/15/25 0746   BP: 136/83   BP Location: Left arm   Patient Position: Sitting   Cuff Size: Adult Regular   Pulse: 76   SpO2: 98%   Weight: 155 lb 11.2 oz   Height: 5' 5\"       Body mass index is 25.91 kg/m .    Ashlee Salcedo CMA    "

## 2025-04-15 NOTE — PROGRESS NOTES
"Colon and Rectal Surgery Consult Clinic Note    Referring provider:  Roldan Delacruz MD  [address] [facility] [address]  [place] [address]     Patient: Sharon Fong  YOB: 1975  Date of Visit: 4/15/2025    Sharon Fong is a very pleasant 49 year old female with HER2 negative breast cancer s/p chemoradiation on adjuvant letrozole/ribo here with concerns for hemorrhoids.    HPI  Has hemorrhoids that prolapse out but she does not manually reduce. Has pain with sitting and squatting. Had some bleeding when had diarrhea last year with chemotherapy for breast cancer. Bowel movements are now hard to normal. Has some pain with bowel movements but no sharp pain. No bowel medications. Uses tucks pads and lidocaine cream on her hemorrhoids, which helps some. Had an office procedure on hemorrhoids after first pregnancy.  Has had two vaginal deliveries with tearing. No difficulty holding bowel movements.  Not on any blood thinners.     Last colonoscopy 1/29/25 with hemorrhoids only. No family history of colorectal cancer.    Physical Examination:  /83 (BP Location: Left arm, Patient Position: Sitting, Cuff Size: Adult Regular)   Pulse 76   Ht 5' 5\"   Wt 155 lb 11.2 oz   SpO2 98%   BMI 25.91 kg/m    General: alert, oriented, in no acute distress, sitting comfortably  Chaperone: Ashlee Reilly MA   Perianal External Examination:  Perianal skin: Intact with no excoriation or lichenification.  Lesions: No evidence of an external lesion, nodularity, or induration in the perianal region.  Eversion of buttocks: There was not evidence of an anal fissure. Details: N/A.  Skin tags or external hemorrhoids: Yes: Circumferential hemorrhoidal skin tags with a longer skin tag in the anterior position.    Digital Rectal Examination: Was performed.   Sphincter tone: Good.  Palpable lesions: No.  Other: None.  Bimanual examination: was not performed.    Anoscopy: Was performed.   Hemorrhoids: Yes. " Grade 2-3 internal hemorrhoids without active bleeding  Lesions: No.    Assessment/Plan: Sharon Fong is a 49 year old female with breast cancer on adjuvant letrozole/ribo with hemorrhoid prolapse and anal skin tag. Discussed that skin tag would require surgical intervention, which she is not ready for yet given her breast cancer treatment and plan for oophorectomy soon. Advised a daily fiber supplement to improve stool consistency and can discuss surgical intervention in the future if she would like. Patient's questions were answered to her stated satisfaction and she is in agreement with this plan.       Past Medical History:   Diagnosis Date    Breast cancer (H)     Hypermobility arthralgia     Hypotension, unspecified hypotension type 07/29/2024    Malignant neoplasm of upper-inner quadrant of right female breast (H) 04/02/2024    Right breast cancer  Has had longstanding history of multiple biopsies, infection, and partial mastectomy for a right breast multifocal abscess dating back to at least 2010.     2024 patient presented with palpable lump at 1:00 in the right breast     3/21/2024 diagnostic mammo showed clustered fine indeterminate calcs at 12:00.  No mammographic abnormality identified to correspond to the palpable    Migraine     S/P radiation therapy     6,000 cGy to right CW + Boost and 5,000 cGy to right SCV completed on 12/27/2024 - [facility]    Seizure (H) 11/15/2013    Seizure disorder (H) 2014    Once post surgery    Vitamin D deficiency 04/23/2013 April 2013 July 2013       Past Surgical History:   Procedure Laterality Date    AS KNEE SCOPE, DIAGNOSTIC      BIOPSY NODE SENTINEL Right 9/30/2024    Procedure: RIGHT RADIO-LOCALIZED SENTINEL LYPMH NODE BIOPSY;  Surgeon: Boom Collier MD;  Location: [facility] OR    COLONOSCOPY N/A 1/29/2025    Procedure: Colonoscopy;  Surgeon: Roldan Delacruz MD;  Location:  GI    COSMETIC SURGERY Bilateral     removal of axillary  excessive tissue    ESOPHAGOSCOPY, GASTROSCOPY, DUODENOSCOPY (EGD), COMBINED N/A 1/29/2025    Procedure: ESOPHAGOGASTRODUODENOSCOPY, WITH BIOPSY;  Surgeon: Roldan Delacruz MD;  Location: [facility] GI    HYSTERECTOMY  12/2022    fibroids    INSERT PORT VASCULAR ACCESS Left 4/17/2024    Procedure: Insert port vascular access chest;  Surgeon: Chapin Loza MD;  Location: [facility] OR    IR CHEST PORT PLACEMENT > 5 YRS OF AGE  4/17/2024    IR LYMPH NODE BIOPSY  03/28/2024    LUMPECTOMY BREAST Right 10/08/2010    for abscess    MASTECTOMY SIMPLE BILATERAL Bilateral 9/30/2024    Procedure: BILATERAL SIMPLE MASTECTOMY;  Surgeon: Boom Collier MD;  Location: [facility] OR    REMOVE PORT VASCULAR ACCESS Left 9/30/2024    Procedure: Remove port vascular access left;  Surgeon: Boom Collier MD;  Location: [facility] OR     Current Outpatient Medications   Medication Sig Dispense Refill    aspirin-acetaminophen-caffeine (EXCEDRIN MIGRAINE) 250-250-65 MG tablet       ibuprofen (ADVIL/MOTRIN) 200 MG capsule as needed.      letrozole (FEMARA) 2.5 MG tablet TAKE ONE TABLET BY MOUTH ONE TIME DAILY 90 tablet 3    mometasone (ELOCON) 0.1 % external cream Apply topically daily. to radiation treatment field.  Stop application 1 week after completing treatment. 45 g 1    ribociclib (KISQALI) (400 MG DOSE) 200 MG tablet therapy pack Take 2 tablets (400 mg) by mouth every morning for 21 days. Then off for 7 days. 42 tablet 0     Allergies   Allergen Reactions    Citrullus Vulgaris Unknown and Other (See Comments)     Rash, swollen lips    Corn-Containing Products Other (See Comments)    Paclitaxel Other (See Comments)     PAIN in shoulder and pelvis - see infusion notes from 4/30/24 and 5/7/24    Salicylic Acid Unknown and Other (See Comments)     Family History   Problem Relation Age of Onset    Hypertension Mother     Hypertension Father     Cerebrovascular Disease Father     Thyroid Disease Sister     Glaucoma No family hx of     Macular  Degeneration No family hx of      Social History     Tobacco Use    Smoking status: Never     Passive exposure: Never    Smokeless tobacco: Never   Substance Use Topics    Alcohol use: Never    Marital status: .    TEETEE Eric CNP  Colon and Rectal Surgery   [facility]      No LOS data to display  Time spent on date of encounter doing chart review, history and exam, documentation, and further activities in this note. An additional 2 minutes was spent for anoscopy.

## 2025-04-15 NOTE — PATIENT INSTRUCTIONS
Start a daily fiber supplement such as Citrucel or Metamucil POWDER. Start with 2 tablespoons daily and slowly increase up to three times a day, if needed, over the next 4-6 weeks. If you are taking the fiber supplement three times a day that means you are taking 2 tablespoons three times a day (total 6 tablespoons daily).   Reach out if you decide you want to go forward with surgery with skin tag removal and hemorrhoidectomy.

## 2025-04-15 NOTE — LETTER
"4/15/2025       RE: Sharon Fong  [address] [address]     Dear Colleague,    Thank you for referring your patient, Sharon Fong, to the [facility] COLON [facility] RE[facility]IN[facility] [place] [facility]. Please see a copy of my visit note below.    Colon and Rectal Surgery Consult Clinic Note    Referring provider:  Roldan Delacruz MD  [address] [facility]  [place] [address]     Patient: Sharon Fong  YOB: 1975  Date of Visit: 4/15/2025    Sharon Fong is a very pleasant 49 year old female with HER2 negative breast cancer s/p chemoradiation on adjuvant letrozole/ribo here with concerns for hemorrhoids.    HPI  Has hemorrhoids that prolapse out but she does not manually reduce. Has pain with sitting and squatting. Had some bleeding when had diarrhea last year with chemotherapy for breast cancer. Bowel movements are now hard to normal. Has some pain with bowel movements but no sharp pain. No bowel medications. Uses tucks pads and lidocaine cream on her hemorrhoids, which helps some. Had an office procedure on hemorrhoids after first pregnancy.  Has had two vaginal deliveries with tearing. No difficulty holding bowel movements.  Not on any blood thinners.     Last colonoscopy 1/29/25 with hemorrhoids only. No family history of colorectal cancer.    Physical Examination:  /83 (BP Location: Left arm, Patient Position: Sitting, Cuff Size: Adult Regular)   Pulse 76   Ht 5' 5\"   Wt 155 lb 11.2 oz   SpO2 98%   BMI 25.91 kg/m    General: alert, oriented, in no acute distress, sitting comfortably  Chaperone: Ashlee Reilly MA   Perianal External Examination:  Perianal skin: Intact with no excoriation or lichenification.  Lesions: No evidence of an external lesion, nodularity, or induration in the perianal region.  Eversion of buttocks: There was not evidence of an anal fissure. " Details: N/A.  Skin tags or external hemorrhoids: Yes: Circumferential hemorrhoidal skin tags with a longer skin tag in the anterior position.    Digital Rectal Examination: Was performed.   Sphincter tone: Good.  Palpable lesions: No.  Other: None.  Bimanual examination: was not performed.    Anoscopy: Was performed.   Hemorrhoids: Yes. Grade 2-3 internal hemorrhoids without active bleeding  Lesions: No.    Assessment/Plan: Sharon Fong is a 49 year old female with breast cancer on adjuvant letrozole/ribo with hemorrhoid prolapse and anal skin tag. Discussed that skin tag would require surgical intervention, which she is not ready for yet given her breast cancer treatment and plan for oophorectomy soon. Advised a daily fiber supplement to improve stool consistency and can discuss surgical intervention in the future if she would like. Patient's questions were answered to her stated satisfaction and she is in agreement with this plan.       Past Medical History:   Diagnosis Date     Breast cancer (H)      Hypermobility arthralgia      Hypotension, unspecified hypotension type 07/29/2024     Malignant neoplasm of upper-inner quadrant of right female breast (H) 04/02/2024    Right breast cancer  Has had longstanding history of multiple biopsies, infection, and partial mastectomy for a right breast multifocal abscess dating back to at least 2010.     2024 patient presented with palpable lump at 1:00 in the right breast     3/21/2024 diagnostic mammo showed clustered fine indeterminate calcs at 12:00.  No mammographic abnormality identified to correspond to the palpable     Migraine      S/P radiation therapy     6,000 cGy to right CW + Boost and 5,000 cGy to right SCV completed on 12/27/2024 - [facility]     Seizure (H) 11/15/2013     Seizure disorder (H) 2014    Once post surgery     Vitamin D deficiency 04/23/2013 April 2013 July 2013       Past Surgical History:   Procedure Laterality  Date     AS KNEE SCOPE, DIAGNOSTIC       BIOPSY NODE SENTINEL Right 9/30/2024    Procedure: RIGHT RADIO-LOCALIZED SENTINEL LYPMH NODE BIOPSY;  Surgeon: Boom Collier MD;  Location: [facility] OR     COLONOSCOPY N/A 1/29/2025    Procedure: Colonoscopy;  Surgeon: Roldan Delacruz MD;  Location:  GI     COSMETIC SURGERY Bilateral     removal of axillary excessive tissue     ESOPHAGOSCOPY, GASTROSCOPY, DUODENOSCOPY (EGD), COMBINED N/A 1/29/2025    Procedure: ESOPHAGOGASTRODUODENOSCOPY, WITH BIOPSY;  Surgeon: Roldan Delacruz MD;  Location:  GI     HYSTERECTOMY  12/2022    fibroids     INSERT PORT VASCULAR ACCESS Left 4/17/2024    Procedure: Insert port vascular access chest;  Surgeon: Chapin Loza MD;  Location: [facility] OR     IR CHEST PORT PLACEMENT > 5 YRS OF AGE  4/17/2024     IR LYMPH NODE BIOPSY  03/28/2024     LUMPECTOMY BREAST Right 10/08/2010    for abscess     MASTECTOMY SIMPLE BILATERAL Bilateral 9/30/2024    Procedure: BILATERAL SIMPLE MASTECTOMY;  Surgeon: Boom Collier MD;  Location: [facility] OR     REMOVE PORT VASCULAR ACCESS Left 9/30/2024    Procedure: Remove port vascular access left;  Surgeon: Boom Collier MD;  Location: [facility] OR     Current Outpatient Medications   Medication Sig Dispense Refill     aspirin-acetaminophen-caffeine (EXCEDRIN MIGRAINE) 250-250-65 MG tablet        ibuprofen (ADVIL/MOTRIN) 200 MG capsule as needed.       letrozole (FEMARA) 2.5 MG tablet TAKE ONE TABLET BY MOUTH ONE TIME DAILY 90 tablet 3     mometasone (ELOCON) 0.1 % external cream Apply topically daily. to radiation treatment field.  Stop application 1 week after completing treatment. 45 g 1     ribociclib (KISQALI) (400 MG DOSE) 200 MG tablet therapy pack Take 2 tablets (400 mg) by mouth every morning for 21 days. Then off for 7 days. 42 tablet 0     Allergies   Allergen Reactions     Citrullus Vulgaris Unknown and Other (See Comments)     Rash, swollen lips     Corn-Containing Products Other (See Comments)      Paclitaxel Other (See Comments)     PAIN in shoulder and pelvis - see infusion notes from 4/30/24 and 5/7/24     Salicylic Acid Unknown and Other (See Comments)     Family History   Problem Relation Age of Onset     Hypertension Mother      Hypertension Father      Cerebrovascular Disease Father      Thyroid Disease Sister      Glaucoma No family hx of      Macular Degeneration No family hx of      Social History     Tobacco Use     Smoking status: Never     Passive exposure: Never     Smokeless tobacco: Never   Substance Use Topics     Alcohol use: Never    Marital status: .    TEETEE Eric CNP  Colon and Rectal Surgery   [facility]      No LOS data to display  Time spent on date of encounter doing chart review, history and exam, documentation, and further activities in this note. An additional 2 minutes was spent for anoscopy.      Again, thank you for allowing me to participate in the care of your patient.      Sincerely,    TEETEE Eric CNP

## 2025-04-21 DIAGNOSIS — Z17.0 MALIGNANT NEOPLASM OF UPPER-INNER QUADRANT OF RIGHT BREAST IN FEMALE, ESTROGEN RECEPTOR POSITIVE (H): Primary | ICD-10-CM

## 2025-04-21 DIAGNOSIS — C50.211 MALIGNANT NEOPLASM OF UPPER-INNER QUADRANT OF RIGHT BREAST IN FEMALE, ESTROGEN RECEPTOR POSITIVE (H): Primary | ICD-10-CM

## 2025-04-23 ENCOUNTER — PATIENT OUTREACH (OUTPATIENT)
Dept: CARE COORDINATION | Facility: CLINIC | Age: 50
End: 2025-04-23

## 2025-04-28 ENCOUNTER — THERAPY VISIT (OUTPATIENT)
Dept: PHYSICAL THERAPY | Facility: CLINIC | Age: 50
End: 2025-04-28
Attending: INTERNAL MEDICINE
Payer: COMMERCIAL

## 2025-04-28 DIAGNOSIS — Z17.0 MALIGNANT NEOPLASM OF UPPER-INNER QUADRANT OF RIGHT BREAST IN FEMALE, ESTROGEN RECEPTOR POSITIVE (H): Primary | ICD-10-CM

## 2025-04-28 DIAGNOSIS — M54.2 NECK PAIN: ICD-10-CM

## 2025-04-28 DIAGNOSIS — C50.211 MALIGNANT NEOPLASM OF UPPER-INNER QUADRANT OF RIGHT BREAST IN FEMALE, ESTROGEN RECEPTOR POSITIVE (H): Primary | ICD-10-CM

## 2025-04-28 PROCEDURE — 97110 THERAPEUTIC EXERCISES: CPT | Mod: GP

## 2025-04-28 PROCEDURE — 97161 PT EVAL LOW COMPLEX 20 MIN: CPT | Mod: GP

## 2025-04-28 NOTE — PROGRESS NOTES
PHYSICAL THERAPY EVALUATION  Type of Visit: Evaluation       Fall Risk Screen:  Have you fallen and had an injury in the past year?: No      Subjective         Presenting condition or subjective complaint: Shoulder and neck pain  Date of onset: 04/14/25    Relevant medical history: Anemia; Cancer; Menopause; Migraines or headaches; Radiation treatment   Dates & types of surgery:      Prior diagnostic imaging/testing results: Other MRI is scheduled for My 9th   Prior therapy history for the same diagnosis, illness or injury: No      Prior Level of Function  Transfers:   Ambulation:   ADL:   IADL:     Living Environment  Social support: With family members   Type of home: House; 2-story   Stairs to enter the home: Yes 5 Is there a railing: Yes     Ramp: No   Stairs inside the home: Yes 15 Is there a railing: Yes     Help at home: None  Equipment owned:       Employment: Yes   Hobbies/Interests:      Patient goals for therapy: Would like to get my daily chores done with no shoulder , nect pain and headaches      PHYSICAL THERAPY CERVICAL EVALUATION    SUBJECTIVE:  Sharon is a 49 year old female who reports that since starting Chemotherapy she has had a lot of joint paints. She is back to work now, but has found her neck and right upper trap is quite sore. She also has headaches every day. Her headaches start in the back of the head bilaterally and travel to the forehead.  She does stretches daily.    Patient reports symptoms of:  Pain and Stiffness / Tightness    Patient report of Pain:  Pain Rating Now: 0/10  Pain Rating at Best: 0/10  Pain Rating at Worst: 8/10  Pain Location: Right Upper Trap, Neck bilaterally, posterior head radiating to anterior head bilaterally  Pain Quality/Description: Achy with some tightness  Pain Better with: Pain Medication  Pain Worse with: Lifting, Life, Working, Reaching overhead, Cooking  Progression of Symptoms: Unchanged    Patient reports Red Flags symptoms  of:  History of Malignancy    OBJECTIVE:  Cervical AROM  Cervical Flexion: No Restriction and WNL with left sided and central spine tightness  Cervical Extension: No Restriction and WNL with no pain  Cervical Right Side Bend: No Restriction and WNL with no pain  Cervical Left Side Bend: No Restriction and WNL with no pain  Cervical Right Rotation: No Restriction and WNL with pain in right UT  Cervical Left Rotation: No Restriction and WNL with no pain    Upper Extremity Manual Muscle Test / Myotome Assessment:  Shoulder Flexion (C5): Right Shoulder 4+/5, Left Shoulder 4+/5  Shoulder Abduction (C5): Right Shoulder 4+/5, Left Shoulder 4+/5  Shoulder Abduction (C5): Right Shoulder 4+/5, Left Shoulder 4+/5  Elbow Flexion (C6): Right Elbow 4+/5, Left Elbow 4+/5  Elbow Extension (C7): Right Elbow 4+/5, Left Elbow 4+/5  Wrist Extension (C6): Right Wrist 4+/5, Left Wrist 4+/5  Thumb Extension (C8): Right Thumb 4+/5, Left Thumb 4+/5  Finger Abduction / Adduction (T1): Right Hand 4+/5, Left Hand 4+/5    Upper Extremity Neural Tension Assessment  Right Median Nerve: Positive with Elbow Extension   Left Median Nerve: Positive with Elbow Extension, Wrist Extension and Lateral Cervical Side Bend    Right Radial Nerve: Positive with Elbow Extension  Left Radial Nerve: Positive with Elbow Extension, Wrist/Finger Flexion and Cervical Side Bend    Right Ulnar Nerve: Positive with Elbow Flexion, Wrist Extension, Hand to Check and Lateral Cervical Side Bend  Left Ulnar Nerve: Negative Ulnar Nerve Tension     Cervical Special Tests  Right Side Spurlings Test: Negative  Left Side Spurlings Test: Negative    Sami JOET Repeated Movements Cervical Assessment:  Repeated Cervical Flexion: No Effect  Repeated Cervical Extension: No Effect  Repeated Cervical Protraction: No Effect  Repeated Cervical Retraction: Better and stretched  Repeated Cervical Retraction with Overpressure: Worse and irritated  Repeated Cervical Retraction with  Overpressure in Supine:     Sami MDT Classification: Other / Inconclusive: Retraction responder with general muscular tightness       ASSESSMENT:  Sharon is a 49 year old female referred to Physical Therapy for Neck Pain   from [facility] Caballero.  Sharon demonstrates findings of Pain and Weakness that justify a need for formal Physical Therapy. These impairments interfere with their ability to perform self care tasks, work tasks, recreational activities, household chores, driving , household mobility, and community mobility as compared to their previous level of function.    Medical Diagnosis: Neck Pain    Treatment Diagnosis: Neck Pain     Clinical Decision Making (Complexity):  Clinical Presentation: Stable/Uncomplicated  Clinical Presentation Rationale: based on medical and personal factors listed in PT evaluation  Clinical Decision Making (Complexity): Low complexity      PHYSICAL THERAPY PLAN OF CARE:  Treatment Interventions:  Modalities: Cryotherapy, Dry Needling, E-stim, Hot Pack, Mechanical Traction, Ultrasound  Interventions: Gait Training, Manual Therapy, Neuromuscular Re-education, Therapeutic Activity, Therapeutic Exercise    Long Term Goals     PT Goal 1  Goal Identifier: Work and Reaching Overhead  Goal Description: Will be able to work 6 hours consecuitively without neck pain worse than 2/10 and will be able to lift 2 lbs overhead without neck or shoulder pain worse than 2/10  Rationale: to maximize safety and independence with performance of ADLs and functional tasks;to maximize safety and independence with self cares;to maximize safety and independence with transportation;to maximize safety and independence within the community;to maximize safety and independence within the home  Goal Progress: Gets 8/10 pain working, cooking and reaching overhead  Target Date: 06/23/25    Frequency of Treatment: 1x a week  Duration of Treatment: 8 weeks         Risks and benefits of evaluation/treatment have  been explained.   Patient/Family/caregiver agrees with Plan of Care.      Evaluation Time:     PT Eval, Low Complexity Minutes (50978): 28         Signing Clinician: Delonte Ayala PT        SUMMARY OF PLAN OF CARE:  Sharon presents with neck pain with tightness and cervicogenic headaches on the right side of her neck and shoulders. She does have neural tension and bilateral UE weakness. She should respond well to general mobility program, focused retraction, manual therapy and progression to strengthening and general conditioning

## 2025-05-05 ENCOUNTER — THERAPY VISIT (OUTPATIENT)
Dept: PHYSICAL THERAPY | Facility: CLINIC | Age: 50
End: 2025-05-05
Attending: INTERNAL MEDICINE
Payer: COMMERCIAL

## 2025-05-05 DIAGNOSIS — M54.2 NECK PAIN: Primary | ICD-10-CM

## 2025-05-05 PROCEDURE — 97110 THERAPEUTIC EXERCISES: CPT | Mod: GP

## 2025-05-05 PROCEDURE — 97140 MANUAL THERAPY 1/> REGIONS: CPT | Mod: GP

## 2025-05-12 ENCOUNTER — RESULTS FOLLOW-UP (OUTPATIENT)
Dept: ONCOLOGY | Facility: CLINIC | Age: 50
End: 2025-05-12

## 2025-05-12 ENCOUNTER — THERAPY VISIT (OUTPATIENT)
Dept: PHYSICAL THERAPY | Facility: CLINIC | Age: 50
End: 2025-05-12
Attending: INTERNAL MEDICINE
Payer: COMMERCIAL

## 2025-05-12 DIAGNOSIS — M54.2 NECK PAIN: Primary | ICD-10-CM

## 2025-05-12 PROCEDURE — 97110 THERAPEUTIC EXERCISES: CPT | Mod: GP

## 2025-05-19 DIAGNOSIS — Z17.0 MALIGNANT NEOPLASM OF UPPER-INNER QUADRANT OF RIGHT BREAST IN FEMALE, ESTROGEN RECEPTOR POSITIVE (H): Primary | ICD-10-CM

## 2025-05-19 DIAGNOSIS — C50.211 MALIGNANT NEOPLASM OF UPPER-INNER QUADRANT OF RIGHT BREAST IN FEMALE, ESTROGEN RECEPTOR POSITIVE (H): Primary | ICD-10-CM

## 2025-05-21 ENCOUNTER — THERAPY VISIT (OUTPATIENT)
Dept: PHYSICAL THERAPY | Facility: CLINIC | Age: 50
End: 2025-05-21
Payer: COMMERCIAL

## 2025-05-21 DIAGNOSIS — M54.2 NECK PAIN: Primary | ICD-10-CM

## 2025-05-21 PROCEDURE — 97110 THERAPEUTIC EXERCISES: CPT | Mod: GP

## 2025-05-27 DIAGNOSIS — Z17.0 MALIGNANT NEOPLASM OF UPPER-INNER QUADRANT OF RIGHT BREAST IN FEMALE, ESTROGEN RECEPTOR POSITIVE (H): Primary | ICD-10-CM

## 2025-05-27 DIAGNOSIS — C50.211 MALIGNANT NEOPLASM OF UPPER-INNER QUADRANT OF RIGHT BREAST IN FEMALE, ESTROGEN RECEPTOR POSITIVE (H): Primary | ICD-10-CM

## 2025-05-28 ENCOUNTER — THERAPY VISIT (OUTPATIENT)
Dept: PHYSICAL THERAPY | Facility: CLINIC | Age: 50
End: 2025-05-28
Payer: COMMERCIAL

## 2025-05-28 DIAGNOSIS — M54.2 NECK PAIN: Primary | ICD-10-CM

## 2025-05-28 PROCEDURE — 97110 THERAPEUTIC EXERCISES: CPT | Mod: GP

## 2025-05-29 DIAGNOSIS — R53.81 PHYSICAL DECONDITIONING: Primary | ICD-10-CM

## 2025-06-08 ENCOUNTER — HEALTH MAINTENANCE LETTER (OUTPATIENT)
Age: 50
End: 2025-06-08

## 2025-06-11 ENCOUNTER — THERAPY VISIT (OUTPATIENT)
Dept: PHYSICAL THERAPY | Facility: CLINIC | Age: 50
End: 2025-06-11
Payer: COMMERCIAL

## 2025-06-11 DIAGNOSIS — M54.2 NECK PAIN: Primary | ICD-10-CM

## 2025-06-11 PROCEDURE — 97140 MANUAL THERAPY 1/> REGIONS: CPT | Mod: GP

## 2025-06-11 PROCEDURE — 97110 THERAPEUTIC EXERCISES: CPT | Mod: GP

## 2025-06-11 ASSESSMENT — ACTIVITIES OF DAILY LIVING (ADL)
GIVING WAY, BUCKLING OR SHIFTING OF KNEE: THE SYMPTOM AFFECTS MY ACTIVITY MODERATELY
LIMPING: THE SYMPTOM AFFECTS MY ACTIVITY MODERATELY
SIT WITH YOUR KNEE BENT: ACTIVITY IS VERY DIFFICULT
WEAKNESS: THE SYMPTOM AFFECTS MY ACTIVITY MODERATELY
WALK: ACTIVITY IS MINIMALLY DIFFICULT
PAIN: THE SYMPTOM AFFECTS MY ACTIVITY MODERATELY
WEAKNESS: THE SYMPTOM AFFECTS MY ACTIVITY MODERATELY
STIFFNESS: THE SYMPTOM AFFECTS MY ACTIVITY MODERATELY
WALK: ACTIVITY IS MINIMALLY DIFFICULT
PLEASE_INDICATE_YOR_PRIMARY_REASON_FOR_REFERRAL_TO_THERAPY:: KNEE
RAW_SCORE: 35
WASHING_YOUR_BACK: 4
CARRYING_A_HEAVY_OBJECT_OF_10_POUNDS: 4
STIFFNESS: THE SYMPTOM AFFECTS MY ACTIVITY MODERATELY
RISE FROM A CHAIR: ACTIVITY IS MINIMALLY DIFFICULT
LIMPING: THE SYMPTOM AFFECTS MY ACTIVITY MODERATELY
KNEEL ON THE FRONT OF YOUR KNEE: ACTIVITY IS SOMEWHAT DIFFICULT
KNEE_ACTIVITY_OF_DAILY_LIVING_SUM: 35
PUTTING_ON_YOUR_PANTS: 3
REACHING_FOR_SOMETHING_ON_A_HIGH_SHELF: 3
GO UP STAIRS: ACTIVITY IS SOMEWHAT DIFFICULT
TOUCHING_THE_BACK_OF_YOUR_NECK: 3
SQUAT: I AM UNABLE TO DO THE ACTIVITY
PUSHING_WITH_THE_INVOLVED_ARM: 3
STAND: ACTIVITY IS MINIMALLY DIFFICULT
WASHING_YOUR_HAIR?: 3
WHEN_LYING_ON_THE_INVOLVED_SIDE: 4
SWELLING: THE SYMPTOM AFFECTS MY ACTIVITY MODERATELY
PUTTING_ON_AN_UNDERSHIRT_OR_A_PULLOVER_SWEATER: 3
SWELLING: THE SYMPTOM AFFECTS MY ACTIVITY MODERATELY
AT_ITS_WORST?: 6
GO UP STAIRS: ACTIVITY IS SOMEWHAT DIFFICULT
SIT WITH YOUR KNEE BENT: ACTIVITY IS VERY DIFFICULT
PUTTING_ON_A_SHIRT_THAT_BUTTONS_DOWN_THE_FRONT: 2
SQUAT: I AM UNABLE TO DO THE ACTIVITY
KNEEL ON THE FRONT OF YOUR KNEE: ACTIVITY IS SOMEWHAT DIFFICULT
GIVING WAY, BUCKLING OR SHIFTING OF KNEE: THE SYMPTOM AFFECTS MY ACTIVITY MODERATELY
PLACING_AN_OBJECT_ON_A_HIGH_SHELF: 3
PAIN: THE SYMPTOM AFFECTS MY ACTIVITY MODERATELY
PLEASE_INDICATE_YOR_PRIMARY_REASON_FOR_REFERRAL_TO_THERAPY:: SHOULDER
REMOVING_SOMETHING_FROM_YOUR_BACK_POCKET: 2
KNEE_ACTIVITY_OF_DAILY_LIVING_SCORE: 50
RISE FROM A CHAIR: ACTIVITY IS MINIMALLY DIFFICULT
STAND: ACTIVITY IS MINIMALLY DIFFICULT
GO DOWN STAIRS: ACTIVITY IS MINIMALLY DIFFICULT
GO DOWN STAIRS: ACTIVITY IS MINIMALLY DIFFICULT

## 2025-06-16 ENCOUNTER — THERAPY VISIT (OUTPATIENT)
Dept: PHYSICAL THERAPY | Facility: CLINIC | Age: 50
End: 2025-06-16
Payer: COMMERCIAL

## 2025-06-16 DIAGNOSIS — C50.211 MALIGNANT NEOPLASM OF UPPER-INNER QUADRANT OF RIGHT BREAST IN FEMALE, ESTROGEN RECEPTOR POSITIVE (H): Primary | ICD-10-CM

## 2025-06-16 DIAGNOSIS — Z17.0 MALIGNANT NEOPLASM OF UPPER-INNER QUADRANT OF RIGHT BREAST IN FEMALE, ESTROGEN RECEPTOR POSITIVE (H): Primary | ICD-10-CM

## 2025-06-16 DIAGNOSIS — R29.898 MUSCULAR DECONDITIONING: Primary | ICD-10-CM

## 2025-06-16 PROCEDURE — 97161 PT EVAL LOW COMPLEX 20 MIN: CPT | Mod: GP

## 2025-06-16 PROCEDURE — 97110 THERAPEUTIC EXERCISES: CPT | Mod: GP

## 2025-06-16 NOTE — PROGRESS NOTES
PHYSICAL THERAPY EVALUATION  Type of Visit: Evaluation       Fall Risk Screen:  Have you fallen 2 or more times in the past year?: No  Have you fallen and had an injury in the past year?: No  Is patient receiving Physical Therapy Services?: No    Subjective         Presenting condition or subjective complaint:    Date of onset: 04/14/25    Relevant medical history:     Dates & types of surgery:      Prior diagnostic imaging/testing results: Bone scan     Prior therapy history for the same diagnosis, illness or injury: No      Prior Level of Function  Transfers:   Ambulation:   ADL:   IADL:     Living Environment  Social support: With family members   Type of home: House; 2-story   Stairs to enter the home: Yes       Ramp:     Stairs inside the home:         Help at home: Home and Yard maintenance tasks  Equipment owned:       Employment:      Hobbies/Interests:      Patient goals for therapy:        SUBJECTIVE:  Sharon is a 49 year old female who reports that she has had Physical Therapy for her neck and headaches. This has resolved her pain and revealed generalized deconditioning throughout her body. She arrives today with no pain just muscular soreness.    Patient reports symptoms of:  Pain, Stiffness / Tightness, and Weakness    Patient report of Pain:  Pain Rating Now: 0/10  Pain Rating at Best: 0/10  Pain Rating at Worst: 8/10  Pain Location: Quads, Knees, Neck  Pain Quality/Description: Soreness  Pain Better with: Rest  Pain Worse with: Getting up out of a chair  Progression of Symptoms: Imporving    Patient reports Red Flags symptoms of:  None    OBJECTIVE:  Cervical AROM  Cervical Flexion: No Restriction and WNL  Cervical Extension: No Restriction and WNL  Cervical Right Side Bend: No Restriction and WNL  Cervical Left Side Bend: No Restriction and WNL  Cervical Right Rotation: No Restriction and WNL (84 degrees)  Cervical Left Rotation: No Restriction and WNL (84 degrees)    Standing Lumbar Active  ROM:  Standing Lumbar Flexion (Hands slide down thighs):Fingertips to Toes  Standing Lumbar Extension: No Restriction and WNL     Supine Knee ROM:   Right Knee: 0 - 145   Left Knee: 0 - 145    Upper Extremity Manual Muscle Test / Myotome Assessment:  Shoulder Flexion (C5): Right Shoulder 5/5, Left Shoulder 5/5  Shoulder Abduction (C5): Right Shoulder 5/5, Left Shoulder 5/5  Shoulder External Rotation (C5): Right Shoulder 4+/5, Left Shoulder 4+/5  Shoulder Internal Rotation: Right Shoulder 4+/5, Left Shoulder 4+/5  Elbow Flexion (C6): Right Elbow 4+/5, Left Elbow 4+/5  Elbow Extension (C7): Right Elbow 4+/5, Left Elbow 4+/5  Wrist Extension (C6): Right Wrist 4+/5, Left Wrist 4+/5  Thumb Extension (C8): Right Thumb 5/5, Left Thumb 5/5  Finger Abduction / Adduction (T1): Right Hand 5/5, Left Hand 5/5    Seated Lower Extremity Manual Muscle Test / Myotome Assessment:  Hip Flexion (L2): Right Leg 5/5, Left Leg 5/5  Knee Extension (L3): Right Leg 4+/5, Left Leg 4+/5  Ankle Dorsiflexion (L4): Right Leg 5/5, Left Leg 5/5  Great Toe (L5): Right Leg 5/5, Left Leg 5/5  Ankle Plantarflexion (S1): Right Leg 5/5, Left Leg 5/5  Knee Flexion (S2): Right Leg 4/5, Left Leg 4/5 (Both Painful Bilaterally)    Functional Strength Testing:    Strength: Right Hand: 35 lbs, Left Hand: 36 lbs    30 Second Sit to Stand Test: 12 reps (Caused bilateral knee pain and knee soreness)    Single Leg Bridge Endurance 1st: Left Glute 4 Reps, Right Glute: 5 reps (Caused bilateral thigh soreness and fatigue)    Single Leg Calf Raise: Left Calf: 17 reps, Right Calf: 25 reps    Double Leg Squat Technique: Increased Forward Trunk Lean and Increased Hip Hinge. Reduced Glute Use.    Upper Extremity Neural Tension Assessment  Right Median Nerve: Positive with Elbow Extension, Wrist Extension and Lateral Cervical Side Bend   Left Median Nerve: Positive with Elbow Extension, Wrist Extension and Lateral Cervical Side Bend    Right Radial Nerve: Negative for  Radial Nerve Tension  Left Radial Nerve: Negative for Radial Nerve Tension    Right Ulnar Nerve: Negative Ulnar Nerve Tension  Left Ulnar Nerve: Negative Ulnar Nerve Tension     Seated Neural Tension Assessment:   Slump Test: Right Leg: Positive with Ankle Dorsiflexion combined with Cervical Flexion, Left Leg:Positive with Ankle Dorsiflexion combined with Cervical Flexion  Both Positive and Equal Bilaterally. No Asymmetry        ASSESSMENT:  Sharon is a 49 year old female referred to Physical Therapy for Generalized Deconditioning   from Gena Caballero.  Sharon demonstrates findings of Pain, Weakness, and Loss of Function that justify a need for formal Physical Therapy. These impairments interfere with their ability to perform self care tasks, work tasks, recreational activities, household chores, driving , household mobility, and community mobility as compared to their previous level of function.    Medical Diagnosis: Generalized Deconditioning    Treatment Diagnosis: Generalized Deconditioning     Clinical Decision Making (Complexity):  Clinical Presentation: Stable/Uncomplicated  Clinical Presentation Rationale: based on medical and personal factors listed in PT evaluation  Clinical Decision Making (Complexity): Low complexity      PHYSICAL THERAPY PLAN OF CARE:  Treatment Interventions:  Modalities: Cryotherapy, Cupping, Dry Needling, E-stim, Hot Pack, Mechanical Traction, Ultrasound  Interventions: Gait Training, Manual Therapy, Neuromuscular Re-education, Therapeutic Activity, Therapeutic Exercise    Long Term Goals     PT Goal 1  Goal Identifier: Transfers and Stairs  Goal Description: Will be able to ascend and descends of 5 flights of stairs without fatigue or pain as well as transfer from any / all surfaces 10 times consecuitively without pain or difficulty  Rationale: to maximize safety and independence with performance of ADLs and functional tasks;to maximize safety and independence within the  community;to maximize safety and independence within the home;to maximize safety and independence with transportation;to maximize safety and independence with self cares  Goal Progress: Knee pain with transfers. Fatigue with 2 flights of stairs  Target Date: 08/11/25    Frequency of Treatment: 1x a week  Duration of Treatment: 8 weeks         Risks and benefits of evaluation/treatment have been explained.   Patient/Family/caregiver agrees with Plan of Care.      Evaluation Time:           Signing Clinician: Delonte Ayala PT        SUMMARY OF PLAN OF CARE:  Sharon presents with general deconditioning following cancer treatment. She initially had neck pain which has improved with physical therapy, but now presents with general weakness and pains with functional limitations secondary to this weakness. We will start with a strengthening program for her entire body and progress based off of her response.

## 2025-06-23 ENCOUNTER — VIRTUAL VISIT (OUTPATIENT)
Dept: ONCOLOGY | Facility: CLINIC | Age: 50
End: 2025-06-23
Attending: INTERNAL MEDICINE
Payer: COMMERCIAL

## 2025-06-23 DIAGNOSIS — N95.1 MENOPAUSAL SYNDROME (HOT FLASHES): Primary | ICD-10-CM

## 2025-06-23 DIAGNOSIS — C50.211 MALIGNANT NEOPLASM OF UPPER-INNER QUADRANT OF RIGHT BREAST IN FEMALE, ESTROGEN RECEPTOR POSITIVE (H): Primary | ICD-10-CM

## 2025-06-23 DIAGNOSIS — C50.211 MALIGNANT NEOPLASM OF UPPER-INNER QUADRANT OF RIGHT BREAST IN FEMALE, ESTROGEN RECEPTOR POSITIVE (H): Primary | Chronic | ICD-10-CM

## 2025-06-23 DIAGNOSIS — Z17.0 MALIGNANT NEOPLASM OF UPPER-INNER QUADRANT OF RIGHT BREAST IN FEMALE, ESTROGEN RECEPTOR POSITIVE (H): Primary | Chronic | ICD-10-CM

## 2025-06-23 DIAGNOSIS — Z17.0 MALIGNANT NEOPLASM OF UPPER-INNER QUADRANT OF RIGHT BREAST IN FEMALE, ESTROGEN RECEPTOR POSITIVE (H): Primary | ICD-10-CM

## 2025-06-23 PROCEDURE — 98006 SYNCH AUDIO-VIDEO EST MOD 30: CPT | Performed by: INTERNAL MEDICINE

## 2025-06-23 PROCEDURE — G2211 COMPLEX E/M VISIT ADD ON: HCPCS | Mod: 95 | Performed by: INTERNAL MEDICINE

## 2025-06-23 PROCEDURE — 1126F AMNT PAIN NOTED NONE PRSNT: CPT | Performed by: INTERNAL MEDICINE

## 2025-06-23 RX ORDER — GABAPENTIN 300 MG/1
600 CAPSULE ORAL AT BEDTIME
Qty: 60 CAPSULE | Refills: 3 | Status: SHIPPED | OUTPATIENT
Start: 2025-06-23

## 2025-06-23 NOTE — NURSING NOTE
Current patient location: [id number] [facility] [address]    Is the patient currently in the state of [place]? YES    Visit mode: VIDEO    If the visit is dropped, the patient can be reconnected by:VIDEO VISIT: Text to cell phone:   Telephone Information:   Mobile [phone]       Will anyone else be joining the visit? NO  (If patient encounters technical issues they should call [phone])    Are changes needed to the allergy or medication list? Pt stated no changes to allergies and Pt stated no med changes    Are refills needed on medications prescribed by this physician? NO    Rooming Documentation:  Questionnaire(s) completed    Reason for visit: RECHECK    Marty RAMÍREZ

## 2025-06-23 NOTE — PROGRESS NOTES
Oncology Progress Note    Name: Sharon Fong  : 1975  MRN: [id number]    CC: Breast Cancer    HPI: Sharon Fong is a 49 year old female with a right clinical T2-3N3 hormone receptor positive, HER2 negative breast cancer with high risk features treated on block B and C on ISPY2.2 with weekly Taxol --> Abraxane/AC s/p bilateral mastectomies with [id number] disease (RCBII) s/p radiation, currently on adjuvant OFS/letrozole/ribociclib.    She is scheduled for BSO on 25. Last received Zometa 25, took advil/tylenol and that helped but still with some joint pain. Continues on letrozole/ribociclib.  No chest wall masses. On week 3 of ribo. She takes iron 4-5 times a week. + hot flashes, changing time of day did help so slightly improved but still bothersome particularly with the warmer weather.    History  Negative genetics  2024 MammaPrint -0.192 (high risk) and blueprint luminal B  2024 - 2024 ISPY2.2 weekly Taxol - changed to Abraxane starting week 2 due to allergic reaction  2024 Study biopsy  Right breast 1:00 grade 2 residual IDC with probable treatment related changes.  Also grade 2 DCIS.  Right axillary lymph node with residual metastatic disease.  Tumor-infiltrating lymphocytes of 10%.  Some decreased cellularity suggestive of treatment response.   2024 - 2024 ddAC x 4   2024 Right mastectomy showing 1.6cm of residual grade II IDC w/ evidence of treatment related changes. 25% cellularity. No LVI.  Also 1.6 cm of grade 2 DCIS. DCIS is 5% of tumor. Negative margins. ADH, classic type LCIS, calcs associated w/ invasive carcinoma, DCIS, and benign acini. ER (3+, %), DC (1+, 3%), HER2 2+ and FISH 2.8/2.4 = 1.2 (negative). / LN w/ metastatic carcinoma w/ evidence of treatment related changes. No EVA.  [id number].  Left breast mastectomy - ADH. No malignancy.    10/14/2024-2025 Zoladex    10/21/2024 DEXA scan showing osteopenia: Total hip T-score:  -1.7, Left femoral neck T-score = -2.1, Right femoral neck T-score= -2.1    10/28/2024-present  Letrozole  11/14-12/27/24: radiation  12/18/24-present: Zometa  1/13/25 - present: adjuvant ribo  7/29/25: planned BSO    Review of systems: All other systems reviewed and are negative except for what is described in the history of present illness.      PMH:   Patient Active Problem List    Diagnosis Date Noted    Muscular deconditioning 06/16/2025     Priority: Medium    Neck pain 04/28/2025     Priority: Medium    Hypotension, unspecified hypotension type 07/29/2024     Priority: Medium    Syncope, unspecified syncope type 07/29/2024     Priority: Medium    Malignant neoplasm of upper-inner quadrant of right female breast (H) 04/02/2024     Priority: Medium    Seizure (H) 11/15/2013     Priority: Medium    Vitamin D deficiency 04/23/2013     Priority: Medium     April 2013 July 2013      Inflammatory disease of breast 08/17/2010     Priority: Medium     S/p  Partial R mastectomy secondary to recurrent abscesses  Dr Sweet followed  Final dx after surgery -histoplasmosis  Was treated       Past Medical History:   Diagnosis Date    Breast cancer (H)     Hypermobility arthralgia     Hypotension, unspecified hypotension type 07/29/2024    Malignant neoplasm of upper-inner quadrant of right female breast (H) 04/02/2024    Right breast cancer  Has had longstanding history of multiple biopsies, infection, and partial mastectomy for a right breast multifocal abscess dating back to at least 2010.     2024 patient presented with palpable lump at 1:00 in the right breast     3/21/2024 diagnostic mammo showed clustered fine indeterminate calcs at 12:00.  No mammographic abnormality identified to correspond to the palpable    Migraine     S/P radiation therapy     6,000 cGy to right CW + Boost and 5,000 cGy to right SCV completed on 12/27/2024 - [facility]    Seizure (H) 11/15/2013    Seizure disorder (H) 2014     Once post surgery    Vitamin D deficiency 2013         Medications:   Current Outpatient Medications:     aspirin-acetaminophen-caffeine (EXCEDRIN MIGRAINE) 250-250-65 MG tablet, Take 2 tablets by mouth daily as needed for headaches., Disp: , Rfl:     ibuprofen (ADVIL/MOTRIN) 200 MG capsule, as needed., Disp: , Rfl:     letrozole (FEMARA) 2.5 MG tablet, TAKE ONE TABLET BY MOUTH ONE TIME DAILY, Disp: 90 tablet, Rfl: 3    ribociclib (KISQALI) (400 MG DOSE) 200 MG tablet therapy pack, Take 2 tablets (400 mg) by mouth every morning for 21 days. Then off for 7 days., Disp: 42 tablet, Rfl: 0  No current facility-administered medications for this visit.    Facility-Administered Medications Ordered in Other Visits:     lidocaine 1 % 9 mL, 9 mL, Intradermal, Once, Valeria Martinez MD    lidocaine 1% with EPINEPHrine 1:100,000 injection 10 mL, 10 mL, Intradermal, Once, Valeria Martinez MD      Allergies:   Allergies   Allergen Reactions    Citrullus Vulgaris Unknown and Other (See Comments)     Rash, swollen lips    Corn-Containing Products Other (See Comments)    Paclitaxel Other (See Comments)     PAIN in shoulder and pelvis - see infusion notes from 24 and 24    Salicylic Acid Unknown and Other (See Comments)         Social history:   Social History     Socioeconomic History    Marital status:      Spouse name: Not on file    Number of children: Not on file    Years of education: Not on file    Highest education level: Not on file   Occupational History    Not on file   Tobacco Use    Smoking status: Never     Passive exposure: Never    Smokeless tobacco: Never   Substance and Sexual Activity    Alcohol use: Never    Drug use: Never    Sexual activity: Not on file   Other Topics Concern    Not on file   Social History Narrative    Menarche 14. No periods since hysterectomy. No postmenopausal symptoms      first at age 26    Youngest child at diagnosis - 16    Working full time as        Social Drivers of Health     Financial Resource Strain: Not on file   Food Insecurity: No Food Insecurity (4/6/2024)    Received from [facility]    Hunger Vital Sign     Worried About Running Out of Food in the Last Year: Never true     Ran Out of Food in the Last Year: Never true   Transportation Needs: No Transportation Needs (4/6/2024)    Received from [facility]    PRAPARE - Transportation     Lack of Transportation (Medical): No     Lack of Transportation (Non-Medical): No   Physical Activity: Sufficiently Active (4/6/2024)    Received from [facility]    Exercise Vital Sign     Days of Exercise per Week: 5 days     Minutes of Exercise per Session: 30 min   Stress: Not on file   Social Connections: Unknown (7/16/2021)    Received from [facility]    Social Connection and Isolation Panel [NHANES]     Frequency of Communication with Friends and Family: Once a week     Frequency of Social Gatherings with Friends and Family: Once a week     Attends Alevism Services: Not on file     Active Member of Clubs or Organizations: No     Attends Club or Organization Meetings: Not on file     Marital Status: Not on file   Interpersonal Safety: Unknown (1/29/2025)    Interpersonal Safety     Do you feel physically and emotionally safe where you currently live?: Patient unable to answer     Within the past 12 months, have you been hit, slapped, kicked or otherwise physically hurt by someone?: Patient unable to answer     Within the past 12 months, have you been humiliated or emotionally abused in other ways by your partner or ex-partner?: Patient unable to answer   Housing Stability: Low Risk  (4/6/2024)    Received from [facility]    Housing Stability     What is your living situation today?: I have a steady place to live       Family history:I have reviewed this patient's family history and updated it with pertinent information if needed.  Family History   Problem Relation Age of Onset     Hypertension Mother     Hypertension Father     Cerebrovascular Disease Father     Thyroid Disease Sister     Glaucoma No family hx of     Macular Degeneration No family hx of      Physical exam:  Vitals:There were no vitals taken for this visit.  Gen: NAD, pleasant, interactive and answering questions appropriately, PS 0  HEENT: Normocephalic atraumatic, sclera anicteric  Neck: Full range of motion  Lungs: No respiratory distress, speaking in full sentences, no coughing  Skin: no facial rash noted  Psych: normal affect        Labs:  Lab Results   Component Value Date    WBC 2.8 (L) 06/20/2025    HGB 10.9 (L) 06/20/2025    HCT 31.8 (L) 06/20/2025     06/20/2025     06/20/2025    POTASSIUM 5.0 06/20/2025    CHLORIDE 104 06/20/2025    CO2 22 06/20/2025    BUN 12.0 06/20/2025    CR 0.69 06/20/2025     (H) 06/20/2025    AST 35 06/20/2025    ALT 12 06/20/2025    ALKPHOS 80 06/20/2025    BILITOTAL 0.5 06/20/2025    INR 1.01 07/29/2024       Radiology:   DEXA 10/24  T score -2.1    May 2025 MRI  C spine  1. No abnormal enhancement in the spinal cord, thecal sac or cervical  vertebrae. No evidence of metastatic disease in the cervical spine.  2. Small left central disc protrusion at C5-6 mildly abutting the  ventral thecal sac. No associated spinal canal or foraminal stenosis.    Bone scan from April 2024 reviewed showing mild uptake in bilateral shoulders, elbows, sternoclavicular joints,  knees and spine, likely related to degenerative changes.    Assessment/plan:   Sharon Fong is a 49 year old female with a right clinical T2-3N3 hormone receptor positive, HER2 negative breast cancer with high risk features treated on block B and C on ISPY2.2 with weekly Taxol --> Abraxane/AC s/p bilateral mastectomies with [id number] disease (RCBII) s/p radiation, currently on adjuvant OFS/letrozole/ribociclib.    1.  Breast Cancer: She continues on adjuvant OFS letrozole/ribo. Labs reviewed, ok to check every 3  months.   Planned BSO in late July so will give Zoladex monthly dose 7/9/25 (her preference). Planned Ribo through January 2027 and Letrozole through at least October 2029 although consider extended endocrine therapy. Breast exam next visit. Call if new symptoms develop.    2. Osteopenia:  Zometa due Dec 2025. DEXA due October 2026. Check vitamin D with next labs.    3.  Histoy of Iron deficiency anemia:  continues on iron. Monitor labs in 3 months. If stable, then can hold iron. Some anemia is common with ribociclib and her MCV is now normal. If drops again, may need capsule endoscopy.    4. Hot flashes:  Will trial gabapentin in the summer. We reviewed side effects of sedation and dry mouth.  Start with 1 capsule at bedtime for a week then increase to 2 capsules.  Most women need 2-4 capsules a day to help with hot flashes.    All of the patient's questions were answered.    Return to the office in 3 months or sooner as needed.    Thank you for allowing me to participate in the care of this patient.  Please call with any questions.    I personally reviewed the recent studies and I explained the rationale of the tests ordered today to the patient.      Cancer Staging   Malignant neoplasm of upper-inner quadrant of right female breast (H)  Staging form: Breast, AJCC 8th Edition  - Clinical: Stage IIIB (cT3, cN3, cM0, G2, ER+, SD+, HER2-)  - Pathologic: ypT1c, pN1a, cM0, G2, ER+, SD+, HER2-      The longitudinal plan of care for the diagnosis(es)/condition(s) as documented were addressed during this visit. Due to the added complexity in care, I will continue to support [facility] in the subsequent management and with ongoing continuity of care.      Orders Placed This Encounter   Procedures    Magnesium    Phosphorus    COMPREHENSIVE METABOLIC PANEL    Vitamin D Deficiency    Iron & Iron Binding Capacity    Ferritin    CBC with Platelets & Differential

## 2025-06-23 NOTE — LETTER
2025      Sharon Fong  [address] [facility] [address]      Dear Colleague,    Thank you for referring your patient, Sharon Fong, to the [facility]. Please see a copy of my visit note below.    Virtual Visit Details    Type of service:  Video Visit     Originating Location (pt. Location): Home  Distant Location (provider location):  On-site  Platform used for Video Visit: [facility]    Oncology Progress Note    Name: Sharon Fong  : 1975  MRN: [id number]    CC: Breast Cancer    HPI: Sharon Fong is a 49 year old female with a right clinical T2-3N3 hormone receptor positive, HER2 negative breast cancer with high risk features treated on block B and C on ISPY2.2 with weekly Taxol --> Abraxane/AC s/p bilateral mastectomies with [id number] disease (RCBII) s/p radiation, currently on adjuvant OFS/letrozole/ribociclib.    She is scheduled for BSO on 25. Last received Zometa 25, took advil/tylenol and that helped but still with some joint pain. Continues on letrozole/ribociclib.  No chest wall masses. On week 3 of ribo. She takes iron 4-5 times a week. + hot flashes, changing time of day did help so slightly improved but still bothersome particularly with the warmer weather.    History  Negative genetics  2024 MammaPrint -0.192 (high risk) and blueprint luminal B  2024 - 2024 ISPY2.2 weekly Taxol - changed to Abraxane starting week 2 due to allergic reaction  2024 Study biopsy  Right breast 1:00 grade 2 residual IDC with probable treatment related changes.  Also grade 2 DCIS.  Right axillary lymph node with residual metastatic disease.  Tumor-infiltrating lymphocytes of 10%.  Some decreased cellularity suggestive of treatment response.   2024 - 2024 ddAC x 4   2024 Right mastectomy showing 1.6cm of residual grade II IDC w/ evidence of treatment related changes. 25% cellularity. No LVI.  Also 1.6 cm of  grade 2 DCIS. DCIS is 5% of tumor. Negative margins. ADH, classic type LCIS, calcs associated w/ invasive carcinoma, DCIS, and benign acini. ER (3+, %), ND (1+, 3%), HER2 2+ and FISH 2.8/2.4 = 1.2 (negative). 1/4 LN w/ metastatic carcinoma w/ evidence of treatment related changes. No EVA.  [id number].  Left breast mastectomy - ADH. No malignancy.    10/14/2024-July 2025 Zoladex    10/21/2024 DEXA scan showing osteopenia: Total hip T-score: -1.7, Left femoral neck T-score = -2.1, Right femoral neck T-score= -2.1    10/28/2024-present  Letrozole  11/14-12/27/24: radiation  12/18/24-present: Zometa  1/13/25 - present: adjuvant ribo  7/29/25: planned BSO    Review of systems: All other systems reviewed and are negative except for what is described in the history of present illness.      PMH:   Patient Active Problem List    Diagnosis Date Noted     Muscular deconditioning 06/16/2025     Priority: Medium     Neck pain 04/28/2025     Priority: Medium     Hypotension, unspecified hypotension type 07/29/2024     Priority: Medium     Syncope, unspecified syncope type 07/29/2024     Priority: Medium     Malignant neoplasm of upper-inner quadrant of right female breast (H) 04/02/2024     Priority: Medium     Seizure (H) 11/15/2013     Priority: Medium     Vitamin D deficiency 04/23/2013     Priority: Medium     April 2013 July 2013       Inflammatory disease of breast 08/17/2010     Priority: Medium     S/p  Partial R mastectomy secondary to recurrent abscesses  Dr Sweet followed  Final dx after surgery -histoplasmosis  Was treated       Past Medical History:   Diagnosis Date     Breast cancer (H)      Hypermobility arthralgia      Hypotension, unspecified hypotension type 07/29/2024     Malignant neoplasm of upper-inner quadrant of right female breast (H) 04/02/2024    Right breast cancer  Has had longstanding history of multiple biopsies, infection, and partial mastectomy for a right breast multifocal abscess dating  back to at least 2010. 2024 patient presented with palpable lump at 1:00 in the right breast     3/21/2024 diagnostic mammo showed clustered fine indeterminate calcs at 12:00.  No mammographic abnormality identified to correspond to the palpable     Migraine      S/P radiation therapy     6,000 cGy to right CW + Boost and 5,000 cGy to right SCV completed on 12/27/2024 [facility]     Seizure (H) 11/15/2013     Seizure disorder (H) 2014    Once post surgery     Vitamin D deficiency 04/23/2013 April 2013 July 2013         Medications:   Current Outpatient Medications:      aspirin-acetaminophen-caffeine (EXCEDRIN MIGRAINE) 250-250-65 MG tablet, Take 2 tablets by mouth daily as needed for headaches., Disp: , Rfl:      ibuprofen (ADVIL/MOTRIN) 200 MG capsule, as needed., Disp: , Rfl:      letrozole (FEMARA) 2.5 MG tablet, TAKE ONE TABLET BY MOUTH ONE TIME DAILY, Disp: 90 tablet, Rfl: 3     ribociclib (KISQALI) (400 MG DOSE) 200 MG tablet therapy pack, Take 2 tablets (400 mg) by mouth every morning for 21 days. Then off for 7 days., Disp: 42 tablet, Rfl: 0  No current facility-administered medications for this visit.    Facility-Administered Medications Ordered in Other Visits:      lidocaine 1 % 9 mL, 9 mL, Intradermal, Once, Valeria Martinez MD     lidocaine 1% with EPINEPHrine 1:100,000 injection 10 mL, 10 mL, Intradermal, Once, Valeria Martinez MD      Allergies:   Allergies   Allergen Reactions     Citrullus Vulgaris Unknown and Other (See Comments)     Rash, swollen lips     Corn-Containing Products Other (See Comments)     Paclitaxel Other (See Comments)     PAIN in shoulder and pelvis - see infusion notes from 4/30/24 and 5/7/24     Salicylic Acid Unknown and Other (See Comments)         Social history:   Social History     Socioeconomic History     Marital status:      Spouse name: Not on file     Number of children: Not on file     Years of education: Not on file     Highest education  level: Not on file   Occupational History     Not on file   Tobacco Use     Smoking status: Never     Passive exposure: Never     Smokeless tobacco: Never   Substance and Sexual Activity     Alcohol use: Never     Drug use: Never     Sexual activity: Not on file   Other Topics Concern     Not on file   Social History Narrative    Menarche 14. No periods since hysterectomy. No postmenopausal symptoms      first at age 26    Youngest child at diagnosis - 16    Working full time as       Social Drivers of Health     Financial Resource Strain: Not on file   Food Insecurity: No Food Insecurity (2024)    Received from [facility]    Hunger Vital Sign      Worried About Running Out of Food in the Last Year: Never true      Ran Out of Food in the Last Year: Never true   Transportation Needs: No Transportation Needs (2024)    Received from [facility]    PRAPARE - Transportation      Lack of Transportation (Medical): No      Lack of Transportation (Non-Medical): No   Physical Activity: Sufficiently Active (2024)    Received from [facility]    Exercise Vital Sign      Days of Exercise per Week: 5 days      Minutes of Exercise per Session: 30 min   Stress: Not on file   Social Connections: Unknown (2021)    Received from [facility]    Social Connection and Isolation Panel [NHANES]      Frequency of Communication with Friends and Family: Once a week      Frequency of Social Gatherings with Friends and Family: Once a week      Attends Druze Services: Not on file      Active Member of Clubs or Organizations: No      Attends Club or Organization Meetings: Not on file      Marital Status: Not on file   Interpersonal Safety: Unknown (2025)    Interpersonal Safety      Do you feel physically and emotionally safe where you currently live?: Patient unable to answer      Within the past 12 months, have you been hit, slapped, kicked or otherwise physically hurt by someone?: Patient unable  to answer      Within the past 12 months, have you been humiliated or emotionally abused in other ways by your partner or ex-partner?: Patient unable to answer   Housing Stability: Low Risk  (4/6/2024)    Received from [facility]    Housing Stability      What is your living situation today?: I have a steady place to live       Family history:I have reviewed this patient's family history and updated it with pertinent information if needed.  Family History   Problem Relation Age of Onset     Hypertension Mother      Hypertension Father      Cerebrovascular Disease Father      Thyroid Disease Sister      Glaucoma No family hx of      Macular Degeneration No family hx of      Physical exam:  Vitals:There were no vitals taken for this visit.  Gen: NAD, pleasant, interactive and answering questions appropriately, PS 0  HEENT: Normocephalic atraumatic, sclera anicteric  Neck: Full range of motion  Lungs: No respiratory distress, speaking in full sentences, no coughing  Skin: no facial rash noted  Psych: normal affect        Labs:  Lab Results   Component Value Date    WBC 2.8 (L) 06/20/2025    HGB 10.9 (L) 06/20/2025    HCT 31.8 (L) 06/20/2025     06/20/2025     06/20/2025    POTASSIUM 5.0 06/20/2025    CHLORIDE 104 06/20/2025    CO2 22 06/20/2025    BUN 12.0 06/20/2025    CR 0.69 06/20/2025     (H) 06/20/2025    AST 35 06/20/2025    ALT 12 06/20/2025    ALKPHOS 80 06/20/2025    BILITOTAL 0.5 06/20/2025    INR 1.01 07/29/2024       Radiology:   DEXA 10/24  T score -2.1    May 2025 MRI  C spine  1. No abnormal enhancement in the spinal cord, thecal sac or cervical  vertebrae. No evidence of metastatic disease in the cervical spine.  2. Small left central disc protrusion at C5-6 mildly abutting the  ventral thecal sac. No associated spinal canal or foraminal stenosis.    Bone scan from April 2024 reviewed showing mild uptake in bilateral shoulders, elbows, sternoclavicular joints,  knees and spine,  likely related to degenerative changes.    Assessment/plan:   Sharon Fong is a 49 year old female with a right clinical T2-3N3 hormone receptor positive, HER2 negative breast cancer with high risk features treated on block B and C on ISPY2.2 with weekly Taxol --> Abraxane/AC s/p bilateral mastectomies with [id number] disease (RCBII) s/p radiation, currently on adjuvant OFS/letrozole/ribociclib.    1.  Breast Cancer: She continues on adjuvant OFS letrozole/ribo. Labs reviewed, ok to check every 3 months.   Planned BSO in late July so will give Zoladex monthly dose 7/9/25 (her preference). Planned Ribo through January 2027 and Letrozole through at least October 2029 although consider extended endocrine therapy. Breast exam next visit. Call if new symptoms develop.    2. Osteopenia:  Zometa due Dec 2025. DEXA due October 2026. Check vitamin D with next labs.    3.  Histoy of Iron deficiency anemia:  continues on iron. Monitor labs in 3 months. If stable, then can hold iron. Some anemia is common with ribociclib and her MCV is now normal. If drops again, may need capsule endoscopy.    4. Hot flashes:  Will trial gabapentin in the summer. We reviewed side effects of sedation and dry mouth.  Start with 1 capsule at bedtime for a week then increase to 2 capsules.  Most women need 2-4 capsules a day to help with hot flashes.    All of the patient's questions were answered.    Return to the office in 3 months or sooner as needed.    Thank you for allowing me to participate in the care of this patient.  Please call with any questions.    I personally reviewed the recent studies and I explained the rationale of the tests ordered today to the patient.      Cancer Staging   Malignant neoplasm of upper-inner quadrant of right female breast (H)  Staging form: Breast, AJCC 8th Edition  - Clinical: Stage IIIB (cT3, cN3, cM0, G2, ER+, OK+, HER2-)  - Pathologic: ypT1c, pN1a, cM0, G2, ER+, OK+, HER2-      The longitudinal plan of  care for the diagnosis(es)/condition(s) as documented were addressed during this visit. Due to the added complexity in care, I will continue to support Sharon in the subsequent management and with ongoing continuity of care.      Orders Placed This Encounter   Procedures     Magnesium     Phosphorus     COMPREHENSIVE METABOLIC PANEL     Vitamin D Deficiency     Iron & Iron Binding Capacity     Ferritin     CBC with Platelets & Differential       Again, thank you for allowing me to participate in the care of your patient.        Sincerely,        Gena Caballero MD    Electronically signed

## 2025-06-30 ENCOUNTER — THERAPY VISIT (OUTPATIENT)
Dept: PHYSICAL THERAPY | Facility: CLINIC | Age: 50
End: 2025-06-30
Payer: COMMERCIAL

## 2025-06-30 DIAGNOSIS — R29.898 MUSCULAR DECONDITIONING: Primary | ICD-10-CM

## 2025-06-30 PROCEDURE — 97110 THERAPEUTIC EXERCISES: CPT | Mod: GP

## 2025-07-03 ENCOUNTER — ENROLLMENT (OUTPATIENT)
Dept: HOME HEALTH SERVICES | Facility: HOME HEALTH | Age: 50
End: 2025-07-03
Payer: COMMERCIAL

## 2025-07-03 DIAGNOSIS — C50.211 MALIGNANT NEOPLASM OF UPPER-INNER QUADRANT OF RIGHT FEMALE BREAST (H): Primary | ICD-10-CM

## 2025-07-03 NOTE — PROGRESS NOTES
PT HAS AGREED TO TRANSITION TO [facility] FOR ZOMETA, INITIATED REQUIRED [facility] REFERRAL. ORDER HAS BEEN PENDED FOR PROVIDER SIGNATURE.    Thank you,    Nadege Goodman  Supervisor Pharmacy Operations Access Team  [facility]  Office: [phone]  Fax: [phone]  [email or web address]

## 2025-07-08 ENCOUNTER — THERAPY VISIT (OUTPATIENT)
Dept: PHYSICAL THERAPY | Facility: CLINIC | Age: 50
End: 2025-07-08
Payer: COMMERCIAL

## 2025-07-08 DIAGNOSIS — R29.898 MUSCULAR DECONDITIONING: Primary | ICD-10-CM

## 2025-07-08 PROCEDURE — 97110 THERAPEUTIC EXERCISES: CPT | Mod: GP | Performed by: PHYSICAL THERAPY ASSISTANT

## 2025-07-16 ENCOUNTER — THERAPY VISIT (OUTPATIENT)
Dept: PHYSICAL THERAPY | Facility: CLINIC | Age: 50
End: 2025-07-16
Payer: COMMERCIAL

## 2025-07-16 DIAGNOSIS — R29.898 MUSCULAR DECONDITIONING: Primary | ICD-10-CM

## 2025-07-16 PROCEDURE — 97110 THERAPEUTIC EXERCISES: CPT | Mod: GP

## 2025-07-16 NOTE — PROGRESS NOTES
07/16/25 0500   Appointment Info   Signing clinician's name / credentials Delonte Ayala, PT, DPT, CSCS, CLT   Total/Authorized Visits [employer]   Visits Used 4   Medical Diagnosis Generalized Deconditioning   PT Tx Diagnosis Generalized Deconditioning   Progress Note/Certification   Onset of illness/injury or Date of Surgery 04/14/25   Therapy Frequency 1x a week   Predicted Duration 8 weeks   Progress Note Due Date 08/11/25   Progress Note Completed Date 06/16/25   PT Goal 1   Goal Identifier Transfers and Stairs   Goal Description Will be able to ascend and descends of 5 flights of stairs without fatigue or pain as well as transfer from any / all surfaces 10 times consecuitively without pain or difficulty   Rationale to maximize safety and independence with performance of ADLs and functional tasks;to maximize safety and independence within the community;to maximize safety and independence within the home;to maximize safety and independence with transportation;to maximize safety and independence with self cares   Goal Progress Unchanged since last week due to busy   Target Date 08/11/25   Subjective Report   Subjective Report Reports being busy last week so did not get in much exercise beyond walking. However, overall still doing well   Objective Measure 1   Objective Measure Bike distance for endurance and conditioning   Details 1.30 miles   Therapeutic Procedure/Exercise   Therapeutic Procedures: strength, endurance, ROM, flexibility minutes (17150) 40   Ther Proc 1 Bike   Ther Proc 1 - Details Level 6 resistance for 5 minutes for a distance of 1.30   Ther Proc 2 Seated Cable Rows   Ther Proc 2 - Details 3x15 Level 6 resistance   Ther Proc 3 Seated Cable Lat Pulldown   Ther Proc 3 - Details 3x15 with Level 6 Resistance   Skilled Intervention Continue progressive overload HEP   Patient Response/Progress Tolerated well. Good resistance and fatiguing appropriately. Good resistance intensity   Plan   Home program See  PTRx   Updates to plan of care Continue per POC   Plan for next session BIke 5 minutes at level 6 resistance and record distance. Progress HEP to more weight. Then leg press squats for heavier weight lifting.   Total Session Time   Timed Code Treatment Minutes 40   Total Treatment Time (sum of timed and untimed services) 40           PLAN  Continue therapy per current plan of care.  Sharon is doing great and improving her strength. She has an upcoming procedure with activity restrictions post-procedure. At this time we are going to temporarily take a break from Physical Therapy as she follows her post-operative restrictions and return to therapy when her surgeon permits her to, which we estimate will be around September 2025.    Beginning/End Dates of Progress Note Reporting Period:  06/16/25 to 07/16/2025    Referring Provider:  Gena Caballero

## 2025-08-18 DIAGNOSIS — Z17.0 MALIGNANT NEOPLASM OF UPPER-INNER QUADRANT OF RIGHT BREAST IN FEMALE, ESTROGEN RECEPTOR POSITIVE (H): Primary | ICD-10-CM

## 2025-08-18 DIAGNOSIS — C50.211 MALIGNANT NEOPLASM OF UPPER-INNER QUADRANT OF RIGHT BREAST IN FEMALE, ESTROGEN RECEPTOR POSITIVE (H): Primary | ICD-10-CM

## (undated) DEVICE — MARKER MARGIN MARKER STD 6 COLOR SGL USE MMS6

## (undated) DEVICE — DRAIN JACKSON PRATT ROUND W/TROCAR 15FR LF JP-HUR151

## (undated) DEVICE — SU DERMABOND ADVANCED .7ML DNX12

## (undated) DEVICE — GLOVE BIOGEL PI MICRO SZ 6.5 48565

## (undated) DEVICE — SPECIMEN CONTAINER W/10% BUFFERED FORMALIN 120ML 591201

## (undated) DEVICE — KIT INTRODUCER FLUENT MICRO 5FRX10CM ECHO TIP KIT-038-04

## (undated) DEVICE — DRSG TEGADERM 4X10" 1627

## (undated) DEVICE — Device

## (undated) DEVICE — SU VICRYL+ 3-0 27IN SH UND VCP416H

## (undated) DEVICE — ESU GROUND PAD ADULT W/CORD E7507

## (undated) DEVICE — LINEN GOWN XLG 5407

## (undated) DEVICE — DECANTER BAG 2002S

## (undated) DEVICE — STPL SKIN 35W 059037

## (undated) DEVICE — BNDG COHESIVE 2"X5YDS UNSTERILE ASSORTED COLORS LF 8962

## (undated) DEVICE — PACK MINOR CUSTOM ASC

## (undated) DEVICE — CLIP HORIZON MED BLUE 002200

## (undated) DEVICE — SPONGE LAP 18X18" X8435

## (undated) DEVICE — GOWN XLG DISP 9545

## (undated) DEVICE — BLADE KNIFE SURG 10 371110

## (undated) DEVICE — SOL NACL 0.9% IRRIG 500ML BOTTLE 2F7123

## (undated) DEVICE — DRSG KERLIX FLUFFS X5

## (undated) DEVICE — DRAPE LAP PEDS DISP 29492

## (undated) DEVICE — FILTER HEPA FLUID TRAP NEPTUNE 0703-040-001

## (undated) DEVICE — NDL BLUNT 18GA 1" W/O FILTER 305181

## (undated) DEVICE — SYR 01ML 27GA 0.5" NDL TBC 309623

## (undated) DEVICE — PACK ASC BREAST SBA1BPUMA / SB15BPUM1

## (undated) DEVICE — SOL WATER IRRIG 500ML BOTTLE 2F7113

## (undated) DEVICE — GLOVE BIOGEL PI ULTRATOUCH G SZ 8.0 42180

## (undated) DEVICE — SU ETHILON 3-0 PS-1 18" 1663H

## (undated) DEVICE — DRAPE IOBAN INCISE 23X17" 6650EZ

## (undated) DEVICE — SU MONOCRYL 4-0 P-3 18" UND Y494G

## (undated) DEVICE — PROBE COVER INTRAOPERATIVE 5"X96" PC1308

## (undated) DEVICE — DRAIN JACKSON PRATT RESERVOIR 100ML SU130-1305

## (undated) DEVICE — SYR 10ML FINGER CONTROL W/O NDL 309695

## (undated) DEVICE — SOL NACL 0.9% 10ML VIAL 0409-4888-02

## (undated) DEVICE — SU VICRYL 4-0 P-3 18" UND  J494H

## (undated) DEVICE — SU PDS II 4-0 FS-2 27" Z422H

## (undated) DEVICE — SUCTION MANIFOLD NEPTUNE 2 SYS 1 PORT 702-025-000

## (undated) DEVICE — GLOVE BIOGEL PI MICRO SZ 8.0 48580

## (undated) DEVICE — BNDG ELASTIC 6" DBL LENGTH UNSTERILE 6611-16

## (undated) DEVICE — CONNECTOR MALE TO MALE LL

## (undated) DEVICE — PACK CENTRAL LINE INSERTION SAN32CLFCG

## (undated) DEVICE — NEPTUNE SMOKE EVAC ADAPTER

## (undated) DEVICE — PEN MARKING SKIN TYCO DEVON DUAL TIP 31145868

## (undated) DEVICE — PITCHER STERILE 1000ML  SSK9004A

## (undated) DEVICE — SURGICEL ABSORBABLE HEMOSTAT SNOW 4"X4" 2083

## (undated) DEVICE — PAD CHUX UNDERPAD 30X36" P3036C

## (undated) DEVICE — SU SILK 3-0 SH 30" K832H

## (undated) DEVICE — LINEN TOWEL PACK X5 5464

## (undated) DEVICE — KNIFE HANDLE W/15 BLADE 371615

## (undated) DEVICE — NDL 25GA 2"  8881200441

## (undated) DEVICE — ESU ELEC BLADE 2.75" COATED/INSULATED E1455

## (undated) DEVICE — CLIP HORIZON SM RED WIDE SLOT 001201

## (undated) DEVICE — LOCALIZER INSTRUMENT COVER KIT

## (undated) DEVICE — DRAPE LAP TRANSVERSE 29421

## (undated) DEVICE — KIT PROCEDURE SPY-PHI SGL HH9001

## (undated) DEVICE — DRSG GAUZE 4X4" 3033

## (undated) DEVICE — PREP PAD ALCOHOL 6818

## (undated) DEVICE — PREP CHLORAPREP 26ML TINTED HI-LITE ORANGE 930815

## (undated) RX ORDER — DEXAMETHASONE SODIUM PHOSPHATE 4 MG/ML
INJECTION, SOLUTION INTRA-ARTICULAR; INTRALESIONAL; INTRAMUSCULAR; INTRAVENOUS; SOFT TISSUE
Status: DISPENSED
Start: 2024-09-30

## (undated) RX ORDER — CEFAZOLIN SODIUM 2 G/50ML
SOLUTION INTRAVENOUS
Status: DISPENSED
Start: 2024-04-17

## (undated) RX ORDER — PROPOFOL 10 MG/ML
INJECTION, EMULSION INTRAVENOUS
Status: DISPENSED
Start: 2024-09-30

## (undated) RX ORDER — ONDANSETRON 2 MG/ML
INJECTION INTRAMUSCULAR; INTRAVENOUS
Status: DISPENSED
Start: 2024-09-30

## (undated) RX ORDER — DIPHENHYDRAMINE HYDROCHLORIDE 50 MG/ML
INJECTION INTRAMUSCULAR; INTRAVENOUS
Status: DISPENSED
Start: 2024-09-30

## (undated) RX ORDER — HYDROMORPHONE HYDROCHLORIDE 1 MG/ML
INJECTION, SOLUTION INTRAMUSCULAR; INTRAVENOUS; SUBCUTANEOUS
Status: DISPENSED
Start: 2024-09-30

## (undated) RX ORDER — GLYCOPYRROLATE 0.2 MG/ML
INJECTION INTRAMUSCULAR; INTRAVENOUS
Status: DISPENSED
Start: 2024-09-30

## (undated) RX ORDER — FENTANYL CITRATE 50 UG/ML
INJECTION, SOLUTION INTRAMUSCULAR; INTRAVENOUS
Status: DISPENSED
Start: 2024-09-30

## (undated) RX ORDER — BUPIVACAINE HYDROCHLORIDE 2.5 MG/ML
INJECTION, SOLUTION INFILTRATION; PERINEURAL
Status: DISPENSED
Start: 2024-09-30

## (undated) RX ORDER — FENTANYL CITRATE 50 UG/ML
INJECTION, SOLUTION INTRAMUSCULAR; INTRAVENOUS
Status: DISPENSED
Start: 2025-01-29

## (undated) RX ORDER — KETOROLAC TROMETHAMINE 30 MG/ML
INJECTION, SOLUTION INTRAMUSCULAR; INTRAVENOUS
Status: DISPENSED
Start: 2024-09-30

## (undated) RX ORDER — ENOXAPARIN SODIUM 100 MG/ML
INJECTION SUBCUTANEOUS
Status: DISPENSED
Start: 2024-09-30

## (undated) RX ORDER — CEFAZOLIN SODIUM 2 G/50ML
SOLUTION INTRAVENOUS
Status: DISPENSED
Start: 2024-09-30

## (undated) RX ORDER — ACETAMINOPHEN 325 MG/1
TABLET ORAL
Status: DISPENSED
Start: 2024-09-30

## (undated) RX ORDER — ACETAMINOPHEN 325 MG/1
TABLET ORAL
Status: DISPENSED
Start: 2024-04-17